# Patient Record
Sex: MALE | Race: WHITE | NOT HISPANIC OR LATINO | Employment: OTHER | URBAN - METROPOLITAN AREA
[De-identification: names, ages, dates, MRNs, and addresses within clinical notes are randomized per-mention and may not be internally consistent; named-entity substitution may affect disease eponyms.]

---

## 2018-03-06 ENCOUNTER — TRANSCRIBE ORDERS (OUTPATIENT)
Dept: LAB | Facility: HOSPITAL | Age: 83
End: 2018-03-06

## 2018-07-14 ENCOUNTER — HOSPITAL ENCOUNTER (EMERGENCY)
Facility: HOSPITAL | Age: 83
Discharge: HOME | End: 2018-07-14
Attending: INTERNAL MEDICINE
Payer: MEDICARE

## 2018-07-14 VITALS
OXYGEN SATURATION: 94 % | HEIGHT: 68 IN | RESPIRATION RATE: 18 BRPM | DIASTOLIC BLOOD PRESSURE: 62 MMHG | HEART RATE: 76 BPM | BODY MASS INDEX: 21.22 KG/M2 | TEMPERATURE: 98.3 F | WEIGHT: 140 LBS | SYSTOLIC BLOOD PRESSURE: 126 MMHG

## 2018-07-14 DIAGNOSIS — T83.9XXA FOLEY CATHETER PROBLEM, INITIAL ENCOUNTER (HCC): Primary | ICD-10-CM

## 2018-07-14 DIAGNOSIS — N32.89 BLADDER SPASM: ICD-10-CM

## 2018-07-14 PROCEDURE — 87186 SC STD MICRODIL/AGAR DIL: CPT | Performed by: INTERNAL MEDICINE

## 2018-07-14 PROCEDURE — 87077 CULTURE AEROBIC IDENTIFY: CPT | Performed by: INTERNAL MEDICINE

## 2018-07-14 PROCEDURE — 99283 EMERGENCY DEPT VISIT LOW MDM: CPT

## 2018-07-14 PROCEDURE — 87086 URINE CULTURE/COLONY COUNT: CPT | Performed by: INTERNAL MEDICINE

## 2018-07-14 RX ORDER — GABAPENTIN 100 MG/1
100 CAPSULE ORAL 3 TIMES DAILY
Status: ON HOLD | COMMUNITY
End: 2020-02-27 | Stop reason: SDUPTHER

## 2018-07-14 RX ORDER — LEVOFLOXACIN 500 MG/1
500 TABLET, FILM COATED ORAL ONCE
Status: COMPLETED | OUTPATIENT
Start: 2018-07-14 | End: 2018-07-14

## 2018-07-14 RX ORDER — FUROSEMIDE 40 MG/1
40 TABLET ORAL 2 TIMES DAILY
Status: ON HOLD | COMMUNITY
End: 2018-10-18

## 2018-07-14 RX ORDER — ASPIRIN 81 MG/1
81 TABLET ORAL DAILY
Status: ON HOLD | COMMUNITY
End: 2020-02-27 | Stop reason: SDUPTHER

## 2018-07-14 RX ORDER — ATROPA BELLADONNA AND OPIUM 16.2; 6 MG/1; MG/1
1 SUPPOSITORY RECTAL EVERY 6 HOURS PRN
Status: DISCONTINUED | OUTPATIENT
Start: 2018-07-14 | End: 2018-07-14 | Stop reason: HOSPADM

## 2018-07-14 RX ORDER — TRAMADOL HYDROCHLORIDE 50 MG/1
50 TABLET ORAL EVERY 6 HOURS PRN
COMMUNITY
End: 2020-02-03 | Stop reason: HOSPADM

## 2018-07-14 RX ORDER — OXYBUTYNIN CHLORIDE 5 MG/1
15 TABLET ORAL DAILY
Status: ON HOLD | COMMUNITY
End: 2020-02-27 | Stop reason: SDUPTHER

## 2018-07-14 RX ORDER — INSULIN GLARGINE 100 [IU]/ML
20 INJECTION, SOLUTION SUBCUTANEOUS DAILY
Status: ON HOLD | COMMUNITY
End: 2020-02-27 | Stop reason: SDUPTHER

## 2018-07-14 RX ADMIN — LEVOFLOXACIN 500 MG: 500 TABLET, FILM COATED ORAL at 19:07

## 2018-07-14 RX ADMIN — ATROPA BELLADONNA AND OPIUM 1 SUPPOSITORY: 16.2; 6 SUPPOSITORY RECTAL at 19:26

## 2018-07-14 NOTE — DISCHARGE INSTRUCTIONS
Lucia Catheter Placement and Care   AMBULATORY CARE:   A Lucia catheter  is a sterile tube that is inserted into your bladder to drain urine  It is also called an indwelling urinary catheter  The tip of the catheter has a small balloon filled with solution that holds the catheter in your bladder  How a Lucia catheter is placed:   · Your healthcare provider will clean your genital area with a sterile solution  He or she will put lubricant jelly on the catheter to help it go in smoothly  The end of the catheter with the deflated balloon will be inserted into your urethra  The catheter will be moved slowly and gently into your bladder  You may be asked to take slow, deep breaths or to push as if you were trying to urinate as the catheter is inserted  · When your healthcare provider sees urine flowing from the catheter, he or she will fill the balloon at the end of the catheter  The balloon holds the catheter in place so it does not come out  Your healthcare provider will attach the open end of the catheter to a sterile drainage bag  Seek care immediately if:   · Your catheter comes out  · You suddenly have material that looks like sand in the tubing or drainage bag  · No urine is draining into the bag and you have checked the system  · You have pain in your hip, back, pelvis, or lower abdomen  · You are confused or cannot think clearly  Contact your healthcare provider if:   · You have a fever  · You have bladder spasms for more than 1 day after the catheter is placed  · You see blood in the tubing or drainage bag  · You have a rash or itching where the catheter tube is secured to your skin  · Urine leaks from or around the catheter, tubing, or drainage bag  · The closed drainage system has accidently come open or apart  · You see a layer of crystals inside the tubing  · You have questions or concerns about your condition or care    Care for your Lucia catheter: · Clean your genital area 2 times every day  Clean your catheter and the area around where it was inserted  Use soap and water  Clean your anal opening and catheter area after every bowel movement  · Secure the catheter tube  so you do not pull or move the catheter  This helps prevent pain and bladder spasms  Healthcare providers will show you how to use medical tape or a strap to secure the catheter tube to your body  · Keep a closed drainage system  Your Lucia catheter should always be attached to the drainage bag to form a closed system  Do not disconnect any part of the closed system unless you need to change the bag  Care for your drainage bag:   · Ask if a leg bag is right for you  A leg bag can be worn under your clothes  Ask your healthcare provider for more information about a leg bag  · Keep the drainage bag below the level of your waist   This helps stop urine from moving back up the tubing and into your bladder  Do not loop or kink the tubing  This can cause urine to back up and collect in your bladder  Do not let the drainage bag touch or lie on the floor  · Empty the drainage bag when needed  The weight of a full drainage bag can be painful  Empty the drainage bag every 3 to 6 hours or when it is ? full  · Clean and change the drainage bag as directed  Ask your healthcare provider how often you should change the drainage bag and what cleaning solution to use  Wear disposable gloves when you change the bag  Do not allow the end of the catheter or tubing to touch anything  Clean the ends with an alcohol pad before you reconnect them  What to do if problems develop:   · No urine is draining into the bag:      ¨ Check for kinks in the tubing and straighten them out  ¨ Check the tape or strap used to secure the catheter tube to your skin  Make sure it is not blocking the tube  ¨ Make sure you are not sitting or lying on the tubing      ¨ Make sure the urine bag is hanging below the level of your waist     · Urine leaks from or around the catheter, tubing, or drainage bag:  Check if the closed drainage system has accidently come open or apart  Clean the catheter and tubing ends with a new alcohol pad and reconnect them  Prevent an infection:   · Wash your hands often  Wash before and after you touch your catheter, tubing, or drainage bag  Use soap and water  Wear clean disposable gloves when you care for your catheter or disconnect the drainage bag  Wash your hands before you prepare or eat food  · Drink liquids as directed  Ask your healthcare provider how much liquid to drink each day and which liquids are best for you  Liquids will help flush your kidneys and bladder to help prevent infection  Follow up with your healthcare provider as directed:  Write down your questions so you remember to ask them during your visits  © 2017 [employer] Information is for End User's use only and may not be sold, redistributed or otherwise used for commercial purposes  All illustrations and images included in CareNotes® are the copyrighted property of [employer]  or [employer]  The above information is an  only  It is not intended as medical advice for individual conditions or treatments  Talk to your doctor, nurse or pharmacist before following any medical regimen to see if it is safe and effective for you

## 2018-07-14 NOTE — ED PROVIDER NOTES
History  Chief Complaint   Patient presents with    Bladder Spasms     pt has suprapubic catherter has been having spasm x few weeks  Dr Viola Estrada aware  Has been flushing frequently, returns clear  A 80year-old male with chronic Lucia has been intermittently flexion and lateral last 24 hr   Is a increasing number bladder spasms today presents to the emergency room for further evaluation  He denies fever chills or flank pain associated with this  He was to get his Lucia catheter changed this upcoming week  Other wife cares for his Lucia regularly  Prior to Admission Medications   Prescriptions Last Dose Informant Patient Reported? Taking?   aspirin (ECOTRIN LOW STRENGTH) 81 mg EC tablet   Yes Yes   Sig: Take 81 mg by mouth daily   fluticasone-salmeterol (ADVAIR) 250-50 mcg/dose inhaler   Yes Yes   Sig: Inhale 1 puff 2 (two) times a day Rinse mouth after use  furosemide (LASIX) 40 mg tablet   Yes Yes   Sig: Take 40 mg by mouth 2 (two) times a day   gabapentin (NEURONTIN) 100 mg capsule   Yes Yes   Sig: Take 100 mg by mouth 3 (three) times a day   insulin glargine (LANTUS) 100 units/mL subcutaneous injection   Yes Yes   Sig: Inject 15 Units under the skin daily   oxybutynin (DITROPAN) 5 mg tablet   Yes Yes   Sig: Take 15 mg by mouth 3 (three) times a day   traMADol (ULTRAM) 50 mg tablet   Yes Yes   Sig: Take 50 mg by mouth every 6 (six) hours as needed for moderate pain      Facility-Administered Medications: None       Past Medical History:   Diagnosis Date    [facility]     Diabetes mellitus ([facility] )     Renal disorder     Stroke [facility])        Past Surgical History:   Procedure Laterality Date    SUPRAPUBIC TUBE PLACEMENT         No family history on file  I have reviewed and agree with the history as documented      Social History   Substance Use Topics    Smoking status: Current Every Day Smoker     Packs/day: 1 00    Smokeless tobacco: Never Used   [place] Alcohol use No        Review of Systems   Constitutional: Negative for chills and fever  HENT: Negative for rhinorrhea and sore throat  Eyes: Negative for visual disturbance  Respiratory: Negative for cough and shortness of breath  Cardiovascular: Negative for chest pain and leg swelling  Gastrointestinal: Negative for abdominal pain, diarrhea, nausea and vomiting  Genitourinary: Positive for dysuria  Having bladder spasms  Much debris in the Lucia catheter  Musculoskeletal: Negative for back pain and myalgias  Skin: Negative for rash  Neurological: Negative for dizziness and headaches  Psychiatric/Behavioral: Negative for confusion  All other systems reviewed and are negative  Physical Exam  Physical Exam   Constitutional: He is oriented to person, place, and time  Appears chronically ill   HENT:   Nose: Nose normal    Mouth/Throat: Oropharynx is clear and moist  No oropharyngeal exudate  Eyes: Conjunctivae and EOM are normal  Pupils are equal, round, and reactive to light  No scleral icterus  Neck: Normal range of motion  Neck supple  No JVD present  No tracheal deviation present  Cardiovascular: Normal rate, regular rhythm and normal heart sounds  No murmur heard  Pulmonary/Chest: Effort normal  No respiratory distress  He has no rales  Diminished breath sounds bilaterally with increased AP diameter and mild wheeze diffusely   Abdominal: Soft  Bowel sounds are normal  There is no tenderness  There is no guarding  Genitourinary:   Genitourinary Comments: Lucia catheter in place   Musculoskeletal: Normal range of motion  He exhibits no edema or tenderness  Neurological: He is alert and oriented to person, place, and time  No cranial nerve deficit or sensory deficit  He exhibits normal muscle tone  5/5 motor, nl sens   Skin: Skin is warm and dry  Psychiatric: He has a normal mood and affect   His behavior is normal    Nursing note and vitals reviewed  Vital Signs  ED Triage Vitals   Temperature Pulse Respirations Blood Pressure SpO2   07/14/18 1650 07/14/18 1650 07/14/18 1650 07/14/18 1650 07/14/18 1650   98 3 °F (36 8 °C) 78 20 120/60 93 %      Temp Source Heart Rate Source Patient Position - Orthostatic VS BP Location FiO2 (%)   07/14/18 1650 07/14/18 1650 -- 07/14/18 1650 --   Temporal Monitor  Left arm       Pain Score       07/14/18 1648       No Pain           Vitals:    07/14/18 1650   BP: 120/60   Pulse: 78       Visual Acuity      ED Medications  Medications - No data to display    Diagnostic Studies  Results Reviewed     None                 No orders to display              Procedures  Procedures       Phone Contacts  ED Phone Contact    ED Course  ED Course as of Jul 14 1932   Sat Jul 14, 2018 1928 Suprapubic catheter replaced by me , tolerated well                                [facility]  CritCare Time    Disposition  Final diagnoses:   None     ED Disposition     None      Follow-up Information    None         Patient's Medications   Discharge Prescriptions    No medications on file     No discharge procedures on file      ED Provider  Electronically Signed by           Lyndsey Gonsalves,   07/14/18 [address] [place], DO  07/14/18 [address] [place], DO  07/14/18 1940

## 2018-07-15 NOTE — ED NOTES
Please disregard the time of D/C vital signs, they were taken at 2015 on 7/14/18 as pt was discharged at 2018        Bridgette Ahn RN  07/14/18 0802

## 2018-07-17 ENCOUNTER — TELEPHONE (OUTPATIENT)
Dept: EMERGENCY DEPT | Facility: HOSPITAL | Age: 83
End: 2018-07-17

## 2018-07-17 LAB
BACTERIA UR CULT: ABNORMAL
BACTERIA UR CULT: ABNORMAL

## 2018-08-27 ENCOUNTER — HOSPITAL ENCOUNTER (EMERGENCY)
Facility: HOSPITAL | Age: 83
Discharge: HOME | End: 2018-08-27
Attending: EMERGENCY MEDICINE | Admitting: EMERGENCY MEDICINE
Payer: MEDICARE

## 2018-08-27 VITALS
WEIGHT: 140 LBS | HEIGHT: 68 IN | SYSTOLIC BLOOD PRESSURE: 114 MMHG | OXYGEN SATURATION: 92 % | HEART RATE: 72 BPM | DIASTOLIC BLOOD PRESSURE: 63 MMHG | RESPIRATION RATE: 18 BRPM | BODY MASS INDEX: 21.22 KG/M2

## 2018-08-27 DIAGNOSIS — Z46.6 CATHETER (URINE) CHANGE REQUIRED: Primary | ICD-10-CM

## 2018-08-27 PROCEDURE — 99282 EMERGENCY DEPT VISIT SF MDM: CPT

## 2018-08-28 NOTE — ED PROCEDURE NOTE
PROCEDURE  Bladder Cath  Date/Time: 8/26/2018 10:00 PM  Performed by: Mary Wynne  Authorized by: Kasie Jiménez D     Consent:     Consent obtained:  Verbal and emergent situation  Pre-procedure details:     Procedure purpose:  Therapeutic    Preparation: Patient was prepped and draped in usual sterile fashion    Anesthesia (see MAR for exact dosages): Anesthesia method:  Topical application  Procedure details:     Bladder irrigation: no      Catheter insertion:  Indwelling    Approach: percutaneous      Catheter type: Lucia    Catheter size:  22 Fr    Number of attempts:  1    Successful placement: yes      Urine characteristics:  Clear  Post-procedure details:     Patient tolerance of procedure:   Tolerated well, no immediate complications         Yanely Bruce MD  08/28/18 5700

## 2018-08-28 NOTE — ED PROVIDER NOTES
History  Chief Complaint   Patient presents with    Urinary Catheter Problem     Wife has been attempting to flush since 4pm   Spasms are becoming unmanagable  80YEAR-OLD MALE WITH A HISTORY OF PROSTATE HYPERTROPHY LEADING TO THE PLACEMENT OF A SUPRAPUBIC CATHETER SOME MONTHS AGO PRESENTS TO THE EMERGENCY DEPARTMENT THIS EVENING WITH REPORTED BLOCKAGE OF THE SUPRAPUBIC CATHETER  PATIENT'S WIFE STATES THAT THE CATHETER HAS HAD VERY MINIMAL DRAINAGE TODAY, AND THEY WERE UNABLE TO FLUSH CATHETER  PATIENT DENIES ANY PAIN AT THIS POINT IN TIME  NO REPORTED FEVER OR CHILLS NO COUGH SORE THROAT OR RUNNY NOSE  Prior to Admission Medications   Prescriptions Last Dose Informant Patient Reported? Taking?   aspirin (ECOTRIN LOW STRENGTH) 81 mg EC tablet   Yes Yes   Sig: Take 81 mg by mouth daily   fluticasone-salmeterol (ADVAIR) 250-50 mcg/dose inhaler   Yes Yes   Sig: Inhale 1 puff 2 (two) times a day Rinse mouth after use  furosemide (LASIX) 40 mg tablet   Yes Yes   Sig: Take 40 mg by mouth 2 (two) times a day   gabapentin (NEURONTIN) 100 mg capsule   Yes Yes   Sig: Take 100 mg by mouth 3 (three) times a day   insulin glargine (LANTUS) 100 units/mL subcutaneous injection   Yes Yes   Sig: Inject 15 Units under the skin daily   oxybutynin (DITROPAN) 5 mg tablet   Yes Yes   Sig: Take 15 mg by mouth 3 (three) times a day   traMADol (ULTRAM) 50 mg tablet   Yes Yes   Sig: Take 50 mg by mouth every 6 (six) hours as needed for moderate pain      Facility-Administered Medications: None       Past Medical History:   Diagnosis Date    [facility]chronic obstructive pulmonary disease) ([facility] )     Diabetes mellitus ([facility] )     Renal disorder     Stroke ([facility] )     may 2015       Past Surgical History:   Procedure Laterality Date    BACK SURGERY  2015    SUPRAPUBIC TUBE PLACEMENT         History reviewed  No pertinent family history  I have reviewed and agree with the history as documented      Social History Substance Use Topics    Smoking status: Current Every Day Smoker     Packs/day: 1 00    Smokeless tobacco: Never Used    Alcohol use No        Review of Systems   Constitutional: Negative for chills and fever  HENT: Negative for ear pain, rhinorrhea and sore throat  Eyes: Negative for pain, redness and visual disturbance  Respiratory: Positive for cough and shortness of breath  Cardiovascular: Negative for chest pain and leg swelling  Gastrointestinal: Negative for abdominal pain, diarrhea, nausea and vomiting  Genitourinary: Positive for difficulty urinating  Negative for dysuria, flank pain, frequency and urgency  Musculoskeletal: Negative for back pain, myalgias and neck pain  Skin: Negative for rash  Neurological: Negative for dizziness, weakness, light-headedness and headaches  Hematological: Negative  Psychiatric/Behavioral: Negative for agitation, confusion and suicidal ideas  The patient is not nervous/anxious  All other systems reviewed and are negative  Physical Exam  Physical Exam   Constitutional: He is oriented to person, place, and time  He appears well-developed and well-nourished  HENT:   Nose: Nose normal    Mouth/Throat: Oropharynx is clear and moist  No oropharyngeal exudate  Eyes: Conjunctivae and EOM are normal  Pupils are equal, round, and reactive to light  No scleral icterus  Neck: Normal range of motion  Neck supple  No JVD present  No tracheal deviation present  Cardiovascular: Normal rate, regular rhythm and normal heart sounds  No murmur heard  Pulmonary/Chest: Effort normal and breath sounds normal  No respiratory distress  He has no wheezes  He has no rales  Abdominal: Soft  Bowel sounds are normal  There is no tenderness  There is no guarding  Genitourinary:   Genitourinary Comments: THERE IS A SUPRAPUBIC CATHETER PLACE WITHOUT SPONTANEOUS DRAINAGE   Musculoskeletal: Normal range of motion  He exhibits no edema or tenderness  Neurological: He is alert and oriented to person, place, and time  No cranial nerve deficit or sensory deficit  He exhibits normal muscle tone  5/5 motor, nl sens   Skin: Skin is warm and dry  Psychiatric: He has a normal mood and affect  His behavior is normal    Nursing note and vitals reviewed  Vital Signs  ED Triage Vitals [08/27/18 2047]   Temp Pulse Respirations Blood Pressure SpO2   -- 83 20 113/83 92 %      Temp src Heart Rate Source Patient Position - Orthostatic VS BP Location FiO2 (%)   -- Monitor Sitting Left arm --      Pain Score       Worst Possible Pain           Vitals:    08/27/18 2047   BP: 113/83   Pulse: 83   Patient Position - Orthostatic VS: Sitting       Visual Acuity      ED Medications  Medications - No data to display    Diagnostic Studies  Results Reviewed     None                 No orders to display              Procedures  Procedures       Phone Contacts  ED Phone Contact    ED Course                               MDM  CritCare Time    Disposition  Final diagnoses:   None     ED Disposition     None      Follow-up Information    None         Patient's Medications   Discharge Prescriptions    No medications on file     No discharge procedures on file      ED Provider  Electronically Signed by           Kierra Harris MD  08/28/18 6319

## 2018-08-28 NOTE — DISCHARGE INSTRUCTIONS
How to Care for Your Suprapubic Catheter   WHAT YOU NEED TO KNOW:   A suprapubic catheter is a sterile (germ-free) tube that drains urine out of your bladder  It is inserted through a stoma (created opening) in your abdomen and into the bladder  The catheter has a small balloon filled with solution that holds the catheter inside your bladder  Suprapubic catheters are used when you have problems urinating because of a medical condition  A suprapubic catheter is also called an indwelling urinary catheter  DISCHARGE INSTRUCTIONS:   Catheter problem signs:  Know the signs of problems related to your catheter:  · Lower abdomen pain:  This can be a sign of infection  You may also have pain if the catheter is in the wrong place  · Urine leaking from your stoma or urethra:  Wet clothes or a wet bed may be signs that your catheter is not draining as it should  You may get some leaking of urine from your stoma or urethra if you have bladder spasms  A lot of urine leaking is not normal  The catheter may be blocked or in the wrong position  The drainage tubing may be blocked or kinked  Leaking urine can also be a sign of infection  · No urine draining from the catheter:  No urine drainage for 6 to 8 hours is a sign that your catheter is not working properly  Pain or fullness in you lower abdomen can also be signs of catheter blockage or infection  You may also feel restless  If you have any of these signs, do the following:     ¨ Check to see if the urine tubing is twisted or bent or if you are lying on the catheter or tubing  ¨ Make sure the urine bag and tubing are located below the level of your waist    ¨ Move to a different position  ¨ Contact your healthcare provider immediately if there is still no urine draining or if you continue to have pain or fullness or feel restless  Suprapubic catheter change problems:  Know what to do if you have any of these problems:  · Unplanned catheter change:   Your catheter may accidently fall out or be pulled out  You or a person who helps care for you will need to learn how to put in a new catheter  This must be done right away  Your stoma can start to close up quickly if it does not have a catheter in it  · Old catheter does not come out easily:  Some types of catheter balloons get ridges on them or may hold their inflated shape after the water is removed  You may need a different type of catheter if this happens  A catheter that does not come out easily can damage your stoma and increase your risk for infection  Tell your healthcare provider if you have problems with removing your old catheter  · Not able to insert the new catheter: If you have trouble, cover the stoma with a sterile bandage and call your healthcare provider right away  · New catheter balloon in the wrong place:  A catheter balloon that is in the wrong place may cause pain when you try to fill it  ¨ Not inserted deep enough: This can cause pain and may damage your stoma if the catheter balloon is in the stoma when the balloon is filled  Damage to your stoma can make inserting a new catheter harder or lead to an infection  Insert more of the catheter and try to fill the balloon again  ¨ Inserted too deep:  A catheter that is inserted too far into your bladder can pass into your urethra  This can cause pain and leaking urine when the balloon is filled  In women the end of the new catheter may poke out of the urethra  Your catheter will not drain urine as it should if this happens and may damage your urethra  Pull the catheter back several inches and try to fill the balloon again  Tell your healthcare provider if this happens  Prevent infections:  Urinary catheter-based infections are common and can lead to serious illness and death  Proper care and cleaning of the catheter, the insertion site, and the urine drainage bag can help prevent infection   The following are ways you can help prevent catheter-based infections:  · Drinking liquids:  Adults should drink about 9 to 13 cups of liquid each day  One cup is 8 ounces  Good choices of liquids for most people include water, juice, and milk  Coffee, soup, and fruit may be counted in your daily liquid amount  Ask your caregiver how much liquid you should drink each day  · Good hand hygiene is the best way to prevent infections   Always wash your hands with soap and water before and after you touch your catheter, tubing, or drainage bag  Do this to remove germs on your hands before you touch these items  Do this after you touch these items to remove germs that may have been on them  Wear clean medical gloves when you care for your catheter or disconnect the drainage bag  This will help stop germs from getting into your catheter  Remind anyone who cares for your catheter or drainage system to wash his or her hands  · Ask your healthcare provider when your catheter will be removed or replaced with a new one  Your risk for infection is greater the longer you have a catheter  · Always do proper care and cleaning of the catheter, its insertion site, and the drainage bag  · Keep the catheter drainage system closed  · Keep the catheter tube secured to your skin or leg so that it drains well  Prevent drainage bag problems:   · Use good hand hygiene:  Keep your hands clean and as free of germs as possible  Always wash your hands before and after you touch the catheter or the insertion site  Wear clean medical gloves when you care for your catheter  · Allow gravity drainage and position the drainage bag properly:  Do not loop or kink the tubing so urine can flow out  Keep the drainage bag below the level of your waist  This helps stop urine from moving back up the tubing and into your bladder  · Keep the bag off the floor:  Do not let the drainage bag touch or lie on the floor  · Empty the drainage bag when needed:   The weight of a full drainage bag can pull on and hurt your stoma  Empty the drainage bag every 3 to 6 hours or when it is ½ to ? full  · Clean and change the drainage bag as directed:  Ask your healthcare provider how often you should change the drainage bag  You may buy a special solution to clean the drainage bag, or you may make a solution with household bleach or vinegar and tap water  Wear medical gloves if you must disconnect the tubing  Do not allow the end of the catheter or tubing to touch anything  Clean the ends with a new alcohol pad or as directed before you reconnect them  Prevent catheter and stoma problems:   · Stoma site care:  Clean the skin around your stoma every day or as directed by your healthcare provider  Keep the area clean and dry to prevent skin problems  Look for redness, skin injuries, red spots, drainage, and swelling  Report any skin changes to your healthcare provider  · Know how far to insert your new catheter:  Know how far to insert the new catheter to prevent it from being in the stoma or urethra  Check the catheter position if you have discomfort or pain when you fill the catheter balloon  · Prevent stoma damage or loss of stoma:  Tell your healthcare provider if you have problems removing a catheter  A catheter that does not come out easily can damage your stoma and increases your risk for infection  You may need a different type of catheter  Your stoma can start to close off quickly if you wait longer than 5 to 10 minutes to insert a new one  Make sure you always have enough supplies to be able to change your catheter  Always keep an extra catheter with you  Healthcare providers may be able to save your stoma if you seek care immediately   · Prevent blockage of catheter or tubing:  Signs that your catheter or tubing is blocked or kinked include urine leaking from your stoma or urethra or urine not draining at all  Your risk for infection increases if the tube is blocked  Keep the tubing in a straight line when you hang your drainage bag to prevent it from getting blocked  · Secure your catheter well:  Catheters that are not secured can pull at the insertion site  This causes the stoma to get bigger and urine may start to leak out of the stoma  Make sure the device holding your catheter does not block the tubing  Change how you secure the catheter if you develop an overgrowth of skin at the insertion site  Secure the catheter in a different direction than usual, or secure the drainage bag to your other leg  Take your medicine as directed  Contact your healthcare provider if you think your medicine is not helping or if you have side effects  Tell him of her if you are allergic to any medicine  Keep a list of the medicines, vitamins, and herbs you take  Include the amounts, and when and why you take them  Bring the list or the pill bottles to follow-up visits  Carry your medicine list with you in case of an emergency  Follow up with your healthcare provider as directed:  Write down your questions so you remember to ask them during your visits  Contact your healthcare provider if:   · You have a fever  · You have changes in how your urine looks or smells, or you have blood in your urine  · You have overgrowth of skin at the insertion site that is getting larger  · Urine keeps draining out of the catheter insertion site or from your urethra  · There is less urine than usual or no urine draining into the drainage bag  · The closed drainage system has accidently come open or apart  · Your catheter keeps getting blocked  · Your catheter came out and you have replaced it with a new one  Return to the emergency department if:   · Your catheter becomes blocked and you cannot reach your healthcare provider  · Your catheter comes out and you cannot replace it yourself      · Your insertion site is red, has green or yellow discharge, smells bad, or is bleeding more than usual     · You have pain in your hip, back, pelvis, or lower abdomen  · You are confused or have other changes in the way that you think  © 2017 [employer] Information is for End User's use only and may not be sold, redistributed or otherwise used for commercial purposes  All illustrations and images included in CareNotes® are the copyrighted property of [employer]  or [employer]  The above information is an  only  It is not intended as medical advice for individual conditions or treatments  Talk to your doctor, nurse or pharmacist before following any medical regimen to see if it is safe and effective for you

## 2018-10-16 ENCOUNTER — APPOINTMENT (EMERGENCY)
Dept: RADIOLOGY | Facility: HOSPITAL | Age: 83
DRG: 699 | End: 2018-10-16
Payer: MEDICARE

## 2018-10-16 ENCOUNTER — HOSPITAL ENCOUNTER (INPATIENT)
Facility: HOSPITAL | Age: 83
LOS: 2 days | Discharge: HOME | DRG: 699 | End: 2018-10-18
Attending: EMERGENCY MEDICINE | Admitting: HOSPITALIST
Payer: MEDICARE

## 2018-10-16 DIAGNOSIS — R74.02 ELEVATED SERUM LACTATE DEHYDROGENASE: ICD-10-CM

## 2018-10-16 DIAGNOSIS — I15.0 RENOVASCULAR HYPERTENSION: Chronic | ICD-10-CM

## 2018-10-16 DIAGNOSIS — N39.0 UTI (URINARY TRACT INFECTION): Primary | ICD-10-CM

## 2018-10-16 DIAGNOSIS — A41.9 SEPSIS, DUE TO UNSPECIFIED ORGANISM: ICD-10-CM

## 2018-10-16 DIAGNOSIS — J43.9 PULMONARY EMPHYSEMA, UNSPECIFIED EMPHYSEMA TYPE (HCC): ICD-10-CM

## 2018-10-16 DIAGNOSIS — T83.9XXA URINARY CATHETER COMPLICATION, INITIAL ENCOUNTER (HCC): Chronic | ICD-10-CM

## 2018-10-16 DIAGNOSIS — J44.9 CHRONIC OBSTRUCTIVE PULMONARY DISEASE, UNSPECIFIED COPD TYPE (HCC): Chronic | ICD-10-CM

## 2018-10-16 PROBLEM — E87.2 LACTIC ACIDOSIS: Status: ACTIVE | Noted: 2018-10-16

## 2018-10-16 PROBLEM — N30.01 ACUTE CYSTITIS WITH HEMATURIA: Chronic | Status: ACTIVE | Noted: 2018-10-16

## 2018-10-16 PROBLEM — N30.01 ACUTE CYSTITIS WITH HEMATURIA: Status: ACTIVE | Noted: 2018-10-16

## 2018-10-16 PROBLEM — E87.2 LACTIC ACIDOSIS: Chronic | Status: ACTIVE | Noted: 2018-10-16

## 2018-10-16 PROBLEM — G62.9 NEUROPATHY: Chronic | Status: ACTIVE | Noted: 2018-10-16

## 2018-10-16 PROBLEM — Z72.0 TOBACCO ABUSE: Chronic | Status: ACTIVE | Noted: 2018-10-16

## 2018-10-16 PROBLEM — N28.9 RENAL DISORDER: Chronic | Status: ACTIVE | Noted: 2018-10-16

## 2018-10-16 PROBLEM — I63.9 STROKE (HCC): Chronic | Status: ACTIVE | Noted: 2018-10-16

## 2018-10-16 PROBLEM — E11.9 DIABETES MELLITUS (HCC): Chronic | Status: ACTIVE | Noted: 2018-10-16

## 2018-10-16 LAB
ANION GAP SERPL CALCULATED.3IONS-SCNC: 5 MMOL/L (ref 4–13)
APTT PPP: 31 SECONDS (ref 24–36)
ATRIAL RATE: 94 BPM
BACTERIA UR QL AUTO: ABNORMAL /HPF
BASOPHILS # BLD AUTO: 0 THOUSANDS/ΜL (ref 0–0.1)
BASOPHILS NFR BLD AUTO: 0 % (ref 0–2)
BILIRUB UR QL STRIP: NEGATIVE
BUN SERPL-MCNC: 22 MG/DL (ref 7–25)
CALCIUM SERPL-MCNC: 9.2 MG/DL (ref 8.6–10.5)
CHLORIDE SERPL-SCNC: 95 MMOL/L (ref 98–107)
CK SERPL-CCNC: 118 U/L (ref 30–308)
CLARITY UR: ABNORMAL
CO2 SERPL-SCNC: 34 MMOL/L (ref 21–31)
COLOR UR: YELLOW
CREAT SERPL-MCNC: 1.01 MG/DL (ref 0.7–1.3)
EOSINOPHIL # BLD AUTO: 0 THOUSAND/ΜL (ref 0–0.61)
EOSINOPHIL NFR BLD AUTO: 0 % (ref 0–5)
ERYTHROCYTE [DISTWIDTH] IN BLOOD BY AUTOMATED COUNT: 16.9 % (ref 11.5–14.5)
GFR SERPL CREATININE-BSD FRML MDRD: 68 ML/MIN/1.73SQ M
GLUCOSE SERPL-MCNC: 148 MG/DL (ref 65–99)
GLUCOSE SERPL-MCNC: 179 MG/DL (ref 65–140)
GLUCOSE SERPL-MCNC: 297 MG/DL (ref 65–140)
GLUCOSE UR STRIP-MCNC: NEGATIVE MG/DL
HCT VFR BLD AUTO: 43.5 % (ref 36.5–49.3)
HGB BLD-MCNC: 13.7 G/DL (ref 14–18)
HGB UR QL STRIP.AUTO: ABNORMAL
INR PPP: 1.11 (ref 0.9–1.5)
KETONES UR STRIP-MCNC: NEGATIVE MG/DL
LACTATE SERPL-SCNC: 2.1 MMOL/L (ref 0.5–2)
LEUKOCYTE ESTERASE UR QL STRIP: ABNORMAL
LYMPHOCYTES # BLD AUTO: 0.6 THOUSANDS/ΜL (ref 0.6–4.47)
LYMPHOCYTES NFR BLD AUTO: 8 % (ref 21–51)
MCH RBC QN AUTO: 28 PG (ref 26–34)
MCHC RBC AUTO-ENTMCNC: 31.4 G/DL (ref 31–37)
MCV RBC AUTO: 89 FL (ref 81–99)
MONOCYTES # BLD AUTO: 0.7 THOUSAND/ΜL (ref 0.17–1.22)
MONOCYTES NFR BLD AUTO: 11 % (ref 2–12)
MUCOUS THREADS UR QL AUTO: ABNORMAL
NEUTROPHILS # BLD AUTO: 5.4 THOUSANDS/ΜL (ref 1.4–6.5)
NEUTS SEG NFR BLD AUTO: 80 % (ref 42–75)
NITRITE UR QL STRIP: NEGATIVE
NON-SQ EPI CELLS URNS QL MICRO: ABNORMAL /HPF
NRBC BLD AUTO-RTO: 0 /100 WBCS
P AXIS: 75 DEGREES
PH UR STRIP.AUTO: >=9 [PH] (ref 5–8)
PLATELET # BLD AUTO: 147 THOUSANDS/UL (ref 149–390)
PMV BLD AUTO: 8.8 FL (ref 8.6–11.7)
POTASSIUM SERPL-SCNC: 4.5 MMOL/L (ref 3.5–5.5)
PR INTERVAL: 204 MS
PROT UR STRIP-MCNC: ABNORMAL MG/DL
PROTHROMBIN TIME: 12.9 SECONDS (ref 10.1–12.9)
QRS AXIS: 67 DEGREES
QRSD INTERVAL: 66 MS
QT INTERVAL: 360 MS
QTC INTERVAL: 450 MS
RBC # BLD AUTO: 4.88 MILLION/UL (ref 4.3–5.9)
RBC #/AREA URNS AUTO: ABNORMAL /HPF
SODIUM SERPL-SCNC: 134 MMOL/L (ref 134–143)
SP GR UR STRIP.AUTO: 1.01 (ref 1–1.03)
T WAVE AXIS: 78 DEGREES
TRI-PHOS CRY URNS QL MICRO: ABNORMAL /HPF
TROPONIN I SERPL-MCNC: <0.03 NG/ML
UROBILINOGEN UR QL STRIP.AUTO: 0.2 E.U./DL
VENTRICULAR RATE: 94 BPM
WBC # BLD AUTO: 6.7 THOUSAND/UL (ref 4.8–10.8)
WBC #/AREA URNS AUTO: ABNORMAL /HPF

## 2018-10-16 PROCEDURE — 0T2BX0Z CHANGE DRAINAGE DEVICE IN BLADDER, EXTERNAL APPROACH: ICD-10-PCS | Performed by: EMERGENCY MEDICINE

## 2018-10-16 PROCEDURE — 93010 ELECTROCARDIOGRAM REPORT: CPT | Performed by: INTERNAL MEDICINE

## 2018-10-16 PROCEDURE — 87040 BLOOD CULTURE FOR BACTERIA: CPT | Performed by: EMERGENCY MEDICINE

## 2018-10-16 PROCEDURE — 96365 THER/PROPH/DIAG IV INF INIT: CPT

## 2018-10-16 PROCEDURE — 82550 ASSAY OF CK (CPK): CPT | Performed by: EMERGENCY MEDICINE

## 2018-10-16 PROCEDURE — 94640 AIRWAY INHALATION TREATMENT: CPT

## 2018-10-16 PROCEDURE — 82948 REAGENT STRIP/BLOOD GLUCOSE: CPT

## 2018-10-16 PROCEDURE — 85025 COMPLETE CBC W/AUTO DIFF WBC: CPT | Performed by: EMERGENCY MEDICINE

## 2018-10-16 PROCEDURE — 83605 ASSAY OF LACTIC ACID: CPT | Performed by: EMERGENCY MEDICINE

## 2018-10-16 PROCEDURE — 99223 1ST HOSP IP/OBS HIGH 75: CPT | Performed by: NURSE PRACTITIONER

## 2018-10-16 PROCEDURE — 87077 CULTURE AEROBIC IDENTIFY: CPT | Performed by: EMERGENCY MEDICINE

## 2018-10-16 PROCEDURE — 81003 URINALYSIS AUTO W/O SCOPE: CPT | Performed by: EMERGENCY MEDICINE

## 2018-10-16 PROCEDURE — 85730 THROMBOPLASTIN TIME PARTIAL: CPT | Performed by: EMERGENCY MEDICINE

## 2018-10-16 PROCEDURE — 94664 DEMO&/EVAL PT USE INHALER: CPT

## 2018-10-16 PROCEDURE — 87086 URINE CULTURE/COLONY COUNT: CPT | Performed by: EMERGENCY MEDICINE

## 2018-10-16 PROCEDURE — 84484 ASSAY OF TROPONIN QUANT: CPT | Performed by: EMERGENCY MEDICINE

## 2018-10-16 PROCEDURE — 87186 SC STD MICRODIL/AGAR DIL: CPT | Performed by: EMERGENCY MEDICINE

## 2018-10-16 PROCEDURE — 96375 TX/PRO/DX INJ NEW DRUG ADDON: CPT

## 2018-10-16 PROCEDURE — 81001 URINALYSIS AUTO W/SCOPE: CPT | Performed by: EMERGENCY MEDICINE

## 2018-10-16 PROCEDURE — 93005 ELECTROCARDIOGRAM TRACING: CPT

## 2018-10-16 PROCEDURE — 85610 PROTHROMBIN TIME: CPT | Performed by: EMERGENCY MEDICINE

## 2018-10-16 PROCEDURE — 36415 COLL VENOUS BLD VENIPUNCTURE: CPT | Performed by: EMERGENCY MEDICINE

## 2018-10-16 PROCEDURE — 80048 BASIC METABOLIC PNL TOTAL CA: CPT | Performed by: EMERGENCY MEDICINE

## 2018-10-16 PROCEDURE — 94760 N-INVAS EAR/PLS OXIMETRY 1: CPT

## 2018-10-16 PROCEDURE — 99284 EMERGENCY DEPT VISIT MOD MDM: CPT

## 2018-10-16 PROCEDURE — 71045 X-RAY EXAM CHEST 1 VIEW: CPT

## 2018-10-16 RX ORDER — HEPARIN SODIUM 5000 [USP'U]/ML
5000 INJECTION, SOLUTION INTRAVENOUS; SUBCUTANEOUS EVERY 8 HOURS SCHEDULED
Status: DISCONTINUED | OUTPATIENT
Start: 2018-10-16 | End: 2018-10-18 | Stop reason: HOSPADM

## 2018-10-16 RX ORDER — ALBUTEROL SULFATE 2.5 MG/3ML
5 SOLUTION RESPIRATORY (INHALATION) ONCE
Status: COMPLETED | OUTPATIENT
Start: 2018-10-16 | End: 2018-10-16

## 2018-10-16 RX ORDER — NICOTINE 21 MG/24HR
1 PATCH, TRANSDERMAL 24 HOURS TRANSDERMAL DAILY
Status: DISCONTINUED | OUTPATIENT
Start: 2018-10-16 | End: 2018-10-18 | Stop reason: HOSPADM

## 2018-10-16 RX ORDER — ONDANSETRON 2 MG/ML
4 INJECTION INTRAMUSCULAR; INTRAVENOUS EVERY 6 HOURS PRN
Status: DISCONTINUED | OUTPATIENT
Start: 2018-10-16 | End: 2018-10-18 | Stop reason: HOSPADM

## 2018-10-16 RX ORDER — METHYLPREDNISOLONE SODIUM SUCCINATE 40 MG/ML
40 INJECTION, POWDER, LYOPHILIZED, FOR SOLUTION INTRAMUSCULAR; INTRAVENOUS EVERY 8 HOURS
Status: DISCONTINUED | OUTPATIENT
Start: 2018-10-16 | End: 2018-10-17

## 2018-10-16 RX ORDER — ASPIRIN 81 MG/1
81 TABLET ORAL DAILY
Status: DISCONTINUED | OUTPATIENT
Start: 2018-10-16 | End: 2018-10-18 | Stop reason: HOSPADM

## 2018-10-16 RX ORDER — SODIUM CHLORIDE 9 MG/ML
125 INJECTION, SOLUTION INTRAVENOUS CONTINUOUS
Status: DISCONTINUED | OUTPATIENT
Start: 2018-10-16 | End: 2018-10-17

## 2018-10-16 RX ORDER — SODIUM CHLORIDE FOR INHALATION 0.9 %
3 VIAL, NEBULIZER (ML) INHALATION EVERY 4 HOURS PRN
Status: DISCONTINUED | OUTPATIENT
Start: 2018-10-16 | End: 2018-10-16 | Stop reason: SDUPTHER

## 2018-10-16 RX ORDER — FUROSEMIDE 40 MG/1
40 TABLET ORAL 2 TIMES DAILY
Status: DISCONTINUED | OUTPATIENT
Start: 2018-10-16 | End: 2018-10-17

## 2018-10-16 RX ORDER — INSULIN GLARGINE 100 [IU]/ML
20 INJECTION, SOLUTION SUBCUTANEOUS DAILY
Status: DISCONTINUED | OUTPATIENT
Start: 2018-10-17 | End: 2018-10-17

## 2018-10-16 RX ORDER — GABAPENTIN 100 MG/1
200 CAPSULE ORAL DAILY
Status: DISCONTINUED | OUTPATIENT
Start: 2018-10-17 | End: 2018-10-18 | Stop reason: HOSPADM

## 2018-10-16 RX ORDER — ALBUTEROL SULFATE 2.5 MG/3ML
2.5 SOLUTION RESPIRATORY (INHALATION) EVERY 4 HOURS PRN
Status: DISCONTINUED | OUTPATIENT
Start: 2018-10-16 | End: 2018-10-18

## 2018-10-16 RX ORDER — GABAPENTIN 100 MG/1
100 CAPSULE ORAL 3 TIMES DAILY
Status: DISCONTINUED | OUTPATIENT
Start: 2018-10-16 | End: 2018-10-16

## 2018-10-16 RX ORDER — GABAPENTIN 300 MG/1
300 CAPSULE ORAL DAILY PRN
Status: DISCONTINUED | OUTPATIENT
Start: 2018-10-16 | End: 2018-10-18 | Stop reason: HOSPADM

## 2018-10-16 RX ORDER — FLUTICASONE FUROATE AND VILANTEROL 200; 25 UG/1; UG/1
1 POWDER RESPIRATORY (INHALATION) DAILY
Status: DISCONTINUED | OUTPATIENT
Start: 2018-10-16 | End: 2018-10-18 | Stop reason: HOSPADM

## 2018-10-16 RX ORDER — OXYBUTYNIN CHLORIDE 5 MG/1
15 TABLET ORAL 3 TIMES DAILY
Status: DISCONTINUED | OUTPATIENT
Start: 2018-10-16 | End: 2018-10-16 | Stop reason: DRUGHIGH

## 2018-10-16 RX ORDER — OXYBUTYNIN CHLORIDE 5 MG/1
10 TABLET ORAL 3 TIMES DAILY
Status: DISCONTINUED | OUTPATIENT
Start: 2018-10-16 | End: 2018-10-18 | Stop reason: HOSPADM

## 2018-10-16 RX ORDER — INSULIN GLARGINE 100 [IU]/ML
15 INJECTION, SOLUTION SUBCUTANEOUS DAILY
Status: DISCONTINUED | OUTPATIENT
Start: 2018-10-16 | End: 2018-10-16

## 2018-10-16 RX ORDER — METHYLPREDNISOLONE SODIUM SUCCINATE 125 MG/2ML
125 INJECTION, POWDER, LYOPHILIZED, FOR SOLUTION INTRAMUSCULAR; INTRAVENOUS ONCE
Status: COMPLETED | OUTPATIENT
Start: 2018-10-16 | End: 2018-10-16

## 2018-10-16 RX ORDER — TRAMADOL HYDROCHLORIDE 50 MG/1
50 TABLET ORAL EVERY 6 HOURS PRN
Status: DISCONTINUED | OUTPATIENT
Start: 2018-10-16 | End: 2018-10-18 | Stop reason: HOSPADM

## 2018-10-16 RX ORDER — ACETAMINOPHEN 325 MG/1
650 TABLET ORAL EVERY 6 HOURS PRN
Status: DISCONTINUED | OUTPATIENT
Start: 2018-10-16 | End: 2018-10-18 | Stop reason: HOSPADM

## 2018-10-16 RX ADMIN — METHYLPREDNISOLONE SODIUM SUCCINATE 40 MG: 40 INJECTION, POWDER, FOR SOLUTION INTRAMUSCULAR; INTRAVENOUS at 21:36

## 2018-10-16 RX ADMIN — METHYLPREDNISOLONE SODIUM SUCCINATE 125 MG: 125 INJECTION, POWDER, FOR SOLUTION INTRAMUSCULAR; INTRAVENOUS at 09:50

## 2018-10-16 RX ADMIN — FUROSEMIDE 40 MG: 40 TABLET ORAL at 19:03

## 2018-10-16 RX ADMIN — ALBUTEROL SULFATE 5 MG: 2.5 SOLUTION RESPIRATORY (INHALATION) at 10:07

## 2018-10-16 RX ADMIN — SODIUM CHLORIDE 125 ML/HR: 9 INJECTION, SOLUTION INTRAVENOUS at 21:32

## 2018-10-16 RX ADMIN — SODIUM CHLORIDE 1000 ML: 0.9 INJECTION, SOLUTION INTRAVENOUS at 09:52

## 2018-10-16 RX ADMIN — METHYLPREDNISOLONE SODIUM SUCCINATE 40 MG: 40 INJECTION, POWDER, FOR SOLUTION INTRAMUSCULAR; INTRAVENOUS at 15:13

## 2018-10-16 RX ADMIN — ASPIRIN 81 MG: 81 TABLET, COATED ORAL at 15:14

## 2018-10-16 RX ADMIN — NICOTINE 1 PATCH: 21 PATCH, EXTENDED RELEASE TRANSDERMAL at 15:14

## 2018-10-16 RX ADMIN — OXYBUTYNIN CHLORIDE 10 MG: 5 TABLET ORAL at 21:39

## 2018-10-16 RX ADMIN — CEFTRIAXONE 1000 MG: 1 INJECTION, SOLUTION INTRAVENOUS at 09:53

## 2018-10-16 RX ADMIN — OXYBUTYNIN CHLORIDE 10 MG: 5 TABLET ORAL at 15:13

## 2018-10-16 RX ADMIN — GABAPENTIN 400 MG: 100 CAPSULE ORAL at 21:38

## 2018-10-16 RX ADMIN — SODIUM CHLORIDE 125 ML/HR: 9 INJECTION, SOLUTION INTRAVENOUS at 11:59

## 2018-10-16 RX ADMIN — INSULIN LISPRO 4 UNITS: 100 INJECTION, SOLUTION INTRAVENOUS; SUBCUTANEOUS at 16:55

## 2018-10-16 RX ADMIN — FLUTICASONE FUROATE AND VILANTEROL TRIFENATATE 1 PUFF: 200; 25 POWDER RESPIRATORY (INHALATION) at 15:12

## 2018-10-16 RX ADMIN — INSULIN LISPRO 1 UNITS: 100 INJECTION, SOLUTION INTRAVENOUS; SUBCUTANEOUS at 21:33

## 2018-10-16 RX ADMIN — SODIUM CHLORIDE 1000 ML: 0.9 INJECTION, SOLUTION INTRAVENOUS at 11:17

## 2018-10-16 RX ADMIN — HEPARIN SODIUM 5000 UNITS: 5000 INJECTION INTRAVENOUS; SUBCUTANEOUS at 15:13

## 2018-10-16 RX ADMIN — HEPARIN SODIUM 5000 UNITS: 5000 INJECTION INTRAVENOUS; SUBCUTANEOUS at 21:40

## 2018-10-16 NOTE — SOCIAL WORK
Chart reviewed by case management, [place], he has a finished basement 12 steps down, pt does not need to use the basement, his bedroom is on the first level, there is a bathroom on the 1st and 2nd level, pt does smoke 1pkg cig/day ,the pt has 5 outside steps to climb, will continue  to follow and assess for any additional needs, pt has had hhc in the past, pt will need to be evaluated by pt dept before d/c  , pt may need rehab befero d/c home,CM reviewed d/c planning process including the following: identifying help at home, patient preference for d/c planning needs, availability of treatment team to discuss questions or concerns patient and/or family may have regarding understanding medications and recognizing signs and symptoms once discharged  CM also encouraged patient to follow up with all recommended appointments after discharge  Patient advised of importance for patient and family to participate in managing patients medical well being

## 2018-10-16 NOTE — ASSESSMENT & PLAN NOTE
· New finding with suprapubic catheter obstruction  · Continue IV fluids  · Continue Rocephin  · Monitor urine for discoloration  · Continue Ditropan

## 2018-10-16 NOTE — ED PROCEDURE NOTE
PROCEDURE  Suprapubic Tube  Date/Time: 10/16/2018 3:32 PM  Performed by: Gerhardt Ralph  Authorized by: Gerhardt Ralph     Patient location:  ED  Consent:     Consent obtained:  Verbal    Consent given by:  Patient    Risks discussed:  Infection and pain    Alternatives discussed:  No treatment  Universal protocol:     Procedure explained and questions answered to patient or proxy's satisfaction: yes      Relevant documents present and verified: yes      Patient identity confirmed:  Verbally with patient and arm band  Anesthesia (see MAR for exact dosages): Anesthesia method:  None  Procedure details:     Complexity:  Simple    Aspiration by:  Catheter    Catheter size:  18 Fr    Successful placement: yes      Urine characteristics:  Cloudy  Post-procedure details:     Patient tolerance of procedure:   Tolerated well, no immediate complications         Kimo Hauser MD  10/16/18 0655

## 2018-10-16 NOTE — H&P
H&P- Yolis Ceballos 1935, 80 y o  male MRN: [id number]    Unit/Bed#: -01 Encounter: [id number]    Primary Care Provider: Marlon Gonzalez MD   Date and time admitted to hospital: 10/16/2018  7:59 AM        * Urinary catheter complication [facility])   Assessment & Plan    · Patient has a chronic suprapubic catheter after a back surgery  · Patient's catheter was clogged and was unable to be flushed at home  · Patient initially came to emergency department for this reason  · Catheter required to be changed which was performed by ED physician  · Continue to monitor     Acute cystitis with hematuria   Assessment & Plan    · New finding with suprapubic catheter obstruction  · Continue IV fluids  · Continue Rocephin  · Monitor urine for discoloration  · Continue Ditropan     Lactic acidosis   Assessment & Plan    · Most likely a subsequent finding secondary to blocked urinary catheter  · First value was 2 1, we will recollect and monitor  COPD (chronic obstructive pulmonary disease) ([facility])   Assessment & Plan    · Continue with patient's inhaler  · Continue Solu-Medrol  · Monitor     Diabetes mellitus ([address] )   Assessment & Plan    Lab Results   Component Value Date    HGBA1C 7 6 (H) 03/03/2015       No results for input(s): POCGLU in the last 72 hours  Blood Sugar Average: Last 72 hrs:  ·  Accu-Cheks Q a c  and HS  · Continue Levemir  · Continue lispro with meals  · Add sliding scale for incidental elevation of blood sugar    · Continue to monitor     Neuropathy   Assessment & Plan    · Secondary to diabetes  · Continue with Neurontin     Renovascular hypertension   Assessment & Plan    · Currently stable  · Continue with Lasix as scheduled     Tobacco abuse   Assessment & Plan    · Continue nicotine patch  · Monitor patient           VTE Prophylaxis: Heparin  Code Status: full  POLST: POLST is not applicable to this patient  Discussion with family:need for IV antibiotics and IV fluid    Anticipated Length of Stay:  Patient will be admitted on an Inpatient basis with an anticipated length of stay of  > 2 midnights  Justification for Hospital Stay:   Continue with IV fluids and IV antibiotics    Chief Complaint:     Suprapubic catheter is blocked    History of Present Illness:    Reji Olivier is a 80 y o  male who presents with suprapubic catheter is blocked  Patient presents to the emergency department stating that since last evening he is unable to have any urine at come out of his suprapubic catheter  He states that he has been able to flush it in the past however he is unable to do that now  The patient does have a temperature of 99 1° which she believes is secondary to his suprapubic catheter being blocked  The emergency department physician and did change his suprapubic catheter which she states was blocked and the patient bladder was filled to the point where there was excess urine when the new Lucia catheter was placed back into the suprapubic stoma  The patient also states that he has been having excess wheezing and nonproductive cough  He denies any type of chest pain or extra shortness of breath  Review of Systems:  Review of Systems    Past Medical and Surgical History:   Past Medical History:   Diagnosis Date    [facility] [facility]     Diabetes mellitus ([facility] )     Renal disorder     Stroke ([facility] )     may 2015       Past Surgical History:   Procedure Laterality Date    BACK SURGERY  2015    SUPRAPUBIC TUBE PLACEMENT         Meds/Allergies:  Prior to Admission medications    Medication Sig Start Date End Date Taking? Authorizing Provider   aspirin (ECOTRIN LOW STRENGTH) 81 mg EC tablet Take 81 mg by mouth daily   Yes Historical Provider, MD   fluticasone-salmeterol (ADVAIR) 250-50 mcg/dose inhaler Inhale 1 puff 2 (two) times a day Rinse mouth after use     Yes Historical Provider, MD   furosemide (LASIX) 40 mg tablet Take 40 mg by mouth 2 (two) times a day   Yes Historical Provider, MD   gabapentin (NEURONTIN) 100 mg capsule Take 100 mg by mouth 3 (three) times a day   Yes Historical Provider, MD   insulin glargine (LANTUS) 100 units/mL subcutaneous injection Inject 15 Units under the skin daily   Yes Historical Provider, MD   oxybutynin (DITROPAN) 5 mg tablet Take 15 mg by mouth 3 (three) times a day   Yes Historical Provider, MD   traMADol (ULTRAM) 50 mg tablet Take 50 mg by mouth every 6 (six) hours as needed for moderate pain   Yes Historical Provider, MD     I have reviewed home medications with patient family member  Allergies: No Known Allergies    Social History:  Marital Status: Single   Occupation: retired   Patient Pre-hospital Living Situation: at home  Patient Pre-hospital Level of Mobility: minimal  Patient Pre-hospital Diet Restrictions: diabetic   Substance Use History:     History   Alcohol Use No     History   Smoking Status    Current Every Day Smoker    Packs/day: 1 00   Smokeless Tobacco    Never Used     History   Drug Use No       Family History:  I have reviewed the patients family history    Physical Exam:   Vitals:   Blood Pressure: 108/57 (10/16/18 1145)  Pulse: 86 (10/16/18 1145)  Temperature: 98 2 °F (36 8 °C) (10/16/18 1145)  Temp Source: Tympanic (10/16/18 1145)  Respirations: 18 (10/16/18 1145)  Height: 5' 10" (177 8 cm) (10/16/18 1145)  Weight - Scale: 63 5 kg (140 lb) (10/16/18 1145)  SpO2: 94 % (10/16/18 1145)    Physical Exam   Constitutional: He is oriented to person, place, and time  Vital signs are normal  He appears well-developed and well-nourished  He is cooperative  HENT:   Head: Normocephalic and atraumatic  Nose: Nose normal    Mouth/Throat: Mucous membranes are normal    Eyes: Pupils are equal, round, and reactive to light  Conjunctivae and EOM are normal    Neck: Normal range of motion and full passive range of motion without pain  Neck supple     Cardiovascular: Normal rate, regular rhythm, normal heart sounds and normal pulses  Pulmonary/Chest: Effort normal and breath sounds normal    Abdominal: Normal appearance and bowel sounds are normal        Genitourinary:   Genitourinary Comments: Suprapubic catheter   Musculoskeletal:   Generalized weakness, lower extremity neuropathy   Neurological: He is alert and oriented to person, place, and time  Skin: Skin is warm and dry  Psychiatric: He has a normal mood and affect  His speech is normal and behavior is normal        Additional Data:   Lab Results: I have personally reviewed pertinent reports  Results from last 7 days  Lab Units 10/16/18  0928   WBC Thousand/uL 6 70   HEMOGLOBIN g/dL 13 7*   HEMATOCRIT % 43 5   PLATELETS Thousands/uL 147*   NEUTROS PCT % 80*   LYMPHS PCT % 8*   MONOS PCT % 11   EOS PCT % 0       Results from last 7 days  Lab Units 10/16/18  0928   SODIUM mmol/L 134   POTASSIUM mmol/L 4 5   CHLORIDE mmol/L 95*   CO2 mmol/L 34*   BUN mg/dL 22   CREATININE mg/dL 1 01   CALCIUM mg/dL 9 2       Results from last 7 days  Lab Units 10/16/18  0928   INR  1 11               Imaging: I have personally reviewed pertinent reports  XR chest 1 view portable   Final Result by Mary Mckinnon MD (08/94 2653)   COPD  Active disease              Signed by Luis Fernando Mendez MD          EKG, Pathology, and Other Studies Reviewed on Admission:   · EKG: N/A    NetAccess/Epic Records Reviewed: Yes     ** Please Note: This note has been constructed using a voice recognition system   **

## 2018-10-16 NOTE — ED PROVIDER NOTES
History  Chief Complaint   Patient presents with    Urinary Catheter Problem     started last night  Will not flush     80 yr male here with blocked urinary catheter since last night and suprapubic pain   +gen weakness, poor po intake, sob/wheeze x 2 days  No CP, +nonprod cough, no f/c   +cloudy urine in bag from suprapubic catheter site  No focal numbness weakness  No n/v/d        History provided by:  Patient  Urinary Catheter Problem   Onset quality:  Gradual  Duration:  2 days  Associated symptoms: abdominal pain, cough, fatigue, nausea, shortness of breath and wheezing    Associated symptoms: no chest pain, no diarrhea, no fever, no headaches, no myalgias, no rash, no rhinorrhea, no sore throat and no vomiting        Prior to Admission Medications   Prescriptions Last Dose Informant Patient Reported? Taking?   aspirin (ECOTRIN LOW STRENGTH) 81 mg EC tablet   Yes Yes   Sig: Take 81 mg by mouth daily   fluticasone-salmeterol (ADVAIR) 250-50 mcg/dose inhaler   Yes Yes   Sig: Inhale 1 puff 2 (two) times a day Rinse mouth after use     furosemide (LASIX) 40 mg tablet   Yes Yes   Sig: Take 40 mg by mouth 2 (two) times a day   gabapentin (NEURONTIN) 100 mg capsule   Yes Yes   Sig: Take 100 mg by mouth 3 (three) times a day   insulin glargine (LANTUS) 100 units/mL subcutaneous injection   Yes Yes   Sig: Inject 15 Units under the skin daily   oxybutynin (DITROPAN) 5 mg tablet   Yes Yes   Sig: Take 15 mg by mouth 3 (three) times a day   traMADol (ULTRAM) 50 mg tablet   Yes Yes   Sig: Take 50 mg by mouth every 6 (six) hours as needed for moderate pain      Facility-Administered Medications: None       Past Medical History:   Diagnosis Date    COPD [facility]chronic obstructive [facility] disease) [facility] [address] [facility]     Diabetes mellitus ([facility] [address] [facility]     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[facility] [address] )     Renal disorder     Stroke [facility])     may 2015       Past Surgical History:   Procedure Laterality Date    ANGIOPLASTY  03/01/2016    Right femoral vein    BACK SURGERY  2015    SUPRAPUBIC TUBE PLACEMENT         History reviewed  No pertinent family history  I have reviewed and agree with the history as documented  Social History   Substance Use Topics    Smoking status: Current Every Day Smoker     Packs/day: 1 00    Smokeless tobacco: Never Used    Alcohol use No        Review of Systems   Constitutional: Positive for fatigue  Negative for chills and fever  Generalized weakness   HENT: Negative for rhinorrhea and sore throat  Eyes: Negative for visual disturbance  Respiratory: Positive for cough, shortness of breath and wheezing  Cardiovascular: Negative for chest pain and leg swelling  Gastrointestinal: Positive for abdominal pain and nausea  Negative for diarrhea and vomiting  Genitourinary: Negative for hematuria  Suprapubic catheter blocked   Musculoskeletal: Negative for back pain and myalgias  Skin: Negative for rash  Neurological: Negative for dizziness, weakness, numbness and headaches  Psychiatric/Behavioral: Negative for confusion  All other systems reviewed and are negative  Physical Exam  Physical Exam   Constitutional: He is oriented to person, place, and time  Thin male in mild distress   HENT:   Nose: Nose normal    Mouth/Throat: No oropharyngeal exudate  mucous membranes are dry   Eyes: Pupils are equal, round, and reactive to light  Conjunctivae and EOM are normal  No scleral icterus  Neck: Normal range of motion  Neck supple  No JVD present  No tracheal deviation present  Cardiovascular: Normal rate, regular rhythm and normal heart sounds  No murmur heard  Pulmonary/Chest: Effort normal  No respiratory distress  He has wheezes (Positive decreased breath sounds with expiratory wheeze bilaterally)  He has no rales  Abdominal: Soft  Bowel sounds are normal  He exhibits distension (Mild bladder distention)   There is tenderness (Mild suprapubic tenderness with suprapubic catheter in place)  There is no guarding  Musculoskeletal: Normal range of motion  He exhibits no edema or tenderness  Neurological: He is alert and oriented to person, place, and time  No cranial nerve deficit or sensory deficit  He exhibits normal muscle tone  5/5 motor, nl sens   Skin: Skin is warm and dry  Psychiatric: He has a normal mood and affect  His behavior is normal    Nursing note and vitals reviewed        Vital Signs  ED Triage Vitals   Temperature Pulse Respirations Blood Pressure SpO2   10/16/18 0804 10/16/18 0804 10/16/18 0804 10/16/18 0804 10/16/18 0804   99 1 °F (37 3 °C) 81 (!) 24 133/79 93 %      Temp Source Heart Rate Source Patient Position - Orthostatic VS BP Location FiO2 (%)   10/16/18 0804 10/16/18 1524 10/16/18 1145 10/16/18 1141 --   Temporal Monitor Lying Left arm       Pain Score       10/16/18 0804       Worst Possible Pain           Vitals:    10/16/18 1030 10/16/18 1141 10/16/18 1145 10/16/18 1524   BP:  111/58 108/57 130/78   Pulse: 77  86 80   Patient Position - Orthostatic VS:   Lying Lying       Visual Acuity      ED Medications  Medications   sodium chloride 0 9 % infusion (125 mL/hr Intravenous Not Given 10/16/18 1402)   aspirin (ECOTRIN LOW STRENGTH) EC tablet 81 mg (81 mg Oral Given 10/16/18 1514)   fluticasone-vilanterol (BREO ELLIPTA) 200-25 MCG/INH inhaler 1 puff (1 puff Inhalation Given 10/16/18 1512)   furosemide (LASIX) tablet 40 mg (40 mg Oral Not Given 10/16/18 1514)   traMADol (ULTRAM) tablet 50 mg (not administered)   sodium chloride 0 9 % infusion (125 mL/hr Intravenous New Bag 10/16/18 1159)   ondansetron (ZOFRAN) injection 4 mg (not administered)   nicotine (NICODERM CQ) 21 mg/24 hr TD 24 hr patch 1 patch (1 patch Transdermal Medication Applied 10/16/18 1514)   albuterol inhalation solution 2 5 mg (not administered)   methylPREDNISolone sodium succinate (Solu-MEDROL) injection 40 mg (40 mg Intravenous Given 10/16/18 1513)   heparin (porcine) subcutaneous injection 5,000 Units (5,000 Units Subcutaneous Given 10/16/18 1513)   insulin lispro (HumaLOG) 100 units/mL subcutaneous injection 1-6 Units (1 Units Subcutaneous Not Given 10/16/18 1455)   insulin lispro (HumaLOG) 100 units/mL subcutaneous injection 1-5 Units (not administered)   acetaminophen (TYLENOL) tablet 650 mg (not administered)   gabapentin (NEURONTIN) capsule 200 mg (not administered)   gabapentin (NEURONTIN) capsule 400 mg (not administered)   gabapentin (NEURONTIN) capsule 300 mg (not administered)   insulin glargine (LANTUS) subcutaneous injection 20 Units 0 2 mL (not administered)   insulin lispro (HumaLOG) 100 units/mL subcutaneous injection 10 Units (10 Units Subcutaneous Not Given 10/16/18 1515)   oxybutynin (DITROPAN) tablet 10 mg (10 mg Oral Given 10/16/18 1513)   cefTRIAXone (ROCEPHIN) IVPB (premix) 1,000 mg (not administered)   sodium chloride 0 9 % bolus 1,000 mL (1,000 mL Intravenous New Bag 10/16/18 0952)   ceftriaxone (ROCEPHIN) 1 g/50 mL in dextrose IVPB (0 mg Intravenous Stopped 10/16/18 1037)   albuterol inhalation solution 5 mg (5 mg Nebulization Given 10/16/18 1007)   methylPREDNISolone sodium succinate (Solu-MEDROL) injection 125 mg (125 mg Intravenous Given 10/16/18 0950)   sodium chloride 0 9 % bolus 1,000 mL (1,000 mL Intravenous New Bag 10/16/18 1117)       Diagnostic Studies  Results Reviewed     Procedure Component Value Units Date/Time    Lactic acid, plasma [[id number]]     Lab Status:  No result Specimen:  Blood     APTT [[id number]]  (Normal) Collected:  10/16/18 0928    Lab Status:  Final result Specimen:  Blood from Arm, Right Updated:  10/16/18 1035     PTT 31 seconds     Protime-INR [[id number]]  (Normal) Collected:  10/16/18 0928    Lab Status:  Final result Specimen:  Blood from Arm, Right Updated:  10/16/18 1035     Protime 12 9 seconds      INR 1 11    Lactic acid, plasma [[id number]]  (Abnormal) Collected:  10/16/18 0928    Lab Status:  Final result Specimen:  Blood from Arm, Right Updated:  10/16/18 1026     LACTIC ACID 2 1 (HH) mmol/L     Narrative:         Result may be elevated if tourniquet was used during collection  Troponin I [[id number]]  (Normal) Collected:  10/16/18 0928    Lab Status:  Final result Specimen:  Blood from Arm, Right Updated:  10/16/18 1024     Troponin I <0 03 ng/mL     CK Total with Reflex CKMB [[id number]]  (Normal) Collected:  10/16/18 0928    Lab Status:  Final result Specimen:  Blood from Arm, Right Updated:  10/16/18 1024     Total  U/L     Basic metabolic panel [[id number]]  (Abnormal) Collected:  10/16/18 0928    Lab Status:  Final result Specimen:  Blood from Arm, Right Updated:  10/16/18 1023     Sodium 134 mmol/L      Potassium 4 5 mmol/L      Chloride 95 (L) mmol/L      CO2 34 (H) mmol/L      ANION GAP 5 mmol/L      BUN 22 mg/dL      Creatinine 1 01 mg/dL      Glucose 148 (H) mg/dL      Calcium 9 2 mg/dL      eGFR 68 ml/min/1 73sq m     Narrative:         National Kidney Disease Education Program recommendations are as follows:  GFR calculation is accurate only with a steady state creatinine  Chronic Kidney disease less than 60 ml/min/1 73 sq  meters  Kidney failure less than 15 ml/min/1 73 sq  meters  Urine Microscopic [[id number]]  (Abnormal) Collected:  10/16/18 0940    Lab Status:  Final result Specimen:  Urine from Urine, Suprapubic catheter Updated:  10/16/18 1000     RBC, UA 30-50 (A) /hpf      WBC, UA Innumerable (A) /hpf      Epithelial Cells Occasional /hpf      Bacteria, UA Innumerable (A) /hpf      Triplep Phos Lyudmila, UA Occasional /hpf      MUCOUS THREADS Occasional (A)    Urine culture [[id number]] Collected:  10/16/18 0940    Lab Status:   In process Specimen:  Urine from Urine, Suprapubic catheter Updated:  10/16/18 1000    UA w Reflex to Microscopic w Reflex to Culture [[id number]]  (Abnormal) Collected:  10/16/18 0940    Lab Status:  Final result Specimen:  Urine from Urine, Suprapubic catheter Updated:  10/16/18 0959     Color, UA Yellow Clarity, UA Turbid (A)     Specific Gravity, UA 1 010     pH, UA >=9 0 (H)     Leukocytes, UA 2+ (A)     Nitrite, UA Negative     Protein, UA 3+ (A) mg/dl      Glucose, UA Negative mg/dl      Ketones, UA Negative mg/dl      Urobilinogen, UA 0 2 E U /dl      Bilirubin, UA Negative     Blood, UA 3+ (A)    CBC and differential [[id number]]  (Abnormal) Collected:  10/16/18 0928    Lab Status:  Final result Specimen:  Blood from Arm, Right Updated:  10/16/18 0949     WBC 6 70 Thousand/uL      RBC 4 88 Million/uL      Hemoglobin 13 7 (L) g/dL      Hematocrit 43 5 %      MCV 89 fL      MCH 28 0 pg      MCHC 31 4 g/dL      RDW 16 9 (H) %      MPV 8 8 fL      Platelets 991 (L) Thousands/uL      nRBC 0 /100 WBCs      Neutrophils Relative 80 (H) %      Lymphocytes Relative 8 (L) %      Monocytes Relative 11 %      Eosinophils Relative 0 %      Basophils Relative 0 %      Neutrophils Absolute 5 40 Thousands/µL      Lymphocytes Absolute 0 60 Thousands/µL      Monocytes Absolute 0 70 Thousand/µL      Eosinophils Absolute 0 00 Thousand/µL      Basophils Absolute 0 00 Thousands/µL     Blood culture #2 [[id number]] Collected:  10/16/18 0928    Lab Status: In process Specimen:  Blood from Arm, Right Updated:  10/16/18 0941    Blood culture #1 [[id number]] Collected:  10/16/18 0928    Lab Status: In process Specimen:  Blood from Arm, Right Updated:  10/16/18 0941                 XR chest 1 view portable   Final Result by Ellen Kanner, MD (75/33 1904)   COPD  Active disease                    Signed by Ady Gibson MD                 Procedures  Procedures       Phone Contacts  ED Phone Contact    ED Course  ED Course as of Oct 16 1528   Tue Oct 16, 2018   0920 Suprapubic catheter removed with large amount of urine draining from site - catheter replaced by me under sterile conditions    0953 EKG - NSR 95bpm, nl QRS, NSSTTW changes, PAC's    1112 Pt's lactate 2 1, clinically dry and then urine indicating UTI - IV rocephin and total of 30ml/kg IVF and repeat lactate ordered  Will admit for IV Abx, along with COPD exacerbation    1120 D/w Vic Marquez - admitting PA - accept to med surg                                [facility]  CritCare Time    Disposition  Final diagnoses:   UTI (urinary tract infection)   Elevated serum lactate dehydrogenase   Pulmonary emphysema, unspecified emphysema type ([facility] [address] )   Sepsis, due to unspecified organism [facility])     Time reflects when diagnosis was documented in both MDM as applicable and the Disposition within this note     Time User Action Codes Description Comment    10/16/2018 11:16 AM Joellyn Grumet A Add [N39 0] UTI (urinary tract infection)     10/16/2018 11:17 AM Joellyn Grumet A Add [R74 0] Elevated serum lactate dehydrogenase     10/16/2018 11:17 AM Joellyn Grumet A Add [J43 9] Pulmonary emphysema, unspecified [facility] type [facility]     10/16/2018 11:18 AM Joellyn Grumet A Add [A41 9] Sepsis, due to unspecified organism [facility])       ED Disposition     ED Disposition Condition Comment    Admit  Case was discussed with Gay Putnam - admitting PA and the patient's admission status was agreed to be Admission Status: inpatient status to the service of Dr Kay Nunez   Follow-up Information    None         Current Discharge Medication List      CONTINUE these medications which have NOT CHANGED    Details   aspirin (ECOTRIN LOW STRENGTH) 81 mg EC tablet Take 81 mg by mouth daily      fluticasone-salmeterol (ADVAIR) 250-50 mcg/dose inhaler Inhale 1 puff 2 (two) times a day Rinse mouth after use        furosemide (LASIX) 40 mg tablet Take 40 mg by mouth 2 (two) times a day      gabapentin (NEURONTIN) 100 mg capsule Take 100 mg by mouth 3 (three) times a day      insulin glargine (LANTUS) 100 units/mL subcutaneous injection Inject 15 Units under the skin daily      oxybutynin (DITROPAN) 5 mg tablet Take 15 mg by mouth 3 (three) times a day      traMADol (ULTRAM) 50 mg tablet Take 50 mg by mouth every 6 (six) hours as needed for moderate pain           No discharge procedures on file      ED Provider  Electronically Signed by           Isma Mondragon MD  10/16/18 2025 [facility] Bo Sams MD  10/16/18 7750

## 2018-10-16 NOTE — ASSESSMENT & PLAN NOTE
· Most likely a subsequent finding secondary to blocked urinary catheter  · First value was 2 1, we will recollect and monitor

## 2018-10-16 NOTE — PLAN OF CARE
Problem: DISCHARGE PLANNING - CARE MANAGEMENT  Goal: Discharge to post-acute care or home with appropriate resources  INTERVENTIONS:  - Conduct assessment to determine patient/family and health care team treatment goals, and need for post-acute services based on payer coverage, community resources, and patient preferences, and barriers to discharge  - Address psychosocial, clinical, and financial barriers to discharge as identified in assessment in conjunction with the patient/family and health care team  - Arrange appropriate level of post-acute services according to patient's   needs and preference and payer coverage in collaboration with the physician and health care team  - Communicate with and update the patient/family, physician, and health care team regarding progress on the discharge plan  - Arrange appropriate transportation to post-acute venues      D/c home with hhc vs snf vs aru  Outcome: Progressing

## 2018-10-16 NOTE — ASSESSMENT & PLAN NOTE
Lab Results   Component Value Date    HGBA1C 7 6 (H) 03/03/2015       No results for input(s): POCGLU in the last 72 hours  Blood Sugar Average: Last 72 hrs:  ·  Accu-Cheks Q a c  and HS  · Continue Levemir  · Continue lispro with meals  · Add sliding scale for incidental elevation of blood sugar    · Continue to monitor

## 2018-10-16 NOTE — ASSESSMENT & PLAN NOTE
· Patient has a chronic suprapubic catheter after a back surgery  · Patient's catheter was clogged and was unable to be flushed at home  · Patient initially came to emergency department for this reason  · Catheter required to be changed which was performed by ED physician  · Continue to monitor

## 2018-10-17 LAB
ALBUMIN SERPL BCP-MCNC: 3.1 G/DL (ref 3.5–5.7)
ALP SERPL-CCNC: 68 U/L (ref 55–165)
ALT SERPL W P-5'-P-CCNC: 8 U/L (ref 7–52)
ANION GAP SERPL CALCULATED.3IONS-SCNC: 7 MMOL/L (ref 4–13)
AST SERPL W P-5'-P-CCNC: 14 U/L (ref 13–39)
BILIRUB SERPL-MCNC: 0.4 MG/DL (ref 0.2–1)
BUN SERPL-MCNC: 18 MG/DL (ref 7–25)
CALCIUM SERPL-MCNC: 8.3 MG/DL (ref 8.6–10.5)
CHLORIDE SERPL-SCNC: 102 MMOL/L (ref 98–107)
CO2 SERPL-SCNC: 29 MMOL/L (ref 21–31)
CREAT SERPL-MCNC: 0.66 MG/DL (ref 0.7–1.3)
ERYTHROCYTE [DISTWIDTH] IN BLOOD BY AUTOMATED COUNT: 17.4 % (ref 11.5–14.5)
EST. AVERAGE GLUCOSE BLD GHB EST-MCNC: 157 MG/DL
GFR SERPL CREATININE-BSD FRML MDRD: 89 ML/MIN/1.73SQ M
GLUCOSE SERPL-MCNC: 146 MG/DL (ref 65–140)
GLUCOSE SERPL-MCNC: 170 MG/DL (ref 65–99)
GLUCOSE SERPL-MCNC: 173 MG/DL (ref 65–140)
GLUCOSE SERPL-MCNC: 212 MG/DL (ref 65–140)
GLUCOSE SERPL-MCNC: 63 MG/DL (ref 65–140)
HBA1C MFR BLD: 7.1 % (ref 4.2–6.3)
HCT VFR BLD AUTO: 37.8 % (ref 36.5–49.3)
HGB BLD-MCNC: 11.8 G/DL (ref 14–18)
LACTATE SERPL-SCNC: 0.7 MMOL/L (ref 0.5–2)
LYMPHOCYTES # BLD AUTO: 0.41 THOUSAND/UL (ref 0.6–4.47)
LYMPHOCYTES # BLD AUTO: 6 % (ref 20–51)
MAGNESIUM SERPL-MCNC: 1.8 MG/DL (ref 1.9–2.7)
MCH RBC QN AUTO: 27.9 PG (ref 26–34)
MCHC RBC AUTO-ENTMCNC: 31.2 G/DL (ref 31–37)
MCV RBC AUTO: 89 FL (ref 81–99)
MONOCYTES # BLD AUTO: 0.14 THOUSAND/UL (ref 0–1.22)
MONOCYTES NFR BLD AUTO: 2 % (ref 4–12)
NEUTS BAND NFR BLD MANUAL: 2 % (ref 0–8)
NEUTS SEG # BLD: 6.26 THOUSAND/UL (ref 1.81–6.82)
NEUTS SEG NFR BLD AUTO: 90 % (ref 42–75)
NRBC BLD AUTO-RTO: 0 /100 WBCS
PLATELET # BLD AUTO: 125 THOUSANDS/UL (ref 149–390)
PLATELET BLD QL SMEAR: ABNORMAL
PMV BLD AUTO: 9.3 FL (ref 8.6–11.7)
POTASSIUM SERPL-SCNC: 3.9 MMOL/L (ref 3.5–5.5)
PROT SERPL-MCNC: 5.5 G/DL (ref 6.4–8.9)
RBC # BLD AUTO: 4.24 MILLION/UL (ref 4.3–5.9)
RBC MORPH BLD: ABNORMAL
SODIUM SERPL-SCNC: 138 MMOL/L (ref 134–143)
TOTAL CELLS COUNTED SPEC: 100
WBC # BLD AUTO: 6.8 THOUSAND/UL (ref 4.8–10.8)

## 2018-10-17 PROCEDURE — 83605 ASSAY OF LACTIC ACID: CPT | Performed by: EMERGENCY MEDICINE

## 2018-10-17 PROCEDURE — 80053 COMPREHEN METABOLIC PANEL: CPT | Performed by: NURSE PRACTITIONER

## 2018-10-17 PROCEDURE — G8979 MOBILITY GOAL STATUS: HCPCS

## 2018-10-17 PROCEDURE — 97163 PT EVAL HIGH COMPLEX 45 MIN: CPT

## 2018-10-17 PROCEDURE — 85027 COMPLETE CBC AUTOMATED: CPT | Performed by: NURSE PRACTITIONER

## 2018-10-17 PROCEDURE — 99232 SBSQ HOSP IP/OBS MODERATE 35: CPT | Performed by: INTERNAL MEDICINE

## 2018-10-17 PROCEDURE — 82948 REAGENT STRIP/BLOOD GLUCOSE: CPT

## 2018-10-17 PROCEDURE — 97530 THERAPEUTIC ACTIVITIES: CPT

## 2018-10-17 PROCEDURE — 83735 ASSAY OF MAGNESIUM: CPT | Performed by: NURSE PRACTITIONER

## 2018-10-17 PROCEDURE — G8978 MOBILITY CURRENT STATUS: HCPCS

## 2018-10-17 PROCEDURE — 85007 BL SMEAR W/DIFF WBC COUNT: CPT | Performed by: NURSE PRACTITIONER

## 2018-10-17 PROCEDURE — 83036 HEMOGLOBIN GLYCOSYLATED A1C: CPT | Performed by: NURSE PRACTITIONER

## 2018-10-17 RX ORDER — INSULIN GLARGINE 100 [IU]/ML
15 INJECTION, SOLUTION SUBCUTANEOUS DAILY
Status: DISCONTINUED | OUTPATIENT
Start: 2018-10-18 | End: 2018-10-18 | Stop reason: HOSPADM

## 2018-10-17 RX ADMIN — INSULIN GLARGINE 20 UNITS: 100 INJECTION, SOLUTION SUBCUTANEOUS at 08:25

## 2018-10-17 RX ADMIN — METHYLPREDNISOLONE SODIUM SUCCINATE 40 MG: 40 INJECTION, POWDER, FOR SOLUTION INTRAMUSCULAR; INTRAVENOUS at 11:49

## 2018-10-17 RX ADMIN — INSULIN LISPRO 10 UNITS: 100 INJECTION, SOLUTION INTRAVENOUS; SUBCUTANEOUS at 08:25

## 2018-10-17 RX ADMIN — INSULIN LISPRO 10 UNITS: 100 INJECTION, SOLUTION INTRAVENOUS; SUBCUTANEOUS at 11:50

## 2018-10-17 RX ADMIN — SODIUM CHLORIDE 125 ML/HR: 9 INJECTION, SOLUTION INTRAVENOUS at 05:26

## 2018-10-17 RX ADMIN — INSULIN LISPRO 1 UNITS: 100 INJECTION, SOLUTION INTRAVENOUS; SUBCUTANEOUS at 22:47

## 2018-10-17 RX ADMIN — OXYBUTYNIN CHLORIDE 10 MG: 5 TABLET ORAL at 22:44

## 2018-10-17 RX ADMIN — OXYBUTYNIN CHLORIDE 10 MG: 5 TABLET ORAL at 08:23

## 2018-10-17 RX ADMIN — SODIUM CHLORIDE 125 ML/HR: 9 INJECTION, SOLUTION INTRAVENOUS at 14:40

## 2018-10-17 RX ADMIN — ASPIRIN 81 MG: 81 TABLET, COATED ORAL at 08:24

## 2018-10-17 RX ADMIN — CEFEPIME HYDROCHLORIDE 2000 MG: 2 INJECTION, SOLUTION INTRAVENOUS at 17:36

## 2018-10-17 RX ADMIN — HEPARIN SODIUM 5000 UNITS: 5000 INJECTION INTRAVENOUS; SUBCUTANEOUS at 14:40

## 2018-10-17 RX ADMIN — INSULIN LISPRO 1 UNITS: 100 INJECTION, SOLUTION INTRAVENOUS; SUBCUTANEOUS at 08:24

## 2018-10-17 RX ADMIN — OXYBUTYNIN CHLORIDE 10 MG: 5 TABLET ORAL at 15:27

## 2018-10-17 RX ADMIN — NICOTINE 1 PATCH: 21 PATCH, EXTENDED RELEASE TRANSDERMAL at 08:26

## 2018-10-17 RX ADMIN — FLUTICASONE FUROATE AND VILANTEROL TRIFENATATE 1 PUFF: 200; 25 POWDER RESPIRATORY (INHALATION) at 08:24

## 2018-10-17 RX ADMIN — HEPARIN SODIUM 5000 UNITS: 5000 INJECTION INTRAVENOUS; SUBCUTANEOUS at 05:11

## 2018-10-17 RX ADMIN — GABAPENTIN 200 MG: 100 CAPSULE ORAL at 08:24

## 2018-10-17 RX ADMIN — HEPARIN SODIUM 5000 UNITS: 5000 INJECTION INTRAVENOUS; SUBCUTANEOUS at 22:46

## 2018-10-17 RX ADMIN — GABAPENTIN 400 MG: 100 CAPSULE ORAL at 22:45

## 2018-10-17 RX ADMIN — METHYLPREDNISOLONE SODIUM SUCCINATE 40 MG: 40 INJECTION, POWDER, FOR SOLUTION INTRAMUSCULAR; INTRAVENOUS at 04:54

## 2018-10-17 RX ADMIN — CEFTRIAXONE 1000 MG: 1 INJECTION, SOLUTION INTRAVENOUS at 10:18

## 2018-10-17 NOTE — SOCIAL WORK
Chart reviewed by case management, I met with the pt and his wife to discuss d/c plan, I made them aware that pt dept felt the pt needs snf, the pt does not want aru and he wants to go home with outpt therapy, I explained that if he does not improve with therapy that this may not be a good d/c plan, thye did agree, they did give me choices  For snf as a "back up " plan, referrals were sent as requested to [address] [place] [address], [place] and [facility], d/c plan was discussed at the d/c planning meeting , will continue to follow and assess for any additional d/c needs

## 2018-10-17 NOTE — PROGRESS NOTES
Progress Note - Yolis Ceballos 1935, 80 y o  male MRN: [id number]    Unit/Bed#: -01 Encounter: [id number]    Primary Care Provider: Marlon Gonzalez MD   Date and time admitted to hospital: 10/16/2018  7:59 AM        * Urinary catheter complication [facility])   Assessment & Plan    · Patient had catheter change in the ER  · Appears to have resolved as patient is pain-free at this time and catheter is draining appropriately  · Urine culture with Pseudomonas however likely a contaminant  · Will continue cefepime for now and monitor  · Patient is afebrile with no leukocytosis     COPD (chronic obstructive pulmonary disease) ([facility] [address] )   Assessment & Plan    · Does not appear to be in any acute exacerbation  · Will taper steroids  · Continue home medications     Renovascular hypertension   Assessment & Plan    · BP has been on the lower side  · Will hold Lasix for now and monitor     Lactic acidosis   Assessment & Plan    · Resolved     Diabetes mellitus ([facility] [address] )   Assessment & Plan    · Accu-Cheks Q a c  and HS  · Continue Levemir  · Continue lispro with meals  · sliding scale for incidental elevation of blood sugar  · Continue to monitor         VTE Pharmacologic Prophylaxis: Pharmacologic: Heparin    Patient Centered Rounds: I have performed bedside rounds with nursing staff today  Discussions with Specialists or Other Care Team Provider: yes  Education and Discussions with Family / Patient: yes    Time Spent for Care: 30 minutes  More than 50% of total time spent on counseling and coordination of care as described above  Current Length of Stay: 1 day(s)    Current Patient Status: Inpatient   Certification Statement: The patient will continue to require additional inpatient hospital stay due to COPD and suspected UTI    Discharge Plan:   Pending hospital course    Code Status: Level 1 - Full Code    Subjective:   Still with mild shortness of breath today  No problems with his urinary catheter    No abdominal pain  No fever or chills  Objective:     Vitals:   Temp (24hrs), Av 3 °F (36 3 °C), Min:96 7 °F (35 9 °C), Max:97 6 °F (36 4 °C)    Temp:  [96 7 °F (35 9 °C)-97 6 °F (36 4 °C)] 97 5 °F (36 4 °C)  HR:  [53-70] 62  Resp:  [18-22] 18  BP: (93-98)/(50-60) 94/50  SpO2:  [93 %-94 %] 94 %  Body mass index is 20 09 kg/m²  Input and Output Summary (last 24 hours): Intake/Output Summary (Last 24 hours) at 10/17/18 1734  Last data filed at 10/17/18 1701   Gross per 24 hour   Intake           3733 5 ml   Output              500 ml   Net           3233 5 ml       Physical Exam:     Physical Exam   Constitutional: He is oriented to person, place, and time  No distress  Frail elderly male   HENT:   Head: Normocephalic and atraumatic  Eyes: Conjunctivae and EOM are normal    Neck: Normal range of motion  Neck supple  Cardiovascular: Normal rate and regular rhythm  Pulmonary/Chest: Effort normal  No respiratory distress  Mild expiratory wheeze bilaterally   Abdominal: Soft  He exhibits no distension  There is no tenderness  Musculoskeletal: Normal range of motion  He exhibits no edema  Neurological: He is alert and oriented to person, place, and time  Skin: Skin is warm and dry  Psychiatric: He has a normal mood and affect         Additional Data:     Labs:      Results from last 7 days  Lab Units 10/17/18  0500 10/16/18  0928   WBC Thousand/uL 6 80 6 70   HEMOGLOBIN g/dL 11 8* 13 7*   HEMATOCRIT % 37 8 43 5   PLATELETS Thousands/uL 125* 147*   NEUTROS PCT %  --  80*   LYMPHS PCT %  --  8*   LYMPHO PCT % 6*  --    MONOS PCT %  --  11   MONO PCT MAN % 2*  --    EOS PCT %  --  0       Results from last 7 days  Lab Units 10/17/18  0500   SODIUM mmol/L 138   POTASSIUM mmol/L 3 9   CHLORIDE mmol/L 102   CO2 mmol/L 29   BUN mg/dL 18   CREATININE mg/dL 0 66*   CALCIUM mg/dL 8 3*   ALK PHOS U/L 68   ALT U/L 8   AST U/L 14       Results from last 7 days  Lab Units 10/16/18  0928   INR  1 11 Results from last 7 days  Lab Units 10/17/18  1528 10/17/18  1149 10/17/18  0620 10/16/18  2021 10/16/18  1614   POC GLUCOSE mg/dl 63* 146* 173* 179* 297*       Results from last 7 days  Lab Units 10/17/18  0510   HEMOGLOBIN A1C % 7 1*       * I Have Reviewed All Lab Data Listed Above  * Additional Pertinent Lab Tests Reviewed: [address] Admission  Reviewed    Imaging:  Imaging Reports Reviewed Today Include:   No new imaging    Recent Cultures (last 7 days):       Results from last 7 days  Lab Units 10/16/18  0940 10/16/18  0928   BLOOD CULTURE   --  No Growth at 24 hrs  No Growth at 24 hrs     URINE CULTURE  >100,000 cfu/ml Pseudomonas aeruginosa*  --        Last 24 Hours Medication List:     Current Facility-Administered Medications:  acetaminophen 650 mg Oral Q6H PRN DEBBY Rhodes   albuterol 2 5 mg Nebulization Q4H PRN DEBBY Rhodes   aspirin 81 mg Oral Daily DEBBY Rhodes   cefepime 2,000 mg Intravenous Q12H Be Belle MD   fluticasone-vilanterol 1 puff Inhalation Daily DEBBY Rhodes   gabapentin 200 mg Oral Daily DEBBY Rhodes   gabapentin 300 mg Oral Daily PRN DEBBY Rhodes   gabapentin 400 mg Oral HS DEBBY Rhodes   heparin (porcine) 5,000 Units Subcutaneous Q8H [address] DEBBY Rhodes   [START ON 10/18/2018] insulin glargine 15 Units Subcutaneous Daily Be Belle MD   insulin lispro 1-5 Units Subcutaneous HS DEBBY Rhodes   insulin lispro 1-6 Units Subcutaneous TID AC DEBBY Rhodes   [START ON 10/18/2018] insulin lispro 5 Units Subcutaneous TID With Meals Be Belle MD   nicotine 1 patch Transdermal Daily DEBBY Rhodes   ondansetron 4 mg Intravenous Q6H PRN DEBBY Rhodes   oxybutynin 10 mg Oral TID DEBBY Rhodes   [START ON 10/18/2018] predniSONE 30 mg Oral Daily Be Belle MD   traMADol 50 mg Oral Q6H PRN DEBBY Jose        Today, Patient Was Seen By: Junior Marshall MD    ** Please Note: Dictation voice to text software may have been used in the creation of this document   **

## 2018-10-17 NOTE — OCCUPATIONAL THERAPY NOTE
Occupational Therapy Evaluation      Liset Ayon    10/17/2018    Patient Active Problem List   Diagnosis    Urinary catheter complication (HCC)    [facility]    Diabetes mellitus ([facility]    [facility]    Renal disorder    Acute cystitis with hematuria    Lactic acidosis    Neuropathy    Renovascular hypertension    Tobacco abuse       Past Medical History:   Diagnosis Date    [facility]chronic obstructive [facility] disease) [facility]     Diabetes mellitus (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[facility] )     Renal disorder     Stroke ([facility] )     may 2015       Past Surgical History:   Procedure Laterality Date    ANGIOPLASTY  03/01/2016    Right femoral vein    BACK SURGERY  2015    SUPRAPUBIC TUBE PLACEMENT        10/16/18 1554   Note Type   Note type Eval/Treat   Restrictions/Precautions   Weight Bearing Precautions Per Order No   Pain Assessment   Pain Assessment No/denies pain   Pain Score No Pain   Home Living   Type of Home House   Home Layout Stairs to enter with rails; Two level  (12 [address] (outside), Bathroom each level, raised ranch)   [address] Walker;Cane   Additional Comments uses cane usually , walker at times/outside of home   Prior Function   Level of [place] Independent with ADLs and functional mobility   Lives With Jordyn Help From Family   ADL Assistance Independent   IADLs Needs assistance   Falls in the last 6 months 1 to 4  (one fall this past week )   Vocational Retired   Comments Spouse provides transportation   Psychosocial   Psychosocial (WDL) [address]  7  Independent   Grooming Assistance 7  Independent   Grooming Deficit Setup   [address] (more difficult)   LB Dressing Assistance Unable to assess   Bed Mobility   Rolling R 4  Minimal assistance   Additional items Bedrails   Rolling L 4  Minimal assistance   Additional items Bedrails   Supine to Sit 4  Minimal assistance   Additional items Assist x 1;HOB elevated;Verbal cues; Increased time required   Sit to Supine 4  Minimal assistance   Additional items Assist x 1; Increased time required;Verbal cues   Balance   Static Sitting Good   Dynamic Sitting Fair   Activity Tolerance   Activity Tolerance Patient limited by fatigue   Nurse Made Aware yes   RUE Assessment   RUE Assessment WFL   LUE Assessment   LUE Assessment WFL   Hand Function   Gross Motor Coordination Functional   Fine Motor Coordination Functional   Cognition   Overall Cognitive Status WFL   Arousal/Participation Alert; Cooperative   Attention Within functional limits   Orientation Level Oriented X4   Memory Within functional limits   Following Commands Follows one step commands without difficulty   Assessment   Limitation Decreased ADL status; Decreased endurance;Decreased self-care trans;Decreased Safe judgement during ADL   Prognosis Good   Assessment Pt is a 80 y o  male seen for OT evaluation s/p admit to [address] on 10/16/2018 w/ Urinary catheter complication ([facility] )  Comorbidities affecting pt's functional performance at time of assessment include: DM, COPD, neuropathy and Stroke, Acute cyctitis with hematuria, Renal D/O  Personal factors affecting pt at time of IE include:steps to enter environment and difficulty performing ADLS  Prior to admission, pt was I with self care ADL's, MI to ambulate [facility]), MI transfers/toileting  Upon evaluation: Pt Darnell Crate increased level of assistance to complete self care ADL's (LB>UB), functional transfers/toileting and mobility r/t the following deficits impacting occupational performance: weakness, decreased tolerance and decreased safety awareness  Pt to benefit from continued skilled OT tx while in the hospital to address deficits as defined above and maximize level of functional independence w ADL's and functional mobility   Occupational Performance areas to address include: bathing/shower, dressing and functional mobility  From OT standpoint, recommendation at time of d/c would be depending upon progress during hospitalization  Goals   Patient Goals feel better, go home   STG Time Frame 3-5   Short Term Goal #1 increase pt knowledge re: adaptations to increase ease of LB self care, to good   Short Term Goal #2 increase bed mobility to MI, to prepare for self care ADL's   LTG Time Frame 7-10   Long Term Goal #1 increase self toileting to MI, with good safety demonstrated   Long Term Goal #2 pt will be able to participate in functional tasks in room X 5-7', for safe d/c home   ADL Goals   Pt Will Perform Grooming With setup   Pt Will Perform Bathing With mod indep   Pt Will Perform UE Dressing With mod indep   Pt Will Perform LE Dressing With mod indep   Pt Will Perform Toileting With mod indep   Plan   Treatment Interventions Energy conservation; ADL retraining;Functional transfer training; Endurance training;Patient/family training; Activityengagement   Goal Expiration Date 10/26/18   Treatment Day 0   OT Frequency 3-5x/wk   Recommendation   OT Discharge Recommendation Short Term Rehab   OT - OK to Discharge No   Barthel Index   Feeding 10   Bathing 0   Grooming Score 0   Dressing Score 5   Bladder Score 0   Bowels Score 10   Toilet Use Score 5   Transfers (Bed/Chair) Score 5   Mobility (Level Surface) Score 0   Stairs Score 0   Barthel Index Score 35   Rehana De Los Santos, OT

## 2018-10-17 NOTE — PROGRESS NOTES
Patient suprapubic catheter draining clear yellow urine  Patient denies pain, offers no complaints at this time  Call bell and belongings within reach, will continue to monitor   e

## 2018-10-17 NOTE — ASSESSMENT & PLAN NOTE
· Patient had catheter change in the ER  · Appears to have resolved as patient is pain-free at this time and catheter is draining appropriately  · Urine culture with Pseudomonas however likely a contaminant  · Will continue cefepime for now and monitor  · Patient is afebrile with no leukocytosis

## 2018-10-17 NOTE — NURSING NOTE
Received Pt from [facility]  Occupational Therapy is in with him at this time  Samson Lindsay His wife is at bedside  No needs identified  Will continue to monitor, call bell in reach

## 2018-10-17 NOTE — UTILIZATION REVIEW
Initial Clinical Review    Admission: Date/Time/Statement: 10/16/18 @ 1120 INPATIENT    Orders Placed This Encounter   Procedures    Inpatient Admission (expected length of stay for this patient is greater than two midnights)     Standing Status:   Standing     Number of Occurrences:   1     Order Specific Question:   Admitting Physician     Answer:   Ambar Lewis [[id number]]     Order Specific Question:   Level of Care     Answer:   Med Surg [[id number]]     Order Specific Question:   Estimated length of stay     Answer:   More than 2 Midnights     Order Specific Question:   Certification     Answer:   I certify that inpatient services are medically necessary for this patient for a duration of greater than two midnights  See H&P and MD Progress Notes for additional information about the patient's course of treatment  ED: Date/Time/Mode of Arrival:   ED Arrival Information     Expected Arrival Acuity Means of Arrival Escorted By Service Admission Type    - 10/16/2018 07:31 Urgent Walk-In Spouse General Medicine Urgent    Arrival Complaint    blocked cath        Chief Complaint:   Chief Complaint   Patient presents with    Urinary Catheter Problem     started last night  Will not flush       History of Illness: Rowena Haley is a 80 y o  male who presents with suprapubic catheter is blocked  Patient presents to the emergency department stating that since last evening he is unable to have any urine come out of his suprapubic catheter  He states that he has been able to flush it in the past however he is unable to do that now    The patient does have a temperature of 99 1° which she believes is secondary to his suprapubic catheter being blocked      The emergency department physician did change his suprapubic catheter which she states was blocked and the patient bladder was filled to the point where there was excess urine when the new Lucia catheter was placed back into the suprapubic stoma      The patient also states that he has been having excess wheezing and nonproductive cough  He denies any type of chest pain or extra shortness of breath    ED Vital Signs:   ED Triage Vitals   Temperature Pulse Respirations Blood Pressure SpO2   10/16/18 0804 10/16/18 0804 10/16/18 0804 10/16/18 0804 10/16/18 0804   99 1 °F (37 3 °C) 81 (!) 24 133/79 93 %      Temp Source Heart Rate Source Patient Position - Orthostatic VS BP Location FiO2 (%)   10/16/18 0804 10/16/18 1524 10/16/18 1145 10/16/18 1141 --   Temporal Monitor Lying Left arm       Pain Score       10/16/18 0804       Worst Possible Pain        Wt Readings from Last 1 Encounters:   10/17/18 63 5 kg (140 lb)     Physical exam: Pulmonary/Chest:  He has wheezes (Positive decreased breath sounds with expiratory wheeze bilaterally)  Abdominal: Soft  He exhibits distension (Mild bladder distention)  There is tenderness (Mild suprapubic tenderness with suprapubic catheter in place)  Vital Signs (abnormal):     Date/Time   Pulse  Resp  BP  SpO2 O2 Device   10/16/18 1524   80   24  130/78  90 % Nasal cannula   10/16/18 1200   --  --  --  -- Nasal cannula   10/16/18 1145   86  18  108/57  94 % Nasal cannula 2L O2   10/16/18 1015   75   26  --  95 % --   10/16/18 1009   --  --  --   87 % Nasal cannula     Abnormal Labs/Diagnostic Test Results:     Chest x-ray; COPD, active disease      Lactic acid = 2 1     10/16/18 0940    Color, UA Yellow, Straw Yellow    Clarity, UA Hazy, Clear Turbid     Specific Gravity, UA >1 005-<1 030 1 010    pH, UA 5 0-<8 0 >=9 0     Leukocytes, UA Negative 2+     Protein, UA Negative, Interference- unable to analyze mg/dl 3+     Blood, UA Negative 3+       ED Treatment:   Medication Administration from 10/16/2018 0731 to 10/16/2018 1147       Date/Time Order Dose Route Action Action by Comments     10/16/2018 0952 sodium chloride 0 9 % bolus 1,000 mL 1,000 mL Intravenous New Maximo Cotton RN      10/16/2018 1037 ceftriaxone (ROCEPHIN) 1 g/50 mL in dextrose IVPB 0 mg Intravenous Stopped Damon Manuel, RN      10/16/2018 0953 ceftriaxone (ROCEPHIN) 1 g/50 mL in dextrose IVPB 1,000 mg Intravenous New Bag Damon Hong RN      10/16/2018 1007 albuterol inhalation solution 5 mg 5 mg Nebulization Given Brit Emily, RT      30/70/0899 9207 methylPREDNISolone sodium succinate (Solu-MEDROL) injection 125 mg 125 mg Intravenous Given [employer], RN      10/16/2018 1117 sodium chloride 0 9 % bolus 1,000 mL 1,000 mL Intravenous New Bag [employer], RN         Past Medical/Surgical History: Active Ambulatory Problems     Diagnosis Date Noted    [facility]roke [facility]) 10/16/2018    Renal disorder 10/16/2018       Past Medical History:   Diagnosis Date    [facility]ronic [facility] disease) [facility]     Diabetes mellitus (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HCC)     Renal disorder     Stroke [facility])        Admitting Diagnosis: UTI (urinary tract infection) [N39 0]  Elevated serum lactate dehydrogenase [R74 0]  Urinary catheter complication ([facility] ) [N72  9XXA]  Sepsis, due to unspecified organism ([facility] ) [A41 9]  Pulmonary emphysema, unspecified emphysema type [facility] [J43 9]    Age/Sex: 80 y o  male    Assessment/Plan:     * Urinary catheter complication ([facility] )   Assessment & Plan     · Patient has a chronic suprapubic catheter after a back surgery  · Patient's catheter was clogged and was unable to be flushed at home  · Patient initially came to emergency department for this reason  · Catheter required to be changed which was performed by ED physician  · Continue to monitor      Acute cystitis with hematuria   Assessment & Plan     · New finding with suprapubic catheter obstruction  · Continue IV fluids  · Continue Rocephin  · Monitor urine for discoloration  · Continue Ditropan      Lactic acidosis   Assessment & Plan     · Most likely a subsequent finding secondary to blocked urinary catheter  · First value was 2 1, we will recollect and monitor       COPD (chronic obstructive pulmonary disease) ([facility])   Assessment & Plan     · Continue with patient's inhaler  · Continue Solu-Medrol  · Monitor      Diabetes mellitus ([address] )   Assessment & Plan             Lab Results   Component Value Date     HGBA1C 7 6 (H) 03/03/2015         No results for input(s): POCGLU in the last 72 hours      Blood Sugar Average: Last 72 hrs:  ·  Accu-Cheks Q a c  and HS  · Continue Levemir  · Continue lispro with meals  · Add sliding scale for incidental elevation of blood sugar  · Continue to monitor      Neuropathy   Assessment & Plan     · Secondary to diabetes  · Continue with Neurontin      Renovascular hypertension   Assessment & Plan     · Currently stable  · Continue with Lasix as scheduled      Tobacco abuse   Assessment & Plan     · Continue nicotine patch  · Monitor patient            VTE Prophylaxis: Heparin  Code Status: full  POLST: POLST is not applicable to this patient  Discussion with family:need for IV antibiotics and IV fluid     Anticipated Length of Stay:  Patient will be admitted on an Inpatient basis with an anticipated length of stay of  > 2 midnights  Justification for Hospital Stay:   Continue with IV fluids and IV antibiotics    Admission Orders: blood and urine cultures, nasal cannula, OOB, continuous cardiac monitoring, sequential compression device, PT/OT eval and treat         Scheduled Meds:   Current Facility-Administered Medications:  acetaminophen 650 mg Oral Q6H PRN   albuterol 2 5 mg Nebulization Q4H PRN   aspirin 81 mg Oral Daily   cefTRIAXone 1,000 mg Intravenous Q24H   fluticasone-vilanterol 1 puff Inhalation Daily   furosemide 40 mg Oral BID   gabapentin 200 mg Oral Daily   gabapentin 300 mg Oral Daily PRN   gabapentin 400 mg Oral HS   heparin (porcine) 5,000 Units Subcutaneous Q8H [address]   insulin glargine 20 Units Subcutaneous Daily   insulin lispro 1-5 Units Subcutaneous HS   insulin lispro 1-6 Units Subcutaneous TID AC insulin lispro 10 Units Subcutaneous TID With Meals   methylPREDNISolone sodium succinate 40 mg Intravenous Q8H   nicotine 1 patch Transdermal Daily   ondansetron 4 mg Intravenous Q6H PRN   oxybutynin 10 mg Oral TID   traMADol 50 mg Oral Q6H PRN     Continuous Infusions:   sodium chloride 125 mL/hr Last Rate: 125 mL/hr (10/17/18 1440)

## 2018-10-17 NOTE — ASSESSMENT & PLAN NOTE
· Accu-Cheks Q a c  and HS  · Continue Levemir  · Continue lispro with meals  · sliding scale for incidental elevation of blood sugar    · Continue to monitor

## 2018-10-17 NOTE — PLAN OF CARE
Problem: PHYSICAL THERAPY ADULT  Goal: Performs mobility at highest level of function for planned discharge setting  See evaluation for individualized goals  Treatment/Interventions: Functional transfer training, LE strengthening/ROM, Therapeutic exercise, Endurance training, Elevations, Patient/family training, Equipment eval/education, Gait training, Spoke to nursing, OT          See flowsheet documentation for full assessment, interventions and recommendations  Prognosis: Good  Problem List: Decreased strength, Decreased endurance, Impaired balance, Decreased mobility, Decreased safety awareness  Assessment: Pt is 80 y o  male seen for PT evaluation on 10/17/2018 s/p admit to [address] [place] [address] on 10/16/2018 w/ Urinary catheter complication ([facility] ); pt presented to ED w/ suprapubic catheter blocked  PT consulted to assess pt's functional mobility and d/c needs  Order placed for PT eval and tx, w/ up and OOB as tolerated order  Performed at least 2 patient identifiers during session: Name and wristband  Comorbidities affecting pt's physical performance at time of assessment include: acute cystitis w/ hematuria, lactic acidosis, COPD, DM, neuropathy, renovascular HTN, tobacco abuse, h/o stroke per chart  PTA, pt was independent w/ all functional mobility w/ SPC vs RW, ambulates community distances and elevations, ambulates household distances, has 12 [address]AN[address], lives w/ spouse in 1 level home (raised ranch) and retired  Personal factors affecting pt at time of IE include: ambulating w/ assistive device, stairs to enter home, inability to navigate level surfaces w/o external assistance, unable to perform dynamic tasks in community, decreased initiation and engagement, limited insight into impairments, inability to perform IADLs and inability to perform ADLs   Please find objective findings from PT assessment regarding body systems outlined above with impairments and limitations including weakness, impaired balance, decreased endurance, decreased activity tolerance, decreased functional mobility tolerance, altered sensation, fall risk and SOB upon exertion, as well as mobility assessment (need for minimal assist w/ all phases of mobility when usually ambulating independently and need for cueing for mobility technique)  The following objective measures performed on IE also reveal limitations: Barthel Index: 40/100 and Modified Elbert: 4 (moderate/severe disability)  Pt's clinical presentation is currently unstable/unpredictable seen in pt's presentation of ongoing medical assessment  Pt to benefit from continued PT tx to address deficits as defined above and maximize level of functional independent mobility and consistency  From PT/mobility standpoint, recommendation at time of d/c would be STR vs  Home PT w/ family support, pending progress in order to facilitate return to PLOF  Barriers to Discharge: Inaccessible home environment     Recommendation: Short-term skilled PT (vs  HHPT, pending progress)     PT - OK to Discharge: No    See flowsheet documentation for full assessment

## 2018-10-17 NOTE — PHYSICAL THERAPY NOTE
Physical Therapy Evaluation     Patient's Name: Sheyla Correa    Admitting Diagnosis  UTI (urinary tract infection) [N39 0]  Elevated serum lactate dehydrogenase [R74 0]  Urinary catheter complication ([place] [address] ) [P18  9XXA]  Sepsis, due to unspecified organism ([place] [address] ) [A41 9]  Pulmonary emphysema, unspecified emphysema type ([place] [address] ) [J43 9]    Problem List  Patient Active Problem List   Diagnosis    Urinary catheter complication ([place] [address] )    COPD (chronic obstructive pulmonary disease) ([place] [address] )    Diabetes mellitus ([place] [address] )    Stroke ([place] [address] )    Renal disorder    Acute cystitis with hematuria    Lactic acidosis    Neuropathy    Renovascular hypertension    Tobacco abuse       Past Medical History  Past Medical History:   Diagnosis Date    COPD (chronic obstructive pulmonary disease) ([place] [address] )     Diabetes mellitus ([place] [address] )     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[place] [address] )     Renal disorder     Stroke ([place] [address] )     may 2015       Past Surgical History  Past Surgical History:   Procedure Laterality Date    ANGIOPLASTY  03/01/2016    Right femoral vein    BACK SURGERY  2015    SUPRAPUBIC TUBE PLACEMENT          10/17/18 0840   Note Type   Note type Eval only   Pain Assessment   Pain Assessment No/denies pain   Pain Score No Pain   Home Living   Type of [address] One level;Performs ADLs on one level; Able to live on main level with bedroom/bathroom;Stairs to enter with rails  ([address]; raised [place])   Bathroom Shower/Tub Tub/shower unit   Bathroom Toilet Standard   Bathroom Equipment Grab bars in shower; Shower chair   [facility]   Additional Comments uses cane usually , walker at times/outside of home   Prior Function   Level of [place] Independent with ADLs and functional mobility   Lives With Spouse  (pt reporting his wife can provide S/A prn)   Receives Help From Family   ADL Assistance Independent   IADLs Needs assistance   Falls in the last 6 months 1 to 4  (one fall this past week )   Vocational Retired   Restrictions/Precautions   [facility] Bearing Precautions Per Order No   Other Precautions O2;Multiple lines; Fall Risk;Bed Alarm  (2 L O2 via NC; suprapubic catheter)   General   Family/Caregiver Present No   Cognition   Overall Cognitive Status WFL   Arousal/Participation Alert   Attention Within functional limits   Orientation Level Oriented X4   Memory Within functional limits   Following Commands Follows one step commands without difficulty   Comments pt agreeable to PT session   RUE Assessment   RUE Assessment WFL   LUE Assessment   LUE Assessment WFL   RLE Assessment   RLE Assessment WFL  (4-/5)   LLE Assessment   LLE Assessment WFL  (4-/5)   Coordination   Movements are Fluid and Coordinated 1   Sensation X  (pt reporting intermittent N/T in feet)   Light Touch   RLE Light Touch Grossly intact   LLE Light Touch Grossly intact   Bed Mobility   Rolling L 4  Minimal assistance   Additional items Assist x 1;HOB elevated; Increased time required   Supine to Sit 4  Minimal assistance   Additional items Assist x 1;HOB elevated; Increased time required;Verbal cues   Sit to Supine 4  Minimal assistance   Additional items Assist x 1; Increased time required;Verbal cues   Transfers   Sit to Stand 4  Minimal assistance   Additional items Assist x 1; Increased time required; Impulsive;Verbal cues   Stand to Sit 4  Minimal assistance   Additional items Assist x 1;Verbal cues  (grossly unsteady w/ all transitional phases)   Ambulation/Elevation   Gait pattern Improper Weight shift; Forward Flexion;Narrow SCAR; Decreased foot clearance; Short stride   Gait Assistance 4  Minimal assist   Additional items Assist x 1;Verbal cues; Tactile cues   Assistive Device Straight cane  (pt's own)   Distance 15' in room   Stair Management Assistance Not tested   Balance   Static Sitting Good   Dynamic Sitting Fair   Static Standing Fair -   Dynamic Standing Poor +   Ambulatory Poor +   Endurance Deficit Endurance Deficit Yes   Activity Tolerance   Activity Tolerance Patient limited by fatigue   Medical Staff Made Aware pt w/ up & OOB as tolerated order   Nurse Made Aware Yes, RN Sandip Tompkins verbalized pt appropriate for PT session   Assessment   Prognosis Good   Problem List Decreased strength;Decreased endurance; Impaired balance;Decreased mobility; Decreased safety awareness   Assessment Pt is 80 y o  male seen for PT evaluation on 10/17/2018 s/p admit to [facility] on 10/16/2018 w/ Urinary catheter complication ([address] ); pt presented to ED w/ suprapubic catheter blocked  PT consulted to assess pt's functional mobility and d/c needs  Order placed for PT eval and tx, w/ up and OOB as tolerated order  Performed at least 2 patient identifiers during session: Name and wristband  Comorbidities affecting pt's physical performance at time of assessment include: acute cystitis w/ hematuria, lactic acidosis, COPD, DM, neuropathy, renovascular HTN, tobacco abuse, h/o stroke per chart  PTA, pt was independent w/ all functional mobility w/ SPC vs RW, ambulates community distances and elevations, ambulates household distances, has 12 [address], lives w/ spouse in 1 level home (raised ranch) and retired  Personal factors affecting pt at time of IE include: ambulating w/ assistive device, stairs to enter home, inability to navigate level surfaces w/o external assistance, unable to perform dynamic tasks in community, decreased initiation and engagement, limited insight into impairments, inability to perform IADLs and inability to perform ADLs   Please find objective findings from PT assessment regarding body systems outlined above with impairments and limitations including weakness, impaired balance, decreased endurance, decreased activity tolerance, decreased functional mobility tolerance, altered sensation, fall risk and SOB upon exertion, as well as mobility assessment (need for minimal assist w/ all phases of mobility when usually ambulating independently and need for cueing for mobility technique)  The following objective measures performed on IE also reveal limitations: Barthel Index: 40/100 and Modified N[place]: 4 (moderate/severe disability)  Pt's clinical presentation is currently unstable/unpredictable seen in pt's presentation of ongoing medical assessment  Pt to benefit from continued PT tx to address deficits as defined above and maximize level of functional independent mobility and consistency  From PT/mobility standpoint, recommendation at time of d/c would be STR vs  Home PT w/ family support, pending progress in order to facilitate return to PLOF  Barriers to Discharge Inaccessible home environment   Goals   Patient Goals "to go home"   STG Expiration Date 10/27/18   Short Term Goal #1 In 7-10 days: Increase bilateral LE strength 1/2 grade to facilitate independent mobility, Perform all bed mobility tasks independently to decrease caregiver burden, Perform all transfers independently to improve independence, Ambulate > 100 ft  with least restrictive assistive device modified independent w/o LOB and w/ normalized gait pattern 100% of the time, Navigate 12 stairs with distant S with unilateral handrail to facilitate return to previous living environment, Increase all balance 1/2 grade to decrease risk for falls, Complete exercise program independently and Tolerate 4 hr OOB to faciliate upright tolerance   Treatment Day 0   Plan   Treatment/Interventions Functional transfer training;LE strengthening/ROM; Therapeutic exercise; Endurance training;Elevations; Patient/family training;Equipment eval/education;Gait training;Spoke to nursing;OT   PT Frequency 5x/wk   Recommendation   Recommendation Short-term skilled PT  (vs  HHPT, pending progress)   PT - OK to Discharge No   Modified [place] Scale   Modified [place] Scale 4   Barthel Index   Feeding 10   Bathing 0   Grooming Score 0   Dressing Score 5   Bladder Score 0   Bowels Score 10   Toilet Use Score 5   Transfers (Bed/Chair) Score 10   Mobility (Level Surface) Score 0   Stairs Score 0   Barthel Index Score 40         Gepp Purchase, PT

## 2018-10-17 NOTE — ASSESSMENT & PLAN NOTE
· Does not appear to be in any acute exacerbation  · Will taper steroids  · Continue home medications

## 2018-10-17 NOTE — PHYSICIAN ADVISOR
Current patient class: Inpatient  The patient is currently on Hospital Day: 2 at [address]      The patient was admitted to the hospital at 1120 on 10/16/18 for the following diagnosis:  UTI (urinary tract infection) [N39 0]  Elevated serum lactate dehydrogenase [R74 0]  Urinary catheter complication ([facility] ) [F08  9XXA]  Sepsis, due to unspecified organism ([facility] ) [A41 9]  Pulmonary emphysema, unspecified emphysema type ([facility] ) [J43 9]       There is documentation in the medical record of an expected length of stay of at least 2 midnights  The patient is therefore expected to satisfy the 2 midnight benchmark and given the 2 midnight presumption is appropriate for INPATIENT ADMISSION  Given this expectation of a satisfying stay, CMS instructs us that the patient is most often appropriate for inpatient admission under part A provided medical necessity is documented in the chart  After review of the relevant documentation, labs, vital signs and test results, the patient is appropriate for INPATIENT ADMISSION  Admission to the hospital as an inpatient is a complex decision making process which requires the practitioner to consider the patients presenting complaint, history and physical examination and all relevant testing  With this in mind, in this case, the patient was deemed appropriate for INPATIENT ADMISSION  After review of the documentation and testing available at the time of the admission I concur with this clinical determination of medical necessity  Rationale is as follows: The patient is a 80 yrs old Male who presented to the ED at 10/16/2018  7:59 AM with a chief complaint of Urinary Catheter Problem (started last night  Will not flush)     Given the need for further hospitalization, and along with the documentation of medical necessity present in the chart, the patient is appropriate for inpatient admission    The patient is expected to satisfy the 2 midnight benchmark, and will require further acute medical care  The patient does have comorbid conditions which increases the risk for significant adverse outcome  Given this the patient is appropriate for inpatient admission        The patients vitals on arrival were ED Triage Vitals   Temperature Pulse Respirations Blood Pressure SpO2   10/16/18 0804 10/16/18 0804 10/16/18 0804 10/16/18 0804 10/16/18 0804   99 1 °F (37 3 °C) 81 (!) 24 133/79 93 %      Temp Source Heart Rate Source Patient Position - Orthostatic VS BP Location FiO2 (%)   10/16/18 0804 10/16/18 1524 10/16/18 1145 10/16/18 1141 --   Temporal Monitor Lying Left arm       Pain Score       10/16/18 0804       Worst Possible Pain           Past Medical History:   Diagnosis Date    COPD (chronic obstructive pulmonary disease) ([facility] )     Diabetes mellitus ([facility] )     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[facility] )     Renal disorder     Stroke ([facility] )     may 2015     Past Surgical History:   Procedure Laterality Date    ANGIOPLASTY  03/01/2016    Right femoral vein    BACK SURGERY  2015    SUPRAPUBIC TUBE PLACEMENT             Consults have been placed to:   IP CONSULT TO CASE MANAGEMENT  IP CONSULT TO INFECTIOUS DISEASES    Vitals:    10/16/18 2310 10/17/18 0600 10/17/18 0708 10/17/18 1457   BP: 93/53  98/60 94/50   BP Location: Left arm  Left arm Right arm   Pulse: 70  (!) 53 62   Resp: 22  20 18   Temp: 97 6 °F (36 4 °C)  (!) 96 7 °F (35 9 °C) 97 5 °F (36 4 °C)   TempSrc: Temporal  Tympanic Tympanic   SpO2: 93%  93% 94%   Weight:  63 5 kg (140 lb)     Height:           Most recent labs:    Recent Labs      10/16/18   0928  10/17/18   0500   WBC  6 70  6 80   HGB  13 7*  11 8*   HCT  43 5  37 8   PLT  147*  125*   K  4 5  3 9   NA  134  138   CALCIUM  9 2  8 3*   BUN  22  18   CREATININE  1 01  0 66*   INR  1 11   --    TROPONINI  <0 03   --    CKTOTAL  118   --    AST   --   14   ALT   --   8   ALKPHOS   --   68       Scheduled Meds:  Current Facility-Administered Medications:  acetaminophen 650 mg Oral Q6H PRN DEBBY Rhodes    albuterol 2 5 mg Nebulization Q4H PRN DEBBY Rhodes    aspirin 81 mg Oral Daily DEBBY Rhodes    cefepime 2,000 mg Intravenous Q12H Taty Villalta MD Last Rate: 2,000 mg (10/17/18 1736)   fluticasone-vilanterol 1 puff Inhalation Daily DEBBY Rhodes    gabapentin 200 mg Oral Daily DEBBY Rhodes    gabapentin 300 mg Oral Daily PRN DEBBY Rhodes    gabapentin 400 mg Oral HS DEBBY Rhodes    heparin (porcine) 5,000 Units Subcutaneous Q8H [facility] DEBBY Rhodes    [START ON 10/18/2018] insulin glargine 15 Units Subcutaneous Daily Taty Villalta MD    insulin lispro 1-5 Units Subcutaneous HS DEBBY Rhodes    insulin lispro 1-6 Units Subcutaneous TID AC DEBBY Rhodes    [START ON 10/18/2018] insulin lispro 5 Units Subcutaneous TID With Meals Taty Villalta MD    nicotine 1 patch Transdermal Daily DEBBY Rhodes    ondansetron 4 mg Intravenous Q6H PRN DEBBY Rhodes    oxybutynin 10 mg Oral TID DEBBY Rhodes    [START ON 10/18/2018] predniSONE 30 mg Oral Daily Taty Villalta MD    traMADol 50 mg Oral Q6H PRN DEBBY Rhodes      Continuous Infusions:   PRN Meds:   acetaminophen    albuterol **AND** [DISCONTINUED] sodium chloride    gabapentin    ondansetron    traMADol    Surgical procedures (if appropriate):

## 2018-10-18 ENCOUNTER — APPOINTMENT (INPATIENT)
Dept: ULTRASOUND IMAGING | Facility: HOSPITAL | Age: 83
DRG: 699 | End: 2018-10-18
Payer: MEDICARE

## 2018-10-18 VITALS
RESPIRATION RATE: 16 BRPM | HEIGHT: 70 IN | HEART RATE: 52 BPM | OXYGEN SATURATION: 93 % | DIASTOLIC BLOOD PRESSURE: 58 MMHG | TEMPERATURE: 96.3 F | SYSTOLIC BLOOD PRESSURE: 126 MMHG | WEIGHT: 140.2 LBS | BODY MASS INDEX: 20.07 KG/M2

## 2018-10-18 LAB
ANION GAP SERPL CALCULATED.3IONS-SCNC: 4 MMOL/L (ref 4–13)
BUN SERPL-MCNC: 23 MG/DL (ref 7–25)
CALCIUM SERPL-MCNC: 8.6 MG/DL (ref 8.6–10.5)
CHLORIDE SERPL-SCNC: 103 MMOL/L (ref 98–107)
CO2 SERPL-SCNC: 31 MMOL/L (ref 21–31)
CREAT SERPL-MCNC: 0.65 MG/DL (ref 0.7–1.3)
ERYTHROCYTE [DISTWIDTH] IN BLOOD BY AUTOMATED COUNT: 17.2 % (ref 11.5–14.5)
GFR SERPL CREATININE-BSD FRML MDRD: 90 ML/MIN/1.73SQ M
GLUCOSE SERPL-MCNC: 109 MG/DL (ref 65–99)
GLUCOSE SERPL-MCNC: 110 MG/DL (ref 65–140)
GLUCOSE SERPL-MCNC: 53 MG/DL (ref 65–140)
GLUCOSE SERPL-MCNC: 84 MG/DL (ref 65–140)
HCT VFR BLD AUTO: 37.9 % (ref 36.5–49.3)
HGB BLD-MCNC: 11.7 G/DL (ref 14–18)
MCH RBC QN AUTO: 27.5 PG (ref 26–34)
MCHC RBC AUTO-ENTMCNC: 31 G/DL (ref 31–37)
MCV RBC AUTO: 89 FL (ref 81–99)
PLATELET # BLD AUTO: 141 THOUSANDS/UL (ref 149–390)
PMV BLD AUTO: 9.4 FL (ref 8.6–11.7)
POTASSIUM SERPL-SCNC: 4.3 MMOL/L (ref 3.5–5.5)
RBC # BLD AUTO: 4.26 MILLION/UL (ref 4.3–5.9)
SODIUM SERPL-SCNC: 138 MMOL/L (ref 134–143)
WBC # BLD AUTO: 17 THOUSAND/UL (ref 4.8–10.8)

## 2018-10-18 PROCEDURE — 97116 GAIT TRAINING THERAPY: CPT

## 2018-10-18 PROCEDURE — 80048 BASIC METABOLIC PNL TOTAL CA: CPT | Performed by: INTERNAL MEDICINE

## 2018-10-18 PROCEDURE — 82948 REAGENT STRIP/BLOOD GLUCOSE: CPT

## 2018-10-18 PROCEDURE — G0427 INPT/ED TELECONSULT70: HCPCS | Performed by: INTERNAL MEDICINE

## 2018-10-18 PROCEDURE — 99239 HOSP IP/OBS DSCHRG MGMT >30: CPT | Performed by: INTERNAL MEDICINE

## 2018-10-18 PROCEDURE — 85027 COMPLETE CBC AUTOMATED: CPT | Performed by: INTERNAL MEDICINE

## 2018-10-18 PROCEDURE — 94760 N-INVAS EAR/PLS OXIMETRY 1: CPT

## 2018-10-18 RX ORDER — ALBUTEROL SULFATE 90 UG/1
2 AEROSOL, METERED RESPIRATORY (INHALATION) EVERY 4 HOURS PRN
Status: DISCONTINUED | OUTPATIENT
Start: 2018-10-18 | End: 2018-10-18 | Stop reason: HOSPADM

## 2018-10-18 RX ORDER — FUROSEMIDE 40 MG/1
40 TABLET ORAL DAILY
Refills: 0
Start: 2018-10-18 | End: 2019-06-06 | Stop reason: HOSPADM

## 2018-10-18 RX ORDER — PREDNISONE 10 MG/1
30 TABLET ORAL DAILY
Qty: 12 TABLET | Refills: 0 | Status: ON HOLD | OUTPATIENT
Start: 2018-10-19 | End: 2019-06-06 | Stop reason: SDUPTHER

## 2018-10-18 RX ADMIN — CEFEPIME HYDROCHLORIDE 2000 MG: 2 INJECTION, SOLUTION INTRAVENOUS at 06:24

## 2018-10-18 RX ADMIN — NICOTINE 1 PATCH: 21 PATCH, EXTENDED RELEASE TRANSDERMAL at 08:39

## 2018-10-18 RX ADMIN — HEPARIN SODIUM 5000 UNITS: 5000 INJECTION INTRAVENOUS; SUBCUTANEOUS at 06:23

## 2018-10-18 RX ADMIN — PREDNISONE 30 MG: 10 TABLET ORAL at 08:39

## 2018-10-18 RX ADMIN — ASPIRIN 81 MG: 81 TABLET, COATED ORAL at 08:39

## 2018-10-18 RX ADMIN — OXYBUTYNIN CHLORIDE 10 MG: 5 TABLET ORAL at 08:39

## 2018-10-18 RX ADMIN — HEPARIN SODIUM 5000 UNITS: 5000 INJECTION INTRAVENOUS; SUBCUTANEOUS at 14:52

## 2018-10-18 RX ADMIN — INSULIN GLARGINE 15 UNITS: 100 INJECTION, SOLUTION SUBCUTANEOUS at 08:40

## 2018-10-18 RX ADMIN — ALBUTEROL SULFATE 2 PUFF: 90 AEROSOL, METERED RESPIRATORY (INHALATION) at 14:52

## 2018-10-18 RX ADMIN — GABAPENTIN 200 MG: 100 CAPSULE ORAL at 08:39

## 2018-10-18 RX ADMIN — FLUTICASONE FUROATE AND VILANTEROL TRIFENATATE 1 PUFF: 200; 25 POWDER RESPIRATORY (INHALATION) at 08:42

## 2018-10-18 NOTE — SOCIAL WORK
Discussed the POC with the interdisciplinary care team   Pt has walked with PT and states he just wants to go for PT as an outpt at [facility] on [address]  Gave pt script for outpt PT  Got an appointment with Dr Tobi Osorio on 10/30/18 10:45  Wife at bedside and made aware of same  Wife will transport home

## 2018-10-18 NOTE — ASSESSMENT & PLAN NOTE
· Does not appear to be in any acute exacerbation  · Will send patient home on short steroid taper  · Continue home medications

## 2018-10-18 NOTE — ASSESSMENT & PLAN NOTE
· Patient had catheter change in the ER  · Appears to have resolved as patient is pain-free at this time and catheter is draining appropriately  · Urine culture with Pseudomonas however likely a contaminant  · No need for antibiotics on discharge per Urology/ID

## 2018-10-18 NOTE — RESPIRATORY THERAPY NOTE
RT Protocol Note  Lenord Claude 80 y o  male MRN: [id number]  Unit/Bed#: -01 Encounter: [id number]    Assessment    Principal Problem:    Urinary catheter complication ([place] [address] )  Active Problems:    COPD (chronic obstructive pulmonary disease) ([facility] )    Diabetes mellitus ([facility] )    Acute cystitis with hematuria    Lactic acidosis    Neuropathy    Renovascular hypertension    Tobacco abuse      Home Pulmonary Medications:  Breo BID  2l/m for HS and Naps    Home Devices/Therapy: Home O2, Other (Comment) (Pt  wears 2l/m for HS and Naps  Breo BID  )    Past Medical History:   Diagnosis Date    COPD (chronic obstructive pulmonary disease) ([place] [address] )     Diabetes mellitus ([place] [address] )     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[place] [address] )     Renal disorder     Stroke ([place] [address] )     may 2015     Social History     Social History    Marital status: Single     Spouse name: N/A    Number of children: N/A    Years of education: N/A     Social History Main Topics    Smoking status: Current Every Day Smoker     Packs/day: 1 00    Smokeless tobacco: Never Used    Alcohol use No    Drug use: No    Sexual activity: Not Asked     Other Topics Concern    None     Social History Narrative    None       Subjective         Objective    Physical Exam:   Assessment Type: Assess only  General Appearance: Alert, Awake  Respiratory Pattern: Normal  Chest Assessment: Chest expansion symmetrical  Bilateral Breath Sounds: Diminished  Cough: None  O2 Device: 2l/m via NC    Vitals:  Blood pressure 121/61, pulse 64, temperature (!) 97 °F (36 1 °C), temperature source Tympanic, resp  rate 16, height 5' 10" (1 778 m), weight 63 6 kg (140 lb 3 2 oz), SpO2 94 %  Imaging and other studies: I have personally reviewed pertinent reports  O2 Device: 2l/m via NC     Plan    Respiratory Plan: No distress/Pulmonary history        Resp Comments: Pt  assessed  Nebulizer treatment offered, as ordered   Pt  did not feel that he needed a breathing treatment @ this time  Pt  stated that he is not SOB and will use his Breo

## 2018-10-18 NOTE — DISCHARGE INSTR - APPOINTMENTS
[facility]  GIVEN SCRIPT FOR OUTPT PT AT [phone]    WIFE WILL SET UP TIME

## 2018-10-18 NOTE — PHYSICAL THERAPY NOTE
Physical Therapy Cancellation Note    Attempted to see pt at 9:37 for PT treatment & he refused stating he's too tired, come back later   Attempt agai later this am     Pily Villa, PTA

## 2018-10-18 NOTE — PLAN OF CARE
Problem: PHYSICAL THERAPY ADULT  Goal: Performs mobility at highest level of function for planned discharge setting  See evaluation for individualized goals  Treatment/Interventions: Functional transfer training, LE strengthening/ROM, Therapeutic exercise, Endurance training, Elevations, Patient/family training, Equipment eval/education, Gait training, Spoke to nursing, OT          See flowsheet documentation for full assessment, interventions and recommendations  Outcome: Progressing  Prognosis: Good  Problem List: Decreased strength, Decreased endurance, Impaired balance, Decreased mobility, Decreased safety awareness  Assessment: Pt in bed & agreed to PT treatment  He transferred to sit with min assist x 1 & stand min assist x 1  He requested no O2 to amb  He amb using his cane for 40 feet with min assist x 1  After sitting rest break, he amb using  feet with min assist x 1  His gait is steadier & balance is improved with RW vs SPC  Recommend use of RW at this time for safe amb & to prevent falls  After seated rest break, he amb to recliner chair from bed 5 feet with RW & sat mod I  Made comfortable, all needs in reach  He did become fatigued & SOB with amb  His request for breathing treatment relayed to his RN Curt  He would benefit from cont'd PT to increase endurance & safe functional mobility  Barriers to Discharge: Inaccessible home environment     Recommendation: Short-term skilled PT (vs home PT)     PT - OK to Discharge: Yes (when med stable)    See flowsheet documentation for full assessment     Maddie Maharaj PTA

## 2018-10-18 NOTE — TELEMEDICINE
Consultation - Infectious Disease   Kris Hand 80 y o  male MRN: [id number]  Unit/Bed#: -01 Encounter: [id number]      Inpatient consult to Infectious Diseases  Consult performed by: Nilsa Coulter ordered by: Marissa Tyson DOCUMENTATION:     1  This service was provided via Telemedicine  2  Provider located at Home  3  TeleMed provider: Ashli Burks MD   4  Identify all parties in room with patient during tele consult:  RN  5  After connecting through [employer], patient was identified by name and date of birth and assistant checked wristband  Patient was then informed that this was a Telemedicine visit and that the exam was being conducted confidentially over secure lines  My office door was closed  No one else was in the room  Patient acknowledged consent and understanding of privacy and security of the Telemedicine visit, and gave us permission to have the assistant stay in the room in order to assist with the history and to conduct the exam   I informed the patient that I have reviewed their record in Epic and presented the opportunity for them to ask any questions regarding the visit today  The patient agreed to participate         Assessment/Recommendations     80-year-old diabetic male with chronic BPH status post suprapubic catheter presents with acute cystitis in the setting of an obstructed SPT catheter    - symptoms of cystitis suspected due to presence of suprapubic pain, leukocytosis in the setting of obstruction  - urine culture is polymicrobial, difficult to say which isolate, if any, is a colonizer  - patient has been afebrile but with leukocytosis, clinically improved  - has had multiple prior episodes of blocked SPT in the recent past for unclear reasons    · Continue cefepime  · Please check renal ultrasound to rule out any obstructing stone  · Recommend urology evaluation given multiple recent ER visits due to blocked suprapubic catheter  · Await urine culture sensitivity  If isolates are sensitive to fluoroquinolones would complete a 5 day course of levofloxacin or ciprofloxacin    Thank you for involving me in the care of your patient  Please call with questions, change in clinical status or if tests recommended above are abnormal      Discussed with the primary service  History     Reason for Consult: Complicated UTI  HPI: Geoffrey Melendez is a 80y o  year old diabetic male with a history of BPH s/p chronic suprapubic catheter placed three years ago  He follows with Dr Cem Joseph and reports having it changed every month  However since July of this year he has had to go to the emergency room twice, before his scheduled catheter change, because of blockage in the catheter requiring an earlier change  He has had no evidence of UTI these past 2 admissions  He states that about 1-2 days before presentation his catheter appeared to be blocked, would not flush and there was decreased urine amount in the bag and the urine appeared very cloudy  He also developed suprapubic pain, generalized weakness, poor oral intake and chills  He presented to the ER two days ago for evaluation  In the ER he was afebrile but mildly tachypneic with wheezing  He had suprapubic tenderness but no discharge around the catheter site  Labs noted lactic acidosis  Initial white count was normal but white count today is over 17,000  The suprapubic catheter was exchanged with drainage of large amount of cloudy urine  Urine microscopy noted significant pyuria  X-ray chest showed no acute findings  He was admitted and started on IV ceftriaxone which was then switched to cefepime  Urine culture is growing Pseudomonas and Providencia  He reports feeling better except for intermittent pain at the tip of his penis  He denies any urinary leakage through his penis  He states his abdominal pain has resolved     Denies nausea, vomiting, diarrhea or rash and is tolerating antibiotics well  Infectious disease is being consulted for diagnostic work up and antibiotic management  Review of Systems  A [id number] point system-based review of systems is otherwise negative  PAST MEDICAL HISTORY:  Past Medical History:   Diagnosis Date    [facility] )     Diabetes mellitus ([facility] )     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[facility] )     Renal disorder     Stroke ([place] [address] )     may 2015     Past Surgical History:   Procedure Laterality Date    ANGIOPLASTY  2016    Right femoral vein    BACK SURGERY      SUPRAPUBIC TUBE PLACEMENT         FAMILY HISTORY:  Non-contributory    SOCIAL HISTORY:  Social History   Single  History   Alcohol Use No     History   Drug Use No     History   Smoking Status    Current Every Day Smoker    Packs/day: 1 00   Smokeless Tobacco    Never Used       ALLERGIES:  No Known Allergies    MEDICATIONS:  All current active medications have been reviewed      Physical Exam     Temp:  [96 1 °F (35 6 °C)-97 5 °F (36 4 °C)] 97 °F (36 1 °C)  HR:  [61-64] 64  Resp:  [16-18] 16  BP: ()/(50-61) 121/61  SpO2:  [90 %-94 %] 94 %  Temp (24hrs), Av 9 °F (36 1 °C), Min:96 1 °F (35 6 °C), Max:97 5 °F (36 4 °C)  Current: Temperature: (!) 97 °F (36 1 °C)    Intake/Output Summary (Last 24 hours) at 10/18/18 0947  Last data filed at 10/18/18 0911   Gross per 24 hour   Intake             1960 ml   Output             1350 ml   Net              610 ml       Physical exam findings reported by bedside and primary medical team staff    General Appearance:  Frail appearing but nontoxic, and in no distress, appears stated age   Head:  Normocephalic, without obvious abnormality, atraumatic   Eyes:  PERRL, conjunctiva pink and sclera anicteric, both eyes   Nose: Nares normal, mucosa normal, no drainage   Throat: Oropharynx moist without lesions; lips, mucosa, and tongue normal; teeth and gums normal   Neck: Supple, symmetrical, trachea midline, no adenopathy, no tenderness/mass/nodules   Back:   Symmetric, no curvature, ROM normal, no CVA tenderness   Lungs:   Scattered expiratory wheeze   Chest Wall:  No tenderness or deformity   Heart:  Regular rate and rhythm, S1, S2 normal, no murmur, rub or gallop   Abdomen:   Soft, non-tender, non-distended, positive bowel sounds, no masses, no organomegaly    SPT catheter site c/d/i   Extremities: Extremities normal, atraumatic, no cyanosis, clubbing or edema   Skin: Skin color, texture, turgor normal, no rashes or lesions  No draining wounds noted  Lymph nodes: Cervical, supraclavicular, and axillary nodes normal   Neurologic: Alert and oriented times 3, extremity strength 5/5 and symmetric       Invasive Devices:   Peripheral IV 10/16/18 Right Hand (Active)   Site Assessment Clean;Dry; Intact 10/18/2018  8:44 AM   Dressing Type Transparent 10/18/2018  8:44 AM   Line Status Flushed 10/18/2018  8:44 AM   Dressing Status Clean;Dry; Intact 10/18/2018  8:44 AM       Urethral Catheter (Active)       Suprapubic Catheter (Active)   Output (mL) 450 mL 10/18/2018  5:33 AM       Labs, Imaging, & Other Studies     Lab Results:    I have personally reviewed pertinent labs  Results from last 7 days  Lab Units 10/18/18  0622 10/17/18  0500 10/16/18  0928   WBC Thousand/uL 17 00* 6 80 6 70   HEMOGLOBIN g/dL 11 7* 11 8* 13 7*   PLATELETS Thousands/uL 141* 125* 147*       Results from last 7 days  Lab Units 10/18/18  0622 10/17/18  0500 10/16/18  0928   SODIUM mmol/L 138 138 134   POTASSIUM mmol/L 4 3 3 9 4 5   CHLORIDE mmol/L 103 102 95*   CO2 mmol/L 31 29 34*   BUN mg/dL 23 18 22   CREATININE mg/dL 0 65* 0 66* 1 01   EGFR ml/min/1 73sq m 90 89 68   CALCIUM mg/dL 8 6 8 3* 9 2   AST U/L  --  14  --    ALT U/L  --  8  --    ALK PHOS U/L  --  68  --        Results from last 7 days  Lab Units 10/16/18  0940 10/16/18  0928   BLOOD CULTURE   --  No Growth at 24 hrs  No Growth at 24 hrs     URINE CULTURE >100,000 cfu/ml Pseudomonas aeruginosa*  >100,000 cfu/ml Providencia rettgeri*  --        Imaging Studies:   I have personally reviewed pertinent imaging study reports and images in PACS  EKG, Pathology, and Other Studies:   I have personally reviewed pertinent reports  Counseling/Coordination of care: Total 70 minutes communication with the patient via telehealth  Labs, medical tests and imaging studies were independently reviewed by me as noted above in HPI and old records were obtained and summarized as noted above in HPI

## 2018-10-18 NOTE — PHYSICAL THERAPY NOTE
10/18/18 1118   Pain Assessment   Pain Assessment 0-10   Pain Score No Pain   Restrictions/Precautions   Weight Bearing Precautions Per Order No   Other Precautions O2;Fall Risk  (suprapubic catheter)   General   Family/Caregiver Present Yes  (wife present)   Cognition   Overall Cognitive Status WFL   Arousal/Participation Alert; Cooperative   Attention Within functional limits   Orientation Level Oriented X4   Memory Within functional limits   Following Commands Follows one step commands without difficulty   Subjective   Subjective "I don't have any pain now but I did earlier  I don't want the O2 to walk  I'll try my cane & see how I do "   Bed Mobility   Rolling R 6  Modified independent   Additional items Bedrails   Supine to Sit 4  Minimal assistance   Additional items Assist x 1;HOB elevated; Increased time required;Verbal cues   Transfers   Sit to Stand 4  Minimal assistance   Additional items Assist x 1; Increased time required;Verbal cues   Stand to Sit 6  Modified independent   Additional items Assist x 1;Bedrails;Verbal cues   Additional Comments impulsive at times with transfers   Ambulation/Elevation   Gait pattern Improper Weight shift;Narrow SCAR; Forward Flexion;Decreased foot clearance; Short stride   Gait Assistance 4  Minimal assist   Additional items Assist x 1;Verbal cues; Tactile cues   Assistive Device Straight cane;Rolling walker   Distance 40 feet x 1 with his cane, 120 with RW   Stair Management Assistance Not tested   Balance   Static Sitting Good   Dynamic Sitting Fair   Static Standing Fair -   Dynamic Standing Poor +   Ambulatory Poor +   Endurance Deficit   Endurance Deficit Yes   Endurance Deficit Description became SOB with amb, he did alfredo use O2 to amb at his request   Activity Tolerance   Activity Tolerance Patient limited by fatigue  (SOB)   Nurse Made Aware yes VEE Knight notified   Assessment   Prognosis Good   Problem List Decreased strength;Decreased endurance; Impaired balance;Decreased mobility; Decreased safety awareness   Assessment Pt in bed & agreed to PT treatment  He transferred to sit with min assist x 1 & stand min assist x 1  He requested no O2 to amb  He amb using his cane for 40 feet with min assist x 1  After sitting rest break, he amb using  feet with min assist x 1  His gait is steadier & balance is improved with RW vs SPC  Recommend use of RW at this time for safe amb & to prevent falls  After seated rest break, he amb to recliner chair from bed 5 feet with RW & sat mod I  Made comfortable, all needs in reach  He did become fatigued & SOB with amb  His request for breathing treatment relayed to his RN Curt  He would benefit from cont'd PT to increase endurance & safe functional mobility  Barriers to Discharge Inaccessible home environment   Goals   Patient Goals to go home soon   Treatment Day 1   Plan   Treatment/Interventions Functional transfer training;LE strengthening/ROM; Therapeutic exercise; Endurance training;Patient/family training;Equipment eval/education; Bed mobility;Gait training;Spoke to nursing   Progress Progressing toward goals   PT Frequency 5x/wk   Recommendation   Recommendation Short-term skilled PT  (vs home PT)   PT - OK to Discharge Yes  (when med stable)   Mazin Khoury, PTA

## 2018-10-18 NOTE — DISCHARGE INSTRUCTIONS
How to Stop Smoking   WHAT YOU NEED TO KNOW:   You will improve your health and the health of others around you if you stop smoking  Your risk for heart and lung disease, cancer, stroke, heart attack, and vision problems will also decrease  You can benefit from quitting no matter how long you have smoked  DISCHARGE INSTRUCTIONS:   Prepare to stop smoking:  Nicotine is a highly addictive drug found in cigarettes  Withdrawal symptoms can happen when you stop smoking and make it hard to quit  These include anxiety, depression, irritability, trouble sleeping, and increased appetite  You increase your chances of success if you prepare to quit  · Set a quit date  Christiano Franco a date that is within the next 2 weeks  Do not pick a day that you think may be stressful or busy  Write down the day or [place] it on your calender  · Tell friends and family that you plan to quit  Explain that you may have withdrawal symptoms when you try to quit  Ask them to support you  They may be able to encourage you and help reduce your stress to make it easier for you to quit  · Make a list of your reasons for quitting  Put the list somewhere you will see it every day, such as your refrigerator  You can look at the list when you have a craving  · Remove all tobacco and nicotine products from your home, car, and workplace  Also, remove anything else that will tempt you to smoke, such as lighters, matches, or ashtrays  Clean your car, home, and places at work that smell like smoke  The smell of smoke can trigger a craving  · Identify triggers that make you want to smoke  This may include activities, feelings, or people  Also write down 1 way you can deal with each of your triggers  For example, if you want to smoke as soon as you wake up, plan another activity during this time, such as exercise  · Make a plan for how you will quit    Learn about the tools that can help you quit, such as medicine, counseling, or nicotine replacement therapy  Choose at least 2 options to help you quit  Tools to help you stop smoking:   · Counseling  from a trained healthcare provider can provide you with support and skills to quit smoking  The provider will also teach you to manage your withdrawal symptoms and cravings  You may receive counseling from one counselor, in group therapy, or through phone therapy called a quit line  · Nicotine replacement therapy (NRT)  such as nicotine patches, gum, or lozenges may help reduce your nicotine cravings  You may get these without a doctor's order  Do not use e-cigarettes or smokeless tobacco in place of cigarettes or to help you quit  They still contain nicotine  · Prescription medicines  such as nasal sprays or nicotine inhalers may help reduce your withdrawal symptoms  Other medicines may also be used to reduce your urge to smoke  Ask your healthcare provider about these medicines  You may need to start certain medicines 2 weeks before your quit date for them to work well  · Hypnosis  is a practice that helps guide you through thoughts and feelings  Hypnosis may help decrease your cravings and make you more willing to quit  · Acupuncture therapy  uses very thin needles to balance energy channels in the body  This is thought to help decrease cravings and symptoms of nicotine withdrawal      · Support groups  let you talk to others who are trying to quit or have already quit  It may be helpful to speak with others about how they quit  Manage your cravings:   · Avoid situations, people, and places that tempt you to smoke  Go to nonsmoking places, such as libraries or restaurants  Understand what tempts you and try to avoid these things  · Keep your hands busy  Hold things such as a stress ball or pen  · Put candy or toothpicks in your mouth  Keep lollipops, sugarless gum, or toothpicks with you at all times  · Do not have alcohol or caffeine    These drinks may tempt you to smoke  Drink healthy liquids such as water or juice instead  · Reward yourself when you resist your cravings  Rewards will motivate you and help you stay positive  · Do an activity that distracts you from your craving  Examples include going for a walk, exercising, or cleaning  Prevent weight gain after you quit:  You may gain a few pounds after you quit smoking  It is healthier for you to gain a few pounds than to continue to smoke  The following can help you prevent weight gain:  · Eat healthy foods  These include fruits, vegetables, whole-grain breads, low-fat dairy products, beans, lean meats, and fish  Eat healthy snacks, such as low-fat yogurt, if you get hungry between meals  · Drink water before, during, and between meals  This will make your stomach feel full and help prevent you from overeating  Ask your healthcare provider how much liquid to drink each day and which liquids are best for you  · Exercise  Take a walk or do some kind of exercise every day  Ask your healthcare provider what exercise is right for you  This may help reduce your cravings and reduce stress  For support and more information:   · [employer]  gov  Phone: 1- 288 - 296-3675  Web Address: www smokefree  gov  © 2017 [address] [employer] Information is for End User's use only and may not be sold, redistributed or otherwise used for commercial purposes  All illustrations and images included in CareNotes® are the copyrighted property of [employer]  or Petey Babb  The above information is an  only  It is not intended as medical advice for individual conditions or treatments  Talk to your doctor, nurse or pharmacist before following any medical regimen to see if it is safe and effective for you

## 2018-10-18 NOTE — DISCHARGE SUMMARY
Discharge- Marry Retana 1935, 80 y o  male MRN: [id number]    Unit/Bed#: -01 Encounter: [id number]    Primary Care Provider: Amber Rodriguez MD   Date and time admitted to hospital: 10/16/2018  7:59 AM        * Urinary catheter complication [facility])   Assessment & Plan    · Patient had catheter change in the ER  · Appears to have resolved as patient is pain-free at this time and catheter is draining appropriately  · Urine culture with Pseudomonas however likely a contaminant  · No need for antibiotics on discharge per Urology/ID     [facility]chronic obstructive [facility] ([facility] )   Assessment & Plan    · Does not appear to be in any acute exacerbation  · Will send patient home on short steroid taper  · Continue home medications     Renovascular hypertension   Assessment & Plan    · BP has been stable while off of Lasix  · Will resume Lasix 40 daily     Lactic acidosis   Assessment & Plan    · Resolved     Diabetes mellitus ([facility] )   Assessment & Plan    · Continue home meds       Leukocytosis likely due to steroids  Patient has no fever  Urine culture appreciated likely contaminant given patient's chronic suprapubic catheter  Discharging Physician / Practitioner: Jay Rodriguez MD  PCP: Amber Rodriguez MD  Admission Date:   Admission Orders     Ordered        10/16/18 1120  Inpatient Admission (expected length of stay for this patient is greater than two midnights)  Once             Discharge Date: 10/18/18    Resolved Problems  Date Reviewed: 10/16/2018    None          Consultations During Hospital Stay:  · Infectious Disease    Procedures Performed:   · Suprapubic catheter replacement by ER    Significant Findings / Test Results:   · None    Incidental Findings:   · None     Test Results Pending at Discharge (will require follow up):    · None     Outpatient Tests Requested:  · None    Complications:  None    Reason for Admission:   Catheter malfunction and suspected COPD exacerbation    Hospital Course:     Yolis Ceballos is a 80 y o  male patient who originally presented to the hospital on 10/16/2018 due to catheter malfunction  Patient has suprapubic catheter changed in the ER by the ER physician  After replacement catheter was draining appropriately and patient was pain free  Patient was admitted for suspected urinary tract infection  After discussing with Urology in infectious Disease decision was made to only give IV antibiotics for 2 days in the hospital which patient did receive  No need for antibiotics on discharge  Patient was afebrile  Urine culture results were appreciated  Patient should follow up with Urology as outpatient  Patient was also suspected to have a COPD exacerbation however patient's symptoms quickly improved and patient was sent home with a short steroid taper  Patient advised to continue his home medications  Patient's blood pressure was low during hospitalization  Lasix was changed to once daily  Case management provided patient with outpatient physical therapy  Patient was stable for discharge home with outpatient follow-up  Please see above list of diagnoses and related plan for additional information  Condition at Discharge: stable     Discharge Day Visit / Exam:     Subjective:  No complaints today    Vitals: Blood Pressure: 121/61 (10/18/18 0720)  Pulse: 64 (10/18/18 0720)  Temperature: (!) 97 °F (36 1 °C) (10/18/18 0720)  Temp Source: Tympanic (10/18/18 0720)  Respirations: 16 (10/18/18 0720)  Height: 5' 10" (177 8 cm) (10/16/18 1145)  Weight - Scale: 63 6 kg (140 lb 3 2 oz) (10/18/18 0600)  SpO2: 94 % (Pt  is on O2 @ 2l/m via NC ) (10/18/18 0720)     Exam:   Physical Exam   Constitutional:   Frail elderly male   HENT:   Head: Normocephalic and atraumatic  Eyes: Conjunctivae and EOM are normal    Neck: Normal range of motion  Neck supple  Cardiovascular: Normal rate and regular rhythm      Pulmonary/Chest: Effort normal  No respiratory distress  Abdominal: Soft  He exhibits no distension  There is no tenderness  Genitourinary:   Genitourinary Comments: Suprapubic catheter noted   Musculoskeletal: Normal range of motion  He exhibits no edema  Neurological: He is alert  No cranial nerve deficit  Skin: Skin is warm and dry  Psychiatric: His behavior is normal        Discussion with Family: yes    Discharge instructions/Information to patient and family:   See after visit summary for information provided to patient and family  Provisions for Follow-Up Care:  See after visit summary for information related to follow-up care and any pertinent home health orders  Disposition:     Home    For Discharges to [facility] SNF:   · Not Applicable to this Patient - Not Applicable to this Patient    Planned Readmission: no     Discharge Statement:  I spent 35 minutes discharging the patient  This time was spent on the day of discharge  I had direct contact with the patient on the day of discharge  Greater than 50% of the total time was spent examining patient, answering all patient questions, arranging and discussing plan of care with patient as well as directly providing post-discharge instructions  Additional time then spent on discharge activities  Discharge Medications:  See after visit summary for reconciled discharge medications provided to patient and family        ** Please Note: This note has been constructed using a voice recognition system **

## 2018-10-18 NOTE — NURSING NOTE
Patient cleared to be DC home today with Homecare services  IV Removed, DC instructions reviewed and given to patient  Patient instructed to continue to take all meds as prescribed and to follow up with all MD appointments, pt verbalized understanding

## 2018-10-19 LAB
BACTERIA UR CULT: ABNORMAL
BACTERIA UR CULT: ABNORMAL

## 2018-10-21 LAB
BACTERIA BLD CULT: NORMAL
BACTERIA BLD CULT: NORMAL

## 2018-10-24 ENCOUNTER — EVALUATION (OUTPATIENT)
Dept: PHYSICAL THERAPY | Facility: CLINIC | Age: 83
End: 2018-10-24
Payer: MEDICARE

## 2018-10-24 DIAGNOSIS — R53.1 GENERALIZED WEAKNESS: ICD-10-CM

## 2018-10-24 DIAGNOSIS — R26.2 AMBULATORY DYSFUNCTION: Primary | ICD-10-CM

## 2018-10-24 PROCEDURE — G8978 MOBILITY CURRENT STATUS: HCPCS | Performed by: PHYSICAL THERAPIST

## 2018-10-24 PROCEDURE — G8979 MOBILITY GOAL STATUS: HCPCS | Performed by: PHYSICAL THERAPIST

## 2018-10-24 PROCEDURE — 97163 PT EVAL HIGH COMPLEX 45 MIN: CPT | Performed by: PHYSICAL THERAPIST

## 2018-10-24 NOTE — PROGRESS NOTES
PT Evaluation     Today's date: 10/24/2018  Patient name: Aga Galindo  : 1935  MRN: [id number]  Referring provider: Inna Perdomo  Dx:   Encounter Diagnosis     ICD-10-CM    1  Ambulatory dysfunction R26 2    2  Generalized weakness R53 1        Start Time: 1100  Stop Time: 1150  Total time in clinic (min): 50 minutes    Assessment  Impairments: abnormal gait, abnormal or restricted ROM, activity intolerance, impaired balance, impaired physical strength, lacks appropriate home exercise program and poor posture   Other impairment: pulmonary dysfunction     Assessment details: Pt presents with chief complaint of generalized weakness and ambulatory dysfunction with recent hospitalization  PT notes the patient with decreased mm strength and flexibility of B/L LE, decreased core/trunk stabilization, impaired gait mechanics, decreased standing balance, impaired stair mobility, decreased activity tolerance, and decreased activity tolerance  Pt with noticeable SOB and decreased SpO2 with minimal activity  Pt educated to bring O2 to tx for improved activity tolerance Pt would benefit from skilled PT to improve strength, flexibility, endurance, gait mechanics, stair mobility, balance, and functional mobility, as well as to reduce the risk of future injury and falls  Prognosis is good with adherence to skilled PT 2-3x/wk and compliance to HEP  Based upon examination, no other referral appears necessary at this time  Understanding of Dx/Px/POC: good   Prognosis: good    Goals  Short Term Goals  1  Pt will improve LE mm strength 1/2-1 grade in 4 wks  2  Pt will decrease TUG score by 3-5 seconds in 4 wks  3  Pt will demonstrate improved activity tolerance in 4 wks    Long Term Goals  1  Pt will demonstrate improved standing static/dynamic balance in 8 wks  2  Pt will decrease TUG score to >10 seconds in 12 wks  3  Pt will decrease 5x sit to stand by 5-10 seconds in 12 wks  4   Pt will report reduced limitations with ADLs in 12 wks  5  Pt will be free of falls while attending PT    Plan  Patient would benefit from: PT eval and skilled physical therapy  Planned therapy interventions: abdominal trunk stabilization, balance, flexibility, gait training, graded exercise, home exercise program, manual therapy, neuromuscular re-education, patient education, postural training, strengthening, stretching and therapeutic exercise  Frequency: 3x week  Duration in weeks: 8  Plan of Care beginning date: 10/24/2018  Plan of Care expiration date: 11/23/2018  Treatment plan discussed with: patient and PTA  Plan details: Discussed findings of evulation with the patient, patient agreeable to skilled PT 2-3x/wk for 4 wks  POC and treatment goals discussed with PT/PTA  Subjective Evaluation    History of Present Illness  Mechanism of injury: Pt presents with chief complaint of ambulatory dysfunction and generalized weakness following recent hospitalization  Reports sx started about 2 years ago when he was hospitalized, did PT afterwards and reports he was doing well  Had a wound removed off of his right pinky toe and had to stop doing PT  Reports he started to decline and balance became worse  Reports he had to start using AD and experienced a few falls, typically when bending forward  Was recently hospitalized following a fall, with 3 day stay, and was given choice between skilled rehab and OPPT  Pt now presents with referral to OPPT for current impairments  Reports hx of smoking and pulmonary/cardiac dysfunction, wears 2L O2 at night and when resting t/o day    Pain  No pain reported    Social Support  Steps to enter house: yes  7  Stairs in house: no   Lives with: spouse    Employment status: not working  Hand dominance: right      Diagnostic Tests  No diagnostic tests performed  Treatments  Previous treatment: physical therapy  Current treatment: physical therapy  Patient Goals  Patient goals for therapy: improved balance, increased motion, increased strength, independence with ADLs/IADLs and return to sport/leisure activities          Objective     Strength/Myotome Testing     Left Hip   Planes of Motion   Flexion: 3+  Extension: 4-  Abduction: 3+  Adduction: 4-  External rotation: 3+  Internal rotation: 3+    Right Hip   Planes of Motion   Flexion: 3+  Extension: 4-  Abduction: 3+  Adduction: 4-  External rotation: 3+  Internal rotation: 3+    Left Knee   Flexion: 4  Extension: 3+    Right Knee   Flexion: 4  Extension: 3+    Left Ankle/Foot   Dorsiflexion: 3+  Plantar flexion: 4  Inversion: 3+  Eversion: 3+    Right Ankle/Foot   Dorsiflexion: 3+  Plantar flexion: 4  Inversion: 3+  Eversion: 3+    Ambulation     Ambulation: Stairs   Pattern: non-reciprocal  Railings: one rail  Pattern: non-reciprocal  Railings: one rail    Observational Gait   Decreased walking speed and stride length  Base of support: decreased    Quality of Movement During Gait   Trunk  Forward lean  Pelvis    Pelvis (Left): Positive Trendelenburg  Pelvis (Right): Positive Trendelenburg  Knee    Knee (Left): Positive increased flexion during stance, increased flexion during swing and stiff knee  Knee (Right): Positive increased flexion during stance, increased flexion during swing and stiff knee       Comments   TU 98 seconds with SPC, close S (A)    5x Sit to Stand: 26 35 seconds, B/L UE use to sit and stand    4 steps Up and Down: one handrail and SPC, step to pattern, decreased foot clearance, CG (A) required     Tandem  Right forward:10-15 seconds  Left Forward 5-10 seconds     Feet Together/Eyes Open/Firm: 30 seconds, occasional CG (A) to prevent LOB  Feet Together/Eyes Closed/Firm: 30 seconds, min sway noted  Feet Together/Eyes Open/Foam: 30 seconds, min to mod sway, occasional CG (A) required  Feet Together/Eeys Closed/Foam: 15-20 seconds, mod sway, CG (A) to prevent LOB      Flowsheet Rows      Most Recent Value   PT/OT G-Codes FOTO information reviewed  N/A   Assessment Type  Evaluation   G code set  Mobility: Walking & Moving Around   Mobility: Walking and Moving Around Current Status ()  CL   Mobility: Walking and Moving Around Goal Status ()  CK          Precautions Generalized Weakness, Ambulatory Dysfunction, Fall Risk  Hx of Pulmonary Dysfunction (Check Sp02 t/o treatment)     Specialty Daily Treatment Diary     Manual                             Exercise Diary         NuStep L2 5 min        Seated March        Seated LAQ        Seated Hip Abd        Seated Hip Add        Seated HS Curl        TR/HR        Tandem Stance        EO/EC Firm        EO/EC Foam        GT in Clinic        [employer]                                    Modalities

## 2018-10-26 ENCOUNTER — APPOINTMENT (OUTPATIENT)
Dept: PHYSICAL THERAPY | Facility: CLINIC | Age: 83
End: 2018-10-26
Payer: MEDICARE

## 2018-10-29 ENCOUNTER — OFFICE VISIT (OUTPATIENT)
Dept: PHYSICAL THERAPY | Facility: CLINIC | Age: 83
End: 2018-10-29
Payer: MEDICARE

## 2018-10-29 DIAGNOSIS — R53.1 GENERALIZED WEAKNESS: ICD-10-CM

## 2018-10-29 DIAGNOSIS — R26.2 AMBULATORY DYSFUNCTION: Primary | ICD-10-CM

## 2018-10-29 PROCEDURE — 97110 THERAPEUTIC EXERCISES: CPT

## 2018-10-29 NOTE — PROGRESS NOTES
Daily Note     Today's date: 10/29/2018  Patient name: Van Eden  : 1935  MRN: [id number]  Referring provider: Scott Licona PA-C  Dx: No diagnosis found  Subjective: I have no pain  Pt  To department with [facility]  Pt  Reports he does use a walker at home as well  Objective: See treatment diary below    Precautions Generalized Weakness, Ambulatory Dysfunction, Fall Risk  Hx of Pulmonary Dysfunction (Check Sp02 t/o treatment)     Specialty Daily Treatment Diary     Manual                             Exercise Diary   10/29      NuStep L2 5 min  L1 6 min      Seated March  2x10      Seated LAQ  2x10      Seated Hip Abd  3"2x10 Green       Seated Hip Add  3"2x10      Seated HS Curl  2x10 Green      TR/HR        Tandem Stance        EO/EC Firm        EO/EC Foam        GT in Clinic        [employer]                                    Modalities                              Assessment: Tolerated treatment poor  Patient demonstrated fatigue post treatment and would benefit from continued PT  Pt  Required frequent rest breaks t/o  Sats 91 pre RX  Sat rate dropped to 83 following 1 min on Nu-step  Pt  Given 3 L O2 with Sats returning above 90  Educated pt  on proper breathing techniques for improved functional activity amy  Sats 95 following therapy session with 3LO2  Plan: Progress treatment as tolerated

## 2018-10-31 ENCOUNTER — OFFICE VISIT (OUTPATIENT)
Dept: PHYSICAL THERAPY | Facility: CLINIC | Age: 83
End: 2018-10-31
Payer: MEDICARE

## 2018-10-31 ENCOUNTER — APPOINTMENT (OUTPATIENT)
Dept: PHYSICAL THERAPY | Facility: CLINIC | Age: 83
End: 2018-10-31
Payer: MEDICARE

## 2018-10-31 DIAGNOSIS — R26.2 AMBULATORY DYSFUNCTION: Primary | ICD-10-CM

## 2018-10-31 DIAGNOSIS — R53.1 GENERALIZED WEAKNESS: ICD-10-CM

## 2018-10-31 PROCEDURE — 97110 THERAPEUTIC EXERCISES: CPT | Performed by: PHYSICAL THERAPIST

## 2018-10-31 NOTE — PROGRESS NOTES
Daily Note     Today's date: 10/31/2018  Patient name: Jerilyn Davis  : 1935  MRN: [id number]  Referring provider: Emerald Iraheta  Dx:   Encounter Diagnosis     ICD-10-CM    1  Ambulatory dysfunction R26 2    2  Generalized weakness R53 1        Start Time: 1100  Stop Time: 1145  Total time in clinic (min): 45 minutes    Subjective: Pt states he feels "okay" today  No new complaints noted at this time  Objective: See treatment diary below    Precautions Generalized Weakness, Ambulatory Dysfunction, Fall Risk  Hx of Pulmonary Dysfunction (Check Sp02 t/o treatment)      Specialty Daily Treatment Diary      Manual      10/31                                         Exercise Diary    10/29 10/31       NuStep L2 5 min  L1 6 min L1 7 min       Seated March   2x10 2x10        Seated LAQ   2x10 2x10       Seated Hip Abd   3"2x10 Green  2x10 3" Green       Seated Hip Add   3"2x10 2x10 3"       Seated HS Curl   2x10 Green 2x10 Green       TR/HR             Tandem Stance             EO/EC Firm             EO/EC Foam             GT in Clinic             [employer]                                                             Modalities     10/31                                         Assessment: Tolerated treatment well  Patient exhibited good technique with therapeutic exercises and would benefit from continued PT  Pt with good tolerance to exercises  Required several rest breaks 2* fatigue  SpO2 92-94% on 2L O2/min with rest breaks, -130 with exercises  Minor fatigue noted post tx  Plan: Continue per plan of care  Progress treatment as tolerated

## 2018-11-02 ENCOUNTER — OFFICE VISIT (OUTPATIENT)
Dept: PHYSICAL THERAPY | Facility: CLINIC | Age: 83
End: 2018-11-02
Payer: MEDICARE

## 2018-11-02 ENCOUNTER — APPOINTMENT (OUTPATIENT)
Dept: PHYSICAL THERAPY | Facility: CLINIC | Age: 83
End: 2018-11-02
Payer: MEDICARE

## 2018-11-02 DIAGNOSIS — R53.1 GENERALIZED WEAKNESS: ICD-10-CM

## 2018-11-02 DIAGNOSIS — R26.2 AMBULATORY DYSFUNCTION: Primary | ICD-10-CM

## 2018-11-02 PROCEDURE — 97110 THERAPEUTIC EXERCISES: CPT

## 2018-11-02 NOTE — PROGRESS NOTES
Daily Note     Today's date: 2018  Patient name: Lenord Claude  : 1935  MRN: [id number]  Referring provider: Christina Nathan  Dx:   Encounter Diagnosis     ICD-10-CM    1  Ambulatory dysfunction R26 2    2  Generalized weakness R53 1                   Subjective: Pt  to department with SPC without O2  Pt 's wife reports pt  Has bruising between his legs possibly due to squeezing the ball  Pt  without c/o  Objective: See treatment diary below    Precautions Generalized Weakness, Ambulatory Dysfunction, Fall Risk  Hx of Pulmonary Dysfunction (Check Sp02 t/o treatment)      Specialty Daily Treatment Diary      Manual      10/31                                         Exercise Diary    10/29 10/31  11/2     NuStep L2 5 min  L1 6 min L1 7 min  L1 10'     Seated March   2x10 2x10   2x10     Seated LAQ   2x10 2x10  2x10     Seated Hip Abd   3"2x10 Green  2x10 3" Green  2x103" Green     Seated Hip Add   3"2x10 2x10 3"  2x10 3" using pillow today     Seated HS Curl   2x10 Green 2x10 Green  2x10 Green     TR/HR        Seated 2x10     Tandem Stance             EO/EC Firm             EO/EC Foam             GT in Clinic             [employer]                                                             Modalities     10/31                                             Assessment: Tolerated treatment poor  Patient demonstrated fatigue post treatment and would benefit from continued PT  Sat pre RX 85 and BPM 91  O2 applied prior to TE with Sat at 91 and BPM 90  Frequent rest periods t/o secondary to SOB with activity  Pt  monitered t/o  Sat post Rx 91 and BPM 90  Plan: Progress treatment as tolerated

## 2018-11-05 ENCOUNTER — OFFICE VISIT (OUTPATIENT)
Dept: PHYSICAL THERAPY | Facility: CLINIC | Age: 83
End: 2018-11-05
Payer: MEDICARE

## 2018-11-05 DIAGNOSIS — R53.1 GENERALIZED WEAKNESS: ICD-10-CM

## 2018-11-05 DIAGNOSIS — R26.2 AMBULATORY DYSFUNCTION: Primary | ICD-10-CM

## 2018-11-05 PROCEDURE — 97110 THERAPEUTIC EXERCISES: CPT

## 2018-11-05 NOTE — PROGRESS NOTES
Daily Note     Today's date: 2018  Patient name: Marry Retana  : 1935  MRN: [id number]  Referring provider: Maykel Olsen  Dx:   Encounter Diagnosis     ICD-10-CM    1  Ambulatory dysfunction R26 2    2  Generalized weakness R53 1                   Subjective: Pt reports "I think I'm coming down with a cold "       Objective: See treatment diary below  Precautions Generalized Weakness, Ambulatory Dysfunction, Fall Risk  Hx of Pulmonary Dysfunction (Check Sp02 t/o treatment)      Specialty Daily Treatment Diary      Manual      10/31                                         Exercise Diary    10/29 10/31  11/2  11/5   NuStep L2 5 min  L1 6 min L1 7 min L1 10' L1 x10'   Seated March   2x10 2x10  2x10 2x10   Seated LAQ   2x10 2x10 2x10 2x10   Seated Hip Abd   3"2x10 Green  2x10 3" Green 2x10 3" Green 5" x20 Green   Seated Hip Add   3"2x10 2x10 3"  2x10 3" using pillow today 5" x20 with ball   Seated HS Curl   2x10 Green 2x10 Green  2x10 Green  2x10 Green   TR/HR        Seated 2x10 Seated 2x10   Tandem Stance             EO/EC Firm             EO/EC Foam             GT in Clinic             [employer]                                                             Modalities     10/31                                       Assessment: Tolerated treatment fair  Pt has SpO2 of 88 before therapy, 91 after NuStep, and a 90 post treatment  Continue to monitor SpO2  Pt fatigues easily and requires theraputic rest breaks  Patient would benefit from continued PT      Plan: Continue per plan of care  Progress treatment as tolerated

## 2018-11-07 ENCOUNTER — APPOINTMENT (OUTPATIENT)
Dept: PHYSICAL THERAPY | Facility: CLINIC | Age: 83
End: 2018-11-07
Payer: MEDICARE

## 2018-11-09 ENCOUNTER — OFFICE VISIT (OUTPATIENT)
Dept: PHYSICAL THERAPY | Facility: CLINIC | Age: 83
End: 2018-11-09
Payer: MEDICARE

## 2018-11-09 DIAGNOSIS — R53.1 GENERALIZED WEAKNESS: ICD-10-CM

## 2018-11-09 DIAGNOSIS — R26.2 AMBULATORY DYSFUNCTION: Primary | ICD-10-CM

## 2018-11-09 PROCEDURE — 97110 THERAPEUTIC EXERCISES: CPT

## 2018-11-09 NOTE — PROGRESS NOTES
Daily Note     Today's date: 2018  Patient name: Gabriel Cross  : 1935  MRN: [id number]  Referring provider: Cody Montero  Dx:   Encounter Diagnosis     ICD-10-CM    1  Ambulatory dysfunction R26 2    2  Generalized weakness R53 1                   Subjective: Pt  reports he has a lot of back pain today  Pt  Reports the Dr  told him to start using a heating pad on his back at night  Objective: See treatment diary below  Precautions Generalized Weakness, Ambulatory Dysfunction, Fall Risk  Hx of Pulmonary Dysfunction (Check Sp02 t/o treatment)      Specialty Daily Treatment Diary      Manual      10/31                                         Exercise Diary   11/9 10/29 10/31  11/2  11/5   NuStep L1 10 min  L1 6 min L1 7 min L1 10' L1 x10'   Seated March  2x15 2x10 2x10  2x10 2x10   Seated LAQ  2x15 2x10 2x10 2x10 2x10   Seated Hip Abd  3"3x15 3"2x10 Green  2x10 3" Green 2x10 3" Green 5" x20 Green   Seated Hip Add  3"2x15 3"2x10 2x10 3"  2x10 3" using pillow today 5" x20 with ball   Seated HS Curl  2x15 Green 2x10 Green 2x10 Green  2x10 Green  2x10 Green   TR/HR  2x15      Seated 2x10 Seated 2x10   Tandem Stance             EO/EC Firm             EO/EC Foam             GT in Clinic             [employer]                                                             Modalities     10/31                                           Assessment: Tolerated treatment fair  Patient demonstrated fatigue post treatment, exhibited good technique with therapeutic exercises and would benefit from continued PT  Pt  able to increase reps with all seated TE  Educated pt  on proper breathing techniques  Vitals monitered t/o  Frequent rest periods provided secondary to SOB with activity  Pt  performs all TE with 2LO2  Plan: Progress treatment as tolerated

## 2018-11-12 ENCOUNTER — APPOINTMENT (OUTPATIENT)
Dept: PHYSICAL THERAPY | Facility: CLINIC | Age: 83
End: 2018-11-12
Payer: MEDICARE

## 2018-11-14 ENCOUNTER — APPOINTMENT (OUTPATIENT)
Dept: PHYSICAL THERAPY | Facility: CLINIC | Age: 83
End: 2018-11-14
Payer: MEDICARE

## 2018-11-19 ENCOUNTER — APPOINTMENT (OUTPATIENT)
Dept: PHYSICAL THERAPY | Facility: CLINIC | Age: 83
End: 2018-11-19
Payer: MEDICARE

## 2018-11-23 ENCOUNTER — APPOINTMENT (OUTPATIENT)
Dept: PHYSICAL THERAPY | Facility: CLINIC | Age: 83
End: 2018-11-23
Payer: MEDICARE

## 2018-11-26 ENCOUNTER — APPOINTMENT (OUTPATIENT)
Dept: PHYSICAL THERAPY | Facility: CLINIC | Age: 83
End: 2018-11-26
Payer: MEDICARE

## 2018-11-29 ENCOUNTER — APPOINTMENT (OUTPATIENT)
Dept: PHYSICAL THERAPY | Facility: CLINIC | Age: 83
End: 2018-11-29
Payer: MEDICARE

## 2018-12-10 NOTE — PROGRESS NOTES
Patient was last seen for outpatient PT on 11/09/18  Pt has not contacted clinic regarding additional PT services or appointments  Pt will be d/c from skilled PT at this time 2* expiration of PT script

## 2019-06-03 ENCOUNTER — APPOINTMENT (INPATIENT)
Dept: NON INVASIVE DIAGNOSTICS | Facility: HOSPITAL | Age: 84
DRG: 190 | End: 2019-06-03
Payer: MEDICARE

## 2019-06-03 ENCOUNTER — HOSPITAL ENCOUNTER (INPATIENT)
Facility: HOSPITAL | Age: 84
LOS: 3 days | Discharge: NURSING, REHAB OR LONG-TERM CARE | DRG: 190 | End: 2019-06-06
Attending: FAMILY MEDICINE | Admitting: FAMILY MEDICINE
Payer: MEDICARE

## 2019-06-03 ENCOUNTER — APPOINTMENT (EMERGENCY)
Dept: RADIOLOGY | Facility: HOSPITAL | Age: 84
DRG: 190 | End: 2019-06-03
Payer: MEDICARE

## 2019-06-03 DIAGNOSIS — I50.9 CONGESTIVE HEART FAILURE, UNSPECIFIED HF CHRONICITY, UNSPECIFIED HEART FAILURE TYPE (HCC): ICD-10-CM

## 2019-06-03 DIAGNOSIS — R77.8 ELEVATED TROPONIN I LEVEL: Primary | ICD-10-CM

## 2019-06-03 DIAGNOSIS — J44.1 CHRONIC OBSTRUCTIVE PULMONARY DISEASE WITH ACUTE EXACERBATION (HCC): ICD-10-CM

## 2019-06-03 DIAGNOSIS — J44.9 CHRONIC OBSTRUCTIVE PULMONARY DISEASE, UNSPECIFIED COPD TYPE (HCC): Chronic | ICD-10-CM

## 2019-06-03 PROBLEM — M19.90 ARTHRITIS: Status: ACTIVE | Noted: 2017-04-04

## 2019-06-03 PROBLEM — R53.1 GENERALIZED WEAKNESS: Status: ACTIVE | Noted: 2019-06-03

## 2019-06-03 PROBLEM — F09 MILD COGNITIVE DISORDER: Status: ACTIVE | Noted: 2018-11-07

## 2019-06-03 PROBLEM — E11.3299 MILD NONPROLIFERATIVE DIABETIC RETINOPATHY (HCC): Status: ACTIVE | Noted: 2018-12-28

## 2019-06-03 PROBLEM — F32.A CHRONIC DEPRESSION: Status: ACTIVE | Noted: 2017-04-28

## 2019-06-03 PROBLEM — IMO0002 UNILATERAL PARALYSIS DUE TO CEREBROVASCULAR ACCIDENT (CVA): Status: ACTIVE | Noted: 2017-04-04

## 2019-06-03 PROBLEM — N40.0 BENIGN PROSTATIC HYPERPLASIA: Status: ACTIVE | Noted: 2017-04-04

## 2019-06-03 PROBLEM — I95.1 CHRONIC ORTHOSTATIC HYPOTENSION: Status: ACTIVE | Noted: 2018-05-01

## 2019-06-03 PROBLEM — R60.0 LOCALIZED EDEMA: Status: ACTIVE | Noted: 2019-06-03

## 2019-06-03 PROBLEM — E11.42 DIABETIC PERIPHERAL NEUROPATHY ASSOCIATED WITH TYPE 2 DIABETES MELLITUS (HCC): Status: ACTIVE | Noted: 2019-04-17

## 2019-06-03 PROBLEM — E11.311 DIABETIC MACULAR EDEMA (HCC): Status: ACTIVE | Noted: 2019-06-02

## 2019-06-03 PROBLEM — M54.50 CHRONIC LOW BACK PAIN: Status: ACTIVE | Noted: 2017-04-04

## 2019-06-03 PROBLEM — R26.89 ANTALGIC GAIT: Status: ACTIVE | Noted: 2017-04-04

## 2019-06-03 PROBLEM — I73.9 PERIPHERAL VASCULAR DISEASE (HCC): Status: ACTIVE | Noted: 2018-06-22

## 2019-06-03 PROBLEM — M81.0 OSTEOPOROSIS: Status: ACTIVE | Noted: 2018-02-01

## 2019-06-03 PROBLEM — J96.11 CHRONIC RESPIRATORY FAILURE WITH HYPOXIA (HCC): Chronic | Status: ACTIVE | Noted: 2019-06-03

## 2019-06-03 PROBLEM — J18.9 COMMUNITY ACQUIRED PNEUMONIA OF LEFT LOWER LOBE OF LUNG: Status: ACTIVE | Noted: 2019-06-03

## 2019-06-03 PROBLEM — G89.29 CHRONIC LOW BACK PAIN: Status: ACTIVE | Noted: 2017-04-04

## 2019-06-03 PROBLEM — F17.200 NICOTINE DEPENDENCE: Status: ACTIVE | Noted: 2017-09-26

## 2019-06-03 LAB
ALBUMIN SERPL BCP-MCNC: 3.6 G/DL (ref 3.5–5.7)
ALP SERPL-CCNC: 77 U/L (ref 55–165)
ALT SERPL W P-5'-P-CCNC: 8 U/L (ref 7–52)
ANION GAP SERPL CALCULATED.3IONS-SCNC: 5 MMOL/L (ref 4–13)
ANION GAP SERPL CALCULATED.3IONS-SCNC: 7 MMOL/L (ref 4–13)
AST SERPL W P-5'-P-CCNC: 15 U/L (ref 13–39)
ATRIAL RATE: 94 BPM
ATRIAL RATE: 96 BPM
BACTERIA UR QL AUTO: ABNORMAL /HPF
BASOPHILS # BLD AUTO: 0 THOUSANDS/ΜL (ref 0–0.1)
BASOPHILS # BLD AUTO: 0 THOUSANDS/ΜL (ref 0–0.1)
BASOPHILS NFR BLD AUTO: 0 % (ref 0–2)
BASOPHILS NFR BLD AUTO: 0 % (ref 0–2)
BILIRUB SERPL-MCNC: 0.6 MG/DL (ref 0.2–1)
BILIRUB UR QL STRIP: NEGATIVE
BNP SERPL-MCNC: 37 PG/ML (ref 1–100)
BUN SERPL-MCNC: 17 MG/DL (ref 7–25)
BUN SERPL-MCNC: 18 MG/DL (ref 7–25)
CALCIUM SERPL-MCNC: 8.4 MG/DL (ref 8.6–10.5)
CALCIUM SERPL-MCNC: 8.8 MG/DL (ref 8.6–10.5)
CHLORIDE SERPL-SCNC: 100 MMOL/L (ref 98–107)
CHLORIDE SERPL-SCNC: 102 MMOL/L (ref 98–107)
CLARITY UR: CLEAR
CO2 SERPL-SCNC: 30 MMOL/L (ref 21–31)
CO2 SERPL-SCNC: 31 MMOL/L (ref 21–31)
COLOR UR: ABNORMAL
CREAT SERPL-MCNC: 0.77 MG/DL (ref 0.7–1.3)
CREAT SERPL-MCNC: 0.8 MG/DL (ref 0.7–1.3)
EOSINOPHIL # BLD AUTO: 0 THOUSAND/ΜL (ref 0–0.61)
EOSINOPHIL # BLD AUTO: 0 THOUSAND/ΜL (ref 0–0.61)
EOSINOPHIL NFR BLD AUTO: 0 % (ref 0–5)
EOSINOPHIL NFR BLD AUTO: 0 % (ref 0–5)
ERYTHROCYTE [DISTWIDTH] IN BLOOD BY AUTOMATED COUNT: 14.1 % (ref 11.5–14.5)
ERYTHROCYTE [DISTWIDTH] IN BLOOD BY AUTOMATED COUNT: 14.2 % (ref 11.5–14.5)
GFR SERPL CREATININE-BSD FRML MDRD: 83 ML/MIN/1.73SQ M
GFR SERPL CREATININE-BSD FRML MDRD: 84 ML/MIN/1.73SQ M
GLUCOSE SERPL-MCNC: 134 MG/DL (ref 65–99)
GLUCOSE SERPL-MCNC: 150 MG/DL (ref 65–99)
GLUCOSE UR STRIP-MCNC: NEGATIVE MG/DL
HCT VFR BLD AUTO: 38.5 % (ref 42–47)
HCT VFR BLD AUTO: 38.5 % (ref 42–47)
HGB BLD-MCNC: 12.3 G/DL (ref 14–18)
HGB BLD-MCNC: 12.4 G/DL (ref 14–18)
HGB UR QL STRIP.AUTO: NEGATIVE
KETONES UR STRIP-MCNC: ABNORMAL MG/DL
L PNEUMO1 AG UR QL IA.RAPID: NEGATIVE
LACTATE SERPL-SCNC: 0.8 MMOL/L (ref 0.5–2)
LEUKOCYTE ESTERASE UR QL STRIP: ABNORMAL
LYMPHOCYTES # BLD AUTO: 0.4 THOUSANDS/ΜL (ref 0.6–4.47)
LYMPHOCYTES # BLD AUTO: 0.6 THOUSANDS/ΜL (ref 0.6–4.47)
LYMPHOCYTES NFR BLD AUTO: 2 % (ref 21–51)
LYMPHOCYTES NFR BLD AUTO: 4 % (ref 21–51)
MCH RBC QN AUTO: 28.7 PG (ref 26–34)
MCH RBC QN AUTO: 29.4 PG (ref 26–34)
MCHC RBC AUTO-ENTMCNC: 32 G/DL (ref 31–37)
MCHC RBC AUTO-ENTMCNC: 32.2 G/DL (ref 31–37)
MCV RBC AUTO: 90 FL (ref 81–99)
MCV RBC AUTO: 91 FL (ref 81–99)
MONOCYTES # BLD AUTO: 1.4 THOUSAND/ΜL (ref 0.17–1.22)
MONOCYTES # BLD AUTO: 1.5 THOUSAND/ΜL (ref 0.17–1.22)
MONOCYTES NFR BLD AUTO: 9 % (ref 2–12)
MONOCYTES NFR BLD AUTO: 9 % (ref 2–12)
MUCOUS THREADS UR QL AUTO: ABNORMAL
NEUTROPHILS # BLD AUTO: 13.5 THOUSANDS/ΜL (ref 1.4–6.5)
NEUTROPHILS # BLD AUTO: 15.8 THOUSANDS/ΜL (ref 1.4–6.5)
NEUTS SEG NFR BLD AUTO: 87 % (ref 42–75)
NEUTS SEG NFR BLD AUTO: 89 % (ref 42–75)
NITRITE UR QL STRIP: POSITIVE
NON-SQ EPI CELLS URNS QL MICRO: ABNORMAL /HPF
P AXIS: 81 DEGREES
P AXIS: 85 DEGREES
PH UR STRIP.AUTO: 5.5 [PH]
PLATELET # BLD AUTO: 168 THOUSANDS/UL (ref 149–390)
PLATELET # BLD AUTO: 175 THOUSANDS/UL (ref 149–390)
PMV BLD AUTO: 8.4 FL (ref 8.6–11.7)
PMV BLD AUTO: 8.4 FL (ref 8.6–11.7)
POTASSIUM SERPL-SCNC: 4.3 MMOL/L (ref 3.5–5.5)
POTASSIUM SERPL-SCNC: 4.4 MMOL/L (ref 3.5–5.5)
PR INTERVAL: 220 MS
PR INTERVAL: 228 MS
PROCALCITONIN SERPL-MCNC: 0.09 NG/ML
PROCALCITONIN SERPL-MCNC: 0.12 NG/ML
PROT SERPL-MCNC: 6.2 G/DL (ref 6.4–8.9)
PROT UR STRIP-MCNC: ABNORMAL MG/DL
QRS AXIS: 63 DEGREES
QRS AXIS: 80 DEGREES
QRSD INTERVAL: 64 MS
QRSD INTERVAL: 72 MS
QT INTERVAL: 346 MS
QT INTERVAL: 350 MS
QTC INTERVAL: 432 MS
QTC INTERVAL: 442 MS
RBC # BLD AUTO: 4.21 MILLION/UL (ref 4.3–5.9)
RBC # BLD AUTO: 4.3 MILLION/UL (ref 4.3–5.9)
RBC #/AREA URNS AUTO: ABNORMAL /HPF
S PNEUM AG UR QL: NEGATIVE
SODIUM SERPL-SCNC: 137 MMOL/L (ref 134–143)
SODIUM SERPL-SCNC: 138 MMOL/L (ref 134–143)
SP GR UR STRIP.AUTO: >=1.03 (ref 1–1.03)
T WAVE AXIS: 79 DEGREES
T WAVE AXIS: 85 DEGREES
TROPONIN I SERPL-MCNC: 0.07 NG/ML
UROBILINOGEN UR QL STRIP.AUTO: 0.2 E.U./DL
VENTRICULAR RATE: 94 BPM
VENTRICULAR RATE: 96 BPM
WBC # BLD AUTO: 15.6 THOUSAND/UL (ref 4.8–10.8)
WBC # BLD AUTO: 17.7 THOUSAND/UL (ref 4.8–10.8)
WBC #/AREA URNS AUTO: ABNORMAL /HPF

## 2019-06-03 PROCEDURE — 93306 TTE W/DOPPLER COMPLETE: CPT | Performed by: INTERNAL MEDICINE

## 2019-06-03 PROCEDURE — G8987 SELF CARE CURRENT STATUS: HCPCS

## 2019-06-03 PROCEDURE — 83880 ASSAY OF NATRIURETIC PEPTIDE: CPT | Performed by: EMERGENCY MEDICINE

## 2019-06-03 PROCEDURE — 99222 1ST HOSP IP/OBS MODERATE 55: CPT | Performed by: INTERNAL MEDICINE

## 2019-06-03 PROCEDURE — 85025 COMPLETE CBC W/AUTO DIFF WBC: CPT | Performed by: EMERGENCY MEDICINE

## 2019-06-03 PROCEDURE — 81001 URINALYSIS AUTO W/SCOPE: CPT | Performed by: EMERGENCY MEDICINE

## 2019-06-03 PROCEDURE — 87449 NOS EACH ORGANISM AG IA: CPT | Performed by: PHYSICIAN ASSISTANT

## 2019-06-03 PROCEDURE — 94640 AIRWAY INHALATION TREATMENT: CPT

## 2019-06-03 PROCEDURE — G8979 MOBILITY GOAL STATUS: HCPCS

## 2019-06-03 PROCEDURE — 99223 1ST HOSP IP/OBS HIGH 75: CPT | Performed by: PHYSICIAN ASSISTANT

## 2019-06-03 PROCEDURE — 85025 COMPLETE CBC W/AUTO DIFF WBC: CPT | Performed by: PHYSICIAN ASSISTANT

## 2019-06-03 PROCEDURE — G8988 SELF CARE GOAL STATUS: HCPCS

## 2019-06-03 PROCEDURE — 97530 THERAPEUTIC ACTIVITIES: CPT

## 2019-06-03 PROCEDURE — G8978 MOBILITY CURRENT STATUS: HCPCS

## 2019-06-03 PROCEDURE — 87086 URINE CULTURE/COLONY COUNT: CPT | Performed by: EMERGENCY MEDICINE

## 2019-06-03 PROCEDURE — 71046 X-RAY EXAM CHEST 2 VIEWS: CPT

## 2019-06-03 PROCEDURE — 36415 COLL VENOUS BLD VENIPUNCTURE: CPT | Performed by: EMERGENCY MEDICINE

## 2019-06-03 PROCEDURE — 84484 ASSAY OF TROPONIN QUANT: CPT | Performed by: EMERGENCY MEDICINE

## 2019-06-03 PROCEDURE — 99285 EMERGENCY DEPT VISIT HI MDM: CPT

## 2019-06-03 PROCEDURE — 94664 DEMO&/EVAL PT USE INHALER: CPT

## 2019-06-03 PROCEDURE — 80048 BASIC METABOLIC PNL TOTAL CA: CPT | Performed by: PHYSICIAN ASSISTANT

## 2019-06-03 PROCEDURE — 1123F ACP DISCUSS/DSCN MKR DOCD: CPT | Performed by: INTERNAL MEDICINE

## 2019-06-03 PROCEDURE — 93010 ELECTROCARDIOGRAM REPORT: CPT | Performed by: INTERNAL MEDICINE

## 2019-06-03 PROCEDURE — 84145 PROCALCITONIN (PCT): CPT | Performed by: PHYSICIAN ASSISTANT

## 2019-06-03 PROCEDURE — 80053 COMPREHEN METABOLIC PANEL: CPT | Performed by: EMERGENCY MEDICINE

## 2019-06-03 PROCEDURE — 97163 PT EVAL HIGH COMPLEX 45 MIN: CPT

## 2019-06-03 PROCEDURE — 94760 N-INVAS EAR/PLS OXIMETRY 1: CPT

## 2019-06-03 PROCEDURE — 93005 ELECTROCARDIOGRAM TRACING: CPT

## 2019-06-03 PROCEDURE — 84145 PROCALCITONIN (PCT): CPT | Performed by: EMERGENCY MEDICINE

## 2019-06-03 PROCEDURE — 83605 ASSAY OF LACTIC ACID: CPT | Performed by: EMERGENCY MEDICINE

## 2019-06-03 PROCEDURE — 97167 OT EVAL HIGH COMPLEX 60 MIN: CPT

## 2019-06-03 PROCEDURE — 87040 BLOOD CULTURE FOR BACTERIA: CPT | Performed by: EMERGENCY MEDICINE

## 2019-06-03 RX ORDER — CEFTRIAXONE 1 G/50ML
1000 INJECTION, SOLUTION INTRAVENOUS EVERY 24 HOURS
Status: DISCONTINUED | OUTPATIENT
Start: 2019-06-04 | End: 2019-06-04

## 2019-06-03 RX ORDER — ALBUTEROL SULFATE 90 UG/1
2 AEROSOL, METERED RESPIRATORY (INHALATION) EVERY 6 HOURS PRN
Status: ON HOLD | COMMUNITY
End: 2020-02-27 | Stop reason: SDUPTHER

## 2019-06-03 RX ORDER — ASPIRIN 81 MG/1
81 TABLET ORAL DAILY
Status: DISCONTINUED | OUTPATIENT
Start: 2019-06-03 | End: 2019-06-06 | Stop reason: HOSPADM

## 2019-06-03 RX ORDER — FINASTERIDE 5 MG/1
5 TABLET, FILM COATED ORAL DAILY
Status: DISCONTINUED | OUTPATIENT
Start: 2019-06-03 | End: 2019-06-06 | Stop reason: HOSPADM

## 2019-06-03 RX ORDER — ACETAMINOPHEN 325 MG/1
650 TABLET ORAL EVERY 6 HOURS PRN
Status: DISCONTINUED | OUTPATIENT
Start: 2019-06-03 | End: 2019-06-06 | Stop reason: HOSPADM

## 2019-06-03 RX ORDER — GABAPENTIN 100 MG/1
200 CAPSULE ORAL DAILY
Status: DISCONTINUED | OUTPATIENT
Start: 2019-06-03 | End: 2019-06-06 | Stop reason: HOSPADM

## 2019-06-03 RX ORDER — CEFTRIAXONE 1 G/50ML
1000 INJECTION, SOLUTION INTRAVENOUS ONCE
Status: COMPLETED | OUTPATIENT
Start: 2019-06-03 | End: 2019-06-03

## 2019-06-03 RX ORDER — ALBUTEROL SULFATE 90 UG/1
2 AEROSOL, METERED RESPIRATORY (INHALATION) EVERY 6 HOURS PRN
Status: DISCONTINUED | OUTPATIENT
Start: 2019-06-03 | End: 2019-06-03

## 2019-06-03 RX ORDER — TRAMADOL HYDROCHLORIDE 50 MG/1
50 TABLET ORAL EVERY 6 HOURS PRN
Status: DISCONTINUED | OUTPATIENT
Start: 2019-06-03 | End: 2019-06-06 | Stop reason: HOSPADM

## 2019-06-03 RX ORDER — ONDANSETRON 2 MG/ML
4 INJECTION INTRAMUSCULAR; INTRAVENOUS EVERY 6 HOURS PRN
Status: DISCONTINUED | OUTPATIENT
Start: 2019-06-03 | End: 2019-06-06 | Stop reason: HOSPADM

## 2019-06-03 RX ORDER — ALBUTEROL SULFATE 2.5 MG/3ML
2.5 SOLUTION RESPIRATORY (INHALATION) EVERY 4 HOURS PRN
Status: DISCONTINUED | OUTPATIENT
Start: 2019-06-03 | End: 2019-06-06 | Stop reason: HOSPADM

## 2019-06-03 RX ORDER — RIZATRIPTAN BENZOATE 10 MG/1
10 TABLET ORAL ONCE AS NEEDED
Status: ON HOLD | COMMUNITY
End: 2020-02-27 | Stop reason: SDUPTHER

## 2019-06-03 RX ORDER — FUROSEMIDE 40 MG/1
40 TABLET ORAL DAILY
Status: DISCONTINUED | OUTPATIENT
Start: 2019-06-03 | End: 2019-06-03

## 2019-06-03 RX ORDER — OXYBUTYNIN CHLORIDE 5 MG/1
5 TABLET ORAL DAILY
Status: DISCONTINUED | OUTPATIENT
Start: 2019-06-03 | End: 2019-06-06 | Stop reason: HOSPADM

## 2019-06-03 RX ORDER — FINASTERIDE 5 MG/1
5 TABLET, FILM COATED ORAL DAILY
Status: ON HOLD | COMMUNITY
End: 2020-02-27 | Stop reason: SDUPTHER

## 2019-06-03 RX ORDER — ALPRAZOLAM 0.5 MG/1
0.5 TABLET ORAL
Status: DISCONTINUED | OUTPATIENT
Start: 2019-06-03 | End: 2019-06-06 | Stop reason: HOSPADM

## 2019-06-03 RX ORDER — ESCITALOPRAM OXALATE 10 MG/1
10 TABLET ORAL DAILY
Status: ON HOLD | COMMUNITY
End: 2020-02-27 | Stop reason: SDUPTHER

## 2019-06-03 RX ORDER — NICOTINE 21 MG/24HR
1 PATCH, TRANSDERMAL 24 HOURS TRANSDERMAL DAILY
Status: DISCONTINUED | OUTPATIENT
Start: 2019-06-03 | End: 2019-06-06 | Stop reason: HOSPADM

## 2019-06-03 RX ORDER — ATORVASTATIN CALCIUM 20 MG/1
20 TABLET, FILM COATED ORAL DAILY
Status: DISCONTINUED | OUTPATIENT
Start: 2019-06-03 | End: 2019-06-06 | Stop reason: HOSPADM

## 2019-06-03 RX ORDER — GLIMEPIRIDE 2 MG/1
2 TABLET ORAL
Status: ON HOLD | COMMUNITY
End: 2020-02-27 | Stop reason: SDUPTHER

## 2019-06-03 RX ORDER — ATORVASTATIN CALCIUM 20 MG/1
20 TABLET, FILM COATED ORAL DAILY
Status: ON HOLD | COMMUNITY
End: 2020-02-27 | Stop reason: SDUPTHER

## 2019-06-03 RX ORDER — ALPRAZOLAM 0.5 MG/1
0.5 TABLET ORAL
Status: ON HOLD | COMMUNITY
End: 2020-02-27 | Stop reason: SDUPTHER

## 2019-06-03 RX ORDER — ALBUTEROL SULFATE 2.5 MG/3ML
2.5 SOLUTION RESPIRATORY (INHALATION)
Status: DISCONTINUED | OUTPATIENT
Start: 2019-06-03 | End: 2019-06-06 | Stop reason: HOSPADM

## 2019-06-03 RX ORDER — SODIUM CHLORIDE 9 MG/ML
125 INJECTION, SOLUTION INTRAVENOUS CONTINUOUS
Status: DISCONTINUED | OUTPATIENT
Start: 2019-06-03 | End: 2019-06-03

## 2019-06-03 RX ORDER — FLUTICASONE FUROATE AND VILANTEROL 200; 25 UG/1; UG/1
1 POWDER RESPIRATORY (INHALATION)
Status: DISCONTINUED | OUTPATIENT
Start: 2019-06-03 | End: 2019-06-06 | Stop reason: HOSPADM

## 2019-06-03 RX ORDER — HEPARIN SODIUM 5000 [USP'U]/ML
5000 INJECTION, SOLUTION INTRAVENOUS; SUBCUTANEOUS EVERY 8 HOURS SCHEDULED
Status: DISCONTINUED | OUTPATIENT
Start: 2019-06-03 | End: 2019-06-06 | Stop reason: HOSPADM

## 2019-06-03 RX ORDER — ESCITALOPRAM OXALATE 10 MG/1
10 TABLET ORAL DAILY
Status: DISCONTINUED | OUTPATIENT
Start: 2019-06-03 | End: 2019-06-06 | Stop reason: HOSPADM

## 2019-06-03 RX ORDER — FUROSEMIDE 10 MG/ML
40 INJECTION INTRAMUSCULAR; INTRAVENOUS
Status: DISCONTINUED | OUTPATIENT
Start: 2019-06-03 | End: 2019-06-04

## 2019-06-03 RX ADMIN — ASPIRIN 81 MG: 81 TABLET, COATED ORAL at 08:06

## 2019-06-03 RX ADMIN — FUROSEMIDE 40 MG: 10 INJECTION, SOLUTION INTRAMUSCULAR; INTRAVENOUS at 15:41

## 2019-06-03 RX ADMIN — OXYBUTYNIN CHLORIDE 5 MG: 5 TABLET ORAL at 08:05

## 2019-06-03 RX ADMIN — GABAPENTIN 400 MG: 100 CAPSULE ORAL at 22:44

## 2019-06-03 RX ADMIN — FINASTERIDE 5 MG: 5 TABLET, FILM COATED ORAL at 08:05

## 2019-06-03 RX ADMIN — SODIUM CHLORIDE 125 ML/HR: 0.9 INJECTION, SOLUTION INTRAVENOUS at 03:30

## 2019-06-03 RX ADMIN — ALBUTEROL SULFATE 2.5 MG: 2.5 SOLUTION RESPIRATORY (INHALATION) at 11:11

## 2019-06-03 RX ADMIN — ATORVASTATIN CALCIUM 20 MG: 20 TABLET, FILM COATED ORAL at 15:39

## 2019-06-03 RX ADMIN — ALBUTEROL SULFATE 2.5 MG: 2.5 SOLUTION RESPIRATORY (INHALATION) at 08:40

## 2019-06-03 RX ADMIN — ESCITALOPRAM OXALATE 10 MG: 10 TABLET ORAL at 08:05

## 2019-06-03 RX ADMIN — HEPARIN SODIUM 5000 UNITS: 5000 INJECTION INTRAVENOUS; SUBCUTANEOUS at 06:04

## 2019-06-03 RX ADMIN — CEFTRIAXONE 1000 MG: 1 INJECTION, SOLUTION INTRAVENOUS at 03:53

## 2019-06-03 RX ADMIN — GABAPENTIN 200 MG: 100 CAPSULE ORAL at 08:06

## 2019-06-03 RX ADMIN — ALBUTEROL SULFATE 2.5 MG: 2.5 SOLUTION RESPIRATORY (INHALATION) at 20:00

## 2019-06-03 RX ADMIN — HEPARIN SODIUM 5000 UNITS: 5000 INJECTION INTRAVENOUS; SUBCUTANEOUS at 22:44

## 2019-06-03 RX ADMIN — FUROSEMIDE 40 MG: 40 TABLET ORAL at 08:05

## 2019-06-03 RX ADMIN — FLUTICASONE FUROATE AND VILANTEROL TRIFENATATE 1 PUFF: 200; 25 POWDER RESPIRATORY (INHALATION) at 08:05

## 2019-06-03 RX ADMIN — HEPARIN SODIUM 5000 UNITS: 5000 INJECTION INTRAVENOUS; SUBCUTANEOUS at 15:41

## 2019-06-03 RX ADMIN — AZITHROMYCIN MONOHYDRATE 500 MG: 500 INJECTION, POWDER, LYOPHILIZED, FOR SOLUTION INTRAVENOUS at 04:43

## 2019-06-03 RX ADMIN — NICOTINE 1 PATCH: 21 PATCH, EXTENDED RELEASE TRANSDERMAL at 08:04

## 2019-06-03 RX ADMIN — ALBUTEROL SULFATE 2.5 MG: 2.5 SOLUTION RESPIRATORY (INHALATION) at 15:37

## 2019-06-04 PROBLEM — J44.1 CHRONIC OBSTRUCTIVE PULMONARY DISEASE WITH ACUTE EXACERBATION (HCC): Status: ACTIVE | Noted: 2018-10-16

## 2019-06-04 PROBLEM — J96.21 ACUTE ON CHRONIC RESPIRATORY FAILURE WITH HYPOXIA (HCC): Status: ACTIVE | Noted: 2019-06-03

## 2019-06-04 PROBLEM — Z79.4 TYPE 2 DIABETES MELLITUS WITH COMPLICATION, WITH LONG-TERM CURRENT USE OF INSULIN (HCC): Status: ACTIVE | Noted: 2018-10-16

## 2019-06-04 PROBLEM — E11.8 TYPE 2 DIABETES MELLITUS WITH COMPLICATION, WITH LONG-TERM CURRENT USE OF INSULIN (HCC): Status: ACTIVE | Noted: 2018-10-16

## 2019-06-04 LAB
ALBUMIN SERPL BCP-MCNC: 3.1 G/DL (ref 3.5–5.7)
ALP SERPL-CCNC: 70 U/L (ref 55–165)
ALT SERPL W P-5'-P-CCNC: 8 U/L (ref 7–52)
ANION GAP SERPL CALCULATED.3IONS-SCNC: 5 MMOL/L (ref 4–13)
AST SERPL W P-5'-P-CCNC: 11 U/L (ref 13–39)
BACTERIA UR CULT: NORMAL
BASOPHILS # BLD AUTO: 0 THOUSANDS/ΜL (ref 0–0.1)
BASOPHILS NFR BLD AUTO: 0 % (ref 0–2)
BILIRUB SERPL-MCNC: 0.3 MG/DL (ref 0.2–1)
BUN SERPL-MCNC: 18 MG/DL (ref 7–25)
CALCIUM SERPL-MCNC: 8.5 MG/DL (ref 8.6–10.5)
CHLORIDE SERPL-SCNC: 98 MMOL/L (ref 98–107)
CO2 SERPL-SCNC: 36 MMOL/L (ref 21–31)
CREAT SERPL-MCNC: 0.62 MG/DL (ref 0.7–1.3)
EOSINOPHIL # BLD AUTO: 0 THOUSAND/ΜL (ref 0–0.61)
EOSINOPHIL NFR BLD AUTO: 0 % (ref 0–5)
ERYTHROCYTE [DISTWIDTH] IN BLOOD BY AUTOMATED COUNT: 14.2 % (ref 11.5–14.5)
GFR SERPL CREATININE-BSD FRML MDRD: 92 ML/MIN/1.73SQ M
GLUCOSE SERPL-MCNC: 234 MG/DL (ref 65–99)
HCT VFR BLD AUTO: 36 % (ref 42–47)
HGB BLD-MCNC: 11.6 G/DL (ref 14–18)
LYMPHOCYTES # BLD AUTO: 0.6 THOUSANDS/ΜL (ref 0.6–4.47)
LYMPHOCYTES NFR BLD AUTO: 5 % (ref 21–51)
MCH RBC QN AUTO: 29.3 PG (ref 26–34)
MCHC RBC AUTO-ENTMCNC: 32.3 G/DL (ref 31–37)
MCV RBC AUTO: 91 FL (ref 81–99)
MONOCYTES # BLD AUTO: 0.9 THOUSAND/ΜL (ref 0.17–1.22)
MONOCYTES NFR BLD AUTO: 9 % (ref 2–12)
NEUTROPHILS # BLD AUTO: 9.2 THOUSANDS/ΜL (ref 1.4–6.5)
NEUTS SEG NFR BLD AUTO: 86 % (ref 42–75)
PLATELET # BLD AUTO: 154 THOUSANDS/UL (ref 149–390)
PMV BLD AUTO: 8.9 FL (ref 8.6–11.7)
POTASSIUM SERPL-SCNC: 3.9 MMOL/L (ref 3.5–5.5)
PROCALCITONIN SERPL-MCNC: 0.2 NG/ML
PROT SERPL-MCNC: 5.6 G/DL (ref 6.4–8.9)
RBC # BLD AUTO: 3.97 MILLION/UL (ref 4.3–5.9)
SODIUM SERPL-SCNC: 139 MMOL/L (ref 134–143)
WBC # BLD AUTO: 10.7 THOUSAND/UL (ref 4.8–10.8)

## 2019-06-04 PROCEDURE — 99232 SBSQ HOSP IP/OBS MODERATE 35: CPT | Performed by: INTERNAL MEDICINE

## 2019-06-04 PROCEDURE — 97116 GAIT TRAINING THERAPY: CPT

## 2019-06-04 PROCEDURE — 97530 THERAPEUTIC ACTIVITIES: CPT

## 2019-06-04 PROCEDURE — 84145 PROCALCITONIN (PCT): CPT | Performed by: INTERNAL MEDICINE

## 2019-06-04 PROCEDURE — 94760 N-INVAS EAR/PLS OXIMETRY 1: CPT

## 2019-06-04 PROCEDURE — 85025 COMPLETE CBC W/AUTO DIFF WBC: CPT | Performed by: INTERNAL MEDICINE

## 2019-06-04 PROCEDURE — 99232 SBSQ HOSP IP/OBS MODERATE 35: CPT | Performed by: NURSE PRACTITIONER

## 2019-06-04 PROCEDURE — 94640 AIRWAY INHALATION TREATMENT: CPT

## 2019-06-04 PROCEDURE — 80053 COMPREHEN METABOLIC PANEL: CPT | Performed by: INTERNAL MEDICINE

## 2019-06-04 RX ORDER — METHYLPREDNISOLONE SODIUM SUCCINATE 40 MG/ML
40 INJECTION, POWDER, LYOPHILIZED, FOR SOLUTION INTRAMUSCULAR; INTRAVENOUS EVERY 12 HOURS SCHEDULED
Status: DISCONTINUED | OUTPATIENT
Start: 2019-06-04 | End: 2019-06-06 | Stop reason: HOSPADM

## 2019-06-04 RX ORDER — METHYLPREDNISOLONE SODIUM SUCCINATE 40 MG/ML
40 INJECTION, POWDER, LYOPHILIZED, FOR SOLUTION INTRAMUSCULAR; INTRAVENOUS EVERY 8 HOURS SCHEDULED
Status: DISCONTINUED | OUTPATIENT
Start: 2019-06-04 | End: 2019-06-04

## 2019-06-04 RX ORDER — GUAIFENESIN 600 MG
600 TABLET, EXTENDED RELEASE 12 HR ORAL EVERY 12 HOURS SCHEDULED
Status: DISCONTINUED | OUTPATIENT
Start: 2019-06-04 | End: 2019-06-06 | Stop reason: HOSPADM

## 2019-06-04 RX ORDER — CEFTRIAXONE 1 G/50ML
1000 INJECTION, SOLUTION INTRAVENOUS EVERY 24 HOURS
Status: DISCONTINUED | OUTPATIENT
Start: 2019-06-05 | End: 2019-06-06 | Stop reason: HOSPADM

## 2019-06-04 RX ORDER — FUROSEMIDE 20 MG/1
20 TABLET ORAL DAILY PRN
Status: DISCONTINUED | OUTPATIENT
Start: 2019-06-04 | End: 2019-06-04

## 2019-06-04 RX ORDER — FUROSEMIDE 20 MG/1
20 TABLET ORAL DAILY
Status: DISCONTINUED | OUTPATIENT
Start: 2019-06-05 | End: 2019-06-06 | Stop reason: HOSPADM

## 2019-06-04 RX ORDER — POLYVINYL ALCOHOL 14 MG/ML
1 SOLUTION/ DROPS OPHTHALMIC
Status: DISCONTINUED | OUTPATIENT
Start: 2019-06-04 | End: 2019-06-06 | Stop reason: HOSPADM

## 2019-06-04 RX ADMIN — METHYLPREDNISOLONE SODIUM SUCCINATE 40 MG: 40 INJECTION, POWDER, FOR SOLUTION INTRAMUSCULAR; INTRAVENOUS at 20:36

## 2019-06-04 RX ADMIN — NICOTINE 1 PATCH: 21 PATCH, EXTENDED RELEASE TRANSDERMAL at 08:14

## 2019-06-04 RX ADMIN — GABAPENTIN 200 MG: 100 CAPSULE ORAL at 08:12

## 2019-06-04 RX ADMIN — HEPARIN SODIUM 5000 UNITS: 5000 INJECTION INTRAVENOUS; SUBCUTANEOUS at 14:34

## 2019-06-04 RX ADMIN — ALBUTEROL SULFATE 2.5 MG: 2.5 SOLUTION RESPIRATORY (INHALATION) at 20:43

## 2019-06-04 RX ADMIN — ASPIRIN 81 MG: 81 TABLET, COATED ORAL at 08:12

## 2019-06-04 RX ADMIN — HEPARIN SODIUM 5000 UNITS: 5000 INJECTION INTRAVENOUS; SUBCUTANEOUS at 05:32

## 2019-06-04 RX ADMIN — CEFTRIAXONE 1000 MG: 1 INJECTION, SOLUTION INTRAVENOUS at 04:06

## 2019-06-04 RX ADMIN — ESCITALOPRAM OXALATE 10 MG: 10 TABLET ORAL at 08:12

## 2019-06-04 RX ADMIN — FLUTICASONE FUROATE AND VILANTEROL TRIFENATATE 1 PUFF: 200; 25 POWDER RESPIRATORY (INHALATION) at 08:12

## 2019-06-04 RX ADMIN — METHYLPREDNISOLONE SODIUM SUCCINATE 40 MG: 40 INJECTION, POWDER, FOR SOLUTION INTRAMUSCULAR; INTRAVENOUS at 08:12

## 2019-06-04 RX ADMIN — OXYBUTYNIN CHLORIDE 5 MG: 5 TABLET ORAL at 08:12

## 2019-06-04 RX ADMIN — GUAIFENESIN 600 MG: 600 TABLET, EXTENDED RELEASE ORAL at 08:15

## 2019-06-04 RX ADMIN — TRAMADOL HYDROCHLORIDE 50 MG: 50 TABLET, COATED ORAL at 20:35

## 2019-06-04 RX ADMIN — ALBUTEROL SULFATE 2.5 MG: 2.5 SOLUTION RESPIRATORY (INHALATION) at 11:20

## 2019-06-04 RX ADMIN — FINASTERIDE 5 MG: 5 TABLET, FILM COATED ORAL at 08:12

## 2019-06-04 RX ADMIN — FUROSEMIDE 40 MG: 10 INJECTION, SOLUTION INTRAMUSCULAR; INTRAVENOUS at 08:12

## 2019-06-04 RX ADMIN — HEPARIN SODIUM 5000 UNITS: 5000 INJECTION INTRAVENOUS; SUBCUTANEOUS at 21:01

## 2019-06-04 RX ADMIN — ALBUTEROL SULFATE 2.5 MG: 2.5 SOLUTION RESPIRATORY (INHALATION) at 07:41

## 2019-06-04 RX ADMIN — GABAPENTIN 400 MG: 100 CAPSULE ORAL at 21:02

## 2019-06-04 RX ADMIN — GUAIFENESIN 600 MG: 600 TABLET, EXTENDED RELEASE ORAL at 20:35

## 2019-06-04 RX ADMIN — ATORVASTATIN CALCIUM 20 MG: 20 TABLET, FILM COATED ORAL at 16:09

## 2019-06-04 RX ADMIN — AZITHROMYCIN FOR INJECTION INJECTION, POWDER, LYOPHILIZED, FOR SOLUTION 500 MG: 500 INJECTION INTRAVENOUS at 05:29

## 2019-06-05 LAB
ANION GAP SERPL CALCULATED.3IONS-SCNC: 1 MMOL/L (ref 4–13)
ANISOCYTOSIS BLD QL SMEAR: PRESENT
BUN SERPL-MCNC: 21 MG/DL (ref 7–25)
CALCIUM SERPL-MCNC: 8.4 MG/DL (ref 8.6–10.5)
CHLORIDE SERPL-SCNC: 97 MMOL/L (ref 98–107)
CO2 SERPL-SCNC: 38 MMOL/L (ref 21–31)
CREAT SERPL-MCNC: 0.62 MG/DL (ref 0.7–1.3)
ERYTHROCYTE [DISTWIDTH] IN BLOOD BY AUTOMATED COUNT: 14.2 % (ref 11.5–14.5)
GFR SERPL CREATININE-BSD FRML MDRD: 92 ML/MIN/1.73SQ M
GLUCOSE SERPL-MCNC: 334 MG/DL (ref 65–99)
HCT VFR BLD AUTO: 34.5 % (ref 42–47)
HGB BLD-MCNC: 11.2 G/DL (ref 14–18)
LYMPHOCYTES # BLD AUTO: 0.36 THOUSAND/UL (ref 0.6–4.47)
LYMPHOCYTES # BLD AUTO: 4 % (ref 20–51)
MCH RBC QN AUTO: 29.3 PG (ref 26–34)
MCHC RBC AUTO-ENTMCNC: 32.4 G/DL (ref 31–37)
MCV RBC AUTO: 90 FL (ref 81–99)
MONOCYTES # BLD AUTO: 0.36 THOUSAND/UL (ref 0–1.22)
MONOCYTES NFR BLD AUTO: 4 % (ref 4–12)
NEUTS BAND NFR BLD MANUAL: 2 % (ref 0–8)
NEUTS SEG # BLD: 8.28 THOUSAND/UL (ref 1.81–6.82)
NEUTS SEG NFR BLD AUTO: 90 % (ref 42–75)
PLATELET # BLD AUTO: 153 THOUSANDS/UL (ref 149–390)
PLATELET BLD QL SMEAR: ADEQUATE
PMV BLD AUTO: 9 FL (ref 8.6–11.7)
POTASSIUM SERPL-SCNC: 4.3 MMOL/L (ref 3.5–5.5)
RBC # BLD AUTO: 3.82 MILLION/UL (ref 4.3–5.9)
RBC MORPH BLD: ABNORMAL
SODIUM SERPL-SCNC: 136 MMOL/L (ref 134–143)
TOTAL CELLS COUNTED SPEC: 100
WBC # BLD AUTO: 9 THOUSAND/UL (ref 4.8–10.8)

## 2019-06-05 PROCEDURE — 94760 N-INVAS EAR/PLS OXIMETRY 1: CPT

## 2019-06-05 PROCEDURE — 94640 AIRWAY INHALATION TREATMENT: CPT

## 2019-06-05 PROCEDURE — 85007 BL SMEAR W/DIFF WBC COUNT: CPT | Performed by: NURSE PRACTITIONER

## 2019-06-05 PROCEDURE — 80048 BASIC METABOLIC PNL TOTAL CA: CPT | Performed by: NURSE PRACTITIONER

## 2019-06-05 PROCEDURE — 85027 COMPLETE CBC AUTOMATED: CPT | Performed by: NURSE PRACTITIONER

## 2019-06-05 PROCEDURE — 99232 SBSQ HOSP IP/OBS MODERATE 35: CPT | Performed by: INTERNAL MEDICINE

## 2019-06-05 RX ADMIN — POLYVINYL ALCOHOL 1 DROP: 14 SOLUTION/ DROPS OPHTHALMIC at 14:40

## 2019-06-05 RX ADMIN — OXYBUTYNIN CHLORIDE 5 MG: 5 TABLET ORAL at 09:05

## 2019-06-05 RX ADMIN — ALBUTEROL SULFATE 2.5 MG: 2.5 SOLUTION RESPIRATORY (INHALATION) at 21:15

## 2019-06-05 RX ADMIN — ASPIRIN 81 MG: 81 TABLET, COATED ORAL at 09:05

## 2019-06-05 RX ADMIN — METHYLPREDNISOLONE SODIUM SUCCINATE 40 MG: 40 INJECTION, POWDER, FOR SOLUTION INTRAMUSCULAR; INTRAVENOUS at 09:05

## 2019-06-05 RX ADMIN — ACETAMINOPHEN 650 MG: 325 TABLET ORAL at 04:05

## 2019-06-05 RX ADMIN — METHYLPREDNISOLONE SODIUM SUCCINATE 40 MG: 40 INJECTION, POWDER, FOR SOLUTION INTRAMUSCULAR; INTRAVENOUS at 20:43

## 2019-06-05 RX ADMIN — HEPARIN SODIUM 5000 UNITS: 5000 INJECTION INTRAVENOUS; SUBCUTANEOUS at 21:43

## 2019-06-05 RX ADMIN — HEPARIN SODIUM 5000 UNITS: 5000 INJECTION INTRAVENOUS; SUBCUTANEOUS at 14:39

## 2019-06-05 RX ADMIN — ALBUTEROL SULFATE 2.5 MG: 2.5 SOLUTION RESPIRATORY (INHALATION) at 15:06

## 2019-06-05 RX ADMIN — ALBUTEROL SULFATE 2.5 MG: 2.5 SOLUTION RESPIRATORY (INHALATION) at 11:22

## 2019-06-05 RX ADMIN — GUAIFENESIN 600 MG: 600 TABLET, EXTENDED RELEASE ORAL at 20:43

## 2019-06-05 RX ADMIN — POLYVINYL ALCOHOL 1 DROP: 14 SOLUTION/ DROPS OPHTHALMIC at 09:09

## 2019-06-05 RX ADMIN — FINASTERIDE 5 MG: 5 TABLET, FILM COATED ORAL at 09:05

## 2019-06-05 RX ADMIN — ALBUTEROL SULFATE 2.5 MG: 2.5 SOLUTION RESPIRATORY (INHALATION) at 07:18

## 2019-06-05 RX ADMIN — ESCITALOPRAM OXALATE 10 MG: 10 TABLET ORAL at 09:05

## 2019-06-05 RX ADMIN — TRAMADOL HYDROCHLORIDE 50 MG: 50 TABLET, COATED ORAL at 05:25

## 2019-06-05 RX ADMIN — FUROSEMIDE 20 MG: 20 TABLET ORAL at 09:05

## 2019-06-05 RX ADMIN — ATORVASTATIN CALCIUM 20 MG: 20 TABLET, FILM COATED ORAL at 17:29

## 2019-06-05 RX ADMIN — GUAIFENESIN 600 MG: 600 TABLET, EXTENDED RELEASE ORAL at 09:05

## 2019-06-05 RX ADMIN — GABAPENTIN 400 MG: 100 CAPSULE ORAL at 21:43

## 2019-06-05 RX ADMIN — HEPARIN SODIUM 5000 UNITS: 5000 INJECTION INTRAVENOUS; SUBCUTANEOUS at 05:25

## 2019-06-05 RX ADMIN — NICOTINE 1 PATCH: 21 PATCH, EXTENDED RELEASE TRANSDERMAL at 09:04

## 2019-06-05 RX ADMIN — FLUTICASONE FUROATE AND VILANTEROL TRIFENATATE 1 PUFF: 200; 25 POWDER RESPIRATORY (INHALATION) at 09:04

## 2019-06-05 RX ADMIN — CEFTRIAXONE 1000 MG: 1 INJECTION, SOLUTION INTRAVENOUS at 04:06

## 2019-06-05 RX ADMIN — GABAPENTIN 200 MG: 100 CAPSULE ORAL at 09:05

## 2019-06-06 ENCOUNTER — EPISODE CHANGES (OUTPATIENT)
Dept: CASE MANAGEMENT | Facility: HOSPITAL | Age: 84
End: 2019-06-06

## 2019-06-06 VITALS
DIASTOLIC BLOOD PRESSURE: 58 MMHG | SYSTOLIC BLOOD PRESSURE: 106 MMHG | HEIGHT: 70 IN | WEIGHT: 143.08 LBS | RESPIRATION RATE: 20 BRPM | BODY MASS INDEX: 20.48 KG/M2 | OXYGEN SATURATION: 87 % | TEMPERATURE: 97 F | HEART RATE: 75 BPM

## 2019-06-06 PROBLEM — E11.42 DIABETIC POLYNEUROPATHY ASSOCIATED WITH TYPE 2 DIABETES MELLITUS (HCC): Status: ACTIVE | Noted: 2018-10-16

## 2019-06-06 LAB — GLUCOSE SERPL-MCNC: 472 MG/DL (ref 65–140)

## 2019-06-06 PROCEDURE — 94760 N-INVAS EAR/PLS OXIMETRY 1: CPT

## 2019-06-06 PROCEDURE — 94640 AIRWAY INHALATION TREATMENT: CPT

## 2019-06-06 PROCEDURE — 82948 REAGENT STRIP/BLOOD GLUCOSE: CPT

## 2019-06-06 PROCEDURE — 94664 DEMO&/EVAL PT USE INHALER: CPT

## 2019-06-06 PROCEDURE — 99239 HOSP IP/OBS DSCHRG MGMT >30: CPT | Performed by: INTERNAL MEDICINE

## 2019-06-06 RX ORDER — GUAIFENESIN 600 MG
600 TABLET, EXTENDED RELEASE 12 HR ORAL EVERY 12 HOURS SCHEDULED
Qty: 5 TABLET | Refills: 0
Start: 2019-06-06 | End: 2019-06-09

## 2019-06-06 RX ORDER — FUROSEMIDE 20 MG/1
20 TABLET ORAL DAILY
Refills: 0 | Status: ON HOLD
Start: 2019-06-07 | End: 2020-02-27 | Stop reason: SDUPTHER

## 2019-06-06 RX ORDER — PREDNISONE 10 MG/1
30 TABLET ORAL DAILY
Qty: 12 TABLET | Refills: 0
Start: 2019-06-06 | End: 2019-06-07

## 2019-06-06 RX ADMIN — METHYLPREDNISOLONE SODIUM SUCCINATE 40 MG: 40 INJECTION, POWDER, FOR SOLUTION INTRAMUSCULAR; INTRAVENOUS at 08:42

## 2019-06-06 RX ADMIN — ASPIRIN 81 MG: 81 TABLET, COATED ORAL at 08:40

## 2019-06-06 RX ADMIN — HEPARIN SODIUM 5000 UNITS: 5000 INJECTION INTRAVENOUS; SUBCUTANEOUS at 06:17

## 2019-06-06 RX ADMIN — TRAMADOL HYDROCHLORIDE 50 MG: 50 TABLET, COATED ORAL at 11:00

## 2019-06-06 RX ADMIN — CEFTRIAXONE 1000 MG: 1 INJECTION, SOLUTION INTRAVENOUS at 03:48

## 2019-06-06 RX ADMIN — GUAIFENESIN 600 MG: 600 TABLET, EXTENDED RELEASE ORAL at 08:39

## 2019-06-06 RX ADMIN — GABAPENTIN 200 MG: 100 CAPSULE ORAL at 08:40

## 2019-06-06 RX ADMIN — NICOTINE 1 PATCH: 21 PATCH, EXTENDED RELEASE TRANSDERMAL at 08:42

## 2019-06-06 RX ADMIN — ESCITALOPRAM OXALATE 10 MG: 10 TABLET ORAL at 08:44

## 2019-06-06 RX ADMIN — POLYVINYL ALCOHOL 1 DROP: 14 SOLUTION/ DROPS OPHTHALMIC at 08:51

## 2019-06-06 RX ADMIN — OXYBUTYNIN CHLORIDE 5 MG: 5 TABLET ORAL at 08:39

## 2019-06-06 RX ADMIN — FLUTICASONE FUROATE AND VILANTEROL TRIFENATATE 1 PUFF: 200; 25 POWDER RESPIRATORY (INHALATION) at 08:41

## 2019-06-06 RX ADMIN — FUROSEMIDE 20 MG: 20 TABLET ORAL at 08:41

## 2019-06-06 RX ADMIN — ALBUTEROL SULFATE 2.5 MG: 2.5 SOLUTION RESPIRATORY (INHALATION) at 12:14

## 2019-06-06 RX ADMIN — FINASTERIDE 5 MG: 5 TABLET, FILM COATED ORAL at 08:39

## 2019-06-06 RX ADMIN — ALBUTEROL SULFATE 2.5 MG: 2.5 SOLUTION RESPIRATORY (INHALATION) at 07:22

## 2019-06-07 ENCOUNTER — EPISODE CHANGES (OUTPATIENT)
Dept: CASE MANAGEMENT | Facility: OTHER | Age: 84
End: 2019-06-07

## 2019-06-08 LAB
BACTERIA BLD CULT: NORMAL
BACTERIA BLD CULT: NORMAL

## 2019-06-19 ENCOUNTER — PATIENT OUTREACH (OUTPATIENT)
Dept: CASE MANAGEMENT | Facility: OTHER | Age: 84
End: 2019-06-19

## 2019-06-19 ENCOUNTER — EPISODE CHANGES (OUTPATIENT)
Dept: CASE MANAGEMENT | Facility: OTHER | Age: 84
End: 2019-06-19

## 2019-06-21 ENCOUNTER — PATIENT OUTREACH (OUTPATIENT)
Dept: CASE MANAGEMENT | Facility: OTHER | Age: 84
End: 2019-06-21

## 2019-06-25 ENCOUNTER — PATIENT OUTREACH (OUTPATIENT)
Dept: CASE MANAGEMENT | Facility: OTHER | Age: 84
End: 2019-06-25

## 2019-07-09 ENCOUNTER — PATIENT OUTREACH (OUTPATIENT)
Dept: CASE MANAGEMENT | Facility: OTHER | Age: 84
End: 2019-07-09

## 2019-07-09 NOTE — PROGRESS NOTES
Wears O2 at 4L - including hours of sleep  Wife reports patient is taking vit D2 2000 IU   after lunch today  PCP will visit in the home on Tuesday 7/16  POLST form mailed to patient

## 2019-07-09 NOTE — PROGRESS NOTES
Chart review completed  Patient due for skilled nurse visit today  Per  VNA visit 7/2, skilled nurse reports SP catheter  22FR 5ml  Changed 07 02 19 and inflated to 15ml per MD order  Patients wife independent with flush  Supplies in home  Skilled nurse continues instruction of Pulmonary dysfunction and SP catheter complications to report  Patient observed without O2 on upon skilled nurses arrival  Skilled nurse instructed patient to follow MD orders on same  Encouraged small frequent meals and adequate protein  Patient previously drinking Boost and Patients wife to obtain additional drinks  Unsuccessful attempt at reaching patient  Left name, call back number and office hours asking for return call

## 2019-07-26 ENCOUNTER — PATIENT OUTREACH (OUTPATIENT)
Dept: CASE MANAGEMENT | Facility: OTHER | Age: 84
End: 2019-07-26

## 2019-07-26 NOTE — PROGRESS NOTES
Nurse visit today between 6 and 1  PCP visit occurred on 7/16 in the home  Wife reports visit went well and the patient is progressing slowly  Wife reports SOB remains the same  Wears his O2 most of the time  Weighs himself once a month  Education provided on importance of weighing self to help gauge CHF  Red flags discussed for CHF  Edema to bottom legs and feet  Wears diabetic socks during day and off at night  Reports cough productive at times  Wife reports cough is more often dry than productive  Yellow mucous  Education provided on change in mucous and when to contact the doctor  85 FBS yesterday  If sugar gets too high, gives self humalog  Wife verbalized he drink  OJ  If blood sugar too low  Encourage wife to contact doctor for BS greater than 250  Decrease in appetite remains the same  Eats a little time  Drinks shakes  Catheter in place, patent and draining yellow urine  Denies any changes to consistency, frequency or color  Next appointment scheduled is 8/7 with plastic surgery consult for cancer on arms  At this time, wife is to cancel the appointment and denied assistance from this CM to cancel appointment on patients behalf

## 2019-07-26 NOTE — PROGRESS NOTES
Chart review completed  Per  VNA visit 7/23, skilled nurse reports continue to reinforce O2 use  Pts O2 sat 83 on RA upon SN arrival 07 23 19  Pt agreeable to wear O2 as ordered  Sats increased to 95 on 4L O2  Encouraged use of Mucinex as ordered to assist with secretions and moist cough  Reviewed s s of pulmonary dysfunction

## 2019-08-09 ENCOUNTER — PATIENT OUTREACH (OUTPATIENT)
Dept: CASE MANAGEMENT | Facility: OTHER | Age: 84
End: 2019-08-09

## 2019-08-09 NOTE — PROGRESS NOTES
Chart review completed  Patient discharged from [address] [place], [place] [address]e VNA SN 8/2/19 and PT 8/3/19  Per  VNA visit 7/30, skilled nurse reports: SP catheter  22FR 5ml  Changed 07 02 19 and inflated to 15ml per MD order  Next change due 4-6 weeks  Pt to schedule appt with Urology for next change  Reviewed s s of pulmonary dysfunction to report  Encouraged adequate hydration  Reviewed use of all inhaled medications  Encouraged discussing possible nebulizer with PCP next appt

## 2019-08-09 NOTE — PROGRESS NOTES
Spoke with patient today who reports he is doing well  Is wearing O2 at 4L  Denies any edema  Weighed 140 today  FBS was 130  Saw Urologist yesterday and reports everything checked out fine  Red flag symptoms for CHF, COPD and Diabetes discussed  Patient able to verbalize S&S of when to call  This care manager will continue to monitor via chart review throughout bundle episode

## 2019-09-03 ENCOUNTER — PATIENT OUTREACH (OUTPATIENT)
Dept: CASE MANAGEMENT | Facility: OTHER | Age: 84
End: 2019-09-03

## 2019-09-03 NOTE — PROGRESS NOTES
Spoke with wife today who reports patient is doing well -- states "he is back to baseline before he was hospitalized "  Wife reports that his blood sugars have been good with no fluctuation  States no changes to suprapubic catheter / urine output  Reports that Kevin Agrawal still wears his O2 at 4L and continues to smoke  Offered smoking cessation information and wife declined stating that Kevin Agrawal will not quit  Red flags discussed for CHF and wife able to verbalize same  Wife admits that patient does not weigh himself daily but does weigh himself several times a week  At this time, no complaints offered  Care manager informed wife that the the patients bundle program ends today and this care manager will not longer be actively reaching out to patient  Monique Espinosa verbalized understanding  Care manager encouraged patient/wife to reach out to this care manager as needed in the future  Wife acknowledged and agreed  BPCI form updated, care coordination note removed and bundle episode resolved  Inbasket message to MOJGAN Trent

## 2019-09-18 ENCOUNTER — TRANSCRIBE ORDERS (OUTPATIENT)
Dept: ADMINISTRATIVE | Facility: HOSPITAL | Age: 84
End: 2019-09-18

## 2019-09-18 DIAGNOSIS — R04.2 COUGHING UP BLOOD: Primary | ICD-10-CM

## 2019-09-18 NOTE — OCCUPATIONAL THERAPY NOTE
Occupational Therapy Treatment Note      Kris Hand    10/17/2018    Patient Active Problem List   Diagnosis    Urinary catheter complication ([facility] )    COPD (chronic obstructive pulmonary disease) ([facility] )    Diabetes mellitus ([facility] )    Stroke ([facility] )    Renal disorder    Acute cystitis with hematuria    Lactic acidosis    Neuropathy    Renovascular hypertension    Tobacco abuse       Past Medical History:   Diagnosis Date    COPD (chronic obstructive pulmonary disease) ([facility] )     Diabetes mellitus ([facility] )     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[address] )     Renal disorder     Stroke ([place] [address] )     may 2015       Past Surgical History:   Procedure Laterality Date    ANGIOPLASTY  03/01/2016    Right femoral vein    BACK SURGERY  2015    SUPRAPUBIC TUBE PLACEMENT          10/17/18 9378   Restrictions/Precautions   Weight Bearing Precautions Per Order No   Other Precautions Fall Risk;O2;Multiple lines; Bed Alarm   Pain Assessment   Pain Assessment No/denies pain   Pain Score No Pain   Bed Mobility   Rolling L 4  Minimal assistance   Additional items Assist x 1;HOB elevated; Increased time required;Verbal cues   Supine to Sit 4  Minimal assistance   Additional items Assist x 1;HOB elevated; Increased time required;Verbal cues   Sit to Supine 4  Minimal assistance   Additional items Assist x 1; Increased time required;Verbal cues   Transfers   Sit to Stand 4  Minimal assistance   Additional items Assist x 1;HOB elevated; Increased time required;Verbal cues; Impulsive   Stand to Sit 4  Minimal assistance   Additional items Assist x 1;HOB elevated; Increased time required;Verbal cues   Additional items Impulsive  (unsteady)   Functional Mobility   Functional Mobility 4  Minimal assistance   Additional Comments (VC;'s, unsteady, impulsive, SPC used)   Additional items SPC  (around bed, VC's, ART)   Cognition   Overall Cognitive Status WFL   Arousal/Participation Alert; Cooperative   Attention Within functional limits Spoke to patient regarding test results. Patient understands results. Orientation Level Oriented X4   Memory Within functional limits   Following Commands Follows one step commands without difficulty   Activity Tolerance   Activity Tolerance Patient limited by fatigue   Assessment   Assessment Pt agreeable to therapy, requiring VC's for safety as pt s/w impulsive with functional mobility/functional transfers  Pt RAT readily during session  Further education re: energy conservation and safety needed  Plan   Treatment Interventions Energy conservation; Functional transfer training;ADL retraining; Endurance training;Patient/family training   Goal Expiration Date 10/26/18   Treatment Day 1   OT Frequency 3-5x/wk   Recommendation   OT Discharge Recommendation Short Term Rehab   OT - OK to Discharge No   Stephany Estrada, OT

## 2019-09-20 ENCOUNTER — HOSPITAL ENCOUNTER (OUTPATIENT)
Dept: CT IMAGING | Facility: HOSPITAL | Age: 84
Discharge: HOME | End: 2019-09-20
Attending: INTERNAL MEDICINE
Payer: MEDICARE

## 2019-09-20 DIAGNOSIS — R04.2 COUGHING UP BLOOD: ICD-10-CM

## 2019-09-20 PROCEDURE — 71250 CT THORAX DX C-: CPT

## 2019-09-30 ENCOUNTER — TRANSCRIBE ORDERS (OUTPATIENT)
Dept: LAB | Facility: HOSPITAL | Age: 84
End: 2019-09-30

## 2019-09-30 ENCOUNTER — APPOINTMENT (OUTPATIENT)
Dept: LAB | Facility: HOSPITAL | Age: 84
End: 2019-09-30
Attending: INTERNAL MEDICINE
Payer: MEDICARE

## 2019-09-30 DIAGNOSIS — Z79.01 LONG TERM (CURRENT) USE OF ANTICOAGULANTS: ICD-10-CM

## 2019-09-30 DIAGNOSIS — E11.9 DIABETES MELLITUS WITH NO COMPLICATION (HCC): Primary | ICD-10-CM

## 2019-09-30 DIAGNOSIS — E11.9 DIABETES MELLITUS WITH NO COMPLICATION (HCC): ICD-10-CM

## 2019-09-30 LAB
ALBUMIN SERPL BCP-MCNC: 3.7 G/DL (ref 3.5–5.7)
ALP SERPL-CCNC: 80 U/L (ref 55–165)
ALT SERPL W P-5'-P-CCNC: 7 U/L (ref 7–52)
ANION GAP SERPL CALCULATED.3IONS-SCNC: 4 MMOL/L (ref 4–13)
AST SERPL W P-5'-P-CCNC: 11 U/L (ref 13–39)
BILIRUB SERPL-MCNC: 0.5 MG/DL (ref 0.2–1)
BUN SERPL-MCNC: 16 MG/DL (ref 7–25)
CALCIUM SERPL-MCNC: 9.2 MG/DL (ref 8.6–10.5)
CHLORIDE SERPL-SCNC: 102 MMOL/L (ref 98–107)
CHOLEST SERPL-MCNC: 140 MG/DL (ref 0–200)
CO2 SERPL-SCNC: 38 MMOL/L (ref 21–31)
CREAT SERPL-MCNC: 0.72 MG/DL (ref 0.7–1.3)
ERYTHROCYTE [DISTWIDTH] IN BLOOD BY AUTOMATED COUNT: 17 % (ref 11.5–14.5)
EST. AVERAGE GLUCOSE BLD GHB EST-MCNC: 186 MG/DL
GFR SERPL CREATININE-BSD FRML MDRD: 86 ML/MIN/1.73SQ M
GLUCOSE P FAST SERPL-MCNC: 147 MG/DL (ref 65–99)
HBA1C MFR BLD: 8.1 % (ref 4.2–6.3)
HCT VFR BLD AUTO: 41 % (ref 42–47)
HDLC SERPL-MCNC: 47 MG/DL (ref 40–60)
HGB BLD-MCNC: 13.2 G/DL (ref 14–18)
LDLC SERPL CALC-MCNC: 76 MG/DL (ref 0–100)
MCH RBC QN AUTO: 28.4 PG (ref 26–34)
MCHC RBC AUTO-ENTMCNC: 32.1 G/DL (ref 31–37)
MCV RBC AUTO: 88 FL (ref 81–99)
NONHDLC SERPL-MCNC: 93 MG/DL
PLATELET # BLD AUTO: 172 THOUSANDS/UL (ref 149–390)
PMV BLD AUTO: 8.7 FL (ref 8.6–11.7)
POTASSIUM SERPL-SCNC: 4.1 MMOL/L (ref 3.5–5.5)
PROT SERPL-MCNC: 6.7 G/DL (ref 6.4–8.9)
RBC # BLD AUTO: 4.64 MILLION/UL (ref 4.3–5.9)
SODIUM SERPL-SCNC: 144 MMOL/L (ref 134–143)
TRIGL SERPL-MCNC: 85 MG/DL (ref 44–166)
WBC # BLD AUTO: 6.8 THOUSAND/UL (ref 4.8–10.8)

## 2019-09-30 PROCEDURE — 80061 LIPID PANEL: CPT

## 2019-09-30 PROCEDURE — 85027 COMPLETE CBC AUTOMATED: CPT

## 2019-09-30 PROCEDURE — 83036 HEMOGLOBIN GLYCOSYLATED A1C: CPT

## 2019-09-30 PROCEDURE — 80053 COMPREHEN METABOLIC PANEL: CPT

## 2019-09-30 PROCEDURE — 36415 COLL VENOUS BLD VENIPUNCTURE: CPT

## 2019-12-04 ENCOUNTER — TRANSCRIBE ORDERS (OUTPATIENT)
Dept: ADMINISTRATIVE | Facility: HOSPITAL | Age: 84
End: 2019-12-04

## 2019-12-04 DIAGNOSIS — E11.9 TYPE 2 DIABETES MELLITUS WITHOUT COMPLICATION, UNSPECIFIED WHETHER LONG TERM INSULIN USE (HCC): ICD-10-CM

## 2019-12-04 DIAGNOSIS — R04.2 HEMOPTYSIS: Primary | ICD-10-CM

## 2019-12-23 ENCOUNTER — HOSPITAL ENCOUNTER (OUTPATIENT)
Dept: CT IMAGING | Facility: HOSPITAL | Age: 84
Discharge: HOME | End: 2019-12-23
Attending: INTERNAL MEDICINE
Payer: MEDICARE

## 2019-12-23 ENCOUNTER — APPOINTMENT (OUTPATIENT)
Dept: LAB | Facility: HOSPITAL | Age: 84
End: 2019-12-23
Attending: INTERNAL MEDICINE
Payer: MEDICARE

## 2019-12-23 DIAGNOSIS — E11.9 TYPE 2 DIABETES MELLITUS WITHOUT COMPLICATION, UNSPECIFIED WHETHER LONG TERM INSULIN USE (HCC): ICD-10-CM

## 2019-12-23 DIAGNOSIS — R04.2 HEMOPTYSIS: ICD-10-CM

## 2019-12-23 LAB
EST. AVERAGE GLUCOSE BLD GHB EST-MCNC: 163 MG/DL
HBA1C MFR BLD: 7.3 % (ref 4.2–6.3)

## 2019-12-23 PROCEDURE — 71250 CT THORAX DX C-: CPT

## 2019-12-23 PROCEDURE — 36415 COLL VENOUS BLD VENIPUNCTURE: CPT

## 2019-12-23 PROCEDURE — 83036 HEMOGLOBIN GLYCOSYLATED A1C: CPT

## 2020-01-21 ENCOUNTER — TRANSCRIBE ORDERS (OUTPATIENT)
Dept: ADMINISTRATIVE | Facility: HOSPITAL | Age: 85
End: 2020-01-21

## 2020-01-21 DIAGNOSIS — M81.0 ENCOUNTER FOR ONGOING OSTEOPOROSIS THERAPY: Primary | ICD-10-CM

## 2020-01-21 DIAGNOSIS — T17.908A ASPIRATION INTO AIRWAY, INITIAL ENCOUNTER: ICD-10-CM

## 2020-01-21 DIAGNOSIS — Z51.89 ENCOUNTER FOR ONGOING OSTEOPOROSIS THERAPY: Primary | ICD-10-CM

## 2020-01-29 ENCOUNTER — APPOINTMENT (EMERGENCY)
Dept: RADIOLOGY | Facility: HOSPITAL | Age: 85
DRG: 480 | End: 2020-01-29
Payer: MEDICARE

## 2020-01-29 ENCOUNTER — HOSPITAL ENCOUNTER (OUTPATIENT)
Dept: RADIOLOGY | Facility: HOSPITAL | Age: 85
Discharge: HOME | DRG: 480 | End: 2020-01-29
Attending: INTERNAL MEDICINE
Payer: MEDICARE

## 2020-01-29 ENCOUNTER — HOSPITAL ENCOUNTER (INPATIENT)
Facility: HOSPITAL | Age: 85
LOS: 4 days | Discharge: NURSING, REHAB OR LONG-TERM CARE | DRG: 480 | End: 2020-02-03
Attending: EMERGENCY MEDICINE | Admitting: INTERNAL MEDICINE
Payer: MEDICARE

## 2020-01-29 ENCOUNTER — APPOINTMENT (EMERGENCY)
Dept: CT IMAGING | Facility: HOSPITAL | Age: 85
DRG: 480 | End: 2020-01-29
Payer: MEDICARE

## 2020-01-29 DIAGNOSIS — K59.00 CONSTIPATION: ICD-10-CM

## 2020-01-29 DIAGNOSIS — T17.908A ASPIRATION INTO AIRWAY, INITIAL ENCOUNTER: ICD-10-CM

## 2020-01-29 DIAGNOSIS — Z72.0 TOBACCO ABUSE: Chronic | ICD-10-CM

## 2020-01-29 DIAGNOSIS — J44.1 CHRONIC OBSTRUCTIVE PULMONARY DISEASE WITH ACUTE EXACERBATION (HCC): ICD-10-CM

## 2020-01-29 DIAGNOSIS — I50.9 CONGESTIVE HEART FAILURE, UNSPECIFIED HF CHRONICITY, UNSPECIFIED HEART FAILURE TYPE (HCC): ICD-10-CM

## 2020-01-29 DIAGNOSIS — S72.001A CLOSED FRACTURE OF NECK OF RIGHT FEMUR, INITIAL ENCOUNTER (HCC): Primary | ICD-10-CM

## 2020-01-29 DIAGNOSIS — S72.009A HIP FRACTURE (HCC): ICD-10-CM

## 2020-01-29 PROCEDURE — 99285 EMERGENCY DEPT VISIT HI MDM: CPT | Performed by: EMERGENCY MEDICINE

## 2020-01-29 PROCEDURE — 74230 X-RAY XM SWLNG FUNCJ C+: CPT

## 2020-01-29 PROCEDURE — 73502 X-RAY EXAM HIP UNI 2-3 VIEWS: CPT

## 2020-01-29 PROCEDURE — 73552 X-RAY EXAM OF FEMUR 2/>: CPT

## 2020-01-29 PROCEDURE — 72125 CT NECK SPINE W/O DYE: CPT

## 2020-01-29 PROCEDURE — 73564 X-RAY EXAM KNEE 4 OR MORE: CPT

## 2020-01-29 PROCEDURE — 73590 X-RAY EXAM OF LOWER LEG: CPT

## 2020-01-29 PROCEDURE — 93005 ELECTROCARDIOGRAM TRACING: CPT

## 2020-01-29 PROCEDURE — 99285 EMERGENCY DEPT VISIT HI MDM: CPT

## 2020-01-29 PROCEDURE — 70450 CT HEAD/BRAIN W/O DYE: CPT

## 2020-01-29 PROCEDURE — 92611 MOTION FLUOROSCOPY/SWALLOW: CPT

## 2020-01-29 RX ORDER — CARISOPRODOL 350 MG/1
350 TABLET ORAL AS NEEDED
Status: ON HOLD | COMMUNITY
End: 2020-02-27 | Stop reason: SDUPTHER

## 2020-01-29 RX ORDER — ERYTHROMYCIN 5 MG/G
0.5 OINTMENT OPHTHALMIC
COMMUNITY
End: 2020-02-27 | Stop reason: HOSPADM

## 2020-01-29 NOTE — PROCEDURES
Video Swallow Study      Patient Name: Lisa Grissom  PNXVA'X Date: 1/29/2020        Past Medical History  Past Medical History:   Diagnosis Date    [facility] [facility]     [facility]     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[facility] )     Renal disorder     Stroke ([facility] )     may 2015        Past Surgical History  Past Surgical History:   Procedure Laterality Date    ANGIOPLASTY  03/01/2016    Right femoral vein    BACK SURGERY  2015    SUPRAPUBIC TUBE PLACEMENT           General Information:    Jasper Sofia is an 81 y/o male referred for a video swallowing study to r/o aspiration and to define any abnormal function in the swallow  Jasper Sofia reports 2 episodes of unknown time duration when he felt "things" wouldn't go down  He does not report coughing/choking symptoms during or after eating/drinking  He was assessed today in the upright and lateral position with apple sauce, chopped pears, soft/chewable cookie and ham sandwich, as well as thin liquids via cup and straw   A 13 mm barium tablet was also assessed taken with thin liquids  Oral Stage:    Mildy prolonged but functional breakdown of soft and solid boluses  Transfer was segmented w/ mild spillage to the vallecula prior to the swallow  No posterior leakage during liquid swallows  Mixed consistencies (liquid portion) filled vallecula prior to swallow  Pharyngeal Stage: The swallow is 1-2 second delayed  There is adequate pharyngeal constriction and airway protection during swallowing of solids and liquids  No evidence of penetration or aspiration observed  Esophageal Stage:    Mildy delayed passage/clearance of liquids into the stomach  Assessment Summary:    Mild oral-pharyngeal stage dysphagia related to reduced dentition, suspected oral dryness and mild swallow delay    Mildly slowed but effective esophageal emptying          Recommendations: Regular-soft diet as tolerated, adequately chewed   Small bites of food   Alternated w/ liquids   slower rate of ingestion   Aspiration and reflux precautions  Results discussed briefly w/ patient following the study  Erich Barahona MA, CCC/SLP  [id number]  [email or web address]  Available via tiger text

## 2020-01-30 ENCOUNTER — ANESTHESIA EVENT (INPATIENT)
Dept: PERIOP | Facility: HOSPITAL | Age: 85
DRG: 480 | End: 2020-01-30
Payer: MEDICARE

## 2020-01-30 PROBLEM — IMO0002 UNCONTROLLED TYPE 2 DIABETES MELLITUS: Status: ACTIVE | Noted: 2019-10-15

## 2020-01-30 PROBLEM — S72.001A CLOSED FRACTURE OF NECK OF RIGHT FEMUR (HCC): Status: ACTIVE | Noted: 2020-01-30

## 2020-01-30 PROBLEM — E11.21 DIABETIC NEPHROPATHY (HCC): Status: ACTIVE | Noted: 2017-04-04

## 2020-01-30 PROBLEM — R13.10 SWALLOWING PROBLEM: Status: ACTIVE | Noted: 2020-01-21

## 2020-01-30 PROBLEM — R91.8 LUNG MASS: Status: ACTIVE | Noted: 2019-12-30

## 2020-01-30 LAB
ALBUMIN SERPL BCP-MCNC: 3.5 G/DL (ref 3.5–5.7)
ALP SERPL-CCNC: 74 U/L (ref 55–165)
ALT SERPL W P-5'-P-CCNC: 13 U/L (ref 7–52)
ANION GAP SERPL CALCULATED.3IONS-SCNC: 6 MMOL/L (ref 4–13)
AST SERPL W P-5'-P-CCNC: 17 U/L (ref 13–39)
ATRIAL RATE: 100 BPM
ATRIAL RATE: 75 BPM
BASOPHILS # BLD AUTO: 0.1 THOUSANDS/ΜL (ref 0–0.1)
BASOPHILS NFR BLD AUTO: 1 % (ref 0–2)
BILIRUB SERPL-MCNC: 0.5 MG/DL (ref 0.2–1)
BUN SERPL-MCNC: 23 MG/DL (ref 7–25)
CALCIUM SERPL-MCNC: 8.7 MG/DL (ref 8.6–10.5)
CHLORIDE SERPL-SCNC: 103 MMOL/L (ref 98–107)
CO2 SERPL-SCNC: 33 MMOL/L (ref 21–31)
CREAT SERPL-MCNC: 0.87 MG/DL (ref 0.7–1.3)
EOSINOPHIL # BLD AUTO: 0 THOUSAND/ΜL (ref 0–0.61)
EOSINOPHIL NFR BLD AUTO: 0 % (ref 0–5)
ERYTHROCYTE [DISTWIDTH] IN BLOOD BY AUTOMATED COUNT: 15.6 % (ref 11.5–14.5)
GFR SERPL CREATININE-BSD FRML MDRD: 79 ML/MIN/1.73SQ M
GLUCOSE SERPL-MCNC: 106 MG/DL (ref 65–140)
GLUCOSE SERPL-MCNC: 132 MG/DL (ref 65–140)
GLUCOSE SERPL-MCNC: 178 MG/DL (ref 65–99)
GLUCOSE SERPL-MCNC: 82 MG/DL (ref 65–140)
GLUCOSE SERPL-MCNC: 85 MG/DL (ref 65–140)
HCT VFR BLD AUTO: 39.4 % (ref 42–47)
HGB BLD-MCNC: 12.7 G/DL (ref 14–18)
LYMPHOCYTES # BLD AUTO: 0.6 THOUSANDS/ΜL (ref 0.6–4.47)
LYMPHOCYTES NFR BLD AUTO: 5 % (ref 21–51)
MCH RBC QN AUTO: 30.5 PG (ref 26–34)
MCHC RBC AUTO-ENTMCNC: 32.2 G/DL (ref 31–37)
MCV RBC AUTO: 95 FL (ref 81–99)
MONOCYTES # BLD AUTO: 0.9 THOUSAND/ΜL (ref 0.17–1.22)
MONOCYTES NFR BLD AUTO: 7 % (ref 2–12)
NEUTROPHILS # BLD AUTO: 11.4 THOUSANDS/ΜL (ref 1.4–6.5)
NEUTS SEG NFR BLD AUTO: 88 % (ref 42–75)
P AXIS: 67 DEGREES
P AXIS: 78 DEGREES
PLATELET # BLD AUTO: 110 THOUSANDS/UL (ref 149–390)
PMV BLD AUTO: 9.5 FL (ref 8.6–11.7)
POTASSIUM SERPL-SCNC: 4.5 MMOL/L (ref 3.5–5.5)
PR INTERVAL: 202 MS
PR INTERVAL: 212 MS
PROT SERPL-MCNC: 6 G/DL (ref 6.4–8.9)
QRS AXIS: 54 DEGREES
QRS AXIS: 65 DEGREES
QRSD INTERVAL: 64 MS
QRSD INTERVAL: 66 MS
QT INTERVAL: 322 MS
QT INTERVAL: 382 MS
QTC INTERVAL: 415 MS
QTC INTERVAL: 426 MS
RBC # BLD AUTO: 4.16 MILLION/UL (ref 4.3–5.9)
SODIUM SERPL-SCNC: 142 MMOL/L (ref 134–143)
T WAVE AXIS: 78 DEGREES
T WAVE AXIS: 80 DEGREES
VENTRICULAR RATE: 100 BPM
VENTRICULAR RATE: 75 BPM
WBC # BLD AUTO: 12.9 THOUSAND/UL (ref 4.8–10.8)

## 2020-01-30 PROCEDURE — 82948 REAGENT STRIP/BLOOD GLUCOSE: CPT

## 2020-01-30 PROCEDURE — 85025 COMPLETE CBC W/AUTO DIFF WBC: CPT | Performed by: EMERGENCY MEDICINE

## 2020-01-30 PROCEDURE — 93010 ELECTROCARDIOGRAM REPORT: CPT | Performed by: INTERNAL MEDICINE

## 2020-01-30 PROCEDURE — 99223 1ST HOSP IP/OBS HIGH 75: CPT | Performed by: INTERNAL MEDICINE

## 2020-01-30 PROCEDURE — 94664 DEMO&/EVAL PT USE INHALER: CPT

## 2020-01-30 PROCEDURE — 80053 COMPREHEN METABOLIC PANEL: CPT | Performed by: EMERGENCY MEDICINE

## 2020-01-30 PROCEDURE — 94760 N-INVAS EAR/PLS OXIMETRY 1: CPT

## 2020-01-30 PROCEDURE — 93005 ELECTROCARDIOGRAM TRACING: CPT

## 2020-01-30 PROCEDURE — 36415 COLL VENOUS BLD VENIPUNCTURE: CPT | Performed by: EMERGENCY MEDICINE

## 2020-01-30 RX ORDER — ATORVASTATIN CALCIUM 20 MG/1
20 TABLET, FILM COATED ORAL DAILY
Status: DISCONTINUED | OUTPATIENT
Start: 2020-01-30 | End: 2020-02-03 | Stop reason: HOSPADM

## 2020-01-30 RX ORDER — HYDROCODONE BITARTRATE AND ACETAMINOPHEN 5; 325 MG/1; MG/1
1 TABLET ORAL EVERY 6 HOURS PRN
Status: DISCONTINUED | OUTPATIENT
Start: 2020-01-30 | End: 2020-01-31

## 2020-01-30 RX ORDER — INSULIN GLARGINE 100 [IU]/ML
20 INJECTION, SOLUTION SUBCUTANEOUS DAILY
Status: DISCONTINUED | OUTPATIENT
Start: 2020-01-30 | End: 2020-01-30

## 2020-01-30 RX ORDER — NICOTINE 21 MG/24HR
1 PATCH, TRANSDERMAL 24 HOURS TRANSDERMAL DAILY
Status: DISCONTINUED | OUTPATIENT
Start: 2020-01-30 | End: 2020-02-03 | Stop reason: HOSPADM

## 2020-01-30 RX ORDER — FENTANYL CITRATE 50 UG/ML
50 INJECTION, SOLUTION INTRAMUSCULAR; INTRAVENOUS ONCE
Status: COMPLETED | OUTPATIENT
Start: 2020-01-30 | End: 2020-01-30

## 2020-01-30 RX ORDER — SODIUM CHLORIDE 9 MG/ML
50 INJECTION, SOLUTION INTRAVENOUS CONTINUOUS
Status: DISCONTINUED | OUTPATIENT
Start: 2020-01-30 | End: 2020-01-30

## 2020-01-30 RX ORDER — IPRATROPIUM BROMIDE AND ALBUTEROL SULFATE 2.5; .5 MG/3ML; MG/3ML
3 SOLUTION RESPIRATORY (INHALATION) EVERY 6 HOURS PRN
Status: DISCONTINUED | OUTPATIENT
Start: 2020-01-30 | End: 2020-02-01

## 2020-01-30 RX ORDER — ONDANSETRON 2 MG/ML
4 INJECTION INTRAMUSCULAR; INTRAVENOUS EVERY 6 HOURS PRN
Status: DISCONTINUED | OUTPATIENT
Start: 2020-01-30 | End: 2020-01-31

## 2020-01-30 RX ORDER — FINASTERIDE 5 MG/1
5 TABLET, FILM COATED ORAL DAILY
Status: DISCONTINUED | OUTPATIENT
Start: 2020-01-30 | End: 2020-02-03 | Stop reason: HOSPADM

## 2020-01-30 RX ORDER — ERYTHROMYCIN 5 MG/G
0.5 OINTMENT OPHTHALMIC
Status: DISCONTINUED | OUTPATIENT
Start: 2020-01-30 | End: 2020-02-03 | Stop reason: HOSPADM

## 2020-01-30 RX ORDER — GABAPENTIN 100 MG/1
100 CAPSULE ORAL 3 TIMES DAILY
Status: DISCONTINUED | OUTPATIENT
Start: 2020-01-30 | End: 2020-02-03 | Stop reason: HOSPADM

## 2020-01-30 RX ORDER — FUROSEMIDE 40 MG/1
40 TABLET ORAL DAILY
Status: DISCONTINUED | OUTPATIENT
Start: 2020-01-30 | End: 2020-02-03 | Stop reason: HOSPADM

## 2020-01-30 RX ORDER — FLUTICASONE FUROATE AND VILANTEROL 200; 25 UG/1; UG/1
1 POWDER RESPIRATORY (INHALATION)
Status: DISCONTINUED | OUTPATIENT
Start: 2020-01-30 | End: 2020-02-03 | Stop reason: HOSPADM

## 2020-01-30 RX ORDER — TRAMADOL HYDROCHLORIDE 50 MG/1
50 TABLET ORAL EVERY 6 HOURS PRN
Status: DISCONTINUED | OUTPATIENT
Start: 2020-01-30 | End: 2020-01-30

## 2020-01-30 RX ORDER — ALPRAZOLAM 0.5 MG/1
0.5 TABLET ORAL
Status: DISCONTINUED | OUTPATIENT
Start: 2020-01-30 | End: 2020-02-03 | Stop reason: HOSPADM

## 2020-01-30 RX ORDER — ALBUTEROL SULFATE 90 UG/1
2 AEROSOL, METERED RESPIRATORY (INHALATION) EVERY 6 HOURS PRN
Status: DISCONTINUED | OUTPATIENT
Start: 2020-01-30 | End: 2020-01-30

## 2020-01-30 RX ORDER — ESCITALOPRAM OXALATE 10 MG/1
10 TABLET ORAL DAILY
Status: DISCONTINUED | OUTPATIENT
Start: 2020-01-30 | End: 2020-02-03 | Stop reason: HOSPADM

## 2020-01-30 RX ORDER — ACETAMINOPHEN 325 MG/1
650 TABLET ORAL EVERY 6 HOURS PRN
Status: DISCONTINUED | OUTPATIENT
Start: 2020-01-30 | End: 2020-02-03 | Stop reason: HOSPADM

## 2020-01-30 RX ORDER — SODIUM CHLORIDE 9 MG/ML
50 INJECTION, SOLUTION INTRAVENOUS CONTINUOUS
Status: DISCONTINUED | OUTPATIENT
Start: 2020-01-30 | End: 2020-01-31

## 2020-01-30 RX ORDER — CARISOPRODOL 350 MG/1
350 TABLET ORAL 3 TIMES DAILY PRN
Status: DISCONTINUED | OUTPATIENT
Start: 2020-01-30 | End: 2020-02-03 | Stop reason: HOSPADM

## 2020-01-30 RX ORDER — OXYBUTYNIN CHLORIDE 5 MG/1
15 TABLET ORAL DAILY
Status: DISCONTINUED | OUTPATIENT
Start: 2020-01-30 | End: 2020-02-03 | Stop reason: HOSPADM

## 2020-01-30 RX ADMIN — SODIUM CHLORIDE 50 ML/HR: 9 INJECTION, SOLUTION INTRAVENOUS at 09:14

## 2020-01-30 RX ADMIN — MORPHINE SULFATE 2 MG: 2 INJECTION, SOLUTION INTRAMUSCULAR; INTRAVENOUS at 09:03

## 2020-01-30 RX ADMIN — ESCITALOPRAM OXALATE 10 MG: 10 TABLET ORAL at 09:05

## 2020-01-30 RX ADMIN — MORPHINE SULFATE 2 MG: 2 INJECTION, SOLUTION INTRAMUSCULAR; INTRAVENOUS at 21:57

## 2020-01-30 RX ADMIN — GABAPENTIN 100 MG: 100 CAPSULE ORAL at 15:18

## 2020-01-30 RX ADMIN — FENTANYL CITRATE 50 MCG: 50 INJECTION INTRAMUSCULAR; INTRAVENOUS at 05:21

## 2020-01-30 RX ADMIN — FLUTICASONE FUROATE AND VILANTEROL TRIFENATATE 1 PUFF: 200; 25 POWDER RESPIRATORY (INHALATION) at 09:04

## 2020-01-30 RX ADMIN — GABAPENTIN 100 MG: 100 CAPSULE ORAL at 09:05

## 2020-01-30 RX ADMIN — MORPHINE SULFATE 2 MG: 2 INJECTION, SOLUTION INTRAMUSCULAR; INTRAVENOUS at 15:18

## 2020-01-30 RX ADMIN — NICOTINE 1 PATCH: 21 PATCH, EXTENDED RELEASE TRANSDERMAL at 05:43

## 2020-01-30 RX ADMIN — ATORVASTATIN CALCIUM 20 MG: 20 TABLET, FILM COATED ORAL at 09:05

## 2020-01-30 RX ADMIN — ERYTHROMYCIN 0.5 INCH: 5 OINTMENT OPHTHALMIC at 22:47

## 2020-01-30 RX ADMIN — FUROSEMIDE 40 MG: 40 TABLET ORAL at 09:04

## 2020-01-30 RX ADMIN — FINASTERIDE 5 MG: 5 TABLET, FILM COATED ORAL at 09:05

## 2020-01-30 RX ADMIN — GABAPENTIN 100 MG: 100 CAPSULE ORAL at 21:57

## 2020-01-30 RX ADMIN — OXYBUTYNIN CHLORIDE 15 MG: 5 TABLET ORAL at 09:04

## 2020-01-30 NOTE — ASSESSMENT & PLAN NOTE
Lab Results   Component Value Date    HGBA1C 7 3 (H) 12/23/2019       No results for input(s): POCGLU in the last 72 hours      Blood Sugar Average: Last 72 hrs:     Patient is NPO, will obtain Accu-Cheks q 6 hours give Humalog correction dose q 6

## 2020-01-30 NOTE — ASSESSMENT & PLAN NOTE
· Admit to Medicine  · Give pain control p r n  · Make patient NPO  · Hold aspirin  · Consult Orthopedic surgery in a m    · Ice to right hip

## 2020-01-30 NOTE — H&P
H&P- Neo Contreras 1935, 80 y o  male MRN: [id number]    Unit/Bed#: SRIRAM Encounter: [id number]    Primary Care Provider: Garcia Saez MD   Date and time admitted to hospital: 1/29/2020 10:23 PM        * Closed fracture of neck of right femur ([address] )  Assessment & Plan  · Admit to Medicine  · Give pain control p r n  · Make patient NPO  · Hold aspirin  · Consult Orthopedic surgery in a m  · Ice to right hip    Chronic low back pain  Assessment & Plan  Continue pre-hospital Soma 350 mg p o  T i d  P r n  Benign prostatic hyperplasia  Assessment & Plan  Continue pre-hospital Ditropan and Proscar    Tobacco abuse  Assessment & Plan  Give nicotine 21 mg transdermal daily consult for smoking cessation education    Type 2 diabetes mellitus with complication, with long-term current use of insulin ([facility])  Assessment & Plan  Lab Results   Component Value Date    HGBA1C 7 3 (H) 12/23/2019       No results for input(s): POCGLU in the last 72 hours  Blood Sugar Average: Last 72 hrs:     Patient is NPO, will obtain Accu-Cheks q 6 hours give Humalog correction dose q 6    Chronic obstructive pulmonary disease with acute exacerbation [facility])  Assessment & Plan  Patient has a history of chronic hypoxic respiratory failure, continue O2 4 L nasal cannula, substitute Breo 200/25 1 inhalation daily for pre-hospital Advair And continue pre-hospital Ventolin p r n  VTE Prophylaxis: SCDs only until cleared by Orthopedic surgery  Code Status:  Level 1  POLST: There is no POLST form on file for this patient (pre-hospital)  Discussion with family:  Wife was present at bedside at time of exam, diagnosis and treatment plan reviewed all questions answered    Anticipated Length of Stay:  Patient will be admitted on an Inpatient basis with an anticipated length of stay of  > 2 midnights     Justification for Hospital Stay:  Right hip fracture requiring orthopedic evaluation and pain control    Total Time for Visit, including Counseling / Coordination of Care: 1 hour  Greater than 50% of this total time spent on direct patient counseling and coordination of care  Chief Complaint:   Fall with left hip pain x1 day    History of Present Illness:    Analia Jenkins is a 80 y o  male who presents with fall with left hip pain x1 day  Patient's wife was present with him in the ER and she provided much the history is patient is hard of hearing  As per patient's wife she was in another room when she heard a commotion which he went into the room she found that he had fallen between the bed and the wall striking his hip against a wall  She was unable to help him up off the bed as patient was unable to bear weight secondary to pain  Patient denied any chest pain, dizziness, syncope or other trauma and states that he simply lost his balance falling against a wall  There is some reports of altered mental status though in discussing this with the wife she states that has been waxing waning over period of time and today appears to be at his baseline  Review of Systems:  Review of Systems   Constitutional: Negative for chills and fever  Respiratory: Positive for shortness of breath  Negative for cough, chest tightness and wheezing  Cardiovascular: Negative for chest pain, palpitations and leg swelling  Gastrointestinal: Negative for diarrhea, nausea and vomiting  Genitourinary: Negative for dysuria, frequency, hematuria and urgency  Musculoskeletal: Positive for arthralgias and gait problem  Neurological: Negative for dizziness, syncope, light-headedness and headaches  All other systems reviewed and are negative        Past Medical and Surgical History:   Past Medical History:   Diagnosis Date    [facility]     [facility]     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 )     Renal disorder     Stroke [facility] )     may 2015       Past Surgical History:   Procedure Laterality Date    ANGIOPLASTY  03/01/2016    Right femoral vein    BACK SURGERY  2015    SUPRAPUBIC TUBE PLACEMENT         Meds/Allergies:  Prior to Admission medications    Medication Sig Start Date End Date Taking? Authorizing Provider   atorvastatin (LIPITOR) 20 mg tablet Take 20 mg by mouth daily   Yes Historical Provider, MD   carisoprodol (SOMA) 350 mg tablet Take 350 mg by mouth as needed for muscle spasms   Yes Historical Provider, MD   erythromycin (ILOTYCIN) ophthalmic ointment 0 5 inches daily at bedtime   Yes Historical Provider, MD   insulin lispro (HumaLOG) 100 units/mL injection Inject under the skin   Yes Historical Provider, MD   albuterol (PROVENTIL HFA,VENTOLIN HFA) 90 mcg/act inhaler Inhale 2 puffs every 6 (six) hours as needed for wheezing    Historical Provider, MD   ALPRAZolam (XANAX) 0 5 mg tablet Take 0 5 mg by mouth daily at bedtime as needed for anxiety    Historical Provider, MD   aspirin (ECOTRIN LOW STRENGTH) 81 mg EC tablet Take 81 mg by mouth daily    Historical Provider, MD   escitalopram (LEXAPRO) 10 mg tablet Take 10 mg by mouth daily    Historical Provider, MD   finasteride (PROSCAR) 5 mg tablet Take 5 mg by mouth daily    Historical Provider, MD   fluticasone-salmeterol (ADVAIR) 250-50 mcg/dose inhaler Inhale 1 puff 2 (two) times a day Rinse mouth after use      Historical Provider, MD   furosemide (LASIX) 20 mg tablet Take 1 tablet (20 mg total) by mouth daily  Patient taking differently: Take 40 mg by mouth daily  6/7/19   Kyaw Alba MD   gabapentin (NEURONTIN) 100 mg capsule Take 100 mg by mouth 3 (three) times a day     Historical Provider, MD   glimepiride (AMARYL) 2 mg tablet Take 2 mg by mouth every morning before breakfast    Historical Provider, MD   insulin glargine (LANTUS) 100 units/mL subcutaneous injection Inject 20 Units under the skin daily     Historical Provider, MD   oxybutynin (DITROPAN) 5 mg tablet Take 15 mg by mouth daily     Historical Provider, MD rizatriptan (MAXALT) 10 MG tablet Take 10 mg by mouth once as needed for migraine May repeat in 2 hours if needed    Historical Provider, MD   traMADol (ULTRAM) 50 mg tablet Take 50 mg by mouth every 6 (six) hours as needed for moderate pain    Historical Provider, MD     I have reviewed home medications with patient personally  Allergies: No Known Allergies    Social History:  Marital Status: Single   Occupation:  Retired   Patient Pre-hospital Living Situation:  Resides at home with wife  Patient Pre-hospital Level of Mobility:  Full with assist of a walker  Patient Pre-hospital Diet Restrictions:  None  Substance Use History:     Social History     Substance and Sexual Activity   Alcohol Use Never    Frequency: Never     Social History     Tobacco Use   Smoking Status Current Every Day Smoker    Packs/day: 1 00    Years: 70 00    Pack years: 70 00   Smokeless Tobacco Never Used   Tobacco Comment    Wife reports it's a losing matos     Social History     Substance and Sexual Activity   Drug Use No       Family History:  I have reviewed the patients family history    Physical Exam:   Vitals:   Blood Pressure: 108/59 (01/30/20 0552)  Pulse: 71 (01/30/20 0552)  Temperature: 99 3 °F (37 4 °C) (01/29/20 2226)  Respirations: 16 (01/30/20 0552)  Weight - Scale: 63 5 kg (140 lb) (01/29/20 2226)  SpO2: 93 % (01/30/20 0552)    Physical Exam   Constitutional: He is oriented to person, place, and time  He appears well-developed and well-nourished  HENT:   Head: Normocephalic and atraumatic  Mouth/Throat: No oropharyngeal exudate  Eyes: Pupils are equal, round, and reactive to light  EOM are normal  No scleral icterus  Neck: Normal range of motion  Neck supple  No JVD present  Cardiovascular: Normal rate, regular rhythm and normal heart sounds  No murmur heard  Pulmonary/Chest: Effort normal  No respiratory distress  He has decreased breath sounds  He has no wheezes  He has no rhonchi  He has no rales  Abdominal: Soft  Bowel sounds are normal  There is no tenderness  There is no rebound and no guarding  Musculoskeletal: He exhibits tenderness  He exhibits no edema  Right hip: He exhibits decreased range of motion and bony tenderness  Lymphadenopathy:     He has no cervical adenopathy  Neurological: He is alert and oriented to person, place, and time  Skin: Skin is warm and dry  No rash noted  No erythema  Psychiatric: He has a normal mood and affect  His behavior is normal    Nursing note and vitals reviewed  Additional Data:   Lab Results: I have personally reviewed pertinent reports  Results from last 7 days   Lab Units 01/30/20  0405   WBC Thousand/uL 12 90*   HEMOGLOBIN g/dL 12 7*   HEMATOCRIT % 39 4*   PLATELETS Thousands/uL 110*   NEUTROS PCT % 88*   LYMPHS PCT % 5*   MONOS PCT % 7   EOS PCT % 0     Results from last 7 days   Lab Units 01/30/20  0405   POTASSIUM mmol/L 4 5   CHLORIDE mmol/L 103   CO2 mmol/L 33*   BUN mg/dL 23   CREATININE mg/dL 0 87   CALCIUM mg/dL 8 7   ALK PHOS U/L 74   ALT U/L 13   AST U/L 17                   Imaging: I have personally reviewed pertinent reports  CT head without contrast   Final Result by Calista Herrera DO (01/30 0221)      No calvarial fracture or acute intracranial abnormality is seen  Other findings as above  CT CERVICAL SPINE - WITHOUT CONTRAST      INDICATION:   fall  COMPARISON:  None      TECHNIQUE:  CT examination of the cervical spine was performed without intravenous contrast   Contiguous axial images were obtained  Sagittal and coronal reconstructions were performed  Radiation dose length product (DLP) for this visit:  892 6 mGy-cm  (accession [id number]), 179 1 mGy-cm  (accession [id number])    This examination, like all CT scans performed in the [facility], was performed utilizing techniques to minimize    radiation dose exposure, including the use of iterative reconstruction and automated exposure control  IMAGE QUALITY:  Diagnostic  FINDINGS:      ALIGNMENT:  Approximately 2 mm of anterolisthesis of C3 on C4, probably related to facet joint arthropathy at this level  Spinal alignment otherwise grossly maintained  VERTEBRAL BODIES:  No fracture  DEGENERATIVE CHANGES:  Intervertebral disc space narrowing at C3/C4, C4/C5 C5/C6 with anterior, marginal, and uncovertebral osteophytosis at these levels  Multilevel facet joint arthropathy which appears to be most prominent at C3/C4  PREVERTEBRAL AND PARASPINAL SOFT TISSUES:  Grossly unremarkable      THORACIC INLET:  Mild parenchymal and paraseptal emphysematous changes         IMPRESSION:      Degenerative changes as described but no evidence of acute cervical spine injury  Other findings as above  Workstation performed: [id number]         CT spine cervical without contrast   Final Result by Edith Doss DO (01/30 0221)      No calvarial fracture or acute intracranial abnormality is seen  Other findings as above  CT CERVICAL SPINE - WITHOUT CONTRAST      INDICATION:   fall  COMPARISON:  None      TECHNIQUE:  CT examination of the cervical spine was performed without intravenous contrast   Contiguous axial images were obtained  Sagittal and coronal reconstructions were performed  Radiation dose length product (DLP) for this visit:  892 6 mGy-cm  (accession [id number]), 179 1 mGy-cm  (accession [id number])  This examination, like all CT scans performed in the [facility], was performed utilizing techniques to minimize    radiation dose exposure, including the use of iterative reconstruction and automated exposure control  IMAGE QUALITY:  Diagnostic  FINDINGS:      ALIGNMENT:  Approximately 2 mm of anterolisthesis of C3 on C4, probably related to facet joint arthropathy at this level    Spinal alignment otherwise grossly maintained  VERTEBRAL BODIES:  No fracture  DEGENERATIVE CHANGES:  Intervertebral disc space narrowing at C3/C4, C4/C5 C5/C6 with anterior, marginal, and uncovertebral osteophytosis at these levels  Multilevel facet joint arthropathy which appears to be most prominent at C3/C4  PREVERTEBRAL AND PARASPINAL SOFT TISSUES:  Grossly unremarkable      THORACIC INLET:  Mild parenchymal and paraseptal emphysematous changes         IMPRESSION:      Degenerative changes as described but no evidence of acute cervical spine injury  Other findings as above  Workstation performed: [id number]         XR hip/pelv 2-3 vws right if performed   Final Result by Fermin Grady MD (01/30 0351)      Cortical discontinuity and angulation of the right femoral head suggests a femoral neck fracture  Abdominal aortic aneurysm is noted  Vascular atherosclerosis is noted  Workstation performed: [id number]         XR femur 2 views RIGHT    (Results Pending)   XR knee 4+ vw right injury    (Results Pending)   XR tibia fibula 2 views RIGHT    (Results Pending)       EKG, Pathology, and Other Studies Reviewed on Admission:   · EKG: N/A    NetAccess/Epic Records Reviewed: Yes     ** Please Note: This note has been constructed using a voice recognition system   **

## 2020-01-30 NOTE — CONSULTS
Consultation - Sydnee Garrett Nikita 80 y o  male MRN: [id number]  Unit/Bed#: -02 Encounter: [id number]      Assessment/Plan     Assessment:  Subcapital fracture right hip    Plan: Will need operative intervention with cannulated screw fixation  Will need surgical intervention including insertion of a trochanteric femoral nail  All risks, complications, and benefits were discussed with the patient in great detail including bleeding, infection, blood clots, pain, stiffness, nerve damage, nonunion, malunion, loss of fixation, the possibility of loss of life or limb fro surgery, heart attack, stroke, etc    NPO after midnight  Ice and analgesia and mechanical DVT prophylaxis only      AdamHistory of Present Illness   Physician Requesting Consult: Jean George MD  Reason for Consult / Principal Problem:  Right hip fracture  HPI: Haylee Dalton is a 80y o  year old male who presents with right hip pain after falling at home yesterday  He denies hitting his head or any loss consciousness  He came into the emergency room in the middle night were x-rays were taken which showed a nondisplaced subcapital fracture of his right hip  He was subsequently admitted with the above diagnosis      Consults    Review of Systems    Historical Information   Past Medical History:   Diagnosis Date    [facility]     Diabetes mellitus ([facility] )     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 )     Renal disorder     Stroke ([facility] )     may 2015     Past Surgical History:   Procedure Laterality Date    ANGIOPLASTY  03/01/2016    Right femoral vein    BACK SURGERY  2015    SUPRAPUBIC TUBE PLACEMENT       Social History   Social History     Substance and Sexual Activity   Alcohol Use Never    Frequency: Never     Social History     Substance and Sexual Activity   Drug Use No     Social History     Tobacco Use   Smoking Status Current Every Day Smoker    Packs/day: 1 00    Years: 70 00    Pack years: 70 00   Smokeless Tobacco Never Used   Tobacco Comment    Wife reports it's a losing matos     Family History: non-contributory    Meds/Allergies   all current active meds have been reviewed  No Known Allergies    Objective   Vitals: Blood pressure 116/58, pulse 73, temperature (!) 97 4 °F (36 3 °C), temperature source Temporal, resp  rate 16, height 5' 10" (1 778 m), weight 69 kg (152 lb 1 9 oz), SpO2 90 %  ,Body mass index is 21 83 kg/m²  No intake or output data in the 24 hours ending 01/30/20 0750  No intake/output data recorded  Invasive Devices     Peripheral Intravenous Line            Peripheral IV 01/29/20 Left Antecubital less than 1 day          Drain            Suprapubic Catheter Latex 22 Fr  -- days                Physical Exam  Ortho Exam    Lab Results:   CBC:   Lab Results   Component Value Date    WBC 12 90 (H) 01/30/2020    HGB 12 7 (L) 01/30/2020    HCT 39 4 (L) 01/30/2020    MCV 95 01/30/2020     (L) 01/30/2020    MCH 30 5 01/30/2020    MCHC 32 2 01/30/2020    RDW 15 6 (H) 01/30/2020    MPV 9 5 01/30/2020     CMP:   Lab Results   Component Value Date    SODIUM 142 01/30/2020     01/30/2020    CO2 33 (H) 01/30/2020    BUN 23 01/30/2020    CREATININE 0 87 01/30/2020    CALCIUM 8 7 01/30/2020    AST 17 01/30/2020    ALT 13 01/30/2020    ALKPHOS 74 01/30/2020    EGFR 79 01/30/2020     PT/INR: No results found for: PT, INR  Imaging Studies: I have personally reviewed pertinent films in PACS     X-rays performed showed a nondisplaced subcapital fracture of the right hip  The articular surface is intact  EKG, Pathology, and Other Studies: I have personally reviewed pertinent reports  VTE Prophylaxis: Sequential compression device (Venodyne)     Code Status: Level 1 - Full Code  Advance Directive and Living Will:      Power of :    POLST:      Counseling / Coordination of Care  Total floor / unit time spent today 30 minutes   Greater than 50% of total time was spent with the patient and / or family counseling and / or coordination of care   A description of the counseling / coordination of care:

## 2020-01-30 NOTE — ASSESSMENT & PLAN NOTE
Patient has a history of chronic hypoxic respiratory failure, continue O2 4 L nasal cannula, substitute Breo 200/25 1 inhalation daily for pre-hospital Advair And continue pre-hospital Ventolin p r n

## 2020-01-30 NOTE — ED NOTES
PT SLEEPING  WIFE UPDATED ON DELAY OF DISPO, AWAITING RADIOLOGIST TO RTN CALL WITH FAITH Browne RN  51/59/51 [id number]

## 2020-01-30 NOTE — ANESTHESIA PREPROCEDURE EVALUATION
Review of Systems/Medical History  Patient summary reviewed  Chart reviewed      Cardiovascular  Hypertension , CHF ,    Pulmonary  COPD ,        GI/Hepatic            Endo/Other  Diabetes well controlled type 2 ,      GYN       Hematology   Musculoskeletal    Arthritis     Neurology    CVA ,    Psychology   Depression ,            Results for Saadia Prieto (MRN [id number]) as of 1/30/2020 12:19   Ref   Range 1/30/2020 04:05 1/30/2020 04:06   Glucose, Random Latest Ref Range: 65 - 99 mg/dL 178 (H)    Calcium Latest Ref Range: 8 6 - 10 5 mg/dL 8 7    AST Latest Ref Range: 13 - 39 U/L 17    ALT Latest Ref Range: 7 - 52 U/L 13    Alkaline Phosphatase Latest Ref Range: 55 - 165 U/L 74    Total Protein Latest Ref Range: 6 4 - 8 9 g/dL 6 0 (L)    Albumin Latest Ref Range: 3 5 - 5 7 g/dL 3 5    TOTAL BILIRUBIN Latest Ref Range: 0 20 - 1 00 mg/dL 0 50    eGFR Latest Units: ml/min/1 73sq m 79    WBC Latest Ref Range: 4 80 - 10 80 Thousand/uL 12 90 (H)    Red Blood Cell Count Latest Ref Range: 4 30 - 5 90 Million/uL 4 16 (L)    Hemoglobin Latest Ref Range: 14 0 - 18 0 g/dL 12 7 (L)    HCT Latest Ref Range: 42 0 - 47 0 % 39 4 (L)    MCV Latest Ref Range: 81 - 99 fL 95    MCH Latest Ref Range: 26 0 - 34 0 pg 30 5    MCHC Latest Ref Range: 31 0 - 37 0 g/dL 32 2    RDW Latest Ref Range: 11 5 - 14 5 % 15 6 (H)    Platelet Count Latest Ref Range: 149 - 390 Thousands/uL 110 (L)    MPV Latest Ref Range: 8 6 - 11 7 fL 9 5    Neutrophils % Latest Ref Range: 42 - 75 % 88 (H)    Lymphocytes Relative Latest Ref Range: 21 - 51 % 5 (L)    Monocytes Relative Latest Ref Range: 2 - 12 % 7    Eosinophils Latest Ref Range: 0 - 5 % 0    Basophils Relative Latest Ref Range: 0 - 2 % 1    Absolute Neutrophils Latest Ref Range: 1 40 - 6 50 Thousands/µL 11 40 (H)    Lymphocytes Absolute Latest Ref Range: 0 60 - 4 47 Thousands/µL 0 60    Absolute Monocytes Latest Ref Range: 0 17 - 1 22 Thousand/µL 0 90    Absolute Eosinophils Latest Ref Range: 0 00 - 0 61 Thousand/µL 0 00    Basophils Absolute Latest Ref Range: 0 00 - 0 10 Thousands/µL 0 10    ECG 12-LEAD Unknown  Rpt          Anesthesia Plan  ASA Score- 3     Anesthesia Type-     Plan Factors-    Induction- intravenous  Postoperative Plan-     Informed Consent- Anesthetic plan and risks discussed with patient

## 2020-01-30 NOTE — RESPIRATORY THERAPY NOTE
RT Protocol Note  Tomas Velázquez 80 y o  male MRN: [id number]  Unit/Bed#: -02 Encounter: [id number]    Assessment    Principal Problem:    Closed fracture of neck of right femur ([facility] )  Active Problems:    Chronic obstructive pulmonary disease with acute exacerbation (HCC)    Type 2 diabetes mellitus with complication, with long-term current use of insulin (HCC)    Tobacco abuse    Benign prostatic hyperplasia    Chronic low back pain      Home Pulmonary Medications:  Albuterol  MDI PRN  Advair   Home Devices/Therapy: Home O2  4 Liters O2    Past Medical History:   Diagnosis Date    COPD (chronic obstructive pulmonary disease) ([place] [address] )     Diabetes mellitus ([place] [address] )     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[place] [address] )     Renal disorder     Stroke ([place] [address] )     may 2015     Social History     Socioeconomic History    Marital status: Single     Spouse name: Rosie Anna Number of children: 3    Years of education: 15    Highest education level: 12th grade   Occupational History    None   Social Needs    Financial resource strain: Somewhat hard    Food insecurity:     Worry: Never true     Inability: Never true    Transportation needs:     Medical: No     Non-medical: No   Tobacco Use    Smoking status: Current Every Day Smoker     Packs/day: 1 00     Years: 70 00     Pack years: 70 00    Smokeless tobacco: Never Used    Tobacco comment: Wife reports it's a losing matos   Substance and Sexual Activity    Alcohol use: Never     Frequency: Never    Drug use: No    Sexual activity: Yes     Partners: Female   Lifestyle    Physical activity:     Days per week: 0 days     Minutes per session: 0 min    Stress: Only a little   Relationships    Social connections:     Talks on phone:  Three times a week     Gets together: Once a week     Attends Synagogue service: Never     Active member of club or organization: No     Attends meetings of clubs or organizations: Never     Relationship status:     Intimate partner violence:     Fear of current or ex partner: No     Emotionally abused: No     Physically abused: No     Forced sexual activity: No   Other Topics Concern    None   Social History Narrative    None       Subjective         Objective    Physical Exam:   Assessment Type: Assess only  General Appearance: Alert, Awake  Respiratory Pattern: Normal  Chest Assessment: Chest expansion symmetrical  Bilateral Breath Sounds: Other (Comment)(decreased with few upper airway rhonchi)  Cough: Moist, Non-productive    Vitals:  Blood pressure 116/58, pulse 73, temperature (!) 97 4 °F (36 3 °C), temperature source Temporal, resp  rate 16, height 5' 10" (1 778 m), weight 69 kg (152 lb 1 9 oz), SpO2 91 %  Imaging and other studies: I have personally reviewed pertinent reports  Plan    Respiratory Plan: Home Bronchodilator Patient pathway        Resp Comments: Patient stated his breathing is fine and declined  a PRN treatment at this time   Will continue to monitor the patient

## 2020-01-30 NOTE — ED PROVIDER NOTES
History  Chief Complaint   Patient presents with    Altered Mental Status    Leg Pain     R mid thigh to below knee     HPI Patient is a 80-year-old male that reports to the emergency department with a ground level fall while getting out of bed today  The chief complaint notes altered mental status, but according to family the patient feels well, no confusion, no acute distress, the fall was mechanical   He reports hitting his head  No associated vomiting  No associated headache  No focal neurological symptoms  No tenderness to the C, T, L-spine  No abdominal tenderness  No chest wall tenderness or shortness of breath  He has pain with range of motion of the right hip as well as the right knee  He has some right knee swelling with no deformity, no crepitus  He is able to range his right knee and right hip, however it does cause pain  He has some associated achy, nonradiating pain in the right thigh area that is worse with movement  Medical decision making:  Fall, will image  Of note, + hip fracture, discussed with ortho dr Francesca Trinidad, will admit to medicine  Prior to Admission Medications   Prescriptions Last Dose Informant Patient Reported? Taking?    ALPRAZolam (XANAX) 0 5 mg tablet   Yes No   Sig: Take 0 5 mg by mouth daily at bedtime as needed for anxiety   albuterol (PROVENTIL HFA,VENTOLIN HFA) 90 mcg/act inhaler   Yes No   Sig: Inhale 2 puffs every 6 (six) hours as needed for wheezing   aspirin (ECOTRIN LOW STRENGTH) 81 mg EC tablet   Yes No   Sig: Take 81 mg by mouth daily   atorvastatin (LIPITOR) 20 mg tablet   Yes Yes   Sig: Take 20 mg by mouth daily   carisoprodol (SOMA) 350 mg tablet   Yes Yes   Sig: Take 350 mg by mouth as needed for muscle spasms   erythromycin (ILOTYCIN) ophthalmic ointment   Yes Yes   Si 5 inches daily at bedtime   escitalopram (LEXAPRO) 10 mg tablet   Yes No   Sig: Take 10 mg by mouth daily   finasteride (PROSCAR) 5 mg tablet  Self Yes No   Sig: Take 5 mg by mouth daily   fluticasone-salmeterol (ADVAIR) 250-50 mcg/dose inhaler   Yes No   Sig: Inhale 1 puff 2 (two) times a day Rinse mouth after use  furosemide (LASIX) 20 mg tablet   No No   Sig: Take 1 tablet (20 mg total) by mouth daily   Patient taking differently: Take 40 mg by mouth daily    gabapentin (NEURONTIN) 100 mg capsule   Yes No   Sig: Take 100 mg by mouth 3 (three) times a day    glimepiride (AMARYL) 2 mg tablet   Yes No   Sig: Take 2 mg by mouth every morning before breakfast   insulin glargine (LANTUS) 100 units/mL subcutaneous injection   Yes No   Sig: Inject 20 Units under the skin daily    insulin lispro (HumaLOG) 100 units/mL injection   Yes Yes   Sig: Inject under the skin   oxybutynin (DITROPAN) 5 mg tablet   Yes No   Sig: Take 15 mg by mouth daily    rizatriptan (MAXALT) 10 MG tablet   Yes No   Sig: Take 10 mg by mouth once as needed for migraine May repeat in 2 hours if needed   traMADol (ULTRAM) 50 mg tablet   Yes No   Sig: Take 50 mg by mouth every 6 (six) hours as needed for moderate pain      Facility-Administered Medications: None       Past Medical History:   Diagnosis Date    COPD (chronic obstructive pulmonary disease) ([facility] )     Diabetes mellitus ([facility] )     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[facility] )     Renal disorder     Stroke ([facility] )     may 2015       Past Surgical History:   Procedure Laterality Date    ANGIOPLASTY  03/01/2016    Right femoral vein    BACK SURGERY  2015    SUPRAPUBIC TUBE PLACEMENT         History reviewed  No pertinent family history  I have reviewed and agree with the history as documented      Social History     Tobacco Use    Smoking status: Current Every Day Smoker     Packs/day: 1 00     Years: 70 00     Pack years: 70 00    Smokeless tobacco: Never Used    Tobacco comment: Wife reports it's a losing matos   Substance Use Topics    Alcohol use: Never     Frequency: Never    Drug use: No        Review of Systems   Musculoskeletal: Positive for arthralgias and joint swelling  All other systems reviewed and are negative  Physical Exam  Physical Exam   Constitutional: He is oriented to person, place, and time  He appears well-developed and well-nourished  HENT:   Head: Normocephalic and atraumatic  Eyes: Pupils are equal, round, and reactive to light  EOM are normal    Neck: Normal range of motion  Neck supple  Cardiovascular: Normal rate, regular rhythm and normal heart sounds  No murmur heard  Pulmonary/Chest: Effort normal and breath sounds normal  No respiratory distress  He has no wheezes  Abdominal: Soft  Bowel sounds are normal  He exhibits no distension  There is no tenderness  Musculoskeletal: Normal range of motion  He exhibits tenderness  He exhibits no edema  Neurological: He is alert and oriented to person, place, and time  No cranial nerve deficit  Coordination normal    Skin: Skin is warm and dry  He is not diaphoretic  No erythema  Psychiatric: He has a normal mood and affect  His behavior is normal    Nursing note and vitals reviewed        Vital Signs  ED Triage Vitals [01/29/20 2226]   Temperature Pulse Respirations Blood Pressure SpO2   99 3 °F (37 4 °C) 101 18 116/59 (!) 84 %      Temp src Heart Rate Source Patient Position - Orthostatic VS BP Location FiO2 (%)   -- -- -- -- --      Pain Score       8           Vitals:    01/30/20 0230 01/30/20 0300 01/30/20 0330 01/30/20 0430   BP: 104/59 112/55 109/55 111/56   Pulse: 79 77 73 70         Visual Acuity      ED Medications  Medications - No data to display    Diagnostic Studies  Results Reviewed     Procedure Component Value Units Date/Time    Comprehensive metabolic panel [[id number]]  (Abnormal) Collected:  01/30/20 0405    Lab Status:  Final result Specimen:  Blood from Arm, Left Updated:  01/30/20 0434     Sodium 142 mmol/L      Potassium 4 5 mmol/L      Chloride 103 mmol/L      CO2 33 mmol/L      ANION GAP 6 mmol/L      BUN 23 mg/dL      Creatinine 0 87 mg/dL Glucose 178 mg/dL      Calcium 8 7 mg/dL      AST 17 U/L      ALT 13 U/L      Alkaline Phosphatase 74 U/L      Total Protein 6 0 g/dL      Albumin 3 5 g/dL      Total Bilirubin 0 50 mg/dL      eGFR 79 ml/min/1 73sq m     Narrative:       [place] guidelines for Chronic Kidney Disease (CKD):     Stage 1 with normal or high GFR (GFR > 90 mL/min/1 73 square meters)    Stage 2 Mild CKD (GFR = 60-89 mL/min/1 73 square meters)    Stage 3A Moderate CKD (GFR = 45-59 mL/min/1 73 square meters)    Stage 3B Moderate CKD (GFR = 30-44 mL/min/1 73 square meters)    Stage 4 Severe CKD (GFR = 15-29 mL/min/1 73 square meters)    Stage 5 End Stage CKD (GFR <15 mL/min/1 73 square meters)  Note: GFR calculation is accurate only with a steady state creatinine    CBC and differential [[id number]]  (Abnormal) Collected:  01/30/20 0405    Lab Status:  Final result Specimen:  Blood from Arm, Left Updated:  01/30/20 0424     WBC 12 90 Thousand/uL      RBC 4 16 Million/uL      Hemoglobin 12 7 g/dL      Hematocrit 39 4 %      MCV 95 fL      MCH 30 5 pg      MCHC 32 2 g/dL      RDW 15 6 %      MPV 9 5 fL      Platelets 116 Thousands/uL      Neutrophils Relative 88 %      Lymphocytes Relative 5 %      Monocytes Relative 7 %      Eosinophils Relative 0 %      Basophils Relative 1 %      Neutrophils Absolute 11 40 Thousands/µL      Lymphocytes Absolute 0 60 Thousands/µL      Monocytes Absolute 0 90 Thousand/µL      Eosinophils Absolute 0 00 Thousand/µL      Basophils Absolute 0 10 Thousands/µL                  CT head without contrast   Final Result by Ngoc Bee DO (01/30 0221)      No calvarial fracture or acute intracranial abnormality is seen  Other findings as above  CT CERVICAL SPINE - WITHOUT CONTRAST      INDICATION:   fall        COMPARISON:  None      TECHNIQUE:  CT examination of the cervical spine was performed without intravenous contrast   Contiguous axial images were obtained  Sagittal and coronal reconstructions were performed  Radiation dose length product (DLP) for this visit:  892 6 mGy-cm  (accession [id number]), 179 1 mGy-cm  (accession [id number])  This examination, like all CT scans performed in the [facility], was performed utilizing techniques to minimize    radiation dose exposure, including the use of iterative reconstruction and automated exposure control  IMAGE QUALITY:  Diagnostic  FINDINGS:      ALIGNMENT:  Approximately 2 mm of anterolisthesis of C3 on C4, probably related to facet joint arthropathy at this level  Spinal alignment otherwise grossly maintained  VERTEBRAL BODIES:  No fracture  DEGENERATIVE CHANGES:  Intervertebral disc space narrowing at C3/C4, C4/C5 C5/C6 with anterior, marginal, and uncovertebral osteophytosis at these levels  Multilevel facet joint arthropathy which appears to be most prominent at C3/C4  PREVERTEBRAL AND PARASPINAL SOFT TISSUES:  Grossly unremarkable      THORACIC INLET:  Mild parenchymal and paraseptal emphysematous changes         IMPRESSION:      Degenerative changes as described but no evidence of acute cervical spine injury  Other findings as above  Workstation performed: [id number]         CT spine cervical without contrast   Final Result by Jaret Galeas DO (01/30 0221)      No calvarial fracture or acute intracranial abnormality is seen  Other findings as above  CT CERVICAL SPINE - WITHOUT CONTRAST      INDICATION:   fall  COMPARISON:  None      TECHNIQUE:  CT examination of the cervical spine was performed without intravenous contrast   Contiguous axial images were obtained  Sagittal and coronal reconstructions were performed  Radiation dose length product (DLP) for this visit:  892 6 mGy-cm  (accession [id number]), 179 1 mGy-cm  (accession [id number])    This examination, like all CT scans performed in the [facility]  [address], was performed utilizing techniques to minimize    radiation dose exposure, including the use of iterative reconstruction and automated exposure control  IMAGE QUALITY:  Diagnostic  FINDINGS:      ALIGNMENT:  Approximately 2 mm of anterolisthesis of C3 on C4, probably related to facet joint arthropathy at this level  Spinal alignment otherwise grossly maintained  VERTEBRAL BODIES:  No fracture  DEGENERATIVE CHANGES:  Intervertebral disc space narrowing at C3/C4, C4/C5 C5/C6 with anterior, marginal, and uncovertebral osteophytosis at these levels  Multilevel facet joint arthropathy which appears to be most prominent at C3/C4  PREVERTEBRAL AND PARASPINAL SOFT TISSUES:  Grossly unremarkable      THORACIC INLET:  Mild parenchymal and paraseptal emphysematous changes         IMPRESSION:      Degenerative changes as described but no evidence of acute cervical spine injury  Other findings as above  Workstation performed: [id number]         XR hip/pelv 2-3 vws right if performed   Final Result by Kalee Veloz MD (01/30 0351)      Cortical discontinuity and angulation of the right femoral head suggests a femoral neck fracture  Abdominal aortic aneurysm is noted  Vascular atherosclerosis is noted  Workstation performed: [id number]         XR femur 2 views RIGHT    (Results Pending)   XR knee 4+ vw right injury    (Results Pending)   XR tibia fibula 2 views RIGHT    (Results Pending)              Procedures  Procedures         ED Course  ED Course as of Jan 30 0450   Thu Jan 30, 2020   0105 Ekg rate 100, sinus, no stemi, narrow qrs      0227    IMPRESSION:     Degenerative changes as described but no evidence of acute cervical spine injury      Other findings as above          0358 IMPRESSION:     Cortical discontinuity and angulation of the right femoral head suggests a femoral neck fracture      Abdominal aortic aneurysm is noted  Vascular atherosclerosis is noted  MDM      Disposition  Final diagnoses:   Hip fracture ([facility] )     Time reflects when diagnosis was documented in both MDM as applicable and the Disposition within this note     Time User Action Codes Description Comment    1/30/2020  4:22 AM Catrachita Zelaya Add [S72 001A] Closed fracture of neck of right femur, initial encounter ([facility] )     1/30/2020  4:48 AM Kourtney Friday ALEXIS Add [S72 009A] Hip fracture [facility])       ED Disposition     ED Disposition Condition Date/Time Comment    Admit Stable Thu Jan 30, 2020  4:48 AM Case was discussed with slim ap and the patient's admission status was agreed to be Admission Status: inpatient status to the service of Dr Matty Brooks   Follow-up Information    None         Patient's Medications   Discharge Prescriptions    No medications on file     No discharge procedures on file      ED Provider  Electronically Signed by           Katelin Nicole MD  01/30/20 9055

## 2020-01-31 ENCOUNTER — ANESTHESIA (INPATIENT)
Dept: PERIOP | Facility: HOSPITAL | Age: 85
DRG: 480 | End: 2020-01-31
Payer: MEDICARE

## 2020-01-31 ENCOUNTER — APPOINTMENT (INPATIENT)
Dept: RADIOLOGY | Facility: HOSPITAL | Age: 85
DRG: 480 | End: 2020-01-31
Payer: MEDICARE

## 2020-01-31 PROBLEM — J96.11 CHRONIC RESPIRATORY FAILURE WITH HYPOXIA (HCC): Status: ACTIVE | Noted: 2020-01-31

## 2020-01-31 LAB
GLUCOSE SERPL-MCNC: 162 MG/DL (ref 65–140)
GLUCOSE SERPL-MCNC: 204 MG/DL (ref 65–140)
GLUCOSE SERPL-MCNC: 86 MG/DL (ref 65–140)
GLUCOSE SERPL-MCNC: 89 MG/DL (ref 65–140)
GLUCOSE SERPL-MCNC: 99 MG/DL (ref 65–140)
INR PPP: 1.19 (ref 0.9–1.5)
PROTHROMBIN TIME: 13.8 SECONDS (ref 10.2–13)

## 2020-01-31 PROCEDURE — C1713 ANCHOR/SCREW BN/BN,TIS/BN: HCPCS | Performed by: ORTHOPAEDIC SURGERY

## 2020-01-31 PROCEDURE — 87070 CULTURE OTHR SPECIMN AEROBIC: CPT | Performed by: PHYSICIAN ASSISTANT

## 2020-01-31 PROCEDURE — 87106 FUNGI IDENTIFICATION YEAST: CPT | Performed by: PHYSICIAN ASSISTANT

## 2020-01-31 PROCEDURE — 94760 N-INVAS EAR/PLS OXIMETRY 1: CPT

## 2020-01-31 PROCEDURE — 71045 X-RAY EXAM CHEST 1 VIEW: CPT

## 2020-01-31 PROCEDURE — 94640 AIRWAY INHALATION TREATMENT: CPT

## 2020-01-31 PROCEDURE — 87449 NOS EACH ORGANISM AG IA: CPT | Performed by: PHYSICIAN ASSISTANT

## 2020-01-31 PROCEDURE — 85610 PROTHROMBIN TIME: CPT | Performed by: NURSE PRACTITIONER

## 2020-01-31 PROCEDURE — 73502 X-RAY EXAM HIP UNI 2-3 VIEWS: CPT

## 2020-01-31 PROCEDURE — C1769 GUIDE WIRE: HCPCS | Performed by: ORTHOPAEDIC SURGERY

## 2020-01-31 PROCEDURE — 99232 SBSQ HOSP IP/OBS MODERATE 35: CPT | Performed by: INTERNAL MEDICINE

## 2020-01-31 PROCEDURE — 87040 BLOOD CULTURE FOR BACTERIA: CPT | Performed by: PHYSICIAN ASSISTANT

## 2020-01-31 PROCEDURE — 84145 PROCALCITONIN (PCT): CPT | Performed by: PHYSICIAN ASSISTANT

## 2020-01-31 PROCEDURE — 0QS604Z REPOSITION RIGHT UPPER FEMUR WITH INTERNAL FIXATION DEVICE, OPEN APPROACH: ICD-10-PCS | Performed by: ORTHOPAEDIC SURGERY

## 2020-01-31 PROCEDURE — 87205 SMEAR GRAM STAIN: CPT | Performed by: PHYSICIAN ASSISTANT

## 2020-01-31 PROCEDURE — 82948 REAGENT STRIP/BLOOD GLUCOSE: CPT

## 2020-01-31 DEVICE — 7.3MM CANNULATED SCREW 16MM THREAD/85MM: Type: IMPLANTABLE DEVICE | Site: HIP | Status: FUNCTIONAL

## 2020-01-31 DEVICE — 7.3MM CANNULATED SCREW 16MM THREAD/70MM: Type: IMPLANTABLE DEVICE | Site: HIP | Status: FUNCTIONAL

## 2020-01-31 RX ORDER — FONDAPARINUX SODIUM 2.5 MG/.5ML
2.5 INJECTION SUBCUTANEOUS DAILY
Status: DISCONTINUED | OUTPATIENT
Start: 2020-02-01 | End: 2020-02-03 | Stop reason: HOSPADM

## 2020-01-31 RX ORDER — CEFTRIAXONE 1 G/50ML
1000 INJECTION, SOLUTION INTRAVENOUS EVERY 24 HOURS
Status: DISCONTINUED | OUTPATIENT
Start: 2020-01-31 | End: 2020-02-01

## 2020-01-31 RX ORDER — ONDANSETRON 2 MG/ML
4 INJECTION INTRAMUSCULAR; INTRAVENOUS EVERY 6 HOURS PRN
Status: DISCONTINUED | OUTPATIENT
Start: 2020-01-31 | End: 2020-02-03 | Stop reason: HOSPADM

## 2020-01-31 RX ORDER — SODIUM CHLORIDE 9 MG/ML
50 INJECTION, SOLUTION INTRAVENOUS CONTINUOUS
Status: DISCONTINUED | OUTPATIENT
Start: 2020-01-31 | End: 2020-01-31

## 2020-01-31 RX ORDER — CEFAZOLIN SODIUM 1 G/3ML
INJECTION, POWDER, FOR SOLUTION INTRAMUSCULAR; INTRAVENOUS AS NEEDED
Status: DISCONTINUED | OUTPATIENT
Start: 2020-01-31 | End: 2020-01-31 | Stop reason: SURG

## 2020-01-31 RX ORDER — MAGNESIUM HYDROXIDE/ALUMINUM HYDROXICE/SIMETHICONE 120; 1200; 1200 MG/30ML; MG/30ML; MG/30ML
30 SUSPENSION ORAL EVERY 6 HOURS PRN
Status: DISCONTINUED | OUTPATIENT
Start: 2020-01-31 | End: 2020-02-03 | Stop reason: HOSPADM

## 2020-01-31 RX ORDER — FENTANYL CITRATE 50 UG/ML
INJECTION, SOLUTION INTRAMUSCULAR; INTRAVENOUS AS NEEDED
Status: DISCONTINUED | OUTPATIENT
Start: 2020-01-31 | End: 2020-01-31 | Stop reason: SURG

## 2020-01-31 RX ORDER — ONDANSETRON 2 MG/ML
4 INJECTION INTRAMUSCULAR; INTRAVENOUS ONCE AS NEEDED
Status: DISCONTINUED | OUTPATIENT
Start: 2020-01-31 | End: 2020-01-31 | Stop reason: HOSPADM

## 2020-01-31 RX ORDER — LIDOCAINE HYDROCHLORIDE 10 MG/ML
INJECTION, SOLUTION EPIDURAL; INFILTRATION; INTRACAUDAL; PERINEURAL AS NEEDED
Status: DISCONTINUED | OUTPATIENT
Start: 2020-01-31 | End: 2020-01-31 | Stop reason: SURG

## 2020-01-31 RX ORDER — FENTANYL CITRATE/PF 50 MCG/ML
25 SYRINGE (ML) INJECTION
Status: DISCONTINUED | OUTPATIENT
Start: 2020-01-31 | End: 2020-01-31 | Stop reason: HOSPADM

## 2020-01-31 RX ORDER — MORPHINE SULFATE 4 MG/ML
3 INJECTION, SOLUTION INTRAMUSCULAR; INTRAVENOUS EVERY 4 HOURS PRN
Status: DISCONTINUED | OUTPATIENT
Start: 2020-01-31 | End: 2020-02-03

## 2020-01-31 RX ORDER — DOCUSATE SODIUM 100 MG/1
100 CAPSULE, LIQUID FILLED ORAL 2 TIMES DAILY
Status: DISCONTINUED | OUTPATIENT
Start: 2020-01-31 | End: 2020-02-03 | Stop reason: HOSPADM

## 2020-01-31 RX ORDER — IPRATROPIUM BROMIDE AND ALBUTEROL SULFATE 2.5; .5 MG/3ML; MG/3ML
3 SOLUTION RESPIRATORY (INHALATION)
Status: DISCONTINUED | OUTPATIENT
Start: 2020-01-31 | End: 2020-01-31 | Stop reason: HOSPADM

## 2020-01-31 RX ORDER — POVIDONE-IODINE 10 MG/G
OINTMENT TOPICAL AS NEEDED
Status: DISCONTINUED | OUTPATIENT
Start: 2020-01-31 | End: 2020-01-31 | Stop reason: HOSPADM

## 2020-01-31 RX ORDER — EPHEDRINE SULFATE 50 MG/ML
INJECTION INTRAVENOUS AS NEEDED
Status: DISCONTINUED | OUTPATIENT
Start: 2020-01-31 | End: 2020-01-31 | Stop reason: SURG

## 2020-01-31 RX ORDER — PROPOFOL 10 MG/ML
INJECTION, EMULSION INTRAVENOUS AS NEEDED
Status: DISCONTINUED | OUTPATIENT
Start: 2020-01-31 | End: 2020-01-31 | Stop reason: SURG

## 2020-01-31 RX ORDER — FERROUS SULFATE 325(65) MG
325 TABLET ORAL 2 TIMES DAILY WITH MEALS
Status: DISCONTINUED | OUTPATIENT
Start: 2020-01-31 | End: 2020-02-03 | Stop reason: HOSPADM

## 2020-01-31 RX ORDER — OXYCODONE HYDROCHLORIDE AND ACETAMINOPHEN 5; 325 MG/1; MG/1
1 TABLET ORAL EVERY 4 HOURS PRN
Status: DISCONTINUED | OUTPATIENT
Start: 2020-01-31 | End: 2020-02-03 | Stop reason: HOSPADM

## 2020-01-31 RX ORDER — ONDANSETRON 2 MG/ML
INJECTION INTRAMUSCULAR; INTRAVENOUS AS NEEDED
Status: DISCONTINUED | OUTPATIENT
Start: 2020-01-31 | End: 2020-01-31 | Stop reason: SURG

## 2020-01-31 RX ORDER — CEFAZOLIN SODIUM 1 G/50ML
1000 SOLUTION INTRAVENOUS EVERY 8 HOURS
Status: DISCONTINUED | OUTPATIENT
Start: 2020-01-31 | End: 2020-01-31

## 2020-01-31 RX ORDER — BUPIVACAINE HYDROCHLORIDE AND EPINEPHRINE 5; 5 MG/ML; UG/ML
INJECTION, SOLUTION EPIDURAL; INTRACAUDAL; PERINEURAL AS NEEDED
Status: DISCONTINUED | OUTPATIENT
Start: 2020-01-31 | End: 2020-01-31 | Stop reason: HOSPADM

## 2020-01-31 RX ADMIN — OXYBUTYNIN CHLORIDE 15 MG: 5 TABLET ORAL at 10:44

## 2020-01-31 RX ADMIN — FENTANYL CITRATE 50 MCG: 50 INJECTION INTRAMUSCULAR; INTRAVENOUS at 07:48

## 2020-01-31 RX ADMIN — INSULIN LISPRO 1 UNITS: 100 INJECTION, SOLUTION INTRAVENOUS; SUBCUTANEOUS at 22:15

## 2020-01-31 RX ADMIN — PHENYLEPHRINE HYDROCHLORIDE 200 MCG: 10 INJECTION INTRAVENOUS at 07:30

## 2020-01-31 RX ADMIN — ERYTHROMYCIN 0.5 INCH: 5 OINTMENT OPHTHALMIC at 22:16

## 2020-01-31 RX ADMIN — DOCUSATE SODIUM 100 MG: 100 CAPSULE, LIQUID FILLED ORAL at 10:41

## 2020-01-31 RX ADMIN — FUROSEMIDE 40 MG: 40 TABLET ORAL at 10:42

## 2020-01-31 RX ADMIN — PHENYLEPHRINE HYDROCHLORIDE 100 MCG: 10 INJECTION INTRAVENOUS at 07:52

## 2020-01-31 RX ADMIN — FINASTERIDE 5 MG: 5 TABLET, FILM COATED ORAL at 10:42

## 2020-01-31 RX ADMIN — ATORVASTATIN CALCIUM 20 MG: 20 TABLET, FILM COATED ORAL at 10:40

## 2020-01-31 RX ADMIN — LIDOCAINE HYDROCHLORIDE 100 MG: 10 INJECTION, SOLUTION EPIDURAL; INFILTRATION; INTRACAUDAL; PERINEURAL at 07:17

## 2020-01-31 RX ADMIN — MORPHINE SULFATE 3 MG: 4 INJECTION INTRAVENOUS at 20:03

## 2020-01-31 RX ADMIN — CEFAZOLIN 1000 MG: 1 INJECTION, POWDER, FOR SOLUTION INTRAMUSCULAR; INTRAVENOUS at 08:02

## 2020-01-31 RX ADMIN — PHENYLEPHRINE HYDROCHLORIDE 100 MCG: 10 INJECTION INTRAVENOUS at 07:27

## 2020-01-31 RX ADMIN — GABAPENTIN 100 MG: 100 CAPSULE ORAL at 16:54

## 2020-01-31 RX ADMIN — GABAPENTIN 100 MG: 100 CAPSULE ORAL at 20:50

## 2020-01-31 RX ADMIN — FLUTICASONE FUROATE AND VILANTEROL TRIFENATATE 1 PUFF: 200; 25 POWDER RESPIRATORY (INHALATION) at 18:22

## 2020-01-31 RX ADMIN — OXYCODONE HYDROCHLORIDE AND ACETAMINOPHEN 1 TABLET: 5; 325 TABLET ORAL at 13:23

## 2020-01-31 RX ADMIN — ESCITALOPRAM OXALATE 10 MG: 10 TABLET ORAL at 10:41

## 2020-01-31 RX ADMIN — PROPOFOL 50 MG: 10 INJECTION, EMULSION INTRAVENOUS at 07:30

## 2020-01-31 RX ADMIN — Medication 1 TABLET: at 10:43

## 2020-01-31 RX ADMIN — OXYCODONE HYDROCHLORIDE AND ACETAMINOPHEN 1 TABLET: 5; 325 TABLET ORAL at 22:04

## 2020-01-31 RX ADMIN — EPHEDRINE SULFATE 10 MG: 50 INJECTION, SOLUTION INTRAVENOUS at 07:51

## 2020-01-31 RX ADMIN — CEFTRIAXONE 1000 MG: 1 INJECTION, SOLUTION INTRAVENOUS at 21:57

## 2020-01-31 RX ADMIN — AZITHROMYCIN MONOHYDRATE 500 MG: 500 INJECTION, POWDER, LYOPHILIZED, FOR SOLUTION INTRAVENOUS at 22:55

## 2020-01-31 RX ADMIN — ONDANSETRON 4 MG: 2 INJECTION INTRAMUSCULAR; INTRAVENOUS at 08:25

## 2020-01-31 RX ADMIN — FERROUS SULFATE TAB 325 MG (65 MG ELEMENTAL FE) 325 MG: 325 (65 FE) TAB at 10:41

## 2020-01-31 RX ADMIN — INSULIN LISPRO 1 UNITS: 100 INJECTION, SOLUTION INTRAVENOUS; SUBCUTANEOUS at 18:20

## 2020-01-31 RX ADMIN — SODIUM CHLORIDE 50 ML/HR: 9 INJECTION, SOLUTION INTRAVENOUS at 10:39

## 2020-01-31 RX ADMIN — GABAPENTIN 100 MG: 100 CAPSULE ORAL at 10:42

## 2020-01-31 RX ADMIN — IPRATROPIUM BROMIDE AND ALBUTEROL SULFATE 3 ML: 2.5; .5 SOLUTION RESPIRATORY (INHALATION) at 09:22

## 2020-01-31 RX ADMIN — HYDROCODONE BITARTRATE AND ACETAMINOPHEN 1 TABLET: 5; 325 TABLET ORAL at 02:32

## 2020-01-31 RX ADMIN — FERROUS SULFATE TAB 325 MG (65 MG ELEMENTAL FE) 325 MG: 325 (65 FE) TAB at 16:54

## 2020-01-31 RX ADMIN — MORPHINE SULFATE 2 MG: 2 INJECTION, SOLUTION INTRAMUSCULAR; INTRAVENOUS at 06:38

## 2020-01-31 RX ADMIN — CEFAZOLIN SODIUM 1000 MG: 1 SOLUTION INTRAVENOUS at 16:54

## 2020-01-31 RX ADMIN — PROPOFOL 140 MG: 10 INJECTION, EMULSION INTRAVENOUS at 07:17

## 2020-01-31 RX ADMIN — IPRATROPIUM BROMIDE AND ALBUTEROL SULFATE 3 ML: .5; 3 SOLUTION RESPIRATORY (INHALATION) at 09:51

## 2020-01-31 RX ADMIN — NICOTINE 1 PATCH: 21 PATCH, EXTENDED RELEASE TRANSDERMAL at 10:43

## 2020-01-31 RX ADMIN — EPHEDRINE SULFATE 10 MG: 50 INJECTION, SOLUTION INTRAVENOUS at 07:55

## 2020-01-31 RX ADMIN — CEFAZOLIN 1000 MG: 1 INJECTION, POWDER, FOR SOLUTION INTRAMUSCULAR; INTRAVENOUS at 07:23

## 2020-01-31 NOTE — ANESTHESIA POSTPROCEDURE EVALUATION
Post-Op Assessment Note    CV Status:  Stable       Mental Status:  Sleepy   Hydration Status:  Stable   PONV Controlled:  None   Airway Patency:  Patent   Post Op Vitals Reviewed: Yes      Staff: CRNA           BP   124/58   Temp  97 5   Pulse  94   Resp   16   SpO2   95%

## 2020-01-31 NOTE — ASSESSMENT & PLAN NOTE
· Without acute exacerbation at this time  · Continue home inhalers  · P r n  nebulizers, chronically on 4 L of oxygen at home

## 2020-01-31 NOTE — PROGRESS NOTES
Progress Note - Georgina Dopp 1935, 80 y o  male MRN: [id number]    Unit/Bed#: -02 Encounter: [id number]    Primary Care Provider: Brittany Bravo MD   Date and time admitted to hospital: 2020 10:23 PM        Chronic respiratory failure with hypoxia ([address] )  Assessment & Plan  · Chronically on 4 L    Tobacco abuse  Assessment & Plan  · Nicotine patch    Type 2 diabetes mellitus with complication, with long-term current use of insulin [facility])  Assessment & Plan  Lab Results   Component Value Date    HGBA1C 7 3 (H) 2019       Recent Labs     20  1612 20  2041 20  0039 20  0624   POCGLU 85 82 86 89       Blood Sugar Average: Last 72 hrs:  (P) 86 [id number]   Patient is NPO, will obtain Accu-Cheks q 6 hours give Humalog correction dose q 6    Chronic obstructive pulmonary disease with acute exacerbation (HCC)  Assessment & Plan  · Without acute exacerbation at this time  · Continue home inhalers  · P r n  nebulizers, chronically on 4 L of oxygen at home    * Closed fracture of neck of right femur (HCC)  Assessment & Plan  · Status post surgery with Orthopedics  · Tolerated procedure well  · PT/OT  · DVT prophylaxis        VTE Pharmacologic Prophylaxis: Pharmacologic: Enoxaparin (Lovenox)    Patient Centered Rounds: I have performed bedside rounds with nursing staff today  Discussions with Specialists or Other Care Team Provider: n/a  Education and Discussions with Family / Patient: patient    Current Length of Stay: 1 day(s)    Current Patient Status: Inpatient   Certification Statement: The patient will continue to require additional inpatient hospital stay due to hip fracture    Discharge Plan: pending pt/ot eval, will likely need placement    Code Status: Level 1 - Full Code    Subjective:   Patient seen examined  Tolerated procedure well    Comfortable postoperatively    Objective:     Vitals:   Temp (24hrs), Av 2 °F (36 2 °C), Min:96 2 °F (35 7 °C), Max:98 3 °F (36 8 °C)    Temp:  [96 2 °F (35 7 °C)-98 3 °F (36 8 °C)] 96 2 °F (35 7 °C)  HR:  [76-97] 89  Resp:  [18-20] 18  BP: ()/(50-59) 109/56  SpO2:  [69 %-95 %] 94 %  Body mass index is 22 3 kg/m²  Input and Output Summary (last 24 hours): Intake/Output Summary (Last 24 hours) at 1/31/2020 1048  Last data filed at 1/31/2020 0901  Gross per 24 hour   Intake 1473 33 ml   Output 1100 ml   Net 373 33 ml       Physical Exam:     Physical Exam   Constitutional: He is oriented to person, place, and time  He appears well-developed and well-nourished  HENT:   Head: Normocephalic and atraumatic  Eyes: Pupils are equal, round, and reactive to light  EOM are normal    Neck: Normal range of motion  Neck supple  Cardiovascular: Normal rate and regular rhythm  Pulmonary/Chest: Effort normal  He has rales  Abdominal: Soft  Bowel sounds are normal    Musculoskeletal: Normal range of motion  He exhibits tenderness  Surgical site dressed appearing clean dry and intact   Neurological: He is alert and oriented to person, place, and time  Skin: Skin is warm  Capillary refill takes less than 2 seconds  Psychiatric: He has a normal mood and affect  His behavior is normal  Thought content normal    Nursing note and vitals reviewed        Additional Data:     Labs:    Results from last 7 days   Lab Units 01/30/20  0405   WBC Thousand/uL 12 90*   HEMOGLOBIN g/dL 12 7*   HEMATOCRIT % 39 4*   PLATELETS Thousands/uL 110*   NEUTROS PCT % 88*   LYMPHS PCT % 5*   MONOS PCT % 7   EOS PCT % 0     Results from last 7 days   Lab Units 01/30/20  0405   POTASSIUM mmol/L 4 5   CHLORIDE mmol/L 103   CO2 mmol/L 33*   BUN mg/dL 23   CREATININE mg/dL 0 87   CALCIUM mg/dL 8 7   ALK PHOS U/L 74   ALT U/L 13   AST U/L 17     Results from last 7 days   Lab Units 01/31/20  0500   INR  1 19     Results from last 7 days   Lab Units 01/31/20  0624 01/31/20  0039 01/30/20  2041 01/30/20  1612 01/30/20  1125 01/30/20  0650   POC GLUCOSE mg/dl 89 86 82 85 106 132           * I Have Reviewed All Lab Data Listed Above  * Additional Pertinent Lab Tests Reviewed:  [address] Admission  Reviewed    Imaging:  Imaging Reports Reviewed Today Include: n/a    Recent Cultures (last 7 days):           Last 24 Hours Medication List:     Current Facility-Administered Medications:  acetaminophen 650 mg Oral Q6H PRN Tacey Callas, PA-C    ALPRAZolam 0 5 mg Oral HS PRN Tacey Callas, PA-C    aluminum-magnesium hydroxide-simethicone 30 mL Oral Q6H PRN Tacey Callas, PA-C    atorvastatin 20 mg Oral Daily Tacey Callas, PA-C    carisoprodol 350 mg Oral TID PRN Tacey Callas, PA-C    cefazolin 1,000 mg Intravenous Q8H Tacey Callas, PA-C    docusate sodium 100 mg Oral BID Tacey Callas, PA-C    erythromycin 0 5 inch Both Eyes HS Tacey Callas, PA-C    escitalopram 10 mg Oral Daily Tacey Callas, PA-C    ferrous sulfate 325 mg Oral BID With Meals Tacey Callas, PA-C    finasteride 5 mg Oral Daily Tacey Callas, PA-C    fluticasone-vilanterol 1 puff Inhalation Daily [place]    [START ON 2/1/2020] fondaparinux 2 5 mg Subcutaneous Daily Tacey Callas, PA-C    furosemide 40 mg Oral Daily Tacey Callas, PA-C    gabapentin 100 mg Oral TID Tacey Callas, PA-C    insulin lispro 1-5 Units Subcutaneous Q6H [facility] Tacey Callas, PA-C    ipratropium-albuterol 3 mL Nebulization Q6H PRN Tacey Callas, PA-C    morphine injection 3 mg Intravenous Q4H PRN Tacey Callas, PA-C    multivitamin-minerals 1 tablet Oral Daily Tacey Callas, PA-C    nicotine 1 patch Transdermal Daily Tacey Callas, PA-C    ondansetron 4 mg Intravenous Q6H PRN Tacey Callas, PA-C    oxybutynin 15 mg Oral Daily Tacey Callas, PA-C    oxyCODONE-acetaminophen 1 tablet Oral Q4H PRN Tacey Callas, PA-C    sodium chloride 50 mL/hr Intravenous Continuous Caridad Hoose, CRNA Last Rate: 50 mL/hr (01/31/20 1039)     Facility-Administered Medications Ordered in Other Encounters:  barium sulfate 1 tablet Oral Once in imaging Brittany Bravo MD        Today, Patient Was Seen By: Jag Lopez MD    ** Please Note: Dictation voice to text software may have been used in the creation of this document   **

## 2020-01-31 NOTE — NURSING NOTE
Report from the prior shift, al valdes made aware of sepsis screen done and current vitals, bp retaken and charted, ivf's d/c'd and pt for a pcxr, right hip dsg cdi at this time, denies any pain at this time, callbell in reach of the pt and the bed alarm is on

## 2020-01-31 NOTE — PLAN OF CARE
Problem: DISCHARGE PLANNING - CARE MANAGEMENT  Goal: Discharge to post-acute care or home with appropriate resources  Description  INTERVENTIONS:  - Conduct assessment to determine patient/family and health care team treatment goals, and need for post-acute services based on payer coverage, community resources, and patient preferences, and barriers to discharge  - Address psychosocial, clinical, and financial barriers to discharge as identified in assessment in conjunction with the patient/family and health care team  - Arrange appropriate level of post-acute services according to patient's   needs and preference and payer coverage in collaboration with the physician and health care team  - Communicate with and update the patient/family, physician, and health care team regarding progress on the discharge plan  - Arrange appropriate transportation to post-acute venues  Pt will need STR s/p hip repair     Outcome: Progressing

## 2020-01-31 NOTE — ASSESSMENT & PLAN NOTE
Lab Results   Component Value Date    HGBA1C 7 3 (H) 12/23/2019       Recent Labs     01/30/20  1612 01/30/20  2041 01/31/20  0039 01/31/20  0624   POCGLU 85 82 86 89       Blood Sugar Average: Last 72 hrs:  (P) 96 [id number]   Patient is NPO, will obtain Accu-Cheks q 6 hours give Humalog correction dose q 6

## 2020-01-31 NOTE — OP NOTE
OPERATIVE REPORT  PATIENT NAME: Ramy Gorman    :  1935  MRN: [id number]  Pt Location: [facility] OR ROOM 03    SURGERY DATE: 2020    Surgeon(s) and Role:     * Donya Cohen, DO - Primary     * Francheska Doss PA-C - Assisting    Preop Diagnosis:  Subcapital fracture right hip    Postop diagnosis:  Subcapital fracture right hip    Procedure(s) (LRB):  OPEN REDUCTION W/ INTERNAL FIXATION (ORIF) HIP WITH CANNUALATED SCREWS (Right)    Specimen(s):  None    Estimated Blood Loss:   Minimal    Drains:  Suprapubic Catheter Latex 22 Fr  (Active)   Output (mL) 300 mL 2020  3:00 AM   Number of days:        Anesthesia Type:   LMA    Operative Indications:    Subcapital fracture right hip    Operative Findings:  TT: 0    Complications:   None    Procedure and Technique:    The patient was properly identified and brought to the operative suite  After successful induction of the general anesthetic, he was transferred to the fracture table  All areas of possible skin pressurization were well padded to avoid the above  Fluoroscopy was brought into the operative field which showed the fracture to remain nondisplaced  The right lower extremity, hip, and abdominal region were then prepped and draped in the usual sterile fashion    The patient was given a dose of intravenous antibiotics  The surgical landmarks were outlined with skin marker  Using a guide pin, this was placed in a retrograde fashion along the lateral cortex staying opposite and superior to the lesser trochanter  The 1st guide pin was placed along the femoral head and neck in a central/central position  This was advanced just short of subchondral bone  Using the [place] gun, 2 other pins were placed inferior and confirmed to be in anatomic position using multiple fluoroscopic views    Once all 3 pins were in the ideal position, they were measured, the lateral cortex was opened up with a Reamer, and the appropriate size 7 3 mm short thread screws were placed  In this situation, the screws were 70 mm, 70 mm, and 85 mm  Once all the screws were placed, fluoroscopy was brought back to the operative field which showed anatomic reduction of the fracture without any incarceration of the hardware into the joint  The wounds were irrigated, closed with 2 O Vicryl and staples  The region was infiltrated with 0 5% Marcaine with epinephrine and dressed with ointment, Xeroform, 4x4s, and Tegaderms  There was no complication during procedure  He was successfully woken, transferred to his hospital bed, and went to recovery room in stable condition           I was present for the entire procedure    Patient Disposition:  PACU     SIGNATURE: Joanna Caraballo DO  DATE: January 31, 2020  TIME: 7:11 AM

## 2020-02-01 PROBLEM — S72.001D CLOSED FRACTURE OF NECK OF RIGHT FEMUR WITH ROUTINE HEALING: Status: ACTIVE | Noted: 2020-01-30

## 2020-02-01 PROBLEM — J96.21 ACUTE ON CHRONIC RESPIRATORY FAILURE WITH HYPOXIA (HCC): Status: ACTIVE | Noted: 2020-01-31

## 2020-02-01 LAB
ANION GAP SERPL CALCULATED.3IONS-SCNC: 5 MMOL/L (ref 4–13)
ANISOCYTOSIS BLD QL SMEAR: PRESENT
BASOPHILS # BLD AUTO: 0 THOUSANDS/ΜL (ref 0–0.1)
BASOPHILS NFR BLD AUTO: 0 % (ref 0–2)
BUN SERPL-MCNC: 24 MG/DL (ref 7–25)
CALCIUM SERPL-MCNC: 7.8 MG/DL (ref 8.6–10.5)
CHLORIDE SERPL-SCNC: 102 MMOL/L (ref 98–107)
CO2 SERPL-SCNC: 32 MMOL/L (ref 21–31)
CREAT SERPL-MCNC: 0.78 MG/DL (ref 0.7–1.3)
EOSINOPHIL # BLD AUTO: 0.1 THOUSAND/ΜL (ref 0–0.61)
EOSINOPHIL NFR BLD AUTO: 1 % (ref 0–5)
ERYTHROCYTE [DISTWIDTH] IN BLOOD BY AUTOMATED COUNT: 15.4 % (ref 11.5–14.5)
GFR SERPL CREATININE-BSD FRML MDRD: 83 ML/MIN/1.73SQ M
GIANT PLATELETS BLD QL SMEAR: PRESENT
GLUCOSE SERPL-MCNC: 159 MG/DL (ref 65–99)
GLUCOSE SERPL-MCNC: 207 MG/DL (ref 65–140)
GLUCOSE SERPL-MCNC: 236 MG/DL (ref 65–140)
GLUCOSE SERPL-MCNC: 268 MG/DL (ref 65–140)
GLUCOSE SERPL-MCNC: 286 MG/DL (ref 65–140)
HCT VFR BLD AUTO: 36.7 % (ref 42–47)
HGB BLD-MCNC: 11.8 G/DL (ref 14–18)
L PNEUMO1 AG UR QL IA.RAPID: NEGATIVE
LYMPHOCYTES # BLD AUTO: 0.4 THOUSANDS/ΜL (ref 0.6–4.47)
LYMPHOCYTES NFR BLD AUTO: 3 % (ref 21–51)
MCH RBC QN AUTO: 31.1 PG (ref 26–34)
MCHC RBC AUTO-ENTMCNC: 32 G/DL (ref 31–37)
MCV RBC AUTO: 97 FL (ref 81–99)
MONOCYTES # BLD AUTO: 1.3 THOUSAND/ΜL (ref 0.17–1.22)
MONOCYTES NFR BLD AUTO: 10 % (ref 2–12)
NEUTROPHILS # BLD AUTO: 11.6 THOUSANDS/ΜL (ref 1.4–6.5)
NEUTS SEG NFR BLD AUTO: 86 % (ref 42–75)
PLATELET # BLD AUTO: 88 THOUSANDS/UL (ref 149–390)
PLATELET BLD QL SMEAR: ABNORMAL
PMV BLD AUTO: 9.6 FL (ref 8.6–11.7)
POTASSIUM SERPL-SCNC: 3.8 MMOL/L (ref 3.5–5.5)
PROCALCITONIN SERPL-MCNC: 0.11 NG/ML
PROCALCITONIN SERPL-MCNC: 0.17 NG/ML
RBC # BLD AUTO: 3.78 MILLION/UL (ref 4.3–5.9)
RBC MORPH BLD: ABNORMAL
S PNEUM AG UR QL: NEGATIVE
SODIUM SERPL-SCNC: 139 MMOL/L (ref 134–143)
WBC # BLD AUTO: 13.4 THOUSAND/UL (ref 4.8–10.8)

## 2020-02-01 PROCEDURE — 97163 PT EVAL HIGH COMPLEX 45 MIN: CPT

## 2020-02-01 PROCEDURE — 82948 REAGENT STRIP/BLOOD GLUCOSE: CPT

## 2020-02-01 PROCEDURE — 82805 BLOOD GASES W/O2 SATURATION: CPT | Performed by: PHYSICIAN ASSISTANT

## 2020-02-01 PROCEDURE — 80048 BASIC METABOLIC PNL TOTAL CA: CPT | Performed by: PHYSICIAN ASSISTANT

## 2020-02-01 PROCEDURE — 97167 OT EVAL HIGH COMPLEX 60 MIN: CPT

## 2020-02-01 PROCEDURE — 99232 SBSQ HOSP IP/OBS MODERATE 35: CPT | Performed by: NURSE PRACTITIONER

## 2020-02-01 PROCEDURE — 94760 N-INVAS EAR/PLS OXIMETRY 1: CPT

## 2020-02-01 PROCEDURE — 94640 AIRWAY INHALATION TREATMENT: CPT

## 2020-02-01 PROCEDURE — 84145 PROCALCITONIN (PCT): CPT | Performed by: PHYSICIAN ASSISTANT

## 2020-02-01 PROCEDURE — 94664 DEMO&/EVAL PT USE INHALER: CPT

## 2020-02-01 PROCEDURE — 36600 WITHDRAWAL OF ARTERIAL BLOOD: CPT

## 2020-02-01 PROCEDURE — 85025 COMPLETE CBC W/AUTO DIFF WBC: CPT | Performed by: PHYSICIAN ASSISTANT

## 2020-02-01 PROCEDURE — 97530 THERAPEUTIC ACTIVITIES: CPT

## 2020-02-01 RX ORDER — CEFTRIAXONE 1 G/50ML
1000 INJECTION, SOLUTION INTRAVENOUS EVERY 24 HOURS
Status: DISCONTINUED | OUTPATIENT
Start: 2020-02-01 | End: 2020-02-03

## 2020-02-01 RX ORDER — PREDNISONE 20 MG/1
40 TABLET ORAL DAILY
Status: DISCONTINUED | OUTPATIENT
Start: 2020-02-01 | End: 2020-02-03

## 2020-02-01 RX ORDER — SODIUM CHLORIDE FOR INHALATION 0.9 %
3 VIAL, NEBULIZER (ML) INHALATION
Status: DISCONTINUED | OUTPATIENT
Start: 2020-02-01 | End: 2020-02-03 | Stop reason: HOSPADM

## 2020-02-01 RX ORDER — IPRATROPIUM BROMIDE AND ALBUTEROL SULFATE 2.5; .5 MG/3ML; MG/3ML
3 SOLUTION RESPIRATORY (INHALATION)
Status: DISCONTINUED | OUTPATIENT
Start: 2020-02-01 | End: 2020-02-01

## 2020-02-01 RX ORDER — ALBUTEROL SULFATE 2.5 MG/3ML
2.5 SOLUTION RESPIRATORY (INHALATION) EVERY 4 HOURS PRN
Status: DISCONTINUED | OUTPATIENT
Start: 2020-02-01 | End: 2020-02-03 | Stop reason: HOSPADM

## 2020-02-01 RX ORDER — LEVALBUTEROL 1.25 MG/.5ML
1.25 SOLUTION, CONCENTRATE RESPIRATORY (INHALATION)
Status: DISCONTINUED | OUTPATIENT
Start: 2020-02-01 | End: 2020-02-03 | Stop reason: HOSPADM

## 2020-02-01 RX ORDER — BUDESONIDE 0.5 MG/2ML
0.5 INHALANT ORAL
Status: DISCONTINUED | OUTPATIENT
Start: 2020-02-01 | End: 2020-02-03 | Stop reason: HOSPADM

## 2020-02-01 RX ADMIN — CARISOPRODOL 350 MG: 350 TABLET ORAL at 14:40

## 2020-02-01 RX ADMIN — FERROUS SULFATE TAB 325 MG (65 MG ELEMENTAL FE) 325 MG: 325 (65 FE) TAB at 08:17

## 2020-02-01 RX ADMIN — BUDESONIDE 0.5 MG: 0.5 INHALANT RESPIRATORY (INHALATION) at 20:32

## 2020-02-01 RX ADMIN — OXYBUTYNIN CHLORIDE 15 MG: 5 TABLET ORAL at 08:20

## 2020-02-01 RX ADMIN — CEFTRIAXONE 1000 MG: 1 INJECTION, SOLUTION INTRAVENOUS at 20:01

## 2020-02-01 RX ADMIN — ISODIUM CHLORIDE 3 ML: 0.03 SOLUTION RESPIRATORY (INHALATION) at 20:32

## 2020-02-01 RX ADMIN — LEVALBUTEROL HYDROCHLORIDE 1.25 MG: 1.25 SOLUTION, CONCENTRATE RESPIRATORY (INHALATION) at 20:32

## 2020-02-01 RX ADMIN — ISODIUM CHLORIDE 3 ML: 0.03 SOLUTION RESPIRATORY (INHALATION) at 15:46

## 2020-02-01 RX ADMIN — INSULIN LISPRO 3 UNITS: 100 INJECTION, SOLUTION INTRAVENOUS; SUBCUTANEOUS at 17:20

## 2020-02-01 RX ADMIN — IPRATROPIUM BROMIDE AND ALBUTEROL SULFATE 3 ML: 2.5; .5 SOLUTION RESPIRATORY (INHALATION) at 01:33

## 2020-02-01 RX ADMIN — FLUTICASONE FUROATE AND VILANTEROL TRIFENATATE 1 PUFF: 200; 25 POWDER RESPIRATORY (INHALATION) at 08:18

## 2020-02-01 RX ADMIN — Medication 1 TABLET: at 08:19

## 2020-02-01 RX ADMIN — FERROUS SULFATE TAB 325 MG (65 MG ELEMENTAL FE) 325 MG: 325 (65 FE) TAB at 17:21

## 2020-02-01 RX ADMIN — ATORVASTATIN CALCIUM 20 MG: 20 TABLET, FILM COATED ORAL at 08:17

## 2020-02-01 RX ADMIN — INSULIN LISPRO 2 UNITS: 100 INJECTION, SOLUTION INTRAVENOUS; SUBCUTANEOUS at 11:53

## 2020-02-01 RX ADMIN — FINASTERIDE 5 MG: 5 TABLET, FILM COATED ORAL at 08:17

## 2020-02-01 RX ADMIN — OXYCODONE HYDROCHLORIDE AND ACETAMINOPHEN 1 TABLET: 5; 325 TABLET ORAL at 16:15

## 2020-02-01 RX ADMIN — INSULIN LISPRO 1 UNITS: 100 INJECTION, SOLUTION INTRAVENOUS; SUBCUTANEOUS at 08:19

## 2020-02-01 RX ADMIN — DOCUSATE SODIUM 100 MG: 100 CAPSULE, LIQUID FILLED ORAL at 08:17

## 2020-02-01 RX ADMIN — GABAPENTIN 100 MG: 100 CAPSULE ORAL at 20:01

## 2020-02-01 RX ADMIN — ESCITALOPRAM OXALATE 10 MG: 10 TABLET ORAL at 08:17

## 2020-02-01 RX ADMIN — PREDNISONE 40 MG: 20 TABLET ORAL at 14:40

## 2020-02-01 RX ADMIN — DOCUSATE SODIUM 100 MG: 100 CAPSULE, LIQUID FILLED ORAL at 17:21

## 2020-02-01 RX ADMIN — NICOTINE 1 PATCH: 21 PATCH, EXTENDED RELEASE TRANSDERMAL at 08:20

## 2020-02-01 RX ADMIN — OXYCODONE HYDROCHLORIDE AND ACETAMINOPHEN 1 TABLET: 5; 325 TABLET ORAL at 11:53

## 2020-02-01 RX ADMIN — LEVALBUTEROL HYDROCHLORIDE 1.25 MG: 1.25 SOLUTION, CONCENTRATE RESPIRATORY (INHALATION) at 15:46

## 2020-02-01 RX ADMIN — OXYCODONE HYDROCHLORIDE AND ACETAMINOPHEN 1 TABLET: 5; 325 TABLET ORAL at 08:11

## 2020-02-01 RX ADMIN — INSULIN LISPRO 2 UNITS: 100 INJECTION, SOLUTION INTRAVENOUS; SUBCUTANEOUS at 22:39

## 2020-02-01 RX ADMIN — FONDAPARINUX SODIUM 2.5 MG: 2.5 INJECTION, SOLUTION SUBCUTANEOUS at 08:18

## 2020-02-01 RX ADMIN — ERYTHROMYCIN 0.5 INCH: 5 OINTMENT OPHTHALMIC at 22:38

## 2020-02-01 RX ADMIN — GABAPENTIN 100 MG: 100 CAPSULE ORAL at 08:18

## 2020-02-01 RX ADMIN — GABAPENTIN 100 MG: 100 CAPSULE ORAL at 17:20

## 2020-02-01 RX ADMIN — FUROSEMIDE 40 MG: 40 TABLET ORAL at 08:18

## 2020-02-01 NOTE — PLAN OF CARE
Problem: PHYSICAL THERAPY ADULT  Goal: Performs mobility at highest level of function for planned discharge setting  See evaluation for individualized goals  Description  Treatment/Interventions: Functional transfer training, LE strengthening/ROM, Elevations, Therapeutic exercise, Endurance training, Patient/family training, Equipment eval/education, Bed mobility, Gait training, Spoke to nursing, Spoke to case management, OT, Family  Equipment Recommended: Walker(pt uses RW at baseline)       See flowsheet documentation for full assessment, interventions and recommendations  Note:   Prognosis: Good  Problem List: Decreased strength, Decreased endurance, Impaired balance, Decreased mobility, Decreased safety awareness, Pain, Orthopedic restrictions, Decreased skin integrity  Assessment: Pt is 80 y o  male seen for PT evaluation on 2/1/2020 s/p admit to [address] [facility] on 1/29/2020 w/ Closed fracture of neck of right femur ([facility] [address] ); pt presents s/p fall c L hip pain x 1 day  PT consulted to assess pt's functional mobility and d/c needs  Order placed for PT eval and tx, w/ activity as tolerated and TTWB R LE order  Performed at least 2 patient identifiers during session: Name and wristband  Comorbidities affecting pt's physical performance at time of assessment include: chronic low back pain, BPH, tobacco abuse, DM type 2, COPD  PTA, pt was independent w/ all functional mobility w/ RW, ambulates household distances, has 10-12 KHANH, lives w/ spouse in 1 level home and retired  Personal factors affecting pt at time of IE include: ambulating w/ assistive device, stairs to enter home, inability to navigate level surfaces w/o external assistance, unable to perform dynamic tasks in community, positive fall history, decreased initiation and engagement, limited insight into impairments, inability to perform IADLs and inability to perform ADLs   Please find objective findings from PT assessment regarding body systems outlined above with impairments and limitations including weakness, impaired balance, decreased endurance, pain, decreased activity tolerance, decreased functional mobility tolerance, decreased safety awareness, fall risk, orthopedic restrictions and decreased skin integrity, as well as mobility assessment (need for cueing for mobility technique)  The following objective measures performed on IE also reveal limitations: Barthel Index: 35/100  Pt's clinical presentation is currently unstable/unpredictable seen in pt's presentation of need for input for task focus and mobility technique and ongoing medical assessment  Pt to benefit from continued PT tx to address deficits as defined above and maximize level of functional independent mobility and consistency  From PT/mobility standpoint, recommendation at time of d/c would be STR pending progress in order to facilitate return to PLOF  Barriers to Discharge: Inaccessible home environment     Recommendation: Short-term skilled PT     PT - OK to Discharge: Yes(when medically cleared, if to STR)    See flowsheet documentation for full assessment

## 2020-02-01 NOTE — PLAN OF CARE
Problem: OCCUPATIONAL THERAPY ADULT  Goal: Performs self-care activities at highest level of function for planned discharge setting  See evaluation for individualized goals  Description  Treatment Interventions: ADL retraining, Functional transfer training, Endurance training, Patient/family training, Neuromuscular reeducation, Energy conservation    See flowsheet documentation for full assessment, interventions and recommendations  Note:   Limitation: Decreased ADL status, Decreased Safe judgement during ADL, Decreased cognition, Decreased endurance, Decreased self-care trans  Prognosis: Good  Assessment: Pt is a 80 y o  male seen for OT evaluation s/p admit to [address] [place] [address] on 1/29/2020 w/ Closed fracture of neck of right femur ([facility] [address] )  Comorbidities affecting pt's functional performance at time of assessment include: COPD, DM, CVA, Renal disorder  Personal factors affecting pt at time of IE include:steps to enter environment, difficulty performing ADLS and limited insight into deficits  Prior to admission, pt required A from wife prior  Upon evaluation: the following deficits impact occupational performance: decreased balance, decreased tolerance, impaired memory, impaired sequencing, impaired problem solving, decreased safety awareness and orthopedic restrictions  Pt to benefit from continued skilled OT tx while in the hospital to address deficits as defined above and maximize level of functional independence w ADL's and functional mobility  Occupational Performance areas to address include: bathing/shower, toilet hygiene, dressing, functional mobility and clothing management  From OT standpoint, recommendation at time of d/c would be STR       OT Discharge Recommendation: Short Term Rehab  OT - OK to Discharge: Yes(when medically stable)    Arely Linares MS, OTR/L

## 2020-02-01 NOTE — ASSESSMENT & PLAN NOTE
· Status post surgery with Orthopedics 1/31  · Tolerated procedure well  · PT/OT  · DVT prophylaxis  · Monitor H&H closely

## 2020-02-01 NOTE — PROGRESS NOTES
Progress Note - Ramy Gorman 1935, 80 y o  male MRN: [id number]    Unit/Bed#: -02 Encounter: [id number]    Primary Care Provider: Sang Lal MD   Date and time admitted to hospital: 1/29/2020 10:23 PM        * Closed fracture of neck of right femur [facility])  Assessment & Plan  · Status post surgery with Orthopedics 1/31  · Tolerated procedure well  · PT/OT  · DVT prophylaxis  · Monitor H&H closely    Chronic obstructive pulmonary disease with acute exacerbation ([facility] [address] )  Assessment & Plan  · cxr suspects pneumonia  · Unclear etiology of acute exacerbation ?related to status post anesthesia with surgery with receiving IV fluid and possible infectious cause/pneumonia   · Will start on low dose steroid   · Nebs   · Respiratory protocol  · chronically on 4 L of oxygen at home    Acute on chronic respiratory failure with hypoxia ([facility] [address] )  Assessment & Plan  · Chronically on 4 L, the patient was require 6 L nasal cannula as his SP O2 dropped to mid 70s  · Currently is improving   · Able to titrate down to 4L; continue oxygen supplement to keep SpO2 greater than 88%    · Incentive spirometry, early ambulation cough and deep breathing    Chronic low back pain  Assessment & Plan  Continue with soma   Supportive care    Benign prostatic hyperplasia  Assessment & Plan  · Continue Ditropan and Proscar    Tobacco abuse  Assessment & Plan  · Nicotine patch      Type 2 diabetes mellitus with complication, with long-term current use of insulin [facility])  Assessment & Plan  Lab Results   Component Value Date    HGBA1C 7 3 (H) 12/23/2019       Recent Labs     01/31/20  1630 01/31/20  2027 02/01/20  0620 02/01/20  1132   POCGLU 162* 204* 207* 236*       Blood Sugar Average: Last 72 hrs:  (P) 135 [id number]   · Continue with insulin sliding scale  · If blood glucose continues to be elevated will add long-acting insulin        VTE Pharmacologic Prophylaxis: Pharmacologic: Fondaparinux (Arixtra)    Patient Centered Rounds: I have performed bedside rounds with nursing staff today  Discussions with Specialists or Other Care Team Provider: ortho, nursing, OT/PT, CM, Rx   Education and Discussions with Family / Patient: patient     Current Length of Stay: 2 day(s)    Current Patient Status: Inpatient   Certification Statement: The patient will continue to require additional inpatient hospital stay due to right hip fracture     Discharge Plan: pending hospital course     Code Status: Level 1 - Full Code    Subjective:   Feeling okay  Denies any pain dyspnea  Per staff, patient was found hypoxia last night with low SpO2 in 70s  His O2 was found to be at 6L instead of his normal at 4L at home  ABG result noted  O2 lowered to 4L  Patient appears comfortable currently  Objective:     Vitals:   Temp (24hrs), Av 5 °F (36 4 °C), Min:96 1 °F (35 6 °C), Max:99 4 °F (37 4 °C)    Temp:  [96 1 °F (35 6 °C)-99 4 °F (37 4 °C)] 96 1 °F (35 6 °C)  HR:  [78-91] 78  Resp:  [16-22] 16  BP: (100-112)/(52-60) 100/56  SpO2:  [86 %-93 %] 88 %  Body mass index is 23 kg/m²  Input and Output Summary (last 24 hours): Intake/Output Summary (Last 24 hours) at 2020 1404  Last data filed at 2020 0150  Gross per 24 hour   Intake    Output 350 ml   Net -350 ml       Physical Exam:     Physical Exam   Constitutional: He is oriented to person, place, and time  He appears well-developed  No distress  Frail and chronically ill appearing      HENT:   Head: Normocephalic and atraumatic  Mouth/Throat: Oropharynx is clear and moist    Eyes: Pupils are equal, round, and reactive to light  Conjunctivae and EOM are normal    Neck: Normal range of motion  Neck supple  No thyromegaly present  Cardiovascular: Normal rate, regular rhythm, normal heart sounds and intact distal pulses  Pulmonary/Chest: Effort normal  No respiratory distress  He has wheezes (mild scattered expiratory wheezing )  Abdominal: Soft   Bowel sounds are normal  He exhibits no distension  There is no tenderness  Musculoskeletal: Normal range of motion  He exhibits no edema or deformity  Neurological: He is alert and oriented to person, place, and time  He has normal reflexes  No cranial nerve deficit  Skin: Skin is warm and dry  No erythema  Right hip dressing on   Psychiatric: He has a normal mood and affect  His behavior is normal  Thought content normal    Vitals reviewed  Additional Data:     Labs:    Results from last 7 days   Lab Units 02/01/20  0124   WBC Thousand/uL 13 40*   HEMOGLOBIN g/dL 11 8*   HEMATOCRIT % 36 7*   PLATELETS Thousands/uL 88*   NEUTROS PCT % 86*   LYMPHS PCT % 3*   MONOS PCT % 10   EOS PCT % 1     Results from last 7 days   Lab Units 02/01/20  0124 01/30/20  0405   POTASSIUM mmol/L 3 8 4 5   CHLORIDE mmol/L 102 103   CO2 mmol/L 32* 33*   BUN mg/dL 24 23   CREATININE mg/dL 0 78 0 87   CALCIUM mg/dL 7 8* 8 7   ALK PHOS U/L  --  74   ALT U/L  --  13   AST U/L  --  17     Results from last 7 days   Lab Units 01/31/20  0500   INR  1 19     Results from last 7 days   Lab Units 02/01/20  1132 02/01/20  0620 01/31/20  2027 01/31/20  1630 01/31/20  1122 01/31/20  0624 01/31/20  0039 01/30/20  2041 01/30/20  1612 01/30/20  1125 01/30/20  0650   POC GLUCOSE mg/dl 236* 207* 204* 162* 99 89 86 82 85 106 132           * I Have Reviewed All Lab Data Listed Above  * Additional Pertinent Lab Tests Reviewed: [address] Admission  Reviewed    Imaging:  Imaging Reports Reviewed Today Include: CXR     Recent Cultures (last 7 days):     Results from last 7 days   Lab Units 01/31/20 2039 01/31/20 2026 01/31/20 2025 01/31/20 2024   BLOOD CULTURE   --   --  Received in Microbiology Lab  Culture in Progress  Received in Microbiology Lab  Culture in Progress     GRAM STAIN RESULT   --  No Epithelial cells seen  No Polys or Bacteria seen  --   --    LEGIONELLA URINARY ANTIGEN  Negative  --   --   --        Last 24 Hours Medication List: Current Facility-Administered Medications:  acetaminophen 650 mg Oral Q6H PRN Rafita Esteban PA-C   albuterol 2 5 mg Nebulization Q4H PRN DEBBY Beltre   ALPRAZolam 0 5 mg Oral HS PRN Rafita Esteban PA-C   aluminum-magnesium hydroxide-simethicone 30 mL Oral Q6H PRN Rafita Esteban PA-C   atorvastatin 20 mg Oral Daily Rafita Esteban PA-C   bacitracin 81697 units in sodium chloride 0 9% 1000 mL  Irrigation Once Pierre Opitz, DO   budesonide 0 5 mg Nebulization Q12H DEBBY Beltre   carisoprodol 350 mg Oral TID PRN Rafita Esteban PA-C   cefTRIAXone 1,000 mg Intravenous Q24H DEBBY Beltre   docusate sodium 100 mg Oral BID Rafita Esteban PA-C   erythromycin 0 5 inch Both Eyes HS Rafita Esteban PA-C   escitalopram 10 mg Oral Daily Rafita Esteban PA-C   ferrous sulfate 325 mg Oral BID With Meals Rafita Esteban PA-C   finasteride 5 mg Oral Daily Rafita Esteban PA-C   fluticasone-vilanterol 1 puff Inhalation Daily Rafita Esteban PA-C   fondaparinux 2 5 mg Subcutaneous Daily Rafita Esteban PA-C   furosemide 40 mg Oral Daily Rafita Esteban PA-C   gabapentin 100 mg Oral TID Rafita Esteban PA-C   insulin lispro 1-5 Units Subcutaneous TID AC Kelly Calvo PA-C   insulin lispro 1-5 Units Subcutaneous HS Kelly Calvo PA-C   ipratropium-albuterol 3 mL Nebulization TID DEBBY Beltre   morphine injection 3 mg Intravenous Q4H PRN Rafita Esteban PA-C   multivitamin-minerals 1 tablet Oral Daily Rafita Esteban PA-C   nicotine 1 patch Transdermal Daily Rafita Esteban PA-C   ondansetron 4 mg Intravenous Q6H PRN Rafita Esteban PA-C   oxybutynin 15 mg Oral Daily Rafita Esteban PA-C   oxyCODONE-acetaminophen 1 tablet Oral Q4H PRN Rafita Esteban PA-C   predniSONE 40 mg Oral Daily DEBBY Beltre     Facility-Administered Medications Ordered in Other Encounters:  barium sulfate 1 tablet Oral Once in imaging Tr Momin MD        Today, Patient Was Seen By: DEBBY Beltre    ** Please Note: Dictation voice to text software may have been used in the creation of this document   **

## 2020-02-01 NOTE — ASSESSMENT & PLAN NOTE
· cxr suspects pneumonia  · Unclear etiology of acute exacerbation ?related to status post anesthesia with surgery with receiving IV fluid and possible infectious cause/pneumonia   · Will start on low dose steroid   · Nebs   · Respiratory protocol  · chronically on 4 L of oxygen at home

## 2020-02-01 NOTE — OCCUPATIONAL THERAPY NOTE
Occupational Therapy Evaluation      Haylee Dalton    2/1/2020    Principal Problem:    Closed fracture of neck of right femur ([facility] )  Active Problems:    Chronic obstructive pulmonary disease with acute exacerbation (HCC)    Type 2 diabetes mellitus with complication, with long-term current use of insulin (HCC)    Tobacco abuse    Benign prostatic hyperplasia    Chronic low back pain    Chronic respiratory failure with hypoxia ([facility])      Past Medical History:   Diagnosis Date    COPD (chronic obstructive pulmonary disease) ([facility] )     Diabetes mellitus ([place] [address] )     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[place] [address] )     Renal disorder     Stroke ([place] [address] )     may 2015       Past Surgical History:   Procedure Laterality Date    ANGIOPLASTY  03/01/2016    Right femoral vein    BACK SURGERY  2015    ORIF HIP FRACTURE Right 1/31/2020    Procedure: OPEN REDUCTION W/ INTERNAL FIXATION (ORIF) HIP WITH CANNUALATED SCREWS;  Surgeon: Sara Maxwell DO;  Location: [facility] MAIN OR;  Service: Orthopedics    SUPRAPUBIC TUBE PLACEMENT          02/01/20 0755   Note Type   Note type Eval only   Restrictions/Precautions   Weight Bearing Precautions Per Order Yes   RLE Weight Bearing Per Order TTWB   Other Precautions O2;Telemetry; Fall Risk;Pain;WBS   Pain Assessment   Pain Assessment No/denies pain   Pain Score No Pain   Home Living   Type of [address] [place] [address] One level;Performs ADLs on one level; Able to live on main level with bedroom/bathroom;Stairs to enter with rails  (10-12 KHANH)   Bathroom Shower/Tub Tub/shower unit   Bathroom Equipment Shower chair   [facility]  (pt uses RW at baseline)   Prior Function   Level of [place] Needs assistance with ADLs and functional mobility; Needs assistance with IADLs   Lives With Spouse   Receives Help From Family   ADL Assistance Needs assistance   IADLs Needs assistance   Falls in the last 6 months 1 to 4  (3 falls)   Comments pt's wife assists with donning/doffing shoes/socks, pt reports ability to cary shirt and pants independently  Pt reports his wife A with showering - pt can wash UEs and chest   ADL   Grooming Assistance 5  Supervision/Setup   UB Bathing Assistance 5  Supervision/Setup   LB Bathing Assistance 3  Moderate Assistance   UB Dressing Assistance 4  Minimal [address] 1  Total Assistance   Bed Mobility   Supine to Sit 3  Moderate assistance   Additional items Assist x 2;HOB elevated; Increased time required;Verbal cues;LE management   Sit to Supine 3  Moderate assistance   Additional items Assist x 2; Increased time required;Verbal cues;LE management   Transfers   Sit to Stand 3  Moderate assistance   Additional items Assist x 2; Increased time required;Verbal cues   Stand to Sit 3  Moderate assistance   Additional items Assist x 2; Increased time required;Verbal cues   Balance   Static Sitting Good   Dynamic Sitting Fair   Static Standing Poor +   Dynamic Standing Poor   Ambulatory Poor   Activity Tolerance   Activity Tolerance Patient limited by fatigue;Patient limited by pain;Treatment limited secondary to medical complications (Comment)  (SOB, hypoxia- SpO2 on 4 L = 80-82% during exertion)   Nurse Made Aware Yes, VEE Gayle verbalized pt appropriate for PT session   RUE Assessment   RUE Assessment WFL   LUE Assessment   LUE Assessment WFL   Hand Function   Gross Motor Coordination Functional   Fine Motor Coordination Functional   Cognition   Orientation Level Oriented to person;Oriented to place;Oriented to time   Memory Decreased recall of biographical information;Decreased recall of recent events   Following Commands Follows one step commands without difficulty   Comments DNT Mini Cog due to cognition dx   Assessment   Limitation Decreased ADL status; Decreased Safe judgement during ADL;Decreased cognition;Decreased endurance;Decreased self-care trans   Prognosis Good   Assessment Pt is a 80 y o  male seen for OT evaluation s/p admit [facility] [address] on 1/29/2020 w/ Closed fracture of neck of right femur ([facility] [address] )  Comorbidities affecting pt's functional performance at time of assessment include: COPD, DM, CVA, Renal disorder  Personal factors affecting pt at time of IE include:steps to enter environment, difficulty performing ADLS and limited insight into deficits  Prior to admission, pt required A from wife prior  Upon evaluation: the following deficits impact occupational performance: decreased balance, decreased tolerance, impaired memory, impaired sequencing, impaired problem solving, decreased safety awareness and orthopedic restrictions  Pt to benefit from continued skilled OT tx while in the hospital to address deficits as defined above and maximize level of functional independence w ADL's and functional mobility  Occupational Performance areas to address include: bathing/shower, toilet hygiene, dressing, functional mobility and clothing management  From OT standpoint, recommendation at time of d/c would be STR  Goals   Patient Goals "to return home"   Plan   Treatment Interventions ADL retraining;Functional transfer training; Endurance training;Patient/family training;Neuromuscular reeducation; Energy conservation   Goal Expiration Date 02/11/20   OT Treatment Day 0   OT Frequency 5x/wk   Recommendation   OT Discharge Recommendation Short Term Rehab   OT - OK to Discharge Yes  (when medically stable)   Barthel Index   Feeding 10   Bathing 0   Grooming Score 0   Dressing Score 5   Bladder Score 5   Bowels Score 5   Toilet Use Score 5   Transfers (Bed/Chair) Score 5   Mobility (Level Surface) Score 0   Stairs Score 0   Barthel Index Score 35     GOALS:    Pt will achieve the following within specified time frame: STG  Pt will achieve the following goals within 5 days    *ADL transfers with Mod (A) for inc'd independence with ADLs/purposeful tasks    *LB ADL with Max (A) using AE prn for inc'd independence with self cares    *Toileting with Max (A) for clothing management and hygiene for return to PLOF with personal care    *Increase static stand balance to F- and dyn stand balance to P+ for inc'd safety with standing purposeful tasks    *Increase stand tolerance x2 m for inc'd tolerance with standing purposeful tasks    *Participate in 10m UE therex to increase overall stamina/activity tolerance for purposeful tasks    *Bed mobility- Mod (A) for inc'd independence to manage own comfort and initiate EOB & OOB purposeful tasks    Pt will achieve the following within specified time frame: LTG  Pt will achieve the following goals within 10 days    *ADL transfers with Min (A) for inc'd independence with ADLs/purposeful tasks    *UB ADL with (S) for inc'd independence with self cares    *LB ADL with Mod (A) using AE prn for inc'd independence with self cares    *Toileting with Mod (A) for clothing management and hygiene for return to PLOF with personal care    *Increase static stand balance to F and dyn stand balance to F- for inc'd safety with standing purposeful tasks    *Increase stand tolerance x5 m for inc'd tolerance with standing purposeful tasks    *Bed mobility- Min (A) for inc'd independence to manage own comfort and initiate EOB & OOB purposeful tasks        Arely Gaines, MS, OTR/L

## 2020-02-01 NOTE — PHYSICAL THERAPY NOTE
Physical Therapy Evaluation     Patient's Name: Kaela Damon    Admitting Diagnosis  Hip fracture ([place] [address] ) [S72 009A]  Altered mental status [R41 82]  Closed fracture of neck of right femur, initial encounter ([place] [address] ) [S72 001A]    Problem List  Patient Active Problem List   Diagnosis    Chronic obstructive pulmonary disease with acute exacerbation ([place] [address] )    Type 2 diabetes mellitus with complication, with long-term current use of insulin ([place] [address] )    Stroke ([place] [address] )    Renal disorder    Diabetic polyneuropathy associated with type 2 diabetes mellitus ([place] [address] )    Renovascular hypertension    Tobacco abuse    Abdominal aortic aneurysm without rupture ([place] [address] )    Aneurysm of iliac artery ([place] [address] )    Antalgic gait    Arthritis    Benign prostatic hyperplasia    Presence of suprapubic catheter ([facility])    Chronic depression    Chronic diarrhea    Chronic low back pain    Chronic orthostatic hypotension    Congestive heart failure (HCC)    DDD (degenerative disc disease), lumbosacral    Diabetic macular edema (HCC)    Diabetic peripheral neuropathy associated with type 2 diabetes mellitus ([facility])    Edema of both legs    Hyperlipidemia associated with type 2 diabetes mellitus ([facility])    Insomnia    Mild cognitive disorder    Nicotine dependence    Mild nonproliferative diabetic retinopathy ([place] [address] )    Osteoporosis    Peripheral arterial occlusive disease (HCC)    Peripheral vascular disease ([place] [address] )    Recurrent UTI    Thrombocytopenia ([facility])    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)    Vitamin D deficiency    Localized edema    Lung mass    Swallowing problem    Uncontrolled type 2 diabetes mellitus ([place] [address] )    Diabetic nephropathy ([place] [address] )    Closed fracture of neck of right femur (HCC)    Chronic respiratory failure with hypoxia ([facility])       Past Medical History  Past Medical History:   Diagnosis Date    COPD (chronic obstructive pulmonary disease) ([place] [address] )     Diabetes mellitus ([place] [address] )     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[facility] [address] )     Renal disorder     Stroke [facility])     may 2015       Past Surgical History  Past Surgical History:   Procedure Laterality Date    ANGIOPLASTY  03/01/2016    Right femoral vein    BACK SURGERY  2015    ORIF HIP FRACTURE Right 1/31/2020    Procedure: OPEN REDUCTION W/ INTERNAL FIXATION (ORIF) HIP WITH CANNUALATED SCREWS;  Surgeon: Davonte Mak DO;  Location: [address] [place] [address] MAIN OR;  Service: Orthopedics    SUPRAPUBIC TUBE PLACEMENT            02/01/20 0754   Note Type   Note type Eval/Treat   Pain Assessment   Pain Assessment No/denies pain   Pain Score No Pain   Home Living   Type of [address] One level;Performs ADLs on one level; Able to live on main level with bedroom/bathroom;Stairs to enter with rails  (10-12 KHANH)   Bathroom Shower/Tub Tub/shower unit   Bathroom Equipment Shower chair   [facility]  (pt uses RW at baseline)   Prior Function   Level of [place] Needs assistance with ADLs and functional mobility; Needs assistance with IADLs   Lives With Spouse   Receives Help From Family   ADL Assistance Needs assistance   IADLs Needs assistance   Falls in the last 6 months 1 to 4  (3 falls)   Comments pt's wife assists with donning/doffing shoes/socks, pt reports ability to cary shirt and pants independently  Pt reports his wife A with showering - pt can wash UEs and chest   Restrictions/Precautions   Weight Bearing Precautions Per Order Yes   RLE Weight Bearing Per Order TTWB   Other Precautions O2;Telemetry; Fall Risk;Pain;WBS; Bed Alarm   General   Family/Caregiver Present No   Cognition   Arousal/Participation Alert   Orientation Level Oriented to person;Oriented to place;Oriented to time   Memory Decreased recall of biographical information;Decreased recall of recent events   Following Commands Follows one step commands without difficulty   Comments pt agreeable to PT session   RLE Assessment   RLE Assessment X Strength RLE   R Hip Flexion 3-/5   R Knee Extension 3-/5   R Ankle Dorsiflexion 3-/5   LLE Assessment   LLE Assessment X   Strength LLE   LLE Overall Strength 4-/5   Coordination   Movements are Fluid and Coordinated 0   Bed Mobility   Supine to Sit 3  Moderate assistance   Additional items Assist x 2;HOB elevated; Increased time required;Verbal cues;LE management   Sit to Supine 3  Moderate assistance   Additional items Assist x 2; Increased time required;Verbal cues;LE management   Transfers   Sit to Stand 3  Moderate assistance   Additional items Assist x 2; Increased time required;Verbal cues   Stand to Sit 3  Moderate assistance   Additional items Assist x 2; Increased time required;Verbal cues   Ambulation/Elevation   Gait pattern Improper Weight shift;Decreased R stance;Decreased foot clearance; Step to;Excessively slow   Gait Assistance 3  Moderate assist   Additional items Assist x 2;Verbal cues; Tactile cues   Assistive Device Standard walker   Distance 2 sidesteps toward HOB; 1 ft   Stair Management Assistance Not tested   Balance   Static Sitting Good   Dynamic Sitting Fair   Static Standing Poor +   Dynamic Standing Poor   Ambulatory Poor   Endurance Deficit   Endurance Deficit Yes   Activity Tolerance   Activity Tolerance Patient limited by fatigue;Patient limited by pain;Treatment limited secondary to medical complications (Comment)  (SOB, hypoxia- SpO2 on 4 L = 80-82% during exertion)   Nurse Made Aware Yes, RN Nalini verbalized pt appropriate for PT session   Assessment   Prognosis Good   Problem List Decreased strength;Decreased endurance; Impaired balance;Decreased mobility; Decreased safety awareness;Pain;Orthopedic restrictions;Decreased skin integrity   Assessment Pt is 80 y o  male seen for PT evaluation on 2/1/2020 s/p admit to [facility] on 1/29/2020 w/ Closed fracture of neck of right femur ([address] ); pt presents s/p fall c L hip pain x 1 day   PT consulted to assess pt's functional mobility and d/c needs  Order placed for PT eval and tx, w/ activity as tolerated and TTWB R LE order  Performed at least 2 patient identifiers during session: Name and wristband  Comorbidities affecting pt's physical performance at time of assessment include: chronic low back pain, BPH, tobacco abuse, DM type 2, COPD  PTA, pt was independent w/ all functional mobility w/ RW, ambulates household distances, has 10-12 KHANH, lives w/ spouse in 1 level home and retired  Personal factors affecting pt at time of IE include: ambulating w/ assistive device, stairs to enter home, inability to navigate level surfaces w/o external assistance, unable to perform dynamic tasks in community, positive fall history, decreased initiation and engagement, limited insight into impairments, inability to perform IADLs and inability to perform ADLs  Please find objective findings from PT assessment regarding body systems outlined above with impairments and limitations including weakness, impaired balance, decreased endurance, pain, decreased activity tolerance, decreased functional mobility tolerance, decreased safety awareness, fall risk, orthopedic restrictions and decreased skin integrity, as well as mobility assessment (need for cueing for mobility technique)  The following objective measures performed on IE also reveal limitations: Barthel Index: 35/100  Pt's clinical presentation is currently unstable/unpredictable seen in pt's presentation of need for input for task focus and mobility technique and ongoing medical assessment  Pt to benefit from continued PT tx to address deficits as defined above and maximize level of functional independent mobility and consistency  From PT/mobility standpoint, recommendation at time of d/c would be STR pending progress in order to facilitate return to PLOF     Barriers to Discharge Inaccessible home environment   Goals   Patient Goals "to return home"   [facility] Expiration Date 02/11/20   Short Term Goal #1 In 7-10 days: Increase bilateral LE strength 1/2 grade to facilitate independent mobility, Perform all bed mobility tasks modified independent to decrease caregiver burden, Perform all transfers with min A of 1 to improve independence, Ambulate > 25 ft  with RW with min A of 1 and maintaining TTWB R LE w/o LOB and w/ normalized gait pattern 100% of the time, Increase all balance 1/2 grade to decrease risk for falls, Complete exercise program independently, Tolerate 4 hr OOB to faciliate upright tolerance, Tolerate seated at EOB 25 minutes to facilitate functional task performance and Tolerate standing 2 minutes to facilitate functional task performance   PT Treatment Day 1   Plan   Treatment/Interventions Functional transfer training;LE strengthening/ROM; Elevations; Therapeutic exercise; Endurance training;Patient/family training;Equipment eval/education; Bed mobility;Gait training;Spoke to nursing;Spoke to case management;OT;Family   PT Frequency 5x/wk; Weekend  (+1x/wkend)   Recommendation   Recommendation Short-term skilled PT   Equipment Recommended Walker  (pt uses RW at baseline)   PT - OK to Discharge Yes  (when medically cleared, if to STR)   Barthel Index   Feeding 10   Bathing 0   Grooming Score 0   Dressing Score 5   Bladder Score 5   Bowels Score 5   Toilet Use Score 5   Transfers (Bed/Chair) Score 5   Mobility (Level Surface) Score 0   Stairs Score 0   Barthel Index Score 35     Physical Therapy Treatment Note  Time In: 0804  Time Out: 0815  Total Time: 11 min     S:  Pt agreeable to PT treatment session s/p PT eval, in no apparent distress and responsive      O:  Pt seen for PT treatment session this date with interventions consisting of therapeutic activity consisting of training: sit<>stand transfers, static sitting tolerance at EOB for 5 minutes w/ B UE support, vc and tactile cues for static sitting posture faciliation and vc and tactile cues for static standing posture faciliation   VC required for technique  Min Ax2 for STS Transfer on 3rd attempt c B UE support on RW      A:  Post session: pt returned BTB, bed alarm engaged, all needs in reach and RN notified of session findings/recommendations     P:  Continue to recommend STR at time of d/c in order to maximize pt's functional independence and safety w/ mobility  Pt continues to be functioning below baseline level, and remains limited 2* factors listed above  PT will continue to see pt while here in order to address the deficits listed above and provide interventions consistent w/ POC in effort to achieve STGs      Jayme Farnsworth PT

## 2020-02-01 NOTE — QUICK NOTE
Nursing informed me that patient had a portable chest done earlier today for positive sepsis screen which showed worsening bibasilar airspace opacities likely pneumonia  On admission patient had had some mild leukocytosis which was initially attributed to his hip fracture but light of chest x-ray results will trend procalcitonin begin patient on Zithromax and Rocephin  Patient has a history of chronic hypoxic respiratory failure secondary to COPD, additionally will obtain sputum culture Gram stain, blood cultures x2 and urine for strep pneumo and Legionella

## 2020-02-01 NOTE — PROGRESS NOTES
PHYSICAL MEDICINE AND REHABILITATION   PREADMISSION ASSESSMENT     Projected [facility] Diagnoses:  Impairment of mobility, safety and Activities of Daily Living (ADLs) due to Orthopedic Disorders:  08 11  Unilateral Hip Fracture  Etiologic Diagnosis: Non-Displaced R Subcapital Hip Fx  Date of Onset: 1/30/2020 Date of surgery: 1/31/2020 R hip ORIF/Dr Payal Escalona    PATIENT INFORMATION  Name: Jeremias Craft Phone #: [phone] (home)   [place] [address]  [place] [address]  YOB: 1935 Age: 80 y o  SS# [id number]  Marital Status: Single  Ethnicity: Not /Turkmen/  Employment Status: retired  Extended Emergency Contact Information  Primary Emergency Contact: 1995 [address] Phone: [phone]  Mobile Phone: [phone]  Relation: Spouse  Advance Directive: Level 1 Full Code (no ACP docs)    INSURANCE/COVERAGE:     Primary Payor: MEDICARE / Plan: MEDICARE A AND B / Product Type: Medicare A & B Fee for Service /   Secondary Payer:AARP   Payer Contact:  Payer Contact:   Contact Phone:  Contact Phone:     Authorization #:   Coverage Dates:  LCD:   Medicare  [id number]  Medicare Days: 60/30/60  Medical Record #: [id number]    REFERRAL SOURCE:   Referring provider: Elfego Ramachandran MD  Referring facility[facility]  Room: /-02  PCP: Chetna York MD PCP phone number: [phone]    MEDICAL INFORMATION  HPI: Christin George is an 81 yo who presented to the ER with his wife following a fall at home  The spouse provided much of the information in the ER since the pt is [facility]  As per his wife, she was in another room and heard a commotion in the other room  She found pt had fallen and landed between the bed and the wall striking his hip against the wall  She was unable to get him up off the bed and he was unable to bear weight on his RLE secondary to pain    Pt wife reported altered mental status which has been waxing/waning over a period of time but on day admission, appeared at baseline  R hip imaging revealed a non-displaced R subcapital hip fx which required surgical repair  Dr Albert Vega completed R hip ORIF on 1/31/2020 and pt now has RLE TTWB post-op orders  Therapy is recommending STR upon discharge from acute care setting  Pt will require close medical management of active co-morbidities by ARU MD as some of the pt's co-morbidities may impede the healing process if not well controlled  Pt is now medically stable for transfer to ARU  Past Medical History:   Past Surgical History:    Allergies:     Past Medical History:   Diagnosis Date    [facility]     Diabetes mellitus [facility]     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HCC)     Renal disorder     Stroke ([facility] )     may 2015    Past Surgical History:   Procedure Laterality Date    ANGIOPLASTY  03/01/2016    Right femoral vein    BACK SURGERY  2015    ORIF HIP FRACTURE Right 1/31/2020    Procedure: OPEN REDUCTION W/ INTERNAL FIXATION (ORIF) HIP WITH CANNUALATED SCREWS;  Surgeon: Albert Vega DO;  Location: [address] [place] [address];  Service: Orthopedics    SUPRAPUBIC TUBE PLACEMENT       No Known Allergies      Comorbidities:R subcapital femur fx s/p ORIF, RLE TTWB, Chronic Respiratory Failure with hypoxia, tobacco abuse, IDDM, COPD with acute exacerbation    Surgeries/Procedures in the last 100 days: 1/31/20 R hip ORIF, Chest xray, R hip xray, CT cervical spine, CT head, R tib/fib xray, R knee xray    CURRENT VITAL SIGNS:   Temp:  [96 1 °F (35 6 °C)-99 4 °F (37 4 °C)] 96 1 °F (35 6 °C)  HR:  [80-91] 80  Resp:  [18-22] 20  BP: (100-112)/(52-60) 100/56   Intake/Output Summary (Last 24 hours) at 2/1/2020 1036  Last data filed at 2/1/2020 0150  Gross per 24 hour   Intake    Output 650 ml   Net -650 ml        LABORATORY RESULTS:      Lab Results   Component Value Date    HGB 11 8 (L) 02/01/2020    HCT 36 7 (L) 02/01/2020    WBC 13 40 (H) 02/01/2020     Lab Results   Component Value Date    BUN 24 02/01/2020    K 3 8 02/01/2020     02/01/2020    CREATININE 0 78 02/01/2020     Lab Results   Component Value Date    PROTIME 13 8 (H) 01/31/2020    INR 1 19 01/31/2020        DIAGNOSTIC STUDIES:  Xr Chest Portable    Result Date: 1/31/2020  Impression: Worsening bibasilar airspace opacities, likely pneumonia  Workstation performed: [id number]     Xr Hip/pelv 2-3 Vws Right If Performed    Result Date: 1/31/2020  Impression: Fluoroscopic guidance provided for orthopedic procedure  Please refer to the separate procedure notes for additional details  Workstation performed: [id number]     Xr Hip/pelv 2-3 Vws Right If Performed    Result Date: 1/30/2020  Impression: Cortical discontinuity and angulation of the right femoral head suggests a femoral neck fracture  Abdominal aortic aneurysm is noted  Vascular atherosclerosis is noted  Workstation performed: [id number]     Xr Femur 2 Views Right    Result Date: 1/30/2020  Impression: Cortical fragmentation laterally could represent fracture although not well delineated  CT would be useful to assess for the extent of any fracture line  The study was marked in EPIC for significant notification  Workstation performed: [id number]     Xr Knee 4+ Vw Right Injury    Result Date: 1/30/2020  Impression: Cortical fragmentation laterally could represent fracture although not well delineated  CT would be useful to assess for the extent of any fracture line  The study was marked in EPIC for significant notification  Workstation performed: [id number]     Xr Tibia Fibula 2 Views Right    Result Date: 1/30/2020  Impression: Cortical fragmentation laterally could represent fracture although not well delineated  CT would be useful to assess for the extent of any fracture line  The study was marked in EPIC for significant notification   Workstation performed: [id number]     Ct Head Without Contrast    Result Date: 1/30/2020  Impression: No calvarial fracture or acute intracranial abnormality is seen  Other findings as above  CT CERVICAL SPINE - WITHOUT CONTRAST INDICATION:   fall  COMPARISON:  None TECHNIQUE:  CT examination of the cervical spine was performed without intravenous contrast   Contiguous axial images were obtained  Sagittal and coronal reconstructions were performed  Radiation dose length product (DLP) for this visit:  892 6 mGy-cm  (accession [id number]), 179 1 mGy-cm  (accession [id number])  This examination, like all CT scans performed in the [facility], was performed utilizing techniques to minimize  radiation dose exposure, including the use of iterative reconstruction and automated exposure control  IMAGE QUALITY:  Diagnostic  FINDINGS: ALIGNMENT:  Approximately 2 mm of anterolisthesis of C3 on C4, probably related to facet joint arthropathy at this level  Spinal alignment otherwise grossly maintained  VERTEBRAL BODIES:  No fracture  DEGENERATIVE CHANGES:  Intervertebral disc space narrowing at C3/C4, C4/C5 C5/C6 with anterior, marginal, and uncovertebral osteophytosis at these levels  Multilevel facet joint arthropathy which appears to be most prominent at C3/C4  PREVERTEBRAL AND PARASPINAL SOFT TISSUES:  Grossly unremarkable THORACIC INLET:  Mild parenchymal and paraseptal emphysematous changes IMPRESSION: Degenerative changes as described but no evidence of acute cervical spine injury  Other findings as above  Workstation performed: [id number]     Ct Spine Cervical Without Contrast    Result Date: 1/30/2020  Impression: No calvarial fracture or acute intracranial abnormality is seen  Other findings as above  CT CERVICAL SPINE - WITHOUT CONTRAST INDICATION:   fall  COMPARISON:  None TECHNIQUE:  CT examination of the cervical spine was performed without intravenous contrast   Contiguous axial images were obtained    Sagittal and coronal reconstructions were performed  Radiation dose length product (DLP) for this visit:  892 6 mGy-cm  (accession [id number]), 179 1 mGy-cm  (accession [id number])  This examination, like all CT scans performed in the [facility], was performed utilizing techniques to minimize  radiation dose exposure, including the use of iterative reconstruction and automated exposure control  IMAGE QUALITY:  Diagnostic  FINDINGS: ALIGNMENT:  Approximately 2 mm of anterolisthesis of C3 on C4, probably related to facet joint arthropathy at this level  Spinal alignment otherwise grossly maintained  VERTEBRAL BODIES:  No fracture  DEGENERATIVE CHANGES:  Intervertebral disc space narrowing at C3/C4, C4/C5 C5/C6 with anterior, marginal, and uncovertebral osteophytosis at these levels  Multilevel facet joint arthropathy which appears to be most prominent at C3/C4  PREVERTEBRAL AND PARASPINAL SOFT TISSUES:  Grossly unremarkable THORACIC INLET:  Mild parenchymal and paraseptal emphysematous changes IMPRESSION: Degenerative changes as described but no evidence of acute cervical spine injury  Other findings as above  Workstation performed: [id number]       PRECAUTIONS/SPECIAL NEEDS:    Regular diet, RLE TTWB restriction, fall & safety precautions, incontinent of bowel x 1, A&O x 2, poor judgement, short term memory loss, long term memory loss, supra-pubic catheter (pt reported emptying catheter contents himself PTA), [place]         MEDICATIONS:     Current Facility-Administered Medications:     acetaminophen (TYLENOL) tablet 650 mg, 650 mg, Oral, Q6H PRN, Helen Finer, PA-C    ALPRAZolam Elizabeth Pounds) tablet 0 5 mg, 0 5 mg, Oral, HS PRN, Greenbrier Finer, PA-C    aluminum-magnesium hydroxide-simethicone (MYLANTA) 200-200-20 mg/5 mL oral suspension 30 mL, 30 mL, Oral, Q6H PRN, Greenbrier Finer, PA-C    atorvastatin (LIPITOR) tablet 20 mg, 20 mg, Oral, Daily, Helen Finer, PA-C, 20 mg at 02/01/20 0817    azithromycin (ZITHROMAX) 500 mg in sodium chloride 0 9 % 250 mL IVPB, 500 mg, Intravenous, Q24H, Yimi Carpenter PA-C, Last Rate: 250 mL/hr at 01/31/20 2255, 500 mg at 01/31/20 2255    bacitracin 50,000 Units in sodium chloride 0 9 % 1,000 mL irrigation bag, , Irrigation, Once, Celeste Luciano,     carisoprodol (SOMA) tablet 350 mg, 350 mg, Oral, TID PRN, Damian Dodd PA-C    cefTRIAXone (ROCEPHIN) IVPB (premix) 1,000 mg, 1,000 mg, Intravenous, Q24H, Yimi Carpenter PA-C, Last Rate: 100 mL/hr at 01/31/20 2157, 1,000 mg at 01/31/20 2157    docusate sodium (COLACE) capsule 100 mg, 100 mg, Oral, BID, Damian Dodd PA-C, 100 mg at 02/01/20 9111    erythromycin (ILOTYCIN) 0 5 % ophthalmic ointment 0 5 inch, 0 5 inch, Both Eyes, HS, Damian Dodd PA-C, 0 5 inch at 01/31/20 2216    escitalopram (LEXAPRO) tablet 10 mg, 10 mg, Oral, Daily, Damian Dodd PA-C, 10 mg at 02/01/20 4467    ferrous sulfate tablet 325 mg, 325 mg, Oral, BID With Meals, Damian Dodd PA-C, 325 mg at 02/01/20 2769    finasteride (PROSCAR) tablet 5 mg, 5 mg, Oral, Daily, Damian Dodd PA-C, 5 mg at 02/01/20 0817    fluticasone-vilanterol (BREO ELLIPTA) 200-25 MCG/INH inhaler 1 puff, 1 puff, Inhalation, Daily, Damian Dodd PA-C, 1 puff at 02/01/20 0818    fondaparinux (ARIXTRA) subcutaneous injection 2 5 mg, 2 5 mg, Subcutaneous, Daily, Damian Dodd PA-C, 2 5 mg at 02/01/20 0818    furosemide (LASIX) tablet 40 mg, 40 mg, Oral, Daily, Damian Dodd PA-C, 40 mg at 02/01/20 0818    gabapentin (NEURONTIN) capsule 100 mg, 100 mg, Oral, TID, Damian Dodd, MIGUEL ANGEL, 100 mg at 02/01/20 0818    insulin lispro (HumaLOG) 100 units/mL subcutaneous injection 1-5 Units, 1-5 Units, Subcutaneous, TID AC, 1 Units at 02/01/20 0819 **AND** Fingerstick Glucose (POCT), , , TID AC, Yimi Carpenter PA-C    insulin lispro (HumaLOG) 100 units/mL subcutaneous injection 1-5 Units, 1-5 Units, Subcutaneous, HS, Yimi Carpenter PA-C, 1 Units at 01/31/20 0709    ipratropium-albuterol (DUO-NEB) 0 5-2 5 mg/3 mL inhalation solution 3 mL, 3 mL, Nebulization, Q6H PRN, Francheska Doss PA-C, 3 mL at 02/01/20 0133    morphine (PF) 4 mg/mL injection 3 mg, 3 mg, Intravenous, Q4H PRN, Francheska Doss PA-C, 3 mg at 01/31/20 2003    multivitamin-minerals (CENTRUM) tablet 1 tablet, 1 tablet, Oral, Daily, Francheska Doss PA-C, 1 tablet at 02/01/20 0819    nicotine (NICODERM CQ) 21 mg/24 hr TD 24 hr patch 1 patch, 1 patch, Transdermal, Daily, MISHA Parrish-C, 1 patch at 02/01/20 0820    ondansetron (ZOFRAN) injection 4 mg, 4 mg, Intravenous, Q6H PRN, Francheska Doss PA-C    oxybutynin [facility]) tablet 15 mg, 15 mg, Oral, Daily, MISHA Parrish-C, 15 mg at 02/01/20 0820    oxyCODONE-acetaminophen (PERCOCET) 5-325 mg per tablet 1 tablet, 1 tablet, Oral, Q4H PRN, MISHA Parrish-C, 1 tablet at 02/01/20 0754    Facility-Administered Medications Ordered in Other Encounters:     barium sulfate tablet 1 tablet, 1 tablet, Oral, Once in imaging, Sang Lal MD    SKIN INTEGRITY:     Excoriated groin folds, R lateral thigh,  Posterior scrotum, R hip surgical site       PRIOR LEVEL OF FUNCTION:  He lives with his spouse in a 1 story home  Pt was Independent with ambulation utilizing a RW PTA  Pt reported that spouse assists him with donning/doffing sock & provides some assistance with LB dressing  Pt able to complete UB dressing  Wife reported that she assists pt in/out of shower, he utilizes a shower chair & he is able to bathe himself  Wife also assists pt to dry himself once out of the shower  Pt has a supra-pubic catheter for several years and pt empties this himself  Otherwise, any other catheter care is completed by wife  Pt wore 4L of supplemental oxygen at night and PRN during the day PTA  Wife provides transportation,  prepares meals & completes laundry  FALLS IN THE LAST 6 MONTHS: 3    HOME ENVIRONMENT:  The living area: first floor setup    There are 10-12 steps to enter the home with B handrails that pt is able to reach on both sides  The patient will have 24/7 supervision available upon discharge  PREVIOUS DME:  Equipment in home (previous DME): shower chair & RW     FUNCTIONAL STATUS:  Physical Therapy Occupational Therapy Speech Therapy          CURRENT GAP IN FUNCTION  Prior to Admission:      Pt was residing with his spouse PTA and utilized a RW for transfers and gait  Pt required some assistance with ADL's as described above  There are 10-12 steps to enter his home from the outside  Estimated length of stay: 10 to 14 days    Anticipated Post-Discharge Disposition/Treatment  Disposition: Return to previous home/apartment  Outpatient Services: Once closer to discharge date from ARU, interdisciplinary team will make recommendation of required services  BARRIERS TO DISCHARGE    Home accessibility, decreased STM/LTM, RLE weight bearing restriction, decreased safety, poor judgement, weakness, pain, fatigue, decreased blood oxygenation levels at times, caregiver accessibility, equipment needs & resource availability    INTERVENTIONS FOR DISCHARGE    Adaptive equipment, Patient/Family/Caregiver Education, [employer], Support Group, [employer] DME needs, Therapy exercises, Center of balance support  and Energy conservation education Medication changes per MD recommendations    REQUIRED THERAPY:    Patient will require PT and OT 90 minutes each per day, five days per week to achieve rehab goals       REQUIRED FUNCTIONAL AND MEDICAL MANAGEMENT FOR INPATIENT REHABILITATION:    Skin integrity monitoring including new R hip surgical site, overall pain management control, DVT prophylaxis, DM management with monitoring as ordered, blood oxygenation level monitoring/management, pt/family education and training, bowel/bladder program to decrease likelihood of incontinence, education on adherence to RLE TTWB restriction    RECOMMENDED LEVEL OF CARE:   Chaim Bunch is an 79 yo who presented to the ER with his wife following a fall at home  R hip imaging revealed a non-displaced R subcapital hip fx which required surgical repair  Dr Griffin Chamberlain completed R hip ORIF on 1/31/2020 and pt now has RLE TTWB post-op orders  The pt will benefit from 24/7 nursing care while in ARU to monitor his skin integrity as he has some areas of breakdown  They will also provide dressing changes as ordered to his R hip surgical site & monitor the area for any signs of infection or wound dehiscence  The nursing team will also record the pt's caloric intake levels and monitor the pt for any signs/symptoms of aspiration with the current diet as ordered  The pt's supra-pubic catheter will also be managed by the nursing team to ensure that there is not any indications of infection or blockage with this device  Medications will be administered as ordered and monitoring for any allergic reactions will be done on a continual basis  Control of the pt's pain will be done with medications as ordered and nursing will notify the MD if this is effective  Vital signs will be taken routinely with particular attention to the pt's blood oxygenation level so that these levels are within normal limits for the activity level required while in ARU  The pt's blood glucose levels will also be routinely monitored as ordered and insulin coverage provided as deemed necessary  Should the pt have a change in medical status, the nursing team will promptly notify the ARU MD and treat per his orders  If the pt's medical condition requires further workup, the ARU MD will be able to expedite the ordering of any additional lab tests, imaging studies or consults  The ARU MD will also discuss with the therapists the pt's tolerance and ability to participate in the intense 3 hours of therapy offered while in ARU  The therapists will assist the pt in increasing him ROM, strength, endurance and ADL's status    They will provide any needed family and safety education as deemed necessary  During all functional tasks, the therapists will educate the pt on the importance and techniques for maintaining his RLE TTWB status  The nursing team will also follow through with the therapists' recommendations so that the pt has continued reinforcement of techniques during non-therapy hours  The ARU interdisciplinary team will meet weekly to discuss the pt's overall medical status and progress toward stated goals  They will also identify any new goals which may need to be addressed per the interdisciplinary team and add this to his POC  In conclusion, the pt will benefit from the overall ARU interdisciplinary team approach so that he can have close medical oversight of his co-morbidities while making functional gains so that he is able to make a safe & timely return to his previous living environment with the assistance of his family

## 2020-02-01 NOTE — RESPIRATORY THERAPY NOTE
Pt  Is much more alert and awake  He remains with 4lpm via nasal cannula intact  Sa02=93%  Lauren Joy PA-C aware and we are to monitor until ABG results are avail

## 2020-02-01 NOTE — RESPIRATORY THERAPY NOTE
RT Protocol Note  Jose L Bailey 80 y o  male MRN: [id number]  Unit/Bed#: -02 Encounter: [id number]    Assessment    Principal Problem:    Closed fracture of neck of right femur ([facility] )  Active Problems:    Chronic obstructive pulmonary disease with acute exacerbation (HCC)    Type 2 diabetes mellitus with complication, with long-term current use of insulin (HCC)    Tobacco abuse    Benign prostatic hyperplasia    Chronic low back pain    Chronic respiratory failure with hypoxia (HCC)      Home Pulmonary Medications:    Home Devices/Therapy: Home O2, Other (Comment)(Albuterol MDI, Advair 250/50, 02 4lpm at home, denies CPAP/B)    Past Medical History:   Diagnosis Date    COPD (chronic obstructive pulmonary disease) ([place] [address] )     Diabetes mellitus ([place] [address] )     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[place] [address] )     Renal disorder     Stroke ([place] [address] )     may 2015     Social History     Socioeconomic History    Marital status: Single     Spouse name: Joie Tello Number of children: 3    Years of education: 15    Highest education level: 12th grade   Occupational History    None   Social Needs    Financial resource strain: Somewhat hard    Food insecurity:     Worry: Never true     Inability: Never true    Transportation needs:     Medical: No     Non-medical: No   Tobacco Use    Smoking status: Current Every Day Smoker     Packs/day: 1 00     Years: 70 00     Pack years: 70 00    Smokeless tobacco: Never Used    Tobacco comment: Wife reports it's a losing matos   Substance and Sexual Activity    Alcohol use: Never     Frequency: Never    Drug use: No    Sexual activity: Yes     Partners: Female   Lifestyle    Physical activity:     Days per week: 0 days     Minutes per session: 0 min    Stress: Only a little   Relationships    Social connections:     Talks on phone:  Three times a week     Gets together: Once a week     Attends Orthodox service: Never     Active member of club or organization: No     Attends meetings of clubs or organizations: Never     Relationship status:     Intimate partner violence:     Fear of current or ex partner: No     Emotionally abused: No     Physically abused: No     Forced sexual activity: No   Other Topics Concern    None   Social History Narrative    None       Subjective   Pt  Has a Duoneb already ordered  Pt  Uses an Albuterol MDI at home and Advair 250/50 BID and 4lpm via nasal cannula at home  Will perform I S  With the pt  And give PRN Duoneb at this time  Objective    Physical Exam:   Assessment Type: (P) Pre-treatment  General Appearance: (P) Sleeping  Respiratory Pattern: (P) Dyspnea with exertion  Chest Assessment: (P) Chest expansion symmetrical  Bilateral Breath Sounds: (P) Diminished, Expiratory wheezes(Faint expiratory)  Cough: (P) Non-productive, Moist    Vitals:  Blood pressure 112/52, pulse 86, temperature 99 4 °F (37 4 °C), temperature source Temporal, resp  rate 21, height 5' 10" (1 778 m), weight 70 5 kg (155 lb 6 8 oz), SpO2 92 %  Imaging and other studies: I have personally reviewed pertinent reports  Plan    Respiratory Plan: No distress/Pulmonary history  Airway Clearance Plan: Incentive Spirometer     Resp Comments: Patient stated his breathing is fine and declined  a PRN treatment at this time   Will continue to monitor the patient

## 2020-02-01 NOTE — ASSESSMENT & PLAN NOTE
Lab Results   Component Value Date    HGBA1C 7 3 (H) 12/23/2019       Recent Labs     01/31/20  1630 01/31/20 2027 02/01/20  0620 02/01/20  1132   POCGLU 162* 204* 207* 236*       Blood Sugar Average: Last 72 hrs:  (P) 135 [id number]   · Continue with insulin sliding scale  · If blood glucose continues to be elevated will add long-acting insulin

## 2020-02-02 LAB
ANION GAP SERPL CALCULATED.3IONS-SCNC: 7 MMOL/L (ref 4–13)
BUN SERPL-MCNC: 19 MG/DL (ref 7–25)
CALCIUM SERPL-MCNC: 8.7 MG/DL (ref 8.6–10.5)
CHLORIDE SERPL-SCNC: 98 MMOL/L (ref 98–107)
CO2 SERPL-SCNC: 34 MMOL/L (ref 21–31)
CREAT SERPL-MCNC: 0.72 MG/DL (ref 0.7–1.3)
ERYTHROCYTE [DISTWIDTH] IN BLOOD BY AUTOMATED COUNT: 14.8 % (ref 11.5–14.5)
GFR SERPL CREATININE-BSD FRML MDRD: 86 ML/MIN/1.73SQ M
GLUCOSE SERPL-MCNC: 226 MG/DL (ref 65–140)
GLUCOSE SERPL-MCNC: 254 MG/DL (ref 65–99)
GLUCOSE SERPL-MCNC: 300 MG/DL (ref 65–140)
GLUCOSE SERPL-MCNC: 308 MG/DL (ref 65–140)
GLUCOSE SERPL-MCNC: 323 MG/DL (ref 65–140)
HCT VFR BLD AUTO: 38.2 % (ref 42–47)
HGB BLD-MCNC: 12.1 G/DL (ref 14–18)
MCH RBC QN AUTO: 30.6 PG (ref 26–34)
MCHC RBC AUTO-ENTMCNC: 31.8 G/DL (ref 31–37)
MCV RBC AUTO: 96 FL (ref 81–99)
PLATELET # BLD AUTO: 105 THOUSANDS/UL (ref 149–390)
PMV BLD AUTO: 10.2 FL (ref 8.6–11.7)
POTASSIUM SERPL-SCNC: 3.9 MMOL/L (ref 3.5–5.5)
PROCALCITONIN SERPL-MCNC: <0.05 NG/ML
RBC # BLD AUTO: 3.96 MILLION/UL (ref 4.3–5.9)
SODIUM SERPL-SCNC: 139 MMOL/L (ref 134–143)
WBC # BLD AUTO: 11.7 THOUSAND/UL (ref 4.8–10.8)

## 2020-02-02 PROCEDURE — 97530 THERAPEUTIC ACTIVITIES: CPT

## 2020-02-02 PROCEDURE — 94640 AIRWAY INHALATION TREATMENT: CPT

## 2020-02-02 PROCEDURE — 94760 N-INVAS EAR/PLS OXIMETRY 1: CPT

## 2020-02-02 PROCEDURE — 84145 PROCALCITONIN (PCT): CPT | Performed by: NURSE PRACTITIONER

## 2020-02-02 PROCEDURE — 99232 SBSQ HOSP IP/OBS MODERATE 35: CPT | Performed by: NURSE PRACTITIONER

## 2020-02-02 PROCEDURE — 80048 BASIC METABOLIC PNL TOTAL CA: CPT | Performed by: NURSE PRACTITIONER

## 2020-02-02 PROCEDURE — 85027 COMPLETE CBC AUTOMATED: CPT | Performed by: NURSE PRACTITIONER

## 2020-02-02 PROCEDURE — 82948 REAGENT STRIP/BLOOD GLUCOSE: CPT

## 2020-02-02 RX ORDER — FUROSEMIDE 10 MG/ML
20 INJECTION INTRAMUSCULAR; INTRAVENOUS ONCE
Status: COMPLETED | OUTPATIENT
Start: 2020-02-02 | End: 2020-02-02

## 2020-02-02 RX ORDER — INSULIN GLARGINE 100 [IU]/ML
8 INJECTION, SOLUTION SUBCUTANEOUS
Status: DISCONTINUED | OUTPATIENT
Start: 2020-02-02 | End: 2020-02-03

## 2020-02-02 RX ADMIN — INSULIN LISPRO 3 UNITS: 100 INJECTION, SOLUTION INTRAVENOUS; SUBCUTANEOUS at 22:04

## 2020-02-02 RX ADMIN — DOCUSATE SODIUM 100 MG: 100 CAPSULE, LIQUID FILLED ORAL at 08:02

## 2020-02-02 RX ADMIN — FUROSEMIDE 40 MG: 40 TABLET ORAL at 08:03

## 2020-02-02 RX ADMIN — INSULIN GLARGINE 8 UNITS: 100 INJECTION, SOLUTION SUBCUTANEOUS at 22:05

## 2020-02-02 RX ADMIN — OXYCODONE HYDROCHLORIDE AND ACETAMINOPHEN 1 TABLET: 5; 325 TABLET ORAL at 11:36

## 2020-02-02 RX ADMIN — CEFTRIAXONE 1000 MG: 1 INJECTION, SOLUTION INTRAVENOUS at 22:00

## 2020-02-02 RX ADMIN — FLUTICASONE FUROATE AND VILANTEROL TRIFENATATE 1 PUFF: 200; 25 POWDER RESPIRATORY (INHALATION) at 07:57

## 2020-02-02 RX ADMIN — ESCITALOPRAM OXALATE 10 MG: 10 TABLET ORAL at 08:02

## 2020-02-02 RX ADMIN — FINASTERIDE 5 MG: 5 TABLET, FILM COATED ORAL at 08:02

## 2020-02-02 RX ADMIN — INSULIN LISPRO 2 UNITS: 100 INJECTION, SOLUTION INTRAVENOUS; SUBCUTANEOUS at 07:57

## 2020-02-02 RX ADMIN — GABAPENTIN 100 MG: 100 CAPSULE ORAL at 08:03

## 2020-02-02 RX ADMIN — FONDAPARINUX SODIUM 2.5 MG: 2.5 INJECTION, SOLUTION SUBCUTANEOUS at 08:03

## 2020-02-02 RX ADMIN — OXYBUTYNIN CHLORIDE 15 MG: 5 TABLET ORAL at 08:03

## 2020-02-02 RX ADMIN — FUROSEMIDE 20 MG: 10 INJECTION, SOLUTION INTRAVENOUS at 13:35

## 2020-02-02 RX ADMIN — PREDNISONE 40 MG: 20 TABLET ORAL at 08:03

## 2020-02-02 RX ADMIN — ERYTHROMYCIN 0.5 INCH: 5 OINTMENT OPHTHALMIC at 22:04

## 2020-02-02 RX ADMIN — LEVALBUTEROL HYDROCHLORIDE 1.25 MG: 1.25 SOLUTION, CONCENTRATE RESPIRATORY (INHALATION) at 07:31

## 2020-02-02 RX ADMIN — ALPRAZOLAM 0.5 MG: 0.5 TABLET ORAL at 01:43

## 2020-02-02 RX ADMIN — OXYCODONE HYDROCHLORIDE AND ACETAMINOPHEN 1 TABLET: 5; 325 TABLET ORAL at 15:40

## 2020-02-02 RX ADMIN — BUDESONIDE 0.5 MG: 0.5 INHALANT RESPIRATORY (INHALATION) at 07:31

## 2020-02-02 RX ADMIN — GABAPENTIN 100 MG: 100 CAPSULE ORAL at 15:40

## 2020-02-02 RX ADMIN — INSULIN LISPRO 3 UNITS: 100 INJECTION, SOLUTION INTRAVENOUS; SUBCUTANEOUS at 16:55

## 2020-02-02 RX ADMIN — OXYCODONE HYDROCHLORIDE AND ACETAMINOPHEN 1 TABLET: 5; 325 TABLET ORAL at 07:58

## 2020-02-02 RX ADMIN — FERROUS SULFATE TAB 325 MG (65 MG ELEMENTAL FE) 325 MG: 325 (65 FE) TAB at 07:57

## 2020-02-02 RX ADMIN — FERROUS SULFATE TAB 325 MG (65 MG ELEMENTAL FE) 325 MG: 325 (65 FE) TAB at 15:40

## 2020-02-02 RX ADMIN — ISODIUM CHLORIDE 3 ML: 0.03 SOLUTION RESPIRATORY (INHALATION) at 14:59

## 2020-02-02 RX ADMIN — ATORVASTATIN CALCIUM 20 MG: 20 TABLET, FILM COATED ORAL at 08:02

## 2020-02-02 RX ADMIN — NICOTINE 1 PATCH: 21 PATCH, EXTENDED RELEASE TRANSDERMAL at 08:00

## 2020-02-02 RX ADMIN — ISODIUM CHLORIDE 3 ML: 0.03 SOLUTION RESPIRATORY (INHALATION) at 07:31

## 2020-02-02 RX ADMIN — Medication 1 TABLET: at 08:03

## 2020-02-02 RX ADMIN — GABAPENTIN 100 MG: 100 CAPSULE ORAL at 22:00

## 2020-02-02 RX ADMIN — DOCUSATE SODIUM 100 MG: 100 CAPSULE, LIQUID FILLED ORAL at 18:31

## 2020-02-02 RX ADMIN — LEVALBUTEROL HYDROCHLORIDE 1.25 MG: 1.25 SOLUTION, CONCENTRATE RESPIRATORY (INHALATION) at 14:59

## 2020-02-02 RX ADMIN — INSULIN LISPRO 3 UNITS: 100 INJECTION, SOLUTION INTRAVENOUS; SUBCUTANEOUS at 11:36

## 2020-02-02 NOTE — PROGRESS NOTES
Progress Note - Jeremy Wilfred 1935, 80 y o  male MRN: [id number]    Unit/Bed#: -02 Encounter: [id number]    Primary Care Provider: Wesley Marsh MD   Date and time admitted to hospital: 1/29/2020 10:23 PM        * Closed fracture of neck of right femur with routine healing  Assessment & Plan  · Status post surgery with Orthopedics 1/31  · Tolerated procedure well  · PT/OT  · DVT prophylaxis  · Monitor H&H closely      Chronic obstructive pulmonary disease with acute exacerbation ([address] )  Assessment & Plan  · cxr suspects pneumonia  · Unclear etiology of acute exacerbation ?related to status post anesthesia with surgery with receiving IV fluid and possible infectious cause/pneumonia   · Continue with prednisone taper    · Nebs   · Respiratory protocol  · chronically on 4 L of oxygen at home  · Clinically is improving     Acute on chronic respiratory failure with hypoxia ([address] )  Assessment & Plan  · Chronically on 4 L, the patient was require 6 L nasal cannula as his SP O2 dropped to mid 70s (2/1)  · Currently is improving   · 2/2- SpO2 dropped to low 80s% with PT/OT this AM; recover back to 88-89%   · Able to titrate down to 4L; continue oxygen supplement to keep SpO2 greater than 88%    · Incentive spirometry, early ambulation cough and deep breathing    Chronic bilateral low back pain without sciatica  Assessment & Plan  · Continue with soma   · Supportive care    Benign prostatic hyperplasia  Assessment & Plan  · Continue Ditropan and Proscar      Tobacco abuse  Assessment & Plan  · Nicotine patch          Type 2 diabetes mellitus with complication, with long-term current use of insulin [facility])  Assessment & Plan  Lab Results   Component Value Date    HGBA1C 7 3 (H) 12/23/2019       Recent Labs     02/01/20  1600 02/01/20 2023 02/02/20  0701 02/02/20  1109   POCGLU 286* 268* 226* 300*       Blood Sugar Average: Last 72 hrs:  (P) 171 2   · Continue with insulin sliding scale  · Will add lantus VTE Pharmacologic Prophylaxis: Pharmacologic: Fondaparinux (Arixtra)    Patient Centered Rounds: I have performed bedside rounds with nursing staff today  Discussions with Specialists or Other Care Team Provider: nursing, PT/OT, Rx  Education and Discussions with Family / Patient: patient and wife     Current Length of Stay: 3 day(s)    Current Patient Status: Inpatient   Certification Statement: The patient will continue to require additional inpatient hospital stay due to right hip fracture and acute on chronic respiratory failure hypoxia     Discharge Plan: pending decision from ARU for admission  If patient does not qualify for admission then will likely need rehab  Code Status: Level 1 - Full Code    Subjective:   Feeling tired  Denies any pain or dyspnea  Patient repeatedly asked me "what am I doing here"    Objective:     Vitals:   Temp (24hrs), Av °F (36 7 °C), Min:97 3 °F (36 3 °C), Max:98 7 °F (37 1 °C)    Temp:  [97 3 °F (36 3 °C)-98 7 °F (37 1 °C)] 97 3 °F (36 3 °C)  HR:  [88-94] 88  Resp:  [20-22] 20  BP: (112-116)/(59-69) 116/69  SpO2:  [88 %-94 %] 92 %  Body mass index is 23 31 kg/m²  Input and Output Summary (last 24 hours): Intake/Output Summary (Last 24 hours) at 2020 1233  Last data filed at 2020 1700  Gross per 24 hour   Intake 200 ml   Output 500 ml   Net -300 ml       Physical Exam:     Physical Exam   Constitutional: He is oriented to person, place, and time  He appears well-developed  No distress  Frail and chronically ill appearing      HENT:   Head: Normocephalic and atraumatic  Mouth/Throat: Oropharynx is clear and moist    Eyes: Pupils are equal, round, and reactive to light  Conjunctivae and EOM are normal    Neck: Normal range of motion  Neck supple  No thyromegaly present  Cardiovascular: Normal rate, regular rhythm, normal heart sounds and intact distal pulses  Pulmonary/Chest: Effort normal  No respiratory distress  He has no wheezes  Coarse; mild scattered wheezing      Abdominal: Soft  Bowel sounds are normal  He exhibits no distension  There is no tenderness  Musculoskeletal: Normal range of motion  He exhibits no edema or deformity  Neurological: He is alert and oriented to person, place, and time  He has normal reflexes  No cranial nerve deficit  With period of forgetfulness     Skin: Skin is warm and dry  No erythema  Psychiatric: He has a normal mood and affect  His behavior is normal  Thought content normal    Vitals reviewed  Additional Data:     Labs:    Results from last 7 days   Lab Units 02/02/20  0904 02/01/20  0124   WBC Thousand/uL 11 70* 13 40*   HEMOGLOBIN g/dL 12 1* 11 8*   HEMATOCRIT % 38 2* 36 7*   PLATELETS Thousands/uL 105* 88*   NEUTROS PCT %  --  86*   LYMPHS PCT %  --  3*   MONOS PCT %  --  10   EOS PCT %  --  1     Results from last 7 days   Lab Units 02/02/20  0904  01/30/20  0405   POTASSIUM mmol/L 3 9   < > 4 5   CHLORIDE mmol/L 98   < > 103   CO2 mmol/L 34*   < > 33*   BUN mg/dL 19   < > 23   CREATININE mg/dL 0 72   < > 0 87   CALCIUM mg/dL 8 7   < > 8 7   ALK PHOS U/L  --   --  74   ALT U/L  --   --  13   AST U/L  --   --  17    < > = values in this interval not displayed  Results from last 7 days   Lab Units 01/31/20  0500   INR  1 19     Results from last 7 days   Lab Units 02/02/20  1109 02/02/20  0701 02/01/20  2023 02/01/20  1600 02/01/20  1132 02/01/20  0620 01/31/20 2027 01/31/20  1630 01/31/20  1122 01/31/20  0624 01/31/20  0039 01/30/20  2041   POC GLUCOSE mg/dl 300* 226* 268* 286* 236* 207* 204* 162* 99 89 86 82           * I Have Reviewed All Lab Data Listed Above  * Additional Pertinent Lab Tests Reviewed:  [address] Admission  Reviewed    Imaging:  Imaging Reports Reviewed Today Include: n/a     Recent Cultures (last 7 days):     Results from last 7 days   Lab Units 01/31/20  2039 01/31/20 2026 01/31/20 2025 01/31/20 2024   BLOOD CULTURE   --   --  No Growth at 24 hrs  No Growth at 24 hrs     SPUTUM CULTURE   --  Culture too young- will reincubate  --   --    GRAM STAIN RESULT   --  No Epithelial cells seen  No Polys or Bacteria seen  --   --    LEGIONELLA URINARY ANTIGEN  Negative  --   --   --        Last 24 Hours Medication List:     Current Facility-Administered Medications:  acetaminophen 650 mg Oral Q6H PRN Darrel Small PA-C    albuterol 2 5 mg Nebulization Q4H PRN DEBBY Mae    ALPRAZolam 0 5 mg Oral HS PRN Darrel Small PA-C    aluminum-magnesium hydroxide-simethicone 30 mL Oral Q6H PRN Darrel Small PA-C    atorvastatin 20 mg Oral Daily Darrel Small PA-C    bacitracin 85473 units in sodium chloride 0 9% 1000 mL  Irrigation Once Joanna Rashmi,     budesonide 0 5 mg Nebulization Q12H DEBBY Mae    carisoprodol 350 mg Oral TID PRN Darrel Small PA-C    cefTRIAXone 1,000 mg Intravenous Q24H DEBBY Mae Last Rate: 1,000 mg (02/01/20 2001)   docusate sodium 100 mg Oral BID Darrel Small PA-C    erythromycin 0 5 inch Both Eyes HS Darrel Small PA-C    escitalopram 10 mg Oral Daily Darrel Small PA-C    ferrous sulfate 325 mg Oral BID With Meals Darrel Small PA-C    finasteride 5 mg Oral Daily Darrel Small PA-C    fluticasone-vilanterol 1 puff Inhalation Daily Darrel Small PA-C    fondaparinux 2 5 mg Subcutaneous Daily Darrel Small PA-C    furosemide 40 mg Oral Daily Darrel Small PA-C    gabapentin 100 mg Oral TID Darrel Small PA-C    insulin glargine 8 Units Subcutaneous HS DEBBY Mae    insulin lispro 1-5 Units Subcutaneous TID AC Alexa Gray PA-C    insulin lispro 1-5 Units Subcutaneous HS Alexa Gray PA-C    levalbuterol 1 25 mg Nebulization TID Jorge Peck MD    And        sodium chloride 3 mL Nebulization TID Jorge Peck MD    morphine injection 3 mg Intravenous Q4H PRN Darrel Small PA-C    multivitamin-minerals 1 tablet Oral Daily Darrel Small PA-C    nicotine 1 patch Transdermal Daily Nova Cluster, PA-C    ondansetron 4 mg Intravenous Q6H PRN Nova Cluster, PA-C    oxybutynin 15 mg Oral Daily Nova Cluster, PA-C    oxyCODONE-acetaminophen 1 tablet Oral Q4H PRN Nova Cluster, PA-ROSA    predniSONE 40 mg Oral Daily DEBBY Chacko         Today, Patient Was Seen By: DEBBY Chacko    ** Please Note: Dictation voice to text software may have been used in the creation of this document   **

## 2020-02-02 NOTE — ASSESSMENT & PLAN NOTE
· cxr suspects pneumonia  · Unclear etiology of acute exacerbation ?related to status post anesthesia with surgery with receiving IV fluid and possible infectious cause/pneumonia   · Continue with prednisone taper    · Nebs   · Respiratory protocol  · chronically on 4 L of oxygen at home  · Clinically is improving

## 2020-02-02 NOTE — PHYSICAL THERAPY NOTE
Physical Therapy Treatment Note       02/02/20 0931   Pain Assessment   Pain Assessment 0-10   Pain Rating: FLACC (Rest) - Face 0   Pain Rating: FLACC (Rest) - Legs 0   Pain Rating: FLACC (Rest) - Activity 0   Pain Rating: FLACC (Rest) - Cry 0   Pain Rating: FLACC (Rest) - Consolability 0   Score: FLACC (Rest) 0   Pain Rating: FLACC (Activity) - Face 1   Pain Rating: FLACC (Activity) - Legs 1   Pain Rating: FLACC (Activity) - Activity 0   Pain Rating: FLACC (Activity) - Cry 1   Pain Rating: FLACC (Activity) - Consolability 1   Score: FLACC (Activity) 4   Restrictions/Precautions   Weight Bearing Precautions Per Order Yes   RLE Weight Bearing Per Order TTWB  (pt c difficulty maintaining TTWB during SPT this date)   Other Precautions O2;Telemetry;Multiple lines; Fall Risk;Pain; Chair Alarm; Bed Alarm;Cognitive;WBS  (4 L O2 via NC)   General   Chart Reviewed Yes   Response to Previous Treatment Patient unable to report, no changes reported from family or staff   Family/Caregiver Present No   Cognition   Overall Cognitive Status Impaired   Arousal/Participation Cooperative;Responsive  (pt c eyes closed during 75% of session, very tired)   Attention Attends with cues to redirect   Orientation Level Oriented to person;Oriented to place   Memory Decreased recall of precautions   Following Commands Follows one step commands with increased time or repetition   Comments pt agreeable to PT session   Subjective   Subjective "I feel very tired today"   Bed Mobility   Rolling R 3  Moderate assistance   Additional items Assist x 2;HOB elevated; Bedrails; Increased time required;Verbal cues;LE management   Supine to Sit 3  Moderate assistance   Additional items Assist x 2;HOB elevated; Bedrails; Increased time required;Verbal cues;LE management   Additional Comments min/mod Ax1 required throughout duration of EOB dangling x 10 mins in order to prevent posterior LOB   Transfers   Sit to Stand 3  Moderate assistance   Additional items Assist x 2; Increased time required;Verbal cues   Stand to Sit 3  Moderate assistance   Additional items Assist x 2; Increased time required;Verbal cues;Armrests   Stand pivot 3  Moderate assistance   Additional items Assist x 2; Increased time required;Verbal cues  (pt c poor safety awareness, max VCs to maintaing TTWB)   Ambulation/Elevation   Gait pattern Improper Weight shift; Shuffling; Step to;Excessively slow   Gait Assistance 3  Moderate assist   Additional items Assist x 2;Verbal cues; Tactile cues   Assistive Device   (HHA)   Distance 1 ft from bed>recliner; pt with poor awareness of maintaining TTWB on R LE   Stair Management Assistance Not tested   Balance   Static Sitting Poor +   Dynamic Sitting Poor   Static Standing Poor   Dynamic Standing Poor   Ambulatory Poor   Endurance Deficit   Endurance Deficit Yes   Endurance Deficit Description SpO2 on 4 L O2 at rest prior to mobility = 88%, during EOB dangling attempt SpO2 = 90%, dipped to 88% temporarily  SpO2 following stand-pivot transfer to recliner = 82%, recovered to > 86%-88% < 1 min of seated rest and encouragament for PLB technique  Pt requesting to rest in chair at this time 2/2 fatigue   Activity Tolerance   Activity Tolerance Patient limited by fatigue;Patient limited by pain   Nurse Made Aware Yes, RN Nalini verbalized pt appropriate for PT session   Assessment   Prognosis Good   Problem List Decreased strength;Decreased endurance; Impaired balance;Decreased mobility; Decreased safety awareness;Pain;Orthopedic restrictions;Decreased skin integrity   Assessment Pt seen for PT treatment session this date with interventions consisting of gait training w/ emphasis on improving pt's ability to ambulate level surfaces x 1 ft with HHA/mod Ax2 provided by therapist with no AD and therapeutic activity consisting of training: bed mobility, supine<>sit transfers, sit<>stand transfers, static sitting tolerance at EOB for 10 minutes w/ min/mod Ax1 + B UE support, vc and tactile cues for static sitting posture faciliation, vc and tactile cues for static standing posture faciliation and stand pivot transfers towards L direction  Pt agreeable to PT treatment session upon arrival, pt found supine in bed w/ HOB elevated, in no apparent distress and increased fatigue/tiredness this date  In comparison to previous session, pt with improvements in tolerating OOB transfer including SPT towards L direction  Pt requires max VCs 2/2 poor safety awareness maintaining TTWB on R LE  Please reference above in flowsheet for SpO2 readings during and following mobility  BP post session = 111/52  Post session: pt returned back to recliner, chair alarm engaged, all needs in reach and RN notified of session findings/recommendations  Continue to recommend STR at time of d/c in order to maximize pt's functional independence and safety w/ mobility  Pt continues to be functioning below baseline level, and remains limited 2* factors listed above  PT will continue to see pt while here in order to address the deficits listed above and provide interventions consistent w/ POC in effort to achieve STGs  Barriers to Discharge Inaccessible home environment   Goals   Patient Goals "to sleep"   STG Expiration Date 02/11/20   Short Term Goal #1 goals remain appropriate   PT Treatment Day 2   Plan   Treatment/Interventions Functional transfer training;LE strengthening/ROM; Elevations; Therapeutic exercise; Endurance training;Patient/family training;Equipment eval/education; Bed mobility;Gait training;Spoke to nursing;Spoke to case management;OT;Family   Progress Slow progress, decreased activity tolerance   PT Frequency 5x/wk; Weekend  (+1x/wkend)   Recommendation   Recommendation Short-term skilled PT   Equipment Recommended Walker  (pt uses RW at baseline)   PT - OK to Discharge Yes  (when medically cleared, if to STR)       Carlin Goel PT    Time of PT treatment session: [id number]

## 2020-02-02 NOTE — QUICK NOTE
Discussed at length with patient and his wife regarding code status  Initially patient would like CPR WITHOUT intubation  After our discussion, patient is agreeable for DNR/DNI

## 2020-02-02 NOTE — ASSESSMENT & PLAN NOTE
· Chronically on 4 L, the patient was require 6 L nasal cannula as his SP O2 dropped to mid 70s (2/1)  · Currently is improving   · 2/2- SpO2 dropped to low 80s% with PT/OT this AM; recover back to 88-89%   · Able to titrate down to 4L; continue oxygen supplement to keep SpO2 greater than 88%    · Incentive spirometry, early ambulation cough and deep breathing

## 2020-02-02 NOTE — PROGRESS NOTES
Progress Note - Orthopedics   Tita Chairez 80 y o  male MRN: [id number]  Unit/Bed#: -02 Encounter: [id number]    Assessment:  Postop day #1, status post cannulated screw fixation right hip    Plan:  Out of bed  Toe-touch weight-bearing right lower extremity  PT/OT  Arixtra for DVT prophylaxis  Discharge planning to skilled nursing facility versus acute rehab    Weight bearing: Toe-touch weight-bearing    VTE Pharmacologic Prophylaxis: Fondaparinux (Arixtra)  VTE Mechanical Prophylaxis: sequential compression device    Subjective:  Less complaints of pain    Vitals: Blood pressure 116/69, pulse 88, temperature (!) 97 3 °F (36 3 °C), temperature source Tympanic, resp  rate 20, height 5' 10" (1 778 m), weight 73 7 kg (162 lb 7 7 oz), SpO2 92 %  ,Body mass index is 23 31 kg/m²  Intake/Output Summary (Last 24 hours) at 2/2/2020 1135  Last data filed at 2/1/2020 1700  Gross per 24 hour   Intake 200 ml   Output 500 ml   Net -300 ml       Invasive Devices     Peripheral Intravenous Line            Peripheral IV 01/31/20 Left Arm 2 days          Drain            Suprapubic Catheter Latex 22 Fr  -- days                Physical Exam:   Ortho Exam:     Right lower extremity is neurovascular intact  Toes are pink and mobile  Compartments are soft  Dressing is clean, dry, and intact  Negative Homans  Arc of motion is improved  Good dorsiflexion of the foot      Lab, Imaging and other studies:   CBC:   Lab Results   Component Value Date    WBC 11 70 (H) 02/02/2020    HGB 12 1 (L) 02/02/2020    HCT 38 2 (L) 02/02/2020    MCV 96 02/02/2020     (L) 02/02/2020    MCH 30 6 02/02/2020    MCHC 31 8 02/02/2020    RDW 14 8 (H) 02/02/2020    MPV 10 2 02/02/2020     CMP:   Lab Results   Component Value Date    SODIUM 139 02/02/2020    CL 98 02/02/2020    CO2 34 (H) 02/02/2020    BUN 19 02/02/2020    CREATININE 0 72 02/02/2020    CALCIUM 8 7 02/02/2020    EGFR 86 02/02/2020     PT/INR: No results found for: PT, INR

## 2020-02-02 NOTE — PROGRESS NOTES
Status changed to DNR/DNI  Wife here and included in decision with NP  Very Lime  Continues O2 @ 4L nasal cannula  OOB to chair for breakfast and lunch  Blood sugars monitored with sliding scale  Suprapubic cath patent  Dr Bridgette Ceron in   2X2 dressing to right hip removed by MD Rodney Valdez for right hip discomfort  Productive cough with yellow sputum  Duonebs by resp tech  Allevyns to sacrum and heels for protection  T+P q2hrs  IS q1hr with RN assist= 250  Continues Colace but no BM today  Verbalizes needs

## 2020-02-02 NOTE — PLAN OF CARE
Problem: PHYSICAL THERAPY ADULT  Goal: Performs mobility at highest level of function for planned discharge setting  See evaluation for individualized goals  Description  Treatment/Interventions: Functional transfer training, LE strengthening/ROM, Elevations, Therapeutic exercise, Endurance training, Patient/family training, Equipment eval/education, Bed mobility, Gait training, Spoke to nursing, Spoke to case management, OT, Family  Equipment Recommended: Walker(pt uses RW at baseline)       See flowsheet documentation for full assessment, interventions and recommendations  Outcome: Progressing  Note:   Prognosis: Good  Problem List: Decreased strength, Decreased endurance, Impaired balance, Decreased mobility, Decreased safety awareness, Pain, Orthopedic restrictions, Decreased skin integrity  Assessment: Pt seen for PT treatment session this date with interventions consisting of gait training w/ emphasis on improving pt's ability to ambulate level surfaces x 1 ft with HHA/mod Ax2 provided by therapist with no AD and therapeutic activity consisting of training: bed mobility, supine<>sit transfers, sit<>stand transfers, static sitting tolerance at EOB for 10 minutes w/ min/mod Ax1 + B UE support, vc and tactile cues for static sitting posture faciliation, vc and tactile cues for static standing posture faciliation and stand pivot transfers towards L direction  Pt agreeable to PT treatment session upon arrival, pt found supine in bed w/ HOB elevated, in no apparent distress and increased fatigue/tiredness this date  In comparison to previous session, pt with improvements in tolerating OOB transfer including SPT towards L direction  Pt requires max VCs 2/2 poor safety awareness maintaining TTWB on R LE  Please reference above in flowsheet for SpO2 readings during and following mobility  BP post session = 111/52   Post session: pt returned back to recliner, chair alarm engaged, all needs in reach and RN notified of session findings/recommendations  Continue to recommend STR at time of d/c in order to maximize pt's functional independence and safety w/ mobility  Pt continues to be functioning below baseline level, and remains limited 2* factors listed above  PT will continue to see pt while here in order to address the deficits listed above and provide interventions consistent w/ POC in effort to achieve STGs  Barriers to Discharge: Inaccessible home environment     Recommendation: Short-term skilled PT     PT - OK to Discharge: Yes(when medically cleared, if to STR)    See flowsheet documentation for full assessment

## 2020-02-02 NOTE — PLAN OF CARE
Problem: OCCUPATIONAL THERAPY ADULT  Goal: Performs self-care activities at highest level of function for planned discharge setting  See evaluation for individualized goals  Description  Treatment Interventions: ADL retraining, Functional transfer training, Endurance training, Patient/family training, Neuromuscular reeducation, Energy conservation    See flowsheet documentation for full assessment, interventions and recommendations  Outcome: Not Progressing  Note:   Limitation: Decreased ADL status, Decreased Safe judgement during ADL, Decreased cognition, Decreased endurance  Prognosis: Fair  Assessment: Patient participated in Skilled OT session this date with interventions consisting of Energy Conservation techniques, safety awareness and fall prevention techniques, maintaining weight bearing restrictions and increase OOB/ sitting tolerance   Patient agreeable to OT treatment session, upon arrival patient was found supine in bed and sleepy  In comparison to previous session, patient with improvements in pain levels   Patient requiring verbal cues for correct technique, one step directives, frequent rest periods and ocassional safety reminders  Patient continues to be functioning below baseline level, occupational performance remains limited secondary to factors listed above and increased risk for falls and injury  From OT standpoint, recommendation at time of d/c would be Short Term Rehab  Patient to benefit from continued Occupational Therapy treatment while in the hospital to address deficits as defined above and maximize level of functional independence with ADLs and functional mobility        OT Discharge Recommendation: Short Term Rehab  OT - OK to Discharge: Yes     Angel Murguia OT

## 2020-02-02 NOTE — ASSESSMENT & PLAN NOTE
Lab Results   Component Value Date    HGBA1C 7 3 (H) 12/23/2019       Recent Labs     02/01/20  1600 02/01/20 2023 02/02/20  0701 02/02/20  1109   POCGLU 286* 268* 226* 300*       Blood Sugar Average: Last 72 hrs:  (P) 171 2   · Continue with insulin sliding scale  · Will add lantus

## 2020-02-02 NOTE — OCCUPATIONAL THERAPY NOTE
Occupational Therapy Treatment Note      Danyelndantoni Lita    2/2/2020    Principal Problem:    Closed fracture of neck of right femur with routine healing  Active Problems:    Chronic obstructive pulmonary disease with acute exacerbation (HCC)    Type 2 diabetes mellitus with complication, with long-term current use of insulin (HCC)    Tobacco abuse    Benign prostatic hyperplasia    Chronic bilateral low back pain without sciatica    Acute on chronic respiratory failure with hypoxia [facility])      Past Medical History:   Diagnosis Date    COPD (chronic obstructive pulmonary disease) ([facility] )     Diabetes mellitus ([facility] )     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[place] [address] )     Renal disorder     Stroke ([place] [address] )     may 2015       Past Surgical History:   Procedure Laterality Date    ANGIOPLASTY  03/01/2016    Right femoral vein    BACK SURGERY  2015    ORIF HIP FRACTURE Right 1/31/2020    Procedure: OPEN REDUCTION W/ INTERNAL FIXATION (ORIF) HIP WITH CANNUALATED SCREWS;  Surgeon: Lotus Ahn DO;  Location: [facility] MAIN OR;  Service: Orthopedics    SUPRAPUBIC TUBE PLACEMENT          02/02/20 0901   Note Type   Note type Eval/Treat   Restrictions/Precautions   Weight Bearing Precautions Per Order Yes   RLE Weight Bearing Per Order TTWB  (pt c difficulty maintaining TTWB during SPT this date)   Other Precautions Cognitive; Chair Alarm; Bed Alarm;Telemetry;O2;Fall Risk   Pain Assessment   Pain Assessment 0-10   Pain Score 2   Pain Location Arm   Pain Orientation Right   Pain Onset   ("from the needle")   ADL   UB Bathing Assistance 3  Moderate Assistance   UB Bathing Deficit Setup;Steadying;Supervision/safety; Increased time to complete   LB Bathing Assistance 2  Maximal Assistance   LB Bathing Deficit Setup;Steadying;Verbal cueing;Supervision/safety; Increased time to complete;Perineal area; Buttocks;Right lower leg including foot; Left lower leg including foot   UB Dressing Assistance 4  Minimal Assistance   LB Dressing Assistance 1 Total Assistance   Toileting Assistance  1  Total Assistance   Bed Mobility   Rolling R 3  Moderate assistance   Additional items Assist x 2;HOB elevated; Bedrails; Increased time required;Verbal cues;LE management   Supine to Sit 3  Moderate assistance   Additional items Assist x 2;HOB elevated; Bedrails; Increased time required;LE management   Additional Comments Required Min/Mod A to maintain sitting balance   Transfers   Sit to Stand 3  Moderate assistance   Additional items Assist x 2; Increased time required;Verbal cues   Stand pivot 3  Moderate assistance   Additional items Assist x 2; Increased time required;Verbal cues   Balance   Static Sitting Poor +   Dynamic Sitting Poor   Static Standing Poor   Dynamic Standing Poor   Activity Tolerance   Activity Tolerance Patient limited by fatigue;Patient limited by pain   Nurse Made Aware yes   Cognition   Overall Cognitive Status Impaired   Arousal/Participation Cooperative;Responsive  (pt c eyes closed during 75% of session, very tired)   Attention Attends with cues to redirect   Orientation Level Oriented to person;Oriented to place   Memory Decreased recall of precautions   Following Commands Follows one step commands with increased time or repetition   Assessment   Limitation Decreased ADL status; Decreased Safe judgement during ADL;Decreased cognition;Decreased endurance   Prognosis Fair   Assessment Patient participated in Skilled OT session this date with interventions consisting of Energy Conservation techniques, safety awareness and fall prevention techniques, maintaining weight bearing restrictions and increase OOB/ sitting tolerance   Patient agreeable to OT treatment session, upon arrival patient was found supine in bed and sleepy  In comparison to previous session, patient with improvements in pain levels   Patient requiring verbal cues for correct technique, one step directives, frequent rest periods and ocassional safety reminders   Patient continues to be functioning below baseline level, occupational performance remains limited secondary to factors listed above and increased risk for falls and injury  From OT standpoint, recommendation at time of d/c would be Short Term Rehab  Patient to benefit from continued Occupational Therapy treatment while in the hospital to address deficits as defined above and maximize level of functional independence with ADLs and functional mobility  Plan   Treatment Interventions Functional transfer training;UE strengthening/ROM; Endurance training;Cognitive reorientation;Patient/family training;Equipment evaluation/education; Energy conservation; Activityengagement   Goal Expiration Date 02/11/20   OT Treatment Day 1   OT Frequency 5x/wk   Recommendation   OT Discharge Recommendation Short Term Rehab   OT - OK to Discharge Yes   Pia Montilla, OT

## 2020-02-03 ENCOUNTER — HOSPITAL ENCOUNTER (INPATIENT)
Facility: HOSPITAL | Age: 85
LOS: 7 days | Discharge: STILL ADMITTED OR ADMINISTRATIVE | End: 2020-02-10
Attending: FAMILY MEDICINE | Admitting: FAMILY MEDICINE

## 2020-02-03 VITALS
TEMPERATURE: 96.3 F | HEIGHT: 70 IN | HEART RATE: 89 BPM | BODY MASS INDEX: 24.05 KG/M2 | WEIGHT: 167.99 LBS | DIASTOLIC BLOOD PRESSURE: 68 MMHG | RESPIRATION RATE: 18 BRPM | SYSTOLIC BLOOD PRESSURE: 142 MMHG | OXYGEN SATURATION: 93 %

## 2020-02-03 DIAGNOSIS — J44.1 CHRONIC OBSTRUCTIVE PULMONARY DISEASE WITH ACUTE EXACERBATION (HCC): Primary | ICD-10-CM

## 2020-02-03 LAB
ANION GAP SERPL CALCULATED.3IONS-SCNC: 6 MMOL/L (ref 4–13)
ARTERIAL PATENCY WRIST A: YES
BACTERIA SPT RESP CULT: ABNORMAL
BASE EXCESS BLDA CALC-SCNC: 3.6 MMOL/L
BUN SERPL-MCNC: 19 MG/DL (ref 7–25)
CALCIUM SERPL-MCNC: 8.5 MG/DL (ref 8.6–10.5)
CHLORIDE SERPL-SCNC: 98 MMOL/L (ref 98–107)
CO2 SERPL-SCNC: 34 MMOL/L (ref 21–31)
CREAT SERPL-MCNC: 0.58 MG/DL (ref 0.7–1.3)
ERYTHROCYTE [DISTWIDTH] IN BLOOD BY AUTOMATED COUNT: 15 % (ref 11.5–14.5)
GFR SERPL CREATININE-BSD FRML MDRD: 94 ML/MIN/1.73SQ M
GLUCOSE SERPL-MCNC: 243 MG/DL (ref 65–99)
GLUCOSE SERPL-MCNC: 248 MG/DL (ref 65–140)
GLUCOSE SERPL-MCNC: 271 MG/DL (ref 65–140)
GRAM STN SPEC: ABNORMAL
GRAM STN SPEC: ABNORMAL
HCO3 BLDA-SCNC: 30.7 MMOL/L (ref 22–28)
HCT VFR BLD AUTO: 35.7 % (ref 42–47)
HFNC FLOW LPM: ABNORMAL
HGB BLD-MCNC: 11.6 G/DL (ref 14–18)
MCH RBC QN AUTO: 31.1 PG (ref 26–34)
MCHC RBC AUTO-ENTMCNC: 32.6 G/DL (ref 31–37)
MCV RBC AUTO: 96 FL (ref 81–99)
NON VENT TYPE HFNC: ABNORMAL
O2 CT BLDA-SCNC: 14.2 ML/DL
OXYHGB MFR BLDA: 77.6 %
PCO2 BLDA: 58.1 MM HG
PH BLDA: 7.34 [PH]
PLATELET # BLD AUTO: 118 THOUSANDS/UL (ref 149–390)
PMV BLD AUTO: 10.1 FL (ref 8.6–11.7)
PO2 BLDA: 45.5 MM HG (ref 75–129)
POTASSIUM SERPL-SCNC: 3.8 MMOL/L (ref 3.5–5.5)
RBC # BLD AUTO: 3.74 MILLION/UL (ref 4.3–5.9)
SODIUM SERPL-SCNC: 138 MMOL/L (ref 134–143)
SPECIMEN SOURCE: ABNORMAL
WBC # BLD AUTO: 11.4 THOUSAND/UL (ref 4.8–10.8)

## 2020-02-03 PROCEDURE — 99239 HOSP IP/OBS DSCHRG MGMT >30: CPT | Performed by: INTERNAL MEDICINE

## 2020-02-03 PROCEDURE — 80048 BASIC METABOLIC PNL TOTAL CA: CPT | Performed by: NURSE PRACTITIONER

## 2020-02-03 PROCEDURE — 97535 SELF CARE MNGMENT TRAINING: CPT

## 2020-02-03 PROCEDURE — 97530 THERAPEUTIC ACTIVITIES: CPT

## 2020-02-03 PROCEDURE — 94760 N-INVAS EAR/PLS OXIMETRY 1: CPT

## 2020-02-03 PROCEDURE — 97116 GAIT TRAINING THERAPY: CPT

## 2020-02-03 PROCEDURE — 94640 AIRWAY INHALATION TREATMENT: CPT

## 2020-02-03 PROCEDURE — 97110 THERAPEUTIC EXERCISES: CPT

## 2020-02-03 PROCEDURE — 82948 REAGENT STRIP/BLOOD GLUCOSE: CPT

## 2020-02-03 PROCEDURE — 85027 COMPLETE CBC AUTOMATED: CPT | Performed by: NURSE PRACTITIONER

## 2020-02-03 RX ORDER — BUDESONIDE 0.5 MG/2ML
0.5 INHALANT ORAL
Refills: 0
Start: 2020-02-03 | End: 2020-02-27 | Stop reason: HOSPADM

## 2020-02-03 RX ORDER — LEVALBUTEROL 1.25 MG/.5ML
1.25 SOLUTION, CONCENTRATE RESPIRATORY (INHALATION)
Refills: 0
Start: 2020-02-03 | End: 2020-02-27 | Stop reason: HOSPADM

## 2020-02-03 RX ORDER — POLYETHYLENE GLYCOL 3350 17 G/17G
17 POWDER, FOR SOLUTION ORAL DAILY
Status: DISCONTINUED | OUTPATIENT
Start: 2020-02-03 | End: 2020-02-03 | Stop reason: HOSPADM

## 2020-02-03 RX ORDER — POLYETHYLENE GLYCOL 3350 17 G/17G
17 POWDER, FOR SOLUTION ORAL DAILY
Status: DISCONTINUED | OUTPATIENT
Start: 2020-02-03 | End: 2020-02-03

## 2020-02-03 RX ORDER — SENNOSIDES 8.6 MG
2 TABLET ORAL DAILY PRN
Status: DISCONTINUED | OUTPATIENT
Start: 2020-02-03 | End: 2020-02-03 | Stop reason: HOSPADM

## 2020-02-03 RX ORDER — SODIUM CHLORIDE FOR INHALATION 0.9 %
3 VIAL, NEBULIZER (ML) INHALATION
Refills: 0
Start: 2020-02-03 | End: 2020-02-27 | Stop reason: HOSPADM

## 2020-02-03 RX ORDER — INSULIN GLARGINE 100 [IU]/ML
20 INJECTION, SOLUTION SUBCUTANEOUS
Status: DISCONTINUED | OUTPATIENT
Start: 2020-02-03 | End: 2020-02-03 | Stop reason: HOSPADM

## 2020-02-03 RX ORDER — POLYETHYLENE GLYCOL 3350 17 G/17G
17 POWDER, FOR SOLUTION ORAL DAILY
Refills: 0
Start: 2020-02-03 | End: 2020-02-27 | Stop reason: HOSPADM

## 2020-02-03 RX ORDER — FONDAPARINUX SODIUM 2.5 MG/.5ML
2.5 INJECTION SUBCUTANEOUS DAILY
Refills: 0
Start: 2020-02-04 | End: 2020-02-27 | Stop reason: HOSPADM

## 2020-02-03 RX ORDER — SENNOSIDES 8.6 MG
2 TABLET ORAL DAILY PRN
Qty: 120 EACH | Refills: 0 | Status: ON HOLD
Start: 2020-02-03 | End: 2020-02-27 | Stop reason: SDUPTHER

## 2020-02-03 RX ORDER — OXYCODONE HYDROCHLORIDE AND ACETAMINOPHEN 5; 325 MG/1; MG/1
1 TABLET ORAL EVERY 4 HOURS PRN
Qty: 20 TABLET | Refills: 0 | Status: SHIPPED | OUTPATIENT
Start: 2020-02-03 | End: 2020-02-08

## 2020-02-03 RX ORDER — POLYETHYLENE GLYCOL 3350 17 G/17G
17 POWDER, FOR SOLUTION ORAL DAILY
Qty: 14 EACH | Refills: 0
Start: 2020-02-03 | End: 2020-02-14 | Stop reason: HOSPADM

## 2020-02-03 RX ORDER — DOCUSATE SODIUM 100 MG/1
100 CAPSULE, LIQUID FILLED ORAL 2 TIMES DAILY
Qty: 10 CAPSULE | Refills: 0
Start: 2020-02-03 | End: 2020-02-27 | Stop reason: HOSPADM

## 2020-02-03 RX ORDER — NICOTINE 21 MG/24HR
1 PATCH, TRANSDERMAL 24 HOURS TRANSDERMAL DAILY
Qty: 28 PATCH | Refills: 0 | Status: ON HOLD
Start: 2020-02-04 | End: 2020-02-27 | Stop reason: SDUPTHER

## 2020-02-03 RX ADMIN — OXYCODONE HYDROCHLORIDE AND ACETAMINOPHEN 1 TABLET: 5; 325 TABLET ORAL at 14:07

## 2020-02-03 RX ADMIN — DOCUSATE SODIUM 100 MG: 100 CAPSULE, LIQUID FILLED ORAL at 08:02

## 2020-02-03 RX ADMIN — ATORVASTATIN CALCIUM 20 MG: 20 TABLET, FILM COATED ORAL at 08:02

## 2020-02-03 RX ADMIN — POLYETHYLENE GLYCOL 3350 17 G: 17 POWDER, FOR SOLUTION ORAL at 14:07

## 2020-02-03 RX ADMIN — Medication 1 TABLET: at 08:02

## 2020-02-03 RX ADMIN — ISODIUM CHLORIDE 3 ML: 0.03 SOLUTION RESPIRATORY (INHALATION) at 13:49

## 2020-02-03 RX ADMIN — FONDAPARINUX SODIUM 2.5 MG: 2.5 INJECTION, SOLUTION SUBCUTANEOUS at 08:03

## 2020-02-03 RX ADMIN — FINASTERIDE 5 MG: 5 TABLET, FILM COATED ORAL at 08:02

## 2020-02-03 RX ADMIN — OXYBUTYNIN CHLORIDE 15 MG: 5 TABLET ORAL at 08:02

## 2020-02-03 RX ADMIN — LEVALBUTEROL HYDROCHLORIDE 1.25 MG: 1.25 SOLUTION, CONCENTRATE RESPIRATORY (INHALATION) at 07:19

## 2020-02-03 RX ADMIN — INSULIN LISPRO 2 UNITS: 100 INJECTION, SOLUTION INTRAVENOUS; SUBCUTANEOUS at 07:41

## 2020-02-03 RX ADMIN — NICOTINE 1 PATCH: 21 PATCH, EXTENDED RELEASE TRANSDERMAL at 08:02

## 2020-02-03 RX ADMIN — FLUTICASONE FUROATE AND VILANTEROL TRIFENATATE 1 PUFF: 200; 25 POWDER RESPIRATORY (INHALATION) at 07:40

## 2020-02-03 RX ADMIN — INSULIN LISPRO 3 UNITS: 100 INJECTION, SOLUTION INTRAVENOUS; SUBCUTANEOUS at 12:40

## 2020-02-03 RX ADMIN — ESCITALOPRAM OXALATE 10 MG: 10 TABLET ORAL at 08:02

## 2020-02-03 RX ADMIN — FUROSEMIDE 40 MG: 40 TABLET ORAL at 08:02

## 2020-02-03 RX ADMIN — ISODIUM CHLORIDE 3 ML: 0.03 SOLUTION RESPIRATORY (INHALATION) at 07:19

## 2020-02-03 RX ADMIN — BUDESONIDE 0.5 MG: 0.5 INHALANT RESPIRATORY (INHALATION) at 07:19

## 2020-02-03 RX ADMIN — GABAPENTIN 100 MG: 100 CAPSULE ORAL at 08:02

## 2020-02-03 RX ADMIN — PREDNISONE 40 MG: 20 TABLET ORAL at 08:02

## 2020-02-03 RX ADMIN — LEVALBUTEROL HYDROCHLORIDE 1.25 MG: 1.25 SOLUTION, CONCENTRATE RESPIRATORY (INHALATION) at 13:48

## 2020-02-03 RX ADMIN — FERROUS SULFATE TAB 325 MG (65 MG ELEMENTAL FE) 325 MG: 325 (65 FE) TAB at 07:40

## 2020-02-03 NOTE — NURSING NOTE
hospitalist was in to see and assess the pt and he is ok for d/c to [facility], iv site removed with the tip intact, report called to chidi 2nd floor [facility],, all questions answered and she verbalized understanding, pt assisted into the w/c and was d/c'd from the hospital

## 2020-02-03 NOTE — H&P
STACEY Shelton#  DUS:[id number] Byron Elmore  [facility]:[id number]    [facility]:[id number]  Adm Date: 2/3/2020 1527  3:27 PM   ATT PHY: Panchito Caldwell, [address] [facility]         Chief Complaint:  Short-term rehabilitation following right hip fracture status post ORIF    History of Presenting Illness: Joana Jones is a(n) 80y o  year old male who was admitted to [place] [address] for short-term rehabilitation following right hip fractures status post ORIF  Patient had a fall at home resulting in hip fracture  Patient feels weak and reports moderate to severe degree of pain in his right leg and hip  Patient examined at bedside  Patient denied any active suicidal homicidal ideations      No Known Allergies    Current Facility-Administered Medications on File Prior to Encounter   Medication Dose Route Frequency Provider Last Rate Last Dose    [DISCONTINUED] acetaminophen (TYLENOL) tablet 650 mg  650 mg Oral Q6H PRN Rachelle Brsolitario, PA-C        [DISCONTINUED] albuterol inhalation solution 2 5 mg  2 5 mg Nebulization Q4H PRN Cherie Pack, CRNP        [DISCONTINUED] ALPRAZolam Dorisann Corner) tablet 0 5 mg  0 5 mg Oral HS PRN Rachelle Brsolitario, PA-C   0 5 mg at 02/02/20 0143    [DISCONTINUED] aluminum-magnesium hydroxide-simethicone (MYLANTA) 200-200-20 mg/5 mL oral suspension 30 mL  30 mL Oral Q6H PRN Rachelle Ross, PA-C        [DISCONTINUED] atorvastatin (LIPITOR) tablet 20 mg  20 mg Oral Daily Rachelle Ross, PA-C   20 mg at 02/03/20 0802    [DISCONTINUED] bacitracin 50,000 Units in sodium chloride 0 9 % 1,000 mL irrigation bag   Irrigation Once Fer Luevano DO        [DISCONTINUED] budesonide (PULMICORT) inhalation solution 0 5 mg  0 5 mg Nebulization Q12H Cherie Pack, CRNP   0 5 mg at 02/03/20 0719    [DISCONTINUED] carisoprodol (SOMA) tablet 350 mg  350 mg Oral TID PRN Rachelle Brsolitario, PA-C   350 mg at 02/01/20 1440    [DISCONTINUED] cefTRIAXone (ROCEPHIN) IVPB (premix) 1,000 mg  1,000 mg Intravenous Q24H Barbana Coma, CRNP   Stopped at 02/03/20 1353    [DISCONTINUED] docusate sodium (COLACE) capsule 100 mg  100 mg Oral BID Zaheer Joya PA-C   100 mg at 02/03/20 1352    [DISCONTINUED] erythromycin (ILOTYCIN) 0 5 % ophthalmic ointment 0 5 inch  0 5 inch Both Eyes HS Zaheer Joya PA-C   0 5 inch at 02/02/20 2204    [DISCONTINUED] escitalopram (LEXAPRO) tablet 10 mg  10 mg Oral Daily Zaheer Joya PA-C   10 mg at 02/03/20 7851    [DISCONTINUED] ferrous sulfate tablet 325 mg  325 mg Oral BID With Meals Zaheer Joya PA-C   325 mg at 02/03/20 0740    [DISCONTINUED] finasteride (PROSCAR) tablet 5 mg  5 mg Oral Daily Zaheer Joya PA-C   5 mg at 02/03/20 0802    [DISCONTINUED] fluticasone-vilanterol (BREO ELLIPTA) 200-25 MCG/INH inhaler 1 puff  1 puff Inhalation Daily Zaheer Joya PA-C   1 puff at 02/03/20 0740    [DISCONTINUED] fondaparinux (ARIXTRA) subcutaneous injection 2 5 mg  2 5 mg Subcutaneous Daily Zaheer Joya PA-C   2 5 mg at 02/03/20 1013    [DISCONTINUED] furosemide (LASIX) tablet 40 mg  40 mg Oral Daily Zaheer Joya PA-C   40 mg at 02/03/20 1237    [DISCONTINUED] gabapentin (NEURONTIN) capsule 100 mg  100 mg Oral TID Zaheer Joya PA-C   100 mg at 02/03/20 0802    [DISCONTINUED] insulin glargine (LANTUS) subcutaneous injection 20 Units 0 2 mL  20 Units Subcutaneous HS Tess Thakur MD        [DISCONTINUED] insulin glargine (LANTUS) subcutaneous injection 8 Units 0 08 mL  8 Units Subcutaneous HS Barbana Coma, CRNP   8 Units at 02/02/20 2205    [DISCONTINUED] insulin lispro (HumaLOG) 100 units/mL subcutaneous injection 1-5 Units  1-5 Units Subcutaneous TID [facility] Ray Rodriguez PA-C   3 Units at 02/03/20 1240    [DISCONTINUED] insulin lispro (HumaLOG) 100 units/mL subcutaneous injection 1-5 Units  1-5 Units Subcutaneous HS Ray Rodriguez PA-C   3 Units at 02/02/20 2204    [DISCONTINUED] levalbuterol (XOPENEX) inhalation solution 1 25 mg  1 25 mg Nebulization TID Jean George MD   1 25 mg at 02/03/20 1348    [DISCONTINUED] morphine (PF) 4 mg/mL injection 3 mg  3 mg Intravenous Q4H PRN Rachale Leopard, PA-C   3 mg at 01/31/20 2003    [DISCONTINUED] multivitamin-minerals (CENTRUM) tablet 1 tablet  1 tablet Oral Daily Rachael Leopard, PA-C   1 tablet at 02/03/20 0802    [DISCONTINUED] nicotine (NICODERM CQ) 21 mg/24 hr TD 24 hr patch 1 patch  1 patch Transdermal Daily Rachael Leopard, PA-C   1 patch at 02/03/20 0802    [DISCONTINUED] ondansetron (ZOFRAN) injection 4 mg  4 mg Intravenous Q6H PRN Rachael Leopard, PA-C        [DISCONTINUED] oxybutynin (DITROPAN) tablet 15 mg  15 mg Oral Daily Rachael Leopard, PA-C   15 mg at 02/03/20 0802    [DISCONTINUED] oxyCODONE-acetaminophen (PERCOCET) 5-325 mg per tablet 1 tablet  1 tablet Oral Q4H PRN Rachael Leopard, PA-C   1 tablet at 02/03/20 1407    [DISCONTINUED] polyethylene glycol (GLYCOLAX) bowel prep 17 g  17 g Oral Daily Frantz Chaudhry MD        [DISCONTINUED] polyethylene glycol (MIRALAX) packet 17 g  17 g Oral Daily Frantz Chaudhry MD   17 g at 02/03/20 1407    [DISCONTINUED] predniSONE tablet 40 mg  40 mg Oral Daily DEBBY Chapa   40 mg at 02/03/20 0802    [DISCONTINUED] senna (SENOKOT) tablet 17 2 mg  2 tablet Oral Daily PRN Frantz Chaudhry MD       [facility] [DISCONTINUED] sodium chloride 0 9 % inhalation solution 3 mL  3 mL Nebulization TID Jean George MD   3 mL at 02/03/20 1349     Current Outpatient Medications on File Prior to Encounter   Medication Sig Dispense Refill    albuterol (PROVENTIL HFA,VENTOLIN HFA) 90 mcg/act inhaler Inhale 2 puffs every 6 (six) hours as needed for wheezing      ALPRAZolam (XANAX) 0 5 mg tablet Take 0 5 mg by mouth daily at bedtime as needed for anxiety      aspirin (ECOTRIN LOW STRENGTH) 81 mg EC tablet Take 81 mg by mouth daily      atorvastatin (LIPITOR) 20 mg tablet Take 20 mg by mouth daily      budesonide (PULMICORT) 0 5 mg/2 mL nebulizer solution Take 1 vial (0 5 mg total) by nebulization every 12 (twelve) hours Rinse mouth after use   0    carisoprodol (SOMA) 350 mg tablet Take 350 mg by mouth as needed for muscle spasms      docusate sodium (COLACE) 100 mg capsule Take 1 capsule (100 mg total) by mouth 2 (two) times a day 10 capsule 0    erythromycin (ILOTYCIN) ophthalmic ointment 0 5 inches daily at bedtime      escitalopram (LEXAPRO) 10 mg tablet Take 10 mg by mouth daily      finasteride (PROSCAR) 5 mg tablet Take 5 mg by mouth daily      fluticasone-salmeterol (ADVAIR) 250-50 mcg/dose inhaler Inhale 1 puff 2 (two) times a day Rinse mouth after use        [START ON 2/4/2020] fondaparinux (ARIXTRA) 2 5 mg/0 5 mL Inject 2 5 mg under the skin daily  0    furosemide (LASIX) 20 mg tablet Take 1 tablet (20 mg total) by mouth daily (Patient taking differently: Take 40 mg by mouth daily )  0    gabapentin (NEURONTIN) 100 mg capsule Take 100 mg by mouth 3 (three) times a day       glimepiride (AMARYL) 2 mg tablet Take 2 mg by mouth every morning before breakfast      insulin glargine (LANTUS) 100 units/mL subcutaneous injection Inject 20 Units under the skin daily       insulin lispro (HumaLOG) 100 units/mL injection Inject under the skin      levalbuterol (XOPENEX) 1 25 mg/0 5 mL nebulizer solution Take 0 5 mL (1 25 mg total) by nebulization 3 (three) times a day  0    [START ON 2/4/2020] nicotine (NICODERM CQ) 21 mg/24 hr TD 24 hr patch Place 1 patch on the skin daily 28 patch 0    oxybutynin (DITROPAN) 5 mg tablet Take 15 mg by mouth daily       oxyCODONE-acetaminophen (PERCOCET) 5-325 mg per tablet Take 1 tablet by mouth every 4 (four) hours as needed for moderate pain for up to 5 daysMax Daily Amount: 6 tablets 20 tablet 0    polyethylene glycol (GLYCOLAX) powder Take 17 g by mouth daily  0    polyethylene glycol (MIRALAX) 17 g packet Take 17 g by mouth daily 14 each 0    rizatriptan (MAXALT) 10 MG tablet Take 10 mg by mouth once as needed for migraine May repeat in 2 hours if needed      senna (SENOKOT) 8 6 mg Take 2 tablets (17 2 mg total) by mouth daily as needed for constipation 120 each 0    sodium chloride 0 9 % nebulizer solution Take 3 mL by nebulization 3 (three) times a day  0    [DISCONTINUED] traMADol (ULTRAM) 50 mg tablet Take 50 mg by mouth every 6 (six) hours as needed for moderate pain         Active Ambulatory Problems     Diagnosis Date Noted    Chronic obstructive pulmonary disease with acute exacerbation ([facility] ) 10/16/2018    Type 2 diabetes mellitus with complication, with long-term current use of insulin ([facility] ) 10/16/2018    Stroke ([facility] ) 10/16/2018    Renal disorder 10/16/2018    Diabetic polyneuropathy associated with type 2 diabetes mellitus ([facility] ) 10/16/2018    Renovascular hypertension 10/16/2018    Tobacco abuse 10/16/2018    Abdominal aortic aneurysm without rupture ([facility] ) 11/11/2015    Aneurysm of iliac artery ([facility] ) 11/11/2015    Antalgic gait 04/04/2017    Arthritis 04/04/2017    Benign prostatic hyperplasia 04/04/2017    Presence of suprapubic catheter ([facility] ) 08/20/2015    Chronic depression 04/28/2017    Chronic diarrhea 11/25/2013    Chronic bilateral low back pain without sciatica 04/04/2017    Chronic orthostatic hypotension 05/01/2018    Congestive heart failure ([facility] ) 04/18/2017    DDD (degenerative disc disease), lumbosacral 11/19/2013    Diabetic macular edema ([facility] ) 06/02/2019    Diabetic peripheral neuropathy associated with type 2 diabetes mellitus ([facility] ) 04/17/2019    Edema of both legs 02/25/2015    Hyperlipidemia associated with type 2 diabetes mellitus ([facility] ) 11/19/2013    Insomnia 07/23/2014    Mild cognitive disorder 11/07/2018    Nicotine dependence 09/26/2017    Mild nonproliferative diabetic retinopathy ([facility] ) 12/28/2018    Osteoporosis 02/01/2018    Peripheral arterial occlusive disease (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) 04/04/2017    Vitamin D deficiency 07/23/2014    Localized edema 06/03/2019    Lung mass 12/30/2019    Swallowing problem 01/21/2020    Uncontrolled type 2 [facility] mellitus [email or web address] 10/15/2019    Diabetic nephropathy ([facility] ) 04/04/2017    Closed fracture of neck of right femur with routine healing 01/30/2020    Acute on chronic respiratory failure with hypoxia ([facility] ) 01/31/2020     Resolved Ambulatory Problems     Diagnosis Date Noted    No Resolved Ambulatory Problems     Past Medical History:   Diagnosis Date    COPD (chronic obstructive pulmonary disease) ([place] [address] )     Diabetes mellitus (HCC)     DJD (degenerative joint disease)     Falls     Gait abnormality     Left middle cerebral artery stroke [facility])        Past Surgical History:   Procedure Laterality Date    ANGIOPLASTY  03/01/2016    Right femoral vein    BACK SURGERY  2015    ORIF HIP FRACTURE Right 1/31/2020    Procedure: OPEN REDUCTION W/ INTERNAL FIXATION (ORIF) HIP WITH CANNUALATED SCREWS;  Surgeon: Ammon Ceron DO;  Location: [address] [place] [address] OR;  Service: Orthopedics    SUPRAPUBIC TUBE PLACEMENT         Social History:   Social History     Socioeconomic History    Marital status: Single     Spouse name: Toribio Shannon Number of children: 3    Years of education: 15    Highest education level: 12th grade   Occupational History    None   Social Needs    Financial resource strain: Somewhat hard    Food insecurity:     Worry: Never true     Inability: Never true    Transportation needs:     Medical: No     Non-medical: No   Tobacco Use    Smoking status: Current Every Day Smoker     Packs/day: 1 00     Years: 70 00     Pack years: 70 00    Smokeless tobacco: Never Used    Tobacco comment: Wife reports it's a losing matos   Substance and Sexual Activity    Alcohol use: Never     Frequency: Never    Drug use: No    Sexual activity: Yes     Partners: Female   Lifestyle    Physical activity:     Days per week: 0 days     Minutes per session: 0 min    Stress: Only a little   Relationships    Social connections:     Talks on phone: Three times a week     Gets together: Once a week     Attends Yars[employer] service: Never     Active member of club or organization: No     Attends meetings of clubs or organizations: Never     Relationship status:     Intimate partner violence:     Fear of current or ex partner: No     Emotionally abused: No     Physically abused: No     Forced sexual activity: No   Other Topics Concern    None   Social History Narrative    None       Family History: History reviewed  No pertinent family history  Review of Systems   Musculoskeletal: Positive for arthralgias, back pain, gait problem and joint swelling  Neurological: Positive for weakness  All other systems reviewed and are negative  Physical Exam   Vitals  Temperature 97 3°, heart rate 75, respirations 20, blood pressure 110/64, O2 sats 93% on 4 L of oxygen via nasal cannula  Constitutional: Awake and Alert  Well-developed and well-nourished  No distress  HENT: PERR,EOMI, conjunctiva normal  Head: Normocephalic and atraumatic  Mouth/Throat: Oropharynx is clear and moist     Eyes: Conjunctivae and EOM are normal  Pupils are equal, round, and reactive to light  Right and left eye exhibits no discharge  Neck: Neck supple  No tracheal deviation present  No thyromegaly present  Cardiovascular: Normal rate, regular rhythm and normal heart sounds  Exam reveals no friction rub  No murmur heard  Pulmonary/Chest: Effort normal and breath sounds normal  No respiratory distress  She has no wheezes  Abdominal: Soft  Bowel sounds are normal  She exhibits no distension  There is no tenderness  There is no rebound and no guarding  Neurological: Cranial Nerves grossly intact  No sensory deficit  Coordination normal    Musculoskeletal:   Nontender spine    Dressing over the right hip appears dry and clean  Skin: Skin is warm and dry  No rash noted  No diaphoresis  No erythema  No edema  No cyanosis  Assessment     Rod Brenner is a(n) 80y o  year old male for short-term rehabilitation at [facility] following right hip fracture status post ORIF    1  Right hip fracture status post ORIF  Patient will be receiving extensive physical therapy occupational therapy as a part of short-term rehabilitation  Patient is also getting pain medications including Percocet and Soma along with Tylenol on as needed basis  Patient is also on Arixtra  2  Cardiac with history of hypertension, coronary artery disease, dyslipidemia  Patient is on Lasix, atorvastatin and aspirin  3  Type 2 diabetes mellitus requiring insulin along with diabetic neuropathy  Patient will be continued acute on Lantus 20 units at bedtime along with Amaryl and gabapentin  3  COPD requiring 247 oxygen via nasal cannula  Patient may continue to receive fluticasone/salmeterol, Pulmicort, albuterol along with oxygen via nasal cannula  Patient's home baseline oxygen is 3-4 L via nasal cannula  4  BPH  Patient is on Ditropan XL, Proscar  5  Anxiety and depression  Patient is on Lexapro and Xanax  6  Migraine headaches  Patient may continue to receive Maxalt along with Tylenol on as needed basis  7  Tobacco abuse  Patient is on nicotine transdermal patch  8  Constipation  Patient is on Glycolax and Colace  9  Dry eyes  Patient is getting artificial tears  Prognosis: fair  Discharge Plan: in progress  Advanced Directives: I have discussed in detail the patient the advanced directives  Patient's wife Toño Gamboa is patient's POA and her phone number is [phone]  Patient also reports that he has a living will with advanced directives  I called patient's wife and discussed patient's resuscitation status  She informed me that patient is DNR/DNI        I have spent more than 50 minutes gathering data, doing physical examination, and discussing the advanced directives, which was witnessed by caring staff

## 2020-02-03 NOTE — PLAN OF CARE
Problem: OCCUPATIONAL THERAPY ADULT  Goal: Performs self-care activities at highest level of function for planned discharge setting  See evaluation for individualized goals  Description  Treatment Interventions: ADL retraining, Functional transfer training, Endurance training, Patient/family training, Neuromuscular reeducation, Energy conservation    See flowsheet documentation for full assessment, interventions and recommendations  Outcome: Progressing, slowly  Note:   Limitation: Decreased ADL status, Decreased Safe judgement during ADL, Decreased cognition, Decreased endurance  Prognosis: Fair  Assessment: Patient participated in Skilled OT session this date with interventions consisting of ADL re training with the use of correct body mechnaics and Energy Conservation techniques and the initiation of instruction LB/LH AE   Patient agreeable to OT treatment session, upon arrival patient was found seated OOB to Chair  In comparison to previous session, patient with improvements (minimal) in self care ability  Patient requiring verbal cues for correct technique and one step directives and occasional rest periods  Patient continues to be functioning below baseline level, occupational performance remains limited secondary to factors listed above and increased risk for falls and injury  From OT standpoint, recommendation at time of d/c would be Short Term Rehab --- encouraged patient to look for improvements each day  Patient to benefit from continued Occupational Therapy treatment while in the hospital to address deficits as defined above and maximize level of functional independence with ADLs and functional mobility        OT Discharge Recommendation: Short Term Rehab  OT - OK to Discharge: Yes     KRISTEN Victor/L

## 2020-02-03 NOTE — DISCHARGE SUMMARY
Discharge- Tita Muta 1935, 80 y o  male MRN: [id number]    Unit/Bed#: -02 Encounter: [id number]    Primary Care Provider: Tr Momin MD   Date and time admitted to hospital: 1/29/2020 10:23 PM        Acute on chronic respiratory failure with hypoxia ([address] )  Assessment & Plan  · Chronically on 4 L which is his home regimen  · At baseline now  · Incentive spirometry, early ambulation cough and deep breathing    Tobacco abuse  Assessment & Plan  · Nicotine patch          Type 2 diabetes mellitus with complication, with long-term current use of insulin [facility])  Assessment & Plan  Lab Results   Component Value Date    HGBA1C 7 3 (H) 12/23/2019       Recent Labs     02/02/20  1109 02/02/20  1624 02/02/20 2019 02/03/20  0634   POCGLU 300* 323* 308* 248*       Blood Sugar Average: Last 72 hrs:  (P) 217 [id number]   · Continue with insulin sliding scale  · Increase lantus    Chronic obstructive pulmonary disease with acute exacerbation (HCC)  Assessment & Plan  · cxr suspects pneumonia although procalcitonin negative  · Discontinue antibiotics  · Discontinue prednisone, no exacerbation currently  · Nebs   · Respiratory protocol  · chronically on 4 L of oxygen at home  · Clinically is improving     * Closed fracture of neck of right femur with routine healing  Assessment & Plan  · Status post surgery with Orthopedics 1/31  · Tolerated procedure well  · PT/OT  · DVT prophylaxis  · Monitor H&H closely            Discharging Physician / Practitioner: Anabella Sears MD  PCP: Tr Momin MD  Admission Date:   Admission Orders (From admission, onward)     Ordered        01/30/20 0449  Inpatient Admission  Once                   Discharge Date: 02/03/20    Resolved Problems  Date Reviewed: 2/2/2020    None          Consultations During Hospital Stay:  · orhtopedics    Procedures Performed:   · ORIF right hip    Significant Findings / Test Results:   · n/a    Incidental Findings:   · n/a     Test Results Pending at Discharge (will require follow up):   · n/a     Outpatient Tests Requested:  · n/a    Complications:     none    Reason for Admission:  Mechanical fall    Hospital Course:     Teresa Simpson is a 80 y o  male with a past medical history of severe COPD, chronic hypoxic respiratory failure on 4 L of oxygen chronically patient who originally presented to the hospital on 1/29/2020 due to mechanical fall and right hip fracture  Patient was seen by Orthopedic surgery and underwent ORIF a tolerated procedure well  His postoperative course was complicated by respiratory failure requiring increased oxygen requirements  Chest x-ray had demonstrated atelectasis versus pneumonia, however, laboratory testing was negative for pneumonia including negative procalcitonin  His antibiotics were discontinued  Patient had a mild exacerbation of his COPD and was treated with short course of steroids  As patient had returned to his baseline, he was deemed stable for discharge to rehab  He was unfortunate denied by acute rehab unit, and will instead be discharged to a skilled nursing facility for further therapy thereafter  Please see above list of diagnoses and related plan for additional information  Condition at Discharge: fair     Discharge Day Visit / Exam:     Subjective:  Patient seen examined  No acute events overnight  Aside from pain in his leg, patient notes he has returned to his baseline  Vitals: Blood Pressure: 142/68 (02/03/20 0751)  Pulse: 89 (02/03/20 0751)  Temperature: (!) 96 3 °F (35 7 °C) (02/03/20 0751)  Temp Source: Temporal (02/03/20 0751)  Respirations: 18 (02/03/20 0751)  Height: 5' 10" (177 8 cm) (01/30/20 0630)  Weight - Scale: 76 2 kg (167 lb 15 9 oz) (02/03/20 0600)  SpO2: (!) 87 % (02/03/20 0751)  Exam:   Physical Exam   Constitutional: He is oriented to person, place, and time  He appears well-developed and well-nourished  HENT:   Head: Normocephalic and atraumatic  Eyes: Pupils are equal, round, and reactive to light  EOM are normal    Neck: Normal range of motion  Neck supple  Cardiovascular: Normal rate and regular rhythm  Pulmonary/Chest: Effort normal    Poor air movement   Abdominal: Soft  Bowel sounds are normal    Musculoskeletal: Normal range of motion  He exhibits tenderness  Neurological: He is alert and oriented to person, place, and time  Skin: Skin is warm  Capillary refill takes less than 2 seconds  Psychiatric: He has a normal mood and affect  His behavior is normal  Thought content normal    Nursing note and vitals reviewed  Discussion with Family: n/a    Discharge instructions/Information to patient and family:   See after visit summary for information provided to patient and family  Provisions for Follow-Up Care:  See after visit summary for information related to follow-up care and any pertinent home health orders  Disposition:     Home    For Discharges to   [address] SNF:   · Not Applicable to this Patient - Not Applicable to this Patient    Planned Readmission:    n/a     Discharge Statement:  I spent 34 minutes discharging the patient  This time was spent on the day of discharge  I had direct contact with the patient on the day of discharge  Greater than 50% of the total time was spent examining patient, answering all patient questions, arranging and discussing plan of care with patient as well as directly providing post-discharge instructions  Additional time then spent on discharge activities  Discharge Medications:  See after visit summary for reconciled discharge medications provided to patient and family        ** Please Note: This note has been constructed using a voice recognition system **

## 2020-02-03 NOTE — ASSESSMENT & PLAN NOTE
· cxr suspects pneumonia although procalcitonin negative  · Discontinue antibiotics  · Discontinue prednisone, no exacerbation currently  · Nebs   · Respiratory protocol  · chronically on 4 L of oxygen at home  · Clinically is improving

## 2020-02-03 NOTE — PLAN OF CARE
Problem: Potential for Falls  Goal: Patient will remain free of falls  Description  INTERVENTIONS:  - Assess patient frequently for physical needs  -  Identify cognitive and physical deficits and behaviors that affect risk of falls    -  [place] fall precautions as indicated by assessment   - Educate patient/family on patient safety including physical limitations  - Instruct patient to call for assistance with activity based on assessment  - Modify environment to reduce risk of injury  - Consider OT/PT consult to assist with strengthening/mobility  Outcome: Progressing     Problem: MUSCULOSKELETAL - ADULT  Goal: Maintain or return mobility to safest level of function  Description  INTERVENTIONS:  - Assess patient's ability to carry out ADLs; assess patient's baseline for ADL function and identify physical deficits which impact ability to perform ADLs (bathing, care of mouth/teeth, toileting, grooming, dressing, etc )  - Assess/evaluate cause of self-care deficits   - Assess range of motion  - Assess patient's mobility  - Assess patient's need for assistive devices and provide as appropriate  - Encourage maximum independence but intervene and supervise when necessary  - Involve family in performance of ADLs  - Assess for home care needs following discharge   - Consider OT consult to assist with ADL evaluation and planning for discharge  - Provide patient education as appropriate  Outcome: Progressing  Goal: Maintain proper alignment of affected body part  Description  INTERVENTIONS:  - Support, maintain and protect limb and body alignment  - Provide patient/ family with appropriate education  Outcome: Progressing     Problem: PAIN - ADULT  Goal: Verbalizes/displays adequate comfort level or baseline comfort level  Description  Interventions:  - Encourage patient to monitor pain and request assistance  - Assess pain using appropriate pain scale  - Administer analgesics based on type and severity of pain and evaluate response  - Implement non-pharmacological measures as appropriate and evaluate response  - Consider cultural and social influences on pain and pain management  - Notify physician/advanced practitioner if interventions unsuccessful or patient reports new pain  Outcome: Progressing     Problem: INFECTION - ADULT  Goal: Absence or prevention of progression during hospitalization  Description  INTERVENTIONS:  - Assess and monitor for signs and symptoms of infection  - Monitor lab/diagnostic results  - Monitor all insertion sites, i e  indwelling lines, tubes, and drains  - Monitor endotracheal if appropriate and nasal secretions for changes in amount and color  - [place] appropriate cooling/warming therapies per order  - Administer medications as ordered  - Instruct and encourage patient and family to use good hand hygiene technique  - Identify and instruct in appropriate isolation precautions for identified infection/condition  Outcome: Progressing  Goal: Absence of fever/infection during neutropenic period  Description  INTERVENTIONS:  - Monitor WBC    Outcome: Progressing     Problem: SAFETY ADULT  Goal: Maintain or return to baseline ADL function  Description  INTERVENTIONS:  -  Assess patient's ability to carry out ADLs; assess patient's baseline for ADL function and identify physical deficits which impact ability to perform ADLs (bathing, care of mouth/teeth, toileting, grooming, dressing, etc )  - Assess/evaluate cause of self-care deficits   - Assess range of motion  - Assess patient's mobility; develop plan if impaired  - Assess patient's need for assistive devices and provide as appropriate  - Encourage maximum independence but intervene and supervise when necessary  - Involve family in performance of ADLs  - Assess for home care needs following discharge   - Consider OT consult to assist with ADL evaluation and planning for discharge  - Provide patient education as appropriate  Outcome: Progressing  Goal: Maintain or return mobility status to optimal level  Description  INTERVENTIONS:  - Assess patient's baseline mobility status (ambulation, transfers, stairs, etc )    - Identify cognitive and physical deficits and behaviors that affect mobility  - Identify mobility aids required to assist with transfers and/or ambulation (gait belt, sit-to-stand, lift, walker, cane, etc )  - [place] fall precautions as indicated by assessment  - Record patient progress and toleration of activity level on Mobility SBAR; progress patient to next Phase/Stage  - Instruct patient to call for assistance with activity based on assessment  - Consider rehabilitation consult to assist with strengthening/weightbearing, etc   Outcome: Progressing     Problem: DISCHARGE PLANNING  Goal: Discharge to home or other facility with appropriate resources  Description  INTERVENTIONS:  - Identify barriers to discharge w/patient and caregiver  - Arrange for needed discharge resources and transportation as appropriate  - Identify discharge learning needs (meds, wound care, etc )  - Arrange for interpretive services to assist at discharge as needed  - Refer to Case Management Department for coordinating discharge planning if the patient needs post-hospital services based on physician/advanced practitioner order or complex needs related to functional status, cognitive ability, or social support system  Outcome: Progressing     Problem: Knowledge Deficit  Goal: Patient/family/caregiver demonstrates understanding of disease process, treatment plan, medications, and discharge instructions  Description  Complete learning assessment and assess knowledge base    Interventions:  - Provide teaching at level of understanding  - Provide teaching via preferred learning methods  Outcome: Progressing     Problem: Prexisting or High Potential for Compromised Skin Integrity  Goal: Skin integrity is maintained or improved  Description  INTERVENTIONS:  - Identify patients at risk for skin breakdown  - Assess and monitor skin integrity  - Assess and monitor nutrition and hydration status  - Monitor labs   - Assess for incontinence   - Turn and reposition patient  - Assist with mobility/ambulation  - Relieve pressure over bony prominences  - Avoid friction and shearing  - Provide appropriate hygiene as needed including keeping skin clean and dry  - Evaluate need for skin moisturizer/barrier cream  - Collaborate with interdisciplinary team   - Patient/family teaching  - Consider wound care consult   Outcome: Progressing     Problem: DISCHARGE PLANNING - CARE MANAGEMENT  Goal: Discharge to post-acute care or home with appropriate resources  Description  INTERVENTIONS:  - Conduct assessment to determine patient/family and health care team treatment goals, and need for post-acute services based on payer coverage, community resources, and patient preferences, and barriers to discharge  - Address psychosocial, clinical, and financial barriers to discharge as identified in assessment in conjunction with the patient/family and health care team  - Arrange appropriate level of post-acute services according to patient's   needs and preference and payer coverage in collaboration with the physician and health care team  - Communicate with and update the patient/family, physician, and health care team regarding progress on the discharge plan  - Arrange appropriate transportation to post-acute venues  Pt will need STR s/p hip repair     Outcome: Progressing

## 2020-02-03 NOTE — PLAN OF CARE
Problem: PHYSICAL THERAPY ADULT  Goal: Performs mobility at highest level of function for planned discharge setting  See evaluation for individualized goals  Description  Treatment/Interventions: Functional transfer training, LE strengthening/ROM, Elevations, Therapeutic exercise, Endurance training, Patient/family training, Equipment eval/education, Bed mobility, Gait training, Spoke to nursing, Spoke to case management, OT, Family  Equipment Recommended: Walker(pt uses RW at baseline)       See flowsheet documentation for full assessment, interventions and recommendations  Outcome: Progressing  Note:   Prognosis: Good  Problem List: Decreased strength, Decreased endurance, Impaired balance, Decreased mobility, Decreased safety awareness, Pain, Orthopedic restrictions, Decreased skin integrity  Assessment: Pt seen for PT treatment session this date with interventions consisting of gait training w/ emphasis on improving pt's ability to ambulate level surfaces x 8 ft forward/backward with min A of 2 provided by therapist with RW, Therapeutic exercise consisting of: AROM 20 reps B LE in sitting position and therapeutic activity consisting of training: supine<>sit transfers, sit<>stand transfers and vc and tactile cues for static standing posture faciliation  Pt agreeable to PT treatment session upon arrival, pt found supine in bed w/ HOB elevated, in no apparent distress and responsive  In comparison to previous session, pt with improvements in tolerating increased amb distances c less physical A  Pt c improved ability to maintain TTWB c amb, however still requires mod/max VCs for maintaining such  Post session: pt returned back to recliner, chair alarm engaged, all needs in reach and RN notified of session findings/recommendations, breakfast tray placed in front of patient  Continue to recommend STR at time of d/c in order to maximize pt's functional independence and safety w/ mobility   Pt continues to be functioning below baseline level, and remains limited 2* factors listed above  PT will continue to see pt while here in order to address the deficits listed above and provide interventions consistent w/ POC in effort to achieve STGs  Barriers to Discharge: Inaccessible home environment     Recommendation: Short-term skilled PT     PT - OK to Discharge: Yes(when medically cleared, if to STR)    See flowsheet documentation for full assessment

## 2020-02-03 NOTE — PROGRESS NOTES
Saw patient on Vic rounds for vic scale of 16  Patient has a healing area to the right ischium, scab intact, periwound blanchable  Patient states he spends most of time in wheelchair  Buttocks and heels intact  1  EHOB waffle cushion to chair when OOB  2  Skin nourishing cream daily  3  Apply Allevyn life silicone bordered foam to the right ischium, luis eduardo with P, date, peel back daily for skin assessment, reapply, change every 3 days or with soilage or dislodgement  4   Apply Allevyn life silicone bordered foam to sacrum, luis eduardo with P, date, peel back daily for skin assessment, reapply, change every 3 days or with soilage or dislodgement

## 2020-02-03 NOTE — PLAN OF CARE
Problem: Potential for Falls  Goal: Patient will remain free of falls  Description  INTERVENTIONS:  - Assess patient frequently for physical needs  -  Identify cognitive and physical deficits and behaviors that affect risk of falls    -  [place] fall precautions as indicated by assessment   - Educate patient/family on patient safety including physical limitations  - Instruct patient to call for assistance with activity based on assessment  - Modify environment to reduce risk of injury  - Consider OT/PT consult to assist with strengthening/mobility  Outcome: Progressing     Problem: MUSCULOSKELETAL - ADULT  Goal: Maintain or return mobility to safest level of function  Description  INTERVENTIONS:  - Assess patient's ability to carry out ADLs; assess patient's baseline for ADL function and identify physical deficits which impact ability to perform ADLs (bathing, care of mouth/teeth, toileting, grooming, dressing, etc )  - Assess/evaluate cause of self-care deficits   - Assess range of motion  - Assess patient's mobility  - Assess patient's need for assistive devices and provide as appropriate  - Encourage maximum independence but intervene and supervise when necessary  - Involve family in performance of ADLs  - Assess for home care needs following discharge   - Consider OT consult to assist with ADL evaluation and planning for discharge  - Provide patient education as appropriate  Outcome: Progressing  Goal: Maintain proper alignment of affected body part  Description  INTERVENTIONS:  - Support, maintain and protect limb and body alignment  - Provide patient/ family with appropriate education  Outcome: Progressing     Problem: PAIN - ADULT  Goal: Verbalizes/displays adequate comfort level or baseline comfort level  Description  Interventions:  - Encourage patient to monitor pain and request assistance  - Assess pain using appropriate pain scale  - Administer analgesics based on type and severity of pain and evaluate response  - Implement non-pharmacological measures as appropriate and evaluate response  - Consider cultural and social influences on pain and pain management  - Notify physician/advanced practitioner if interventions unsuccessful or patient reports new pain  Outcome: Progressing     Problem: INFECTION - ADULT  Goal: Absence or prevention of progression during hospitalization  Description  INTERVENTIONS:  - Assess and monitor for signs and symptoms of infection  - Monitor lab/diagnostic results  - Monitor all insertion sites, i e  indwelling lines, tubes, and drains  - Monitor endotracheal if appropriate and nasal secretions for changes in amount and color  - [place] appropriate cooling/warming therapies per order  - Administer medications as ordered  - Instruct and encourage patient and family to use good hand hygiene technique  - Identify and instruct in appropriate isolation precautions for identified infection/condition  Outcome: Progressing  Goal: Absence of fever/infection during neutropenic period  Description  INTERVENTIONS:  - Monitor WBC    Outcome: Progressing     Problem: SAFETY ADULT  Goal: Maintain or return to baseline ADL function  Description  INTERVENTIONS:  -  Assess patient's ability to carry out ADLs; assess patient's baseline for ADL function and identify physical deficits which impact ability to perform ADLs (bathing, care of mouth/teeth, toileting, grooming, dressing, etc )  - Assess/evaluate cause of self-care deficits   - Assess range of motion  - Assess patient's mobility; develop plan if impaired  - Assess patient's need for assistive devices and provide as appropriate  - Encourage maximum independence but intervene and supervise when necessary  - Involve family in performance of ADLs  - Assess for home care needs following discharge   - Consider OT consult to assist with ADL evaluation and planning for discharge  - Provide patient education as appropriate  Outcome: Progressing  Goal: Maintain or return mobility status to optimal level  Description  INTERVENTIONS:  - Assess patient's baseline mobility status (ambulation, transfers, stairs, etc )    - Identify cognitive and physical deficits and behaviors that affect mobility  - Identify mobility aids required to assist with transfers and/or ambulation (gait belt, sit-to-stand, lift, walker, cane, etc )  - [place] fall precautions as indicated by assessment  - Record patient progress and toleration of activity level on Mobility SBAR; progress patient to next Phase/Stage  - Instruct patient to call for assistance with activity based on assessment  - Consider rehabilitation consult to assist with strengthening/weightbearing, etc   Outcome: Progressing     Problem: DISCHARGE PLANNING  Goal: Discharge to home or other facility with appropriate resources  Description  INTERVENTIONS:  - Identify barriers to discharge w/patient and caregiver  - Arrange for needed discharge resources and transportation as appropriate  - Identify discharge learning needs (meds, wound care, etc )  - Arrange for interpretive services to assist at discharge as needed  - Refer to Case Management Department for coordinating discharge planning if the patient needs post-hospital services based on physician/advanced practitioner order or complex needs related to functional status, cognitive ability, or social support system  Outcome: Progressing     Problem: Knowledge Deficit  Goal: Patient/family/caregiver demonstrates understanding of disease process, treatment plan, medications, and discharge instructions  Description  Complete learning assessment and assess knowledge base    Interventions:  - Provide teaching at level of understanding  - Provide teaching via preferred learning methods  Outcome: Progressing     Problem: Prexisting or High Potential for Compromised Skin Integrity  Goal: Skin integrity is maintained or improved  Description  INTERVENTIONS:  - Identify patients at risk for skin breakdown  - Assess and monitor skin integrity  - Assess and monitor nutrition and hydration status  - Monitor labs   - Assess for incontinence   - Turn and reposition patient  - Assist with mobility/ambulation  - Relieve pressure over bony prominences  - Avoid friction and shearing  - Provide appropriate hygiene as needed including keeping skin clean and dry  - Evaluate need for skin moisturizer/barrier cream  - Collaborate with interdisciplinary team   - Patient/family teaching  - Consider wound care consult   Outcome: Progressing     Problem: DISCHARGE PLANNING - CARE MANAGEMENT  Goal: Discharge to post-acute care or home with appropriate resources  Description  INTERVENTIONS:  - Conduct assessment to determine patient/family and health care team treatment goals, and need for post-acute services based on payer coverage, community resources, and patient preferences, and barriers to discharge  - Address psychosocial, clinical, and financial barriers to discharge as identified in assessment in conjunction with the patient/family and health care team  - Arrange appropriate level of post-acute services according to patient's   needs and preference and payer coverage in collaboration with the physician and health care team  - Communicate with and update the patient/family, physician, and health care team regarding progress on the discharge plan  - Arrange appropriate transportation to post-acute venues  Pt will need STR s/p hip repair     Outcome: Progressing

## 2020-02-03 NOTE — OCCUPATIONAL THERAPY NOTE
02/03/20 0945   Restrictions/Precautions   Weight Bearing Precautions Per Order Yes   RLE Weight Bearing Per Order TTWB   Other Precautions Cognitive; Chair Alarm;O2;Fall Risk;Hard of hearing   ADL   Where Assessed Chair   Grooming Comments patient declined mouthcare - he Did comb his hair   UB Bathing Assistance 4  Minimal Assistance   UB Bathing Deficit Setup;Verbal cueing;Supervision/safety; Increased time to complete   LB Bathing Assistance 2  Maximal Assistance   LB Bathing Deficit Setup;Verbal cueing;Supervision/safety; Increased time to complete; Buttocks;Right lower leg including foot; Left lower leg including foot   UB Dressing Comments Min  assist  required with hospital gown    LB Dressing Comments Requires Max  assist  for All LB care   Toileting Comments using bedpan secondary to impaired transfer status   Bed Mobility   Additional Comments please see PT note   Transfers   Additional Comments please see PT note   Coordination   Gross Motor [facility]   Dexterity WFL   Cognition   Overall Cognitive Status Impaired   Arousal/Participation Alert; Responsive; Cooperative   Comments patient reports "this isn't like the last time -- (when) I did 'good' "    Activity Tolerance   Activity Tolerance Patient limited by fatigue;Patient limited by pain   Assessment   Assessment Patient participated in Skilled OT session this date with interventions consisting of ADL re training with the use of correct body mechnaics and Energy Conservation techniques and the initiation of instruction LB/LH AE   Patient agreeable to OT treatment session, upon arrival patient was found seated OOB to Chair  In comparison to previous session, patient with improvements (minimal) in self care ability  Patient requiring verbal cues for correct technique and one step directives and occasional rest periods   Patient continues to be functioning below baseline level, occupational performance remains limited secondary to factors listed above and increased risk for falls and injury  From OT standpoint, recommendation at time of d/c would be Short Term Rehab --- encouraged patient to look for improvements each day  Patient to benefit from continued Occupational Therapy treatment while in the hospital to address deficits as defined above and maximize level of functional independence with ADLs and functional mobility  Plan   Treatment Interventions ADL retraining;Patient/family training;Equipment evaluation/education; Activityengagement   Goal Expiration Date 02/11/20   OT Treatment Day 2   KRISTEN Benson/JOHNNIE

## 2020-02-03 NOTE — PLAN OF CARE
Problem: Potential for Falls  Goal: Patient will remain free of falls  Description  INTERVENTIONS:  - Assess patient frequently for physical needs  -  Identify cognitive and physical deficits and behaviors that affect risk of falls    -  [place] fall precautions as indicated by assessment   - Educate patient/family on patient safety including physical limitations  - Instruct patient to call for assistance with activity based on assessment  - Modify environment to reduce risk of injury  - Consider OT/PT consult to assist with strengthening/mobility  2/3/2020 1348 by Heide Harp RN  Outcome: Adequate for Discharge  2/3/2020 0828 by Heide Harp RN  Outcome: Progressing     Problem: MUSCULOSKELETAL - ADULT  Goal: Maintain or return mobility to safest level of function  Description  INTERVENTIONS:  - Assess patient's ability to carry out ADLs; assess patient's baseline for ADL function and identify physical deficits which impact ability to perform ADLs (bathing, care of mouth/teeth, toileting, grooming, dressing, etc )  - Assess/evaluate cause of self-care deficits   - Assess range of motion  - Assess patient's mobility  - Assess patient's need for assistive devices and provide as appropriate  - Encourage maximum independence but intervene and supervise when necessary  - Involve family in performance of ADLs  - Assess for home care needs following discharge   - Consider OT consult to assist with ADL evaluation and planning for discharge  - Provide patient education as appropriate  2/3/2020 1348 by Heide Harp RN  Outcome: Adequate for Discharge  2/3/2020 7428 by Heide Harp RN  Outcome: Progressing  Goal: Maintain proper alignment of affected body part  Description  INTERVENTIONS:  - Support, maintain and protect limb and body alignment  - Provide patient/ family with appropriate education  2/3/[phone] by Heide Harp RN  Outcome: Adequate for Discharge  2/3/2020 8375 by Radames Danielle VEE Delgado  Outcome: Progressing     Problem: PAIN - ADULT  Goal: Verbalizes/displays adequate comfort level or baseline comfort level  Description  Interventions:  - Encourage patient to monitor pain and request assistance  - Assess pain using appropriate pain scale  - Administer analgesics based on type and severity of pain and evaluate response  - Implement non-pharmacological measures as appropriate and evaluate response  - Consider cultural and social influences on pain and pain management  - Notify physician/advanced practitioner if interventions unsuccessful or patient reports new pain  2/3/2020 1348 by Dmitry Gan RN  Outcome: Adequate for Discharge  2/3/2020 0828 by Dmitry Gan RN  Outcome: Progressing     Problem: INFECTION - ADULT  Goal: Absence or prevention of progression during hospitalization  Description  INTERVENTIONS:  - Assess and monitor for signs and symptoms of infection  - Monitor lab/diagnostic results  - Monitor all insertion sites, i e  indwelling lines, tubes, and drains  - Monitor endotracheal if appropriate and nasal secretions for changes in amount and color  - [place] appropriate cooling/warming therapies per order  - Administer medications as ordered  - Instruct and encourage patient and family to use good hand hygiene technique  - Identify and instruct in appropriate isolation precautions for identified infection/condition  2/3/2020 1348 by Dmitry Gan RN  Outcome: Adequate for Discharge  2/3/2020 6274 by Dmitry Gan RN  Outcome: Progressing  Goal: Absence of fever/infection during neutropenic period  Description  INTERVENTIONS:  - Monitor WBC    2/3/2020 1348 by Dmitry Gan RN  Outcome: Adequate for Discharge  2/3/2020 0828 by Dmitry Gan RN  Outcome: Progressing     Problem: SAFETY ADULT  Goal: Maintain or return to baseline ADL function  Description  INTERVENTIONS:  -  Assess patient's ability to carry out ADLs; assess patient's baseline for ADL function and identify physical deficits which impact ability to perform ADLs (bathing, care of mouth/teeth, toileting, grooming, dressing, etc )  - Assess/evaluate cause of self-care deficits   - Assess range of motion  - Assess patient's mobility; develop plan if impaired  - Assess patient's need for assistive devices and provide as appropriate  - Encourage maximum independence but intervene and supervise when necessary  - Involve family in performance of ADLs  - Assess for home care needs following discharge   - Consider OT consult to assist with ADL evaluation and planning for discharge  - Provide patient education as appropriate  2/3/2020 1348 by Lizette Serrano RN  Outcome: Adequate for Discharge  2/3/2020 3506 by Lizette Serrano RN  Outcome: Progressing  Goal: Maintain or return mobility status to optimal level  Description  INTERVENTIONS:  - Assess patient's baseline mobility status (ambulation, transfers, stairs, etc )    - Identify cognitive and physical deficits and behaviors that affect mobility  - Identify mobility aids required to assist with transfers and/or ambulation (gait belt, sit-to-stand, lift, walker, cane, etc )  - [place] fall precautions as indicated by assessment  - Record patient progress and toleration of activity level on Mobility SBAR; progress patient to next Phase/Stage  - Instruct patient to call for assistance with activity based on assessment  - Consider rehabilitation consult to assist with strengthening/weightbearing, etc   2/3/2020 1348 by Lizette Serrano RN  Outcome: Adequate for Discharge  2/3/2020 0828 by Lizette Serrano RN  Outcome: Progressing     Problem: DISCHARGE PLANNING  Goal: Discharge to home or other facility with appropriate resources  Description  INTERVENTIONS:  - Identify barriers to discharge w/patient and caregiver  - Arrange for needed discharge resources and transportation as appropriate  - Identify discharge learning needs (meds, wound care, etc )  - Arrange for interpretive services to assist at discharge as needed  - Refer to Case Management Department for coordinating discharge planning if the patient needs post-hospital services based on physician/advanced practitioner order or complex needs related to functional status, cognitive ability, or social support system  2/3/2020 1348 by Kaz Shah RN  Outcome: Adequate for Discharge  2/3/2020 0828 by Kaz Shah RN  Outcome: Progressing     Problem: Knowledge Deficit  Goal: Patient/family/caregiver demonstrates understanding of disease process, treatment plan, medications, and discharge instructions  Description  Complete learning assessment and assess knowledge base    Interventions:  - Provide teaching at level of understanding  - Provide teaching via preferred learning methods  2/3/2020 1348 by Kaz Shah RN  Outcome: Adequate for Discharge  2/3/2020 0828 by Kaz Shah RN  Outcome: Progressing     Problem: Prexisting or High Potential for Compromised Skin Integrity  Goal: Skin integrity is maintained or improved  Description  INTERVENTIONS:  - Identify patients at risk for skin breakdown  - Assess and monitor skin integrity  - Assess and monitor nutrition and hydration status  - Monitor labs   - Assess for incontinence   - Turn and reposition patient  - Assist with mobility/ambulation  - Relieve pressure over bony prominences  - Avoid friction and shearing  - Provide appropriate hygiene as needed including keeping skin clean and dry  - Evaluate need for skin moisturizer/barrier cream  - Collaborate with interdisciplinary team   - Patient/family teaching  - Consider wound care consult   2/3/2020 1348 by Kaz Shah RN  Outcome: Adequate for Discharge  2/3/2020 7886 by Kaz Shah RN  Outcome: Progressing     Problem: DISCHARGE PLANNING - CARE MANAGEMENT  Goal: Discharge to post-acute care or home with appropriate resources  Description  INTERVENTIONS:  - Conduct assessment to determine patient/family and health care team treatment goals, and need for post-acute services based on payer coverage, community resources, and patient preferences, and barriers to discharge  - Address psychosocial, clinical, and financial barriers to discharge as identified in assessment in conjunction with the patient/family and health care team  - Arrange appropriate level of post-acute services according to patient's   needs and preference and payer coverage in collaboration with the physician and health care team  - Communicate with and update the patient/family, physician, and health care team regarding progress on the discharge plan  - Arrange appropriate transportation to post-acute venues  Pt will need STR s/p hip repair     2/3/2020 1348 by Coty Carpenter RN  Outcome: Adequate for Discharge  2/3/2020 5844 by Coty Carpenter, RN  Outcome: Progressing

## 2020-02-03 NOTE — ASSESSMENT & PLAN NOTE
· Chronically on 4 L which is his home regimen  · At baseline now  · Incentive spirometry, early ambulation cough and deep breathing

## 2020-02-03 NOTE — NURSING NOTE
Pt oob in his chair in no acute distress visiting with wife, adilene in reach of the pt and chair alarm is on

## 2020-02-03 NOTE — DISCHARGE INSTR - OTHER ORDERS
1  EHOB waffle cushion to chair when OOB  2  Skin nourishing cream daily  3  Apply Allevyn life silicone bordered foam to the right ischium, luis eduardo with P, date, peel back daily for skin assessment, reapply, change every 3 days or with soilage or dislodgement  4   Apply Allevyn life silicone bordered foam to sacrum, luis eduardo with P, date, peel back daily for skin assessment, reapply, change every 3 days or with soilage or dislodgement

## 2020-02-03 NOTE — PHYSICAL THERAPY NOTE
Physical Therapy Treatment Note       02/03/20 0829   Pain Assessment   Pain Assessment 0-10   Pain Score 5   Pain Type Surgical pain   Pain Location Hip   Pain Orientation Right   Pain Onset Ongoing   Clinical Progression Not changed   Hospital Pain Intervention(s) Repositioned; Ambulation/increased activity; Emotional support; Rest   Restrictions/Precautions   Weight Bearing Precautions Per Order Yes   RLE Weight Bearing Per Order TTWB   Other Precautions O2;Fall Risk;Pain;Hard of hearing;Cognitive; Chair Alarm; Bed Alarm  (4 L O2 via NC)   General   Chart Reviewed Yes   Response to Previous Treatment Patient unable to report, no changes reported from family or staff   Family/Caregiver Present No   Cognition   Arousal/Participation Alert; Responsive; Cooperative  (improved alertness this session)   Attention Attends with cues to redirect   Memory Decreased recall of precautions   Following Commands Follows one step commands with increased time or repetition   Comments pt agreeable to PT session   Subjective   Subjective "I thought I was supposed to leave yesterday, I don't know why I'm still here"   Bed Mobility   Supine to Sit 3  Moderate assistance   Additional items Assist x 2;HOB elevated; Bedrails; Increased time required;Verbal cues;LE management   Transfers   Sit to Stand 4  Minimal assistance   Additional items Assist x 2; Increased time required;Verbal cues  (max VCs to maintain TTWB)   Stand to Sit 4  Minimal assistance   Additional items Assist x 2;Armrests; Increased time required;Verbal cues   Additional Comments SpO2 at rest seated OOB in recliner = 86% on 4 L O2  Immediately following short amb trial, SpO2 on 4 L = 76%, increased to 86-88% within 1 min of seated rest c encouragement for PLB and deep breathing techniques  Pt denies lightheadedness/dizziness c mobility tasks this session   Ambulation/Elevation   Gait pattern Improper Weight shift; Shuffling; Step to;Excessively slow   Gait Assistance 4  Minimal assist   Additional items Assist x 2;Verbal cues; Tactile cues   Assistive Device Rolling walker   Distance 8 ft  forward/backward   Stair Management Assistance Not tested   Balance   Static Sitting Fair   Dynamic Sitting Fair -   Static Standing Fair -   Dynamic Standing Poor +   Ambulatory Poor +   Endurance Deficit   Endurance Deficit Yes   Activity Tolerance   Activity Tolerance Patient limited by fatigue;Patient limited by pain   Nurse Made Aware Yes, RN Milly Folds verbalized pt appropriate for PT session   Exercises   Glute Sets Sitting;AROM; Bilateral;20 reps   Knee AROM Long Arc Quad Sitting;20 reps;AROM; Right;Left   Ankle Pumps Sitting;AROM; Bilateral;25 reps   Marching Sitting;20 reps;AROM; Right;Left   Assessment   Prognosis Good   Problem List Decreased strength;Decreased endurance; Impaired balance;Decreased mobility; Decreased safety awareness;Pain;Orthopedic restrictions;Decreased skin integrity   Assessment Pt seen for PT treatment session this date with interventions consisting of gait training w/ emphasis on improving pt's ability to ambulate level surfaces x 8 ft forward/backward with min A of 2 provided by therapist with RW, Therapeutic exercise consisting of: AROM 20 reps B LE in sitting position and therapeutic activity consisting of training: supine<>sit transfers, sit<>stand transfers and vc and tactile cues for static standing posture faciliation  Pt agreeable to PT treatment session upon arrival, pt found supine in bed w/ HOB elevated, in no apparent distress and responsive  In comparison to previous session, pt with improvements in tolerating increased amb distances c less physical A  Pt c improved ability to maintain TTWB c amb, however still requires mod/max VCs for maintaining such  Post session: pt returned back to recliner, chair alarm engaged, all needs in reach and RN notified of session findings/recommendations, breakfast tray placed in front of patient   Continue to recommend STR at time of d/c in order to maximize pt's functional independence and safety w/ mobility  Pt continues to be functioning below baseline level, and remains limited 2* factors listed above  PT will continue to see pt while here in order to address the deficits listed above and provide interventions consistent w/ POC in effort to achieve STGs  Barriers to Discharge Inaccessible home environment   Goals   Patient Goals "to go home"   STG Expiration Date 02/11/20   Short Term Goal #1 goals remain appropriate   PT Treatment Day 3   Plan   Treatment/Interventions Functional transfer training;LE strengthening/ROM; Elevations; Therapeutic exercise; Endurance training;Patient/family training;Equipment eval/education; Bed mobility;Gait training;Spoke to nursing;Spoke to case management;OT;Family   Progress Slow progress, decreased activity tolerance   PT Frequency 5x/wk; Weekend  (+1x/wkend)   Recommendation   Recommendation Short-term skilled PT   Equipment Recommended Walker  (pt uses RW at baseline)   PT - OK to Discharge Yes  (when medically cleared, if to STR)       Saulo Galloway, PT    Time of PT treatment session: [id number]

## 2020-02-03 NOTE — ASSESSMENT & PLAN NOTE
Lab Results   Component Value Date    HGBA1C 7 3 (H) 12/23/2019       Recent Labs     02/02/20  1109 02/02/20  1624 02/02/20 2019 02/03/20  0634   POCGLU 300* 323* 308* 248*       Blood Sugar Average: Last 72 hrs:  (P) 217 [id number]   · Continue with insulin sliding scale  · Increase lantus

## 2020-02-04 ENCOUNTER — PATIENT OUTREACH (OUTPATIENT)
Dept: CASE MANAGEMENT | Facility: OTHER | Age: 85
End: 2020-02-04

## 2020-02-04 LAB — GLUCOSE SERPL-MCNC: 254 MG/DL (ref 65–140)

## 2020-02-04 PROCEDURE — 82948 REAGENT STRIP/BLOOD GLUCOSE: CPT

## 2020-02-05 LAB
GLUCOSE SERPL-MCNC: 134 MG/DL (ref 65–140)
GLUCOSE SERPL-MCNC: 213 MG/DL (ref 65–140)
GLUCOSE SERPL-MCNC: 227 MG/DL (ref 65–140)

## 2020-02-05 PROCEDURE — 82948 REAGENT STRIP/BLOOD GLUCOSE: CPT

## 2020-02-05 NOTE — PROGRESS NOTES
Email from  asking this CM if patient has active medicare bundle  This CM sent reply Domitila Gaytan that patient is not currently enrolled in medicare bundle

## 2020-02-06 LAB
BACTERIA BLD CULT: NORMAL
BACTERIA BLD CULT: NORMAL

## 2020-02-06 PROCEDURE — 82948 REAGENT STRIP/BLOOD GLUCOSE: CPT

## 2020-02-07 LAB
GLUCOSE SERPL-MCNC: 119 MG/DL (ref 65–140)
GLUCOSE SERPL-MCNC: 126 MG/DL (ref 65–140)
GLUCOSE SERPL-MCNC: 217 MG/DL (ref 65–140)
GLUCOSE SERPL-MCNC: 254 MG/DL (ref 65–140)

## 2020-02-07 PROCEDURE — 82948 REAGENT STRIP/BLOOD GLUCOSE: CPT

## 2020-02-08 PROCEDURE — 82948 REAGENT STRIP/BLOOD GLUCOSE: CPT

## 2020-02-09 PROCEDURE — 82948 REAGENT STRIP/BLOOD GLUCOSE: CPT

## 2020-02-10 ENCOUNTER — HOSPITAL ENCOUNTER (INPATIENT)
Facility: HOSPITAL | Age: 85
LOS: 4 days | Discharge: NURSING, REHAB OR LONG-TERM CARE | DRG: 871 | End: 2020-02-14
Attending: EMERGENCY MEDICINE | Admitting: FAMILY MEDICINE
Payer: MEDICARE

## 2020-02-10 ENCOUNTER — APPOINTMENT (EMERGENCY)
Dept: CT IMAGING | Facility: HOSPITAL | Age: 85
DRG: 871 | End: 2020-02-10
Payer: MEDICARE

## 2020-02-10 ENCOUNTER — TRANSCRIBE ORDERS (OUTPATIENT)
Dept: ADMINISTRATIVE | Facility: HOSPITAL | Age: 85
End: 2020-02-10

## 2020-02-10 DIAGNOSIS — J18.9 PNEUMONIA: ICD-10-CM

## 2020-02-10 DIAGNOSIS — N13.9 OBSTRUCTIVE UROPATHY: ICD-10-CM

## 2020-02-10 DIAGNOSIS — R07.9 CHEST PAIN, UNSPECIFIED TYPE: ICD-10-CM

## 2020-02-10 DIAGNOSIS — Z97.8 FOLEY CATHETER PRESENT: ICD-10-CM

## 2020-02-10 DIAGNOSIS — N39.0 RECURRENT UTI: ICD-10-CM

## 2020-02-10 DIAGNOSIS — J44.1 CHRONIC OBSTRUCTIVE PULMONARY DISEASE WITH ACUTE EXACERBATION (HCC): ICD-10-CM

## 2020-02-10 DIAGNOSIS — J69.0 ASPIRATION PNEUMONITIS (HCC): ICD-10-CM

## 2020-02-10 DIAGNOSIS — J44.1 COPD EXACERBATION (HCC): Primary | ICD-10-CM

## 2020-02-10 DIAGNOSIS — R06.02 SOB (SHORTNESS OF BREATH): Primary | ICD-10-CM

## 2020-02-10 DIAGNOSIS — J96.90 RESPIRATORY FAILURE (HCC): ICD-10-CM

## 2020-02-10 DIAGNOSIS — R05.9 COUGH: ICD-10-CM

## 2020-02-10 DIAGNOSIS — J96.21 ACUTE ON CHRONIC RESPIRATORY FAILURE WITH HYPOXIA (HCC): ICD-10-CM

## 2020-02-10 PROBLEM — A41.9 SEPSIS (HCC): Status: ACTIVE | Noted: 2020-02-10

## 2020-02-10 LAB
ALBUMIN SERPL BCP-MCNC: 3.4 G/DL (ref 3.5–5.7)
ALP SERPL-CCNC: 95 U/L (ref 55–165)
ALT SERPL W P-5'-P-CCNC: 19 U/L (ref 7–52)
ANION GAP SERPL CALCULATED.3IONS-SCNC: 5 MMOL/L (ref 4–13)
APTT PPP: 37 SECONDS (ref 23–37)
AST SERPL W P-5'-P-CCNC: 20 U/L (ref 13–39)
ATRIAL RATE: 88 BPM
BASOPHILS # BLD AUTO: 0 THOUSANDS/ΜL (ref 0–0.1)
BASOPHILS NFR BLD AUTO: 0 % (ref 0–2)
BILIRUB SERPL-MCNC: 0.6 MG/DL (ref 0.2–1)
BUN SERPL-MCNC: 18 MG/DL (ref 7–25)
CALCIUM SERPL-MCNC: 9 MG/DL (ref 8.6–10.5)
CHLORIDE SERPL-SCNC: 97 MMOL/L (ref 98–107)
CO2 SERPL-SCNC: 37 MMOL/L (ref 21–31)
CREAT SERPL-MCNC: 0.72 MG/DL (ref 0.7–1.3)
EOSINOPHIL # BLD AUTO: 0.1 THOUSAND/ΜL (ref 0–0.61)
EOSINOPHIL NFR BLD AUTO: 1 % (ref 0–5)
ERYTHROCYTE [DISTWIDTH] IN BLOOD BY AUTOMATED COUNT: 14.5 % (ref 11.5–14.5)
GFR SERPL CREATININE-BSD FRML MDRD: 86 ML/MIN/1.73SQ M
GLUCOSE SERPL-MCNC: 113 MG/DL (ref 65–99)
GLUCOSE SERPL-MCNC: 253 MG/DL (ref 65–140)
HCT VFR BLD AUTO: 40.4 % (ref 42–47)
HGB BLD-MCNC: 12.6 G/DL (ref 14–18)
INR PPP: 1.11 (ref 0.9–1.5)
LACTATE SERPL-SCNC: 1.8 MMOL/L (ref 0.5–2)
LYMPHOCYTES # BLD AUTO: 0.8 THOUSANDS/ΜL (ref 0.6–4.47)
LYMPHOCYTES NFR BLD AUTO: 6 % (ref 21–51)
MCH RBC QN AUTO: 30.2 PG (ref 26–34)
MCHC RBC AUTO-ENTMCNC: 31.2 G/DL (ref 31–37)
MCV RBC AUTO: 97 FL (ref 81–99)
MONOCYTES # BLD AUTO: 1.3 THOUSAND/ΜL (ref 0.17–1.22)
MONOCYTES NFR BLD AUTO: 10 % (ref 2–12)
NEUTROPHILS # BLD AUTO: 11.5 THOUSANDS/ΜL (ref 1.4–6.5)
NEUTS SEG NFR BLD AUTO: 84 % (ref 42–75)
P AXIS: 68 DEGREES
PLATELET # BLD AUTO: 216 THOUSANDS/UL (ref 149–390)
PMV BLD AUTO: 8.1 FL (ref 8.6–11.7)
POTASSIUM SERPL-SCNC: 4.6 MMOL/L (ref 3.5–5.5)
PR INTERVAL: 222 MS
PROT SERPL-MCNC: 6.2 G/DL (ref 6.4–8.9)
PROTHROMBIN TIME: 12.9 SECONDS (ref 10.2–13)
QRS AXIS: 75 DEGREES
QRSD INTERVAL: 64 MS
QT INTERVAL: 348 MS
QTC INTERVAL: 421 MS
RBC # BLD AUTO: 4.18 MILLION/UL (ref 4.3–5.9)
SODIUM SERPL-SCNC: 139 MMOL/L (ref 134–143)
T WAVE AXIS: 80 DEGREES
VENTRICULAR RATE: 88 BPM
WBC # BLD AUTO: 13.8 THOUSAND/UL (ref 4.8–10.8)

## 2020-02-10 PROCEDURE — 82948 REAGENT STRIP/BLOOD GLUCOSE: CPT

## 2020-02-10 PROCEDURE — 1123F ACP DISCUSS/DSCN MKR DOCD: CPT | Performed by: EMERGENCY MEDICINE

## 2020-02-10 PROCEDURE — 94760 N-INVAS EAR/PLS OXIMETRY 1: CPT

## 2020-02-10 PROCEDURE — 84145 PROCALCITONIN (PCT): CPT | Performed by: NURSE PRACTITIONER

## 2020-02-10 PROCEDURE — 85025 COMPLETE CBC W/AUTO DIFF WBC: CPT | Performed by: EMERGENCY MEDICINE

## 2020-02-10 PROCEDURE — 94644 CONT INHLJ TX 1ST HOUR: CPT

## 2020-02-10 PROCEDURE — 93005 ELECTROCARDIOGRAM TRACING: CPT

## 2020-02-10 PROCEDURE — 85730 THROMBOPLASTIN TIME PARTIAL: CPT | Performed by: EMERGENCY MEDICINE

## 2020-02-10 PROCEDURE — 99285 EMERGENCY DEPT VISIT HI MDM: CPT

## 2020-02-10 PROCEDURE — 99283 EMERGENCY DEPT VISIT LOW MDM: CPT | Performed by: EMERGENCY MEDICINE

## 2020-02-10 PROCEDURE — 85610 PROTHROMBIN TIME: CPT | Performed by: EMERGENCY MEDICINE

## 2020-02-10 PROCEDURE — 80053 COMPREHEN METABOLIC PANEL: CPT | Performed by: EMERGENCY MEDICINE

## 2020-02-10 PROCEDURE — 94660 CPAP INITIATION&MGMT: CPT

## 2020-02-10 PROCEDURE — 99223 1ST HOSP IP/OBS HIGH 75: CPT | Performed by: NURSE PRACTITIONER

## 2020-02-10 PROCEDURE — 36415 COLL VENOUS BLD VENIPUNCTURE: CPT | Performed by: EMERGENCY MEDICINE

## 2020-02-10 PROCEDURE — 87081 CULTURE SCREEN ONLY: CPT | Performed by: NURSE PRACTITIONER

## 2020-02-10 PROCEDURE — 71275 CT ANGIOGRAPHY CHEST: CPT

## 2020-02-10 PROCEDURE — 87040 BLOOD CULTURE FOR BACTERIA: CPT | Performed by: EMERGENCY MEDICINE

## 2020-02-10 PROCEDURE — 96374 THER/PROPH/DIAG INJ IV PUSH: CPT

## 2020-02-10 PROCEDURE — 94640 AIRWAY INHALATION TREATMENT: CPT

## 2020-02-10 PROCEDURE — 93010 ELECTROCARDIOGRAM REPORT: CPT | Performed by: INTERNAL MEDICINE

## 2020-02-10 PROCEDURE — 83605 ASSAY OF LACTIC ACID: CPT | Performed by: EMERGENCY MEDICINE

## 2020-02-10 RX ORDER — GUAIFENESIN 600 MG
600 TABLET, EXTENDED RELEASE 12 HR ORAL 2 TIMES DAILY
Status: DISCONTINUED | OUTPATIENT
Start: 2020-02-10 | End: 2020-02-14 | Stop reason: HOSPADM

## 2020-02-10 RX ORDER — GABAPENTIN 100 MG/1
100 CAPSULE ORAL 3 TIMES DAILY
Status: DISCONTINUED | OUTPATIENT
Start: 2020-02-10 | End: 2020-02-14 | Stop reason: HOSPADM

## 2020-02-10 RX ORDER — FONDAPARINUX SODIUM 2.5 MG/.5ML
2.5 INJECTION SUBCUTANEOUS DAILY
Status: DISCONTINUED | OUTPATIENT
Start: 2020-02-11 | End: 2020-02-14 | Stop reason: HOSPADM

## 2020-02-10 RX ORDER — LEVALBUTEROL 1.25 MG/.5ML
1.25 SOLUTION, CONCENTRATE RESPIRATORY (INHALATION)
Status: DISCONTINUED | OUTPATIENT
Start: 2020-02-10 | End: 2020-02-10

## 2020-02-10 RX ORDER — NICOTINE 21 MG/24HR
1 PATCH, TRANSDERMAL 24 HOURS TRANSDERMAL DAILY
Status: DISCONTINUED | OUTPATIENT
Start: 2020-02-11 | End: 2020-02-10 | Stop reason: SDUPTHER

## 2020-02-10 RX ORDER — ERYTHROMYCIN 5 MG/G
0.5 OINTMENT OPHTHALMIC
Status: DISCONTINUED | OUTPATIENT
Start: 2020-02-10 | End: 2020-02-14 | Stop reason: HOSPADM

## 2020-02-10 RX ORDER — ATORVASTATIN CALCIUM 20 MG/1
20 TABLET, FILM COATED ORAL DAILY
Status: DISCONTINUED | OUTPATIENT
Start: 2020-02-11 | End: 2020-02-14 | Stop reason: HOSPADM

## 2020-02-10 RX ORDER — NICOTINE 21 MG/24HR
21 PATCH, TRANSDERMAL 24 HOURS TRANSDERMAL DAILY
Status: DISCONTINUED | OUTPATIENT
Start: 2020-02-11 | End: 2020-02-14 | Stop reason: HOSPADM

## 2020-02-10 RX ORDER — LEVALBUTEROL 1.25 MG/.5ML
1.25 SOLUTION, CONCENTRATE RESPIRATORY (INHALATION)
Status: DISCONTINUED | OUTPATIENT
Start: 2020-02-11 | End: 2020-02-14 | Stop reason: HOSPADM

## 2020-02-10 RX ORDER — SODIUM CHLORIDE FOR INHALATION 0.9 %
12 VIAL, NEBULIZER (ML) INHALATION ONCE
Status: COMPLETED | OUTPATIENT
Start: 2020-02-10 | End: 2020-02-10

## 2020-02-10 RX ORDER — FINASTERIDE 5 MG/1
5 TABLET, FILM COATED ORAL DAILY
Status: DISCONTINUED | OUTPATIENT
Start: 2020-02-11 | End: 2020-02-14 | Stop reason: HOSPADM

## 2020-02-10 RX ORDER — ALPRAZOLAM 0.5 MG/1
0.5 TABLET ORAL
Status: DISCONTINUED | OUTPATIENT
Start: 2020-02-10 | End: 2020-02-14 | Stop reason: HOSPADM

## 2020-02-10 RX ORDER — CARISOPRODOL 350 MG/1
350 TABLET ORAL EVERY 6 HOURS PRN
Status: DISCONTINUED | OUTPATIENT
Start: 2020-02-10 | End: 2020-02-14 | Stop reason: HOSPADM

## 2020-02-10 RX ORDER — OXYBUTYNIN CHLORIDE 5 MG/1
15 TABLET ORAL DAILY
Status: DISCONTINUED | OUTPATIENT
Start: 2020-02-11 | End: 2020-02-14 | Stop reason: HOSPADM

## 2020-02-10 RX ORDER — GABAPENTIN 100 MG/1
100 CAPSULE ORAL 3 TIMES DAILY
Status: DISCONTINUED | OUTPATIENT
Start: 2020-02-10 | End: 2020-02-10

## 2020-02-10 RX ORDER — ASPIRIN 81 MG/1
81 TABLET ORAL DAILY
Status: DISCONTINUED | OUTPATIENT
Start: 2020-02-11 | End: 2020-02-14 | Stop reason: HOSPADM

## 2020-02-10 RX ORDER — FUROSEMIDE 40 MG/1
40 TABLET ORAL DAILY
Status: DISCONTINUED | OUTPATIENT
Start: 2020-02-11 | End: 2020-02-14 | Stop reason: HOSPADM

## 2020-02-10 RX ORDER — ESCITALOPRAM OXALATE 10 MG/1
10 TABLET ORAL DAILY
Status: DISCONTINUED | OUTPATIENT
Start: 2020-02-11 | End: 2020-02-14 | Stop reason: HOSPADM

## 2020-02-10 RX ORDER — DOCUSATE SODIUM 100 MG/1
100 CAPSULE, LIQUID FILLED ORAL 2 TIMES DAILY
Status: DISCONTINUED | OUTPATIENT
Start: 2020-02-10 | End: 2020-02-14 | Stop reason: HOSPADM

## 2020-02-10 RX ORDER — CARISOPRODOL 350 MG/1
350 TABLET ORAL AS NEEDED
Status: DISCONTINUED | OUTPATIENT
Start: 2020-02-10 | End: 2020-02-10

## 2020-02-10 RX ORDER — SODIUM CHLORIDE FOR INHALATION 0.9 %
3 VIAL, NEBULIZER (ML) INHALATION
Status: DISCONTINUED | OUTPATIENT
Start: 2020-02-10 | End: 2020-02-10

## 2020-02-10 RX ORDER — LEVALBUTEROL 1.25 MG/.5ML
1.25 SOLUTION, CONCENTRATE RESPIRATORY (INHALATION)
Status: DISCONTINUED | OUTPATIENT
Start: 2020-02-10 | End: 2020-02-10 | Stop reason: CLARIF

## 2020-02-10 RX ORDER — VANCOMYCIN HYDROCHLORIDE 1 G/200ML
1000 INJECTION, SOLUTION INTRAVENOUS EVERY 12 HOURS
Status: DISCONTINUED | OUTPATIENT
Start: 2020-02-10 | End: 2020-02-10 | Stop reason: DRUGHIGH

## 2020-02-10 RX ORDER — BUDESONIDE 0.5 MG/2ML
0.5 INHALANT ORAL
Status: DISCONTINUED | OUTPATIENT
Start: 2020-02-10 | End: 2020-02-14 | Stop reason: HOSPADM

## 2020-02-10 RX ORDER — DEXAMETHASONE SODIUM PHOSPHATE 10 MG/ML
10 INJECTION, SOLUTION INTRAMUSCULAR; INTRAVENOUS ONCE
Status: COMPLETED | OUTPATIENT
Start: 2020-02-10 | End: 2020-02-10

## 2020-02-10 RX ORDER — CEFEPIME HYDROCHLORIDE 1 G/1
1000 INJECTION, POWDER, FOR SOLUTION INTRAMUSCULAR; INTRAVENOUS EVERY 12 HOURS
Status: DISCONTINUED | OUTPATIENT
Start: 2020-02-10 | End: 2020-02-10 | Stop reason: CLARIF

## 2020-02-10 RX ORDER — NICOTINE 21 MG/24HR
1 PATCH, TRANSDERMAL 24 HOURS TRANSDERMAL DAILY
Status: DISCONTINUED | OUTPATIENT
Start: 2020-02-11 | End: 2020-02-10

## 2020-02-10 RX ORDER — METHYLPREDNISOLONE SODIUM SUCCINATE 40 MG/ML
40 INJECTION, POWDER, LYOPHILIZED, FOR SOLUTION INTRAMUSCULAR; INTRAVENOUS EVERY 8 HOURS
Status: DISCONTINUED | OUTPATIENT
Start: 2020-02-10 | End: 2020-02-12

## 2020-02-10 RX ORDER — SODIUM CHLORIDE FOR INHALATION 0.9 %
3 VIAL, NEBULIZER (ML) INHALATION
Status: DISCONTINUED | OUTPATIENT
Start: 2020-02-11 | End: 2020-02-14 | Stop reason: HOSPADM

## 2020-02-10 RX ORDER — ALBUTEROL SULFATE 2.5 MG/3ML
2.5 SOLUTION RESPIRATORY (INHALATION) EVERY 4 HOURS PRN
Status: DISCONTINUED | OUTPATIENT
Start: 2020-02-10 | End: 2020-02-14 | Stop reason: HOSPADM

## 2020-02-10 RX ADMIN — IOHEXOL 85 ML: 350 INJECTION, SOLUTION INTRAVENOUS at 12:20

## 2020-02-10 RX ADMIN — DOCUSATE SODIUM 100 MG: 100 CAPSULE, LIQUID FILLED ORAL at 20:47

## 2020-02-10 RX ADMIN — VANCOMYCIN HYDROCHLORIDE 1250 MG: 1 INJECTION, POWDER, LYOPHILIZED, FOR SOLUTION INTRAVENOUS at 22:33

## 2020-02-10 RX ADMIN — BUDESONIDE 0.5 MG: 0.5 INHALANT RESPIRATORY (INHALATION) at 23:01

## 2020-02-10 RX ADMIN — METRONIDAZOLE 500 MG: 500 INJECTION, SOLUTION INTRAVENOUS at 20:42

## 2020-02-10 RX ADMIN — ISODIUM CHLORIDE 12 ML: 0.03 SOLUTION RESPIRATORY (INHALATION) at 11:11

## 2020-02-10 RX ADMIN — GUAIFENESIN 600 MG: 600 TABLET, EXTENDED RELEASE ORAL at 20:47

## 2020-02-10 RX ADMIN — CEFEPIME HYDROCHLORIDE 1000 MG: 1 INJECTION, POWDER, FOR SOLUTION INTRAMUSCULAR; INTRAVENOUS at 22:35

## 2020-02-10 RX ADMIN — LEVALBUTEROL HYDROCHLORIDE 1.25 MG: 1.25 SOLUTION, CONCENTRATE RESPIRATORY (INHALATION) at 23:01

## 2020-02-10 RX ADMIN — Medication 4.5 G: at 14:08

## 2020-02-10 RX ADMIN — IPRATROPIUM BROMIDE 1 MG: 0.5 SOLUTION RESPIRATORY (INHALATION) at 11:10

## 2020-02-10 RX ADMIN — INSULIN LISPRO 3 UNITS: 100 INJECTION, SOLUTION INTRAVENOUS; SUBCUTANEOUS at 22:36

## 2020-02-10 RX ADMIN — DEXAMETHASONE SODIUM PHOSPHATE 10 MG: 10 INJECTION, SOLUTION INTRAMUSCULAR; INTRAVENOUS at 11:16

## 2020-02-10 RX ADMIN — METHYLPREDNISOLONE SODIUM SUCCINATE 40 MG: 40 INJECTION, POWDER, FOR SOLUTION INTRAMUSCULAR; INTRAVENOUS at 20:42

## 2020-02-10 RX ADMIN — ERYTHROMYCIN 0.5 INCH: 5 OINTMENT OPHTHALMIC at 22:35

## 2020-02-10 RX ADMIN — GABAPENTIN 100 MG: 100 CAPSULE ORAL at 20:50

## 2020-02-10 RX ADMIN — ALBUTEROL SULFATE 10 MG: 2.5 SOLUTION RESPIRATORY (INHALATION) at 11:10

## 2020-02-10 NOTE — ASSESSMENT & PLAN NOTE
· Patient meets criteria for sepsis with leukocytosis and tachycardia with underlying infections to be related to aspiration pneumonia  ? aspiration pneumonitis  · Patient received Zosyn in the ED; will change antibiotic regimen to vancomycin, cefepime, and Flagyl  · Check MRSA culture; if this is negative will stop vancomycin  · Blood cultures are pending  · Will obtain urine for Legionella, strep, and sputum culture  · Aspiration precautions  · Check procalcitonin

## 2020-02-10 NOTE — H&P
H&P- Tamia Hermosillo 1935, 80 y o  male MRN: [id number]    Unit/Bed#: SRIRAM Encounter: [id number]    Primary Care Provider: Bradley Jesus MD   Date and time admitted to hospital: 2/10/2020 10:54 AM        * Acute on chronic respiratory failure with hypoxia ([facility] )  Assessment & Plan  · Likely due to aspiration pneumonitis versus pneumonia  · PE is ruled out  · Will continue with BiPAP for now  · The patient is much more comfortable currently  · Able to maintain SpO2 at high 96-98%  · Will keep BiPAP overnight ; titrate off to nasal cannula as tolerated  · Consult critical care  · Will start the patient on Solu-Medrol, nebulizer treatment, respiratory protocol  · Encourage cough and deep breathing and early ambulation  · Will continue with Zosyn for now pending cultures    Sepsis [facility])  Assessment & Plan  · Patient meets criteria for sepsis with leukocytosis and tachycardia with underlying infections to be related to aspiration pneumonia  ? aspiration pneumonitis  · Patient received Zosyn in the ED; will change antibiotic regimen to vancomycin, cefepime, and Flagyl  · Check MRSA culture; if this is negative will stop vancomycin  · Blood cultures are pending  · Will obtain urine for Legionella, strep, and sputum culture  · Aspiration precautions  · Check procalcitonin      Chronic obstructive pulmonary disease with acute exacerbation ([facility] )  Assessment & Plan  · Acute exacerbation is due to above  · Continue treatment outlined above    Closed fracture of neck of right femur with routine healing  Assessment & Plan  · Status post ORIF on 01/31/20  · Continue on Arixtra  · Will consult PT and OT    Tobacco abuse  Assessment & Plan  · Will use nicotine patch while in house    Type 2 diabetes mellitus with complication, with long-term current use of insulin (HCC)  Assessment & Plan  Lab Results   Component Value Date    HGBA1C 7 3 (H) 12/23/2019       No results for input(s): POCGLU in the last 72 hours      Blood Sugar Average: Last 72 hrs:  · Will place the patient on insulin sliding scale  · Will hold all oral diabetic medications while in house      VTE Prophylaxis: Fondaparinux (Arixtra)  Code Status: DNR/DNI   POLST: POLST is not applicable to this patient  Discussion with family: Hosea Lanier- spouse [phone]    Anticipated Length of Stay:  Patient will be admitted on an Inpatient basis with an anticipated length of stay of  > 2 midnights  Justification for Hospital Stay:  Acute on chronic respiratory failure    Chief Complaint:   Shortness of breath    History of Present Illness:    Servando Smith is a 80 y o  male who presents with increasing shortness of breath and hypoxia  The patient was recently discharged to rehab after being admitted for right hip fracture status post ORIF on 01/31/2020  He was found to have SpO2 under 90% on 4 L nasal cannula and further dropped in the ED and eventually required BiPAP  CTA chest done in ED which ruled out PE  The patient does have history of aspiration and the CTA chest is indicating possible aspiration pneumonitis  The patient was seen examined in the ED  Currently he is resting comfortably on a BiPAP  The patient denies any pain  He states that he is feeling better  He denies any fevers or chills  He admitted that he does have history of aspiration and at times choke on food  Review of Systems:  Review of Systems   Constitutional: Negative for chills, fatigue and fever  HENT: Negative for congestion, ear pain, postnasal drip and sore throat  Eyes: Negative for discharge, itching and visual disturbance  Respiratory: Positive for cough and shortness of breath  Negative for chest tightness  Cardiovascular: Negative for chest pain, palpitations and leg swelling  Gastrointestinal: Negative for abdominal pain, nausea and vomiting  Endocrine: Negative for cold intolerance and heat intolerance     Genitourinary: Negative for difficulty urinating, dysuria and hematuria  Musculoskeletal: Negative for back pain, gait problem and neck pain  Skin: Negative for rash and wound  Neurological: Negative for dizziness, speech difficulty, weakness, light-headedness and headaches  Psychiatric/Behavioral: Negative for confusion, dysphoric mood and hallucinations  Past Medical and Surgical History:   Past Medical History:   Diagnosis Date    [facility]     Diabetes mellitus ([facility] )     DJD (degenerative joint disease)     Falls     Gait abnormality     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HCC)     Renal disorder     Stroke ([facility] )     may 2015    Tobacco abuse        Past Surgical History:   Procedure Laterality Date    ANGIOPLASTY  03/01/2016    Right femoral vein    BACK SURGERY  2015    ORIF HIP FRACTURE Right 1/31/2020    Procedure: OPEN REDUCTION W/ INTERNAL FIXATION (ORIF) HIP WITH CANNUALATED SCREWS;  Surgeon: Jamari Parra DO;  Location: [facility] MAIN OR;  Service: Orthopedics    SUPRAPUBIC TUBE PLACEMENT         Meds/Allergies:  Prior to Admission medications    Medication Sig Start Date End Date Taking? Authorizing Provider   albuterol (PROVENTIL HFA,VENTOLIN HFA) 90 mcg/act inhaler Inhale 2 puffs every 6 (six) hours as needed for wheezing    Historical Provider, MD   ALPRAZolam Layvonne Ala) 0 5 mg tablet Take 0 5 mg by mouth daily at bedtime as needed for anxiety    Historical Provider, MD   aspirin (ECOTRIN LOW STRENGTH) 81 mg EC tablet Take 81 mg by mouth daily    Historical Provider, MD   atorvastatin (LIPITOR) 20 mg tablet Take 20 mg by mouth daily    Historical Provider, MD   budesonide (PULMICORT) 0 5 mg/2 mL nebulizer solution Take 1 vial (0 5 mg total) by nebulization every 12 (twelve) hours Rinse mouth after use   2/3/20   Tita Ribeiro MD   carisoprodol (SOMA) 350 mg tablet Take 350 mg by mouth as needed for muscle spasms    Historical Provider, MD   docusate sodium (COLACE) 100 mg capsule Take 1 capsule (100 mg total) by mouth 2 (two) times a day 2/3/20   Jag Lopez MD   erythromycin (ILOTYCIN) ophthalmic ointment 0 5 inches daily at bedtime    Historical Provider, MD   escitalopram (LEXAPRO) 10 mg tablet Take 10 mg by mouth daily    Historical Provider, MD   finasteride (PROSCAR) 5 mg tablet Take 5 mg by mouth daily    Historical Provider, MD   fluticasone-salmeterol (ADVAIR) 250-50 mcg/dose inhaler Inhale 1 puff 2 (two) times a day Rinse mouth after use      Historical Provider, MD   fondaparinux (ARIXTRA) 2 5 mg/0 5 mL Inject 2 5 mg under the skin daily 2/4/20   Jag Lopez MD   furosemide (LASIX) 20 mg tablet Take 1 tablet (20 mg total) by mouth daily  Patient taking differently: Take 40 mg by mouth daily  6/7/19   Lenore Santos MD   gabapentin (NEURONTIN) 100 mg capsule Take 100 mg by mouth 3 (three) times a day     Historical Provider, MD   glimepiride (AMARYL) 2 mg tablet Take 2 mg by mouth every morning before breakfast    Historical Provider, MD   insulin glargine (LANTUS) 100 units/mL subcutaneous injection Inject 20 Units under the skin daily     Historical Provider, MD   insulin lispro (HumaLOG) 100 units/mL injection Inject under the skin    Historical Provider, MD   levalbuterol (XOPENEX) 1 25 mg/0 5 mL nebulizer solution Take 0 5 mL (1 25 mg total) by nebulization 3 (three) times a day 2/3/20   Jag Lopez MD   nicotine (NICODERM CQ) 21 mg/24 hr TD 24 hr patch Place 1 patch on the skin daily 2/4/20   Jag Lopez MD   oxybutynin (DITROPAN) 5 mg tablet Take 15 mg by mouth daily     Historical Provider, MD   polyethylene glycol (GLYCOLAX) powder Take 17 g by mouth daily 2/3/20   Jag Lopez MD   polyethylene glycol (MIRALAX) 17 g packet Take 17 g by mouth daily 2/3/20   DEBBY Villegas   rizatriptan (MAXALT) 10 MG tablet Take 10 mg by mouth once as needed for migraine May repeat in 2 hours if needed    Historical Provider, MD   senna (SENOKOT) 8 6 mg Take 2 tablets (17 2 mg total) by mouth daily as needed for constipation 2/3/20   Ivonne Younger MD   sodium chloride 0 9 % nebulizer solution Take 3 mL by nebulization 3 (three) times a day 2/3/20   Ivonne Younger MD     I have reveiwed home medications using records provided by [facility]  Allergies: No Known Allergies    Social History:  Marital Status: /Civil Union   Occupation: retired   Patient Pre-hospital Living Situation: family   Patient Pre-hospital Level of Mobility: assist   Patient Pre-hospital Diet Restrictions: diabetic   Substance Use History:     Social History     Substance and Sexual Activity   Alcohol Use Never    Frequency: Never     Social History     Tobacco Use   Smoking Status Current Every Day Smoker    Packs/day: 1 00    Years: 70 00    Pack years: 70 00   Smokeless Tobacco Never Used   Tobacco Comment    Wife reports it's a losing matos     Social History     Substance and Sexual Activity   Drug Use No       Family History:  I have reviewed the patients family history    Physical Exam:   Vitals:   Blood Pressure: 113/61 (02/10/20 1700)  Pulse: 94 (02/10/20 1700)  Temperature: (!) 97 1 °F (36 2 °C) (02/10/20 1053)  Temp Source: Temporal (02/10/20 1053)  Respirations: 18 (02/10/20 1700)  Weight - Scale: 75 8 kg (167 lb) (02/10/20 1053)  SpO2: 98 % (02/10/20 1700)    Physical Exam   Constitutional: He is oriented to person, place, and time  He appears well-developed  He appears lethargic  No distress  Frail and chronically ill appearing     HENT:   Head: Normocephalic and atraumatic  Mouth/Throat: Oropharynx is clear and moist    Eyes: Pupils are equal, round, and reactive to light  Conjunctivae and EOM are normal    Neck: Normal range of motion  Neck supple  No thyromegaly present  Cardiovascular: Normal rate, regular rhythm, normal heart sounds and intact distal pulses  Pulmonary/Chest: Effort normal  No respiratory distress  He has wheezes  Abdominal: Soft  Bowel sounds are normal  He exhibits no distension   There is no tenderness  Musculoskeletal: Normal range of motion  He exhibits no edema or deformity  Neurological: He is oriented to person, place, and time  He has normal reflexes  He appears lethargic  No cranial nerve deficit or sensory deficit  Skin: Skin is warm and dry  No erythema  Psychiatric: He has a normal mood and affect  His behavior is normal  Thought content normal    Vitals reviewed  Additional Data:   Lab Results: I have personally reviewed pertinent reports  Results from last 7 days   Lab Units 02/10/20  1115   WBC Thousand/uL 13 80*   HEMOGLOBIN g/dL 12 6*   HEMATOCRIT % 40 4*   PLATELETS Thousands/uL 216   NEUTROS PCT % 84*   LYMPHS PCT % 6*   MONOS PCT % 10   EOS PCT % 1     Results from last 7 days   Lab Units 02/10/20  1115   POTASSIUM mmol/L 4 6   CHLORIDE mmol/L 97*   CO2 mmol/L 37*   BUN mg/dL 18   CREATININE mg/dL 0 72   CALCIUM mg/dL 9 0   ALK PHOS U/L 95   ALT U/L 19   AST U/L 20     Results from last 7 days   Lab Units 02/10/20  1115   INR  1 11     Results from last 7 days   Lab Units 20  1634 20  0424 20  1607 20  0452 20  1646 20  0435 20  1616 20  0603   POC GLUCOSE mg/dl 254* 126 217* 119 134 213* 227* 254*           Imaging: I have personally reviewed pertinent reports  CTA ED chest PE study   Final Result by Kathy Strange MD (02/10 1303)      1  No PE  2   Basilar effusions with associated airspace opacities and right bronchial impaction concerning for aspiration pneumonitis  3   Mild to moderate COPD  Workstation performed: [id number]             EKG, Pathology, and Other Studies Reviewed on Admission:   · EKst degree AV block HR 88    NetAccess/Epic Records Reviewed: Yes     ** Please Note: This note has been constructed using a voice recognition system   **

## 2020-02-10 NOTE — ASSESSMENT & PLAN NOTE
· Likely due to aspiration pneumonitis versus pneumonia  · PE is ruled out  · Will continue with BiPAP for now  · The patient is much more comfortable currently  · Able to maintain SpO2 at high 96-98%  · Will keep BiPAP overnight ; titrate off to nasal cannula as tolerated  · Consult critical care  · Will start the patient on Solu-Medrol, nebulizer treatment, respiratory protocol  · Encourage cough and deep breathing and early ambulation  · Will continue with Zosyn for now pending cultures

## 2020-02-10 NOTE — NURSING NOTE
Pt received to icu #2, dx acute on chronic resp failure with hypoxia  Pt received on bipap at ordered settings  Pt responds to voice  Placed on icu monitors  Assessment as noted in computer charting  Repos for comfort, safety in place

## 2020-02-10 NOTE — ED PROVIDER NOTES
History  Chief Complaint   Patient presents with    Shortness of Breath     Patient who is status post orthopedic surgery in rehab sent from rehab facility for hypoxia saturations measured under 90%  Patient is not complaining of shortness of breath or chest pain at this time  Only complaints are of right leg pain from his surgery  There was a request from the facility physician for CT angio to rule out PE  Patient does have a history of COPD however  Prior to Admission Medications   Prescriptions Last Dose Informant Patient Reported? Taking? ALPRAZolam (XANAX) 0 5 mg tablet   Yes No   Sig: Take 0 5 mg by mouth daily at bedtime as needed for anxiety   albuterol (PROVENTIL HFA,VENTOLIN HFA) 90 mcg/act inhaler   Yes No   Sig: Inhale 2 puffs every 6 (six) hours as needed for wheezing   aspirin (ECOTRIN LOW STRENGTH) 81 mg EC tablet   Yes No   Sig: Take 81 mg by mouth daily   atorvastatin (LIPITOR) 20 mg tablet   Yes No   Sig: Take 20 mg by mouth daily   budesonide (PULMICORT) 0 5 mg/2 mL nebulizer solution   No No   Sig: Take 1 vial (0 5 mg total) by nebulization every 12 (twelve) hours Rinse mouth after use  carisoprodol (SOMA) 350 mg tablet   Yes No   Sig: Take 350 mg by mouth as needed for muscle spasms   docusate sodium (COLACE) 100 mg capsule   No No   Sig: Take 1 capsule (100 mg total) by mouth 2 (two) times a day   erythromycin (ILOTYCIN) ophthalmic ointment   Yes No   Si 5 inches daily at bedtime   escitalopram (LEXAPRO) 10 mg tablet   Yes No   Sig: Take 10 mg by mouth daily   finasteride (PROSCAR) 5 mg tablet  Self Yes No   Sig: Take 5 mg by mouth daily   fluticasone-salmeterol (ADVAIR) 250-50 mcg/dose inhaler   Yes No   Sig: Inhale 1 puff 2 (two) times a day Rinse mouth after use     fondaparinux (ARIXTRA) 2 5 mg/0 5 mL   No No   Sig: Inject 2 5 mg under the skin daily   furosemide (LASIX) 20 mg tablet   No No   Sig: Take 1 tablet (20 mg total) by mouth daily   Patient taking differently: Take 40 mg by mouth daily    gabapentin (NEURONTIN) 100 mg capsule   Yes No   Sig: Take 100 mg by mouth 3 (three) times a day    glimepiride (AMARYL) 2 mg tablet   Yes No   Sig: Take 2 mg by mouth every morning before breakfast   insulin glargine (LANTUS) 100 units/mL subcutaneous injection   Yes No   Sig: Inject 20 Units under the skin daily    insulin lispro (HumaLOG) 100 units/mL injection   Yes No   Sig: Inject under the skin   levalbuterol (XOPENEX) 1 25 mg/0 5 mL nebulizer solution   No No   Sig: Take 0 5 mL (1 25 mg total) by nebulization 3 (three) times a day   nicotine (NICODERM CQ) 21 mg/24 hr TD 24 hr patch   No No   Sig: Place 1 patch on the skin daily   oxybutynin (DITROPAN) 5 mg tablet   Yes No   Sig: Take 15 mg by mouth daily    polyethylene glycol (GLYCOLAX) powder   No No   Sig: Take 17 g by mouth daily   polyethylene glycol (MIRALAX) 17 g packet   No No   Sig: Take 17 g by mouth daily   rizatriptan (MAXALT) 10 MG tablet   Yes No   Sig: Take 10 mg by mouth once as needed for migraine May repeat in 2 hours if needed   senna (SENOKOT) 8 6 mg   No No   Sig: Take 2 tablets (17 2 mg total) by mouth daily as needed for constipation   sodium chloride 0 9 % nebulizer solution   No No   Sig: Take 3 mL by nebulization 3 (three) times a day      Facility-Administered Medications: None       Past Medical History:   Diagnosis Date    COPD (chronic obstructive pulmonary disease) (HCC)     Diabetes mellitus (HCC)     DJD (degenerative joint disease)     Falls     Gait abnormality     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     Renal disorder     Stroke ([facility] )     may 2015    Tobacco abuse        Past Surgical History:   Procedure Laterality Date    ANGIOPLASTY  03/01/2016    Right femoral vein    BACK SURGERY  2015    ORIF HIP FRACTURE Right 1/31/2020    Procedure: OPEN REDUCTION W/ INTERNAL FIXATION (ORIF) HIP WITH CANNUALATED SCREWS;  Surgeon: Tray Newton DO;  Location: [address] [place] [address] OR;  Service: Orthopedics    SUPRAPUBIC TUBE PLACEMENT         History reviewed  No pertinent family history  I have reviewed and agree with the history as documented  Social History     Tobacco Use    Smoking status: Current Every Day Smoker     Packs/day: 1 00     Years: 70 00     Pack years: 70 00    Smokeless tobacco: Never Used    Tobacco comment: Wife reports it's a losing matos   Substance Use Topics    Alcohol use: Never     Frequency: Never    Drug use: No        Review of Systems   Constitutional: Negative for fever  HENT: Negative for rhinorrhea  Eyes: Negative for visual disturbance  Respiratory: Negative for shortness of breath  Hypoxia   Cardiovascular: Negative for chest pain  Gastrointestinal: Negative for abdominal pain, diarrhea and vomiting  Endocrine: Negative for polydipsia  Genitourinary: Negative for dysuria, frequency and hematuria  Musculoskeletal: Negative for neck stiffness  Skin: Negative for rash  Allergic/Immunologic: Negative for immunocompromised state  Neurological: Negative for speech difficulty, weakness and numbness  Physical Exam  Physical Exam   Constitutional: He is oriented to person, place, and time  He appears well-nourished  No distress  HENT:   Head: Normocephalic and atraumatic  Eyes: Conjunctivae and EOM are normal    Neck: Normal range of motion  Neck supple  Cardiovascular: Regular rhythm and normal heart sounds  Pulmonary/Chest: Effort normal  He has wheezes  Abdominal: Soft  Bowel sounds are normal  He exhibits no mass  There is no tenderness  There is no guarding  Musculoskeletal: He exhibits no edema  Neurological: He is alert and oriented to person, place, and time  Skin: Skin is warm and dry  Psychiatric: He has a normal mood and affect         Vital Signs  ED Triage Vitals [02/10/20 1053]   Temperature Pulse Respirations Blood Pressure SpO2   (!) 97 1 °F (36 2 °C) 86 20 121/57 91 %      Temp Source Heart Rate Source Patient Position - Orthostatic VS BP Location FiO2 (%)   Temporal Monitor Lying Left arm --      Pain Score       --           Vitals:    02/10/20 1053   BP: 121/57   Pulse: 86   Patient Position - Orthostatic VS: Lying         Visual Acuity      ED Medications  Medications   albuterol inhalation solution 10 mg (has no administration in time range)   ipratropium (ATROVENT) 0 02 % inhalation solution 1 mg (has no administration in time range)   sodium chloride 0 9 % inhalation solution 12 mL (has no administration in time range)   dexamethasone (PF) (DECADRON) injection 10 mg (has no administration in time range)       Diagnostic Studies  Results Reviewed     Procedure Component Value Units Date/Time    CBC and differential [[id number]]     Lab Status:  No result Specimen:  Blood     Protime-INR [[id number]]     Lab Status:  No result Specimen:  Blood     APTT [[id number]]     Lab Status:  No result Specimen:  Blood     Comprehensive metabolic panel [[id number]]     Lab Status:  No result Specimen:  Blood                  CTA ED chest PE study    (Results Pending)              Procedures  CriticalCare Time  Performed by: Ilya Waterman MD  Authorized by: Ilya Waterman MD     Critical care provider statement:     Critical care time (minutes):  35    Critical care was time spent personally by me on the following activities:  Obtaining history from patient or surrogate, development of treatment plan with patient or surrogate, discussions with consultants, evaluation of patient's response to treatment, examination of patient, ordering and performing treatments and interventions, ordering and review of laboratory studies, ordering and review of radiographic studies, re-evaluation of patient's condition and review of old charts             ED Course                               MDM  Number of Diagnoses or Management Options  Aspiration pneumonitis ([facility] ):   COPD exacerbation ([facility] ):   Respiratory failure ([facility] ): Diagnosis management comments: Hypoxia worsened and patient required BiPAP to get to 90%  No PE detected on CT that likely aspiration pneumonitis        Disposition  Final diagnoses:   None     ED Disposition     None      Follow-up Information    None         Patient's Medications   Discharge Prescriptions    No medications on file     No discharge procedures on file      ED Provider  Electronically Signed by           Halley Goyal MD  02/10/20 9243

## 2020-02-10 NOTE — ASSESSMENT & PLAN NOTE
Lab Results   Component Value Date    HGBA1C 7 3 (H) 12/23/2019       No results for input(s): POCGLU in the last 72 hours      Blood Sugar Average: Last 72 hrs:  · Will place the patient on insulin sliding scale  · Will hold all oral diabetic medications while in house

## 2020-02-11 VITALS
DIASTOLIC BLOOD PRESSURE: 51 MMHG | HEART RATE: 88 BPM | SYSTOLIC BLOOD PRESSURE: 96 MMHG | TEMPERATURE: 97.7 F | OXYGEN SATURATION: 90 %

## 2020-02-11 LAB
ALBUMIN SERPL BCP-MCNC: 3.2 G/DL (ref 3.5–5.7)
ALP SERPL-CCNC: 93 U/L (ref 55–165)
ALT SERPL W P-5'-P-CCNC: 17 U/L (ref 7–52)
ANION GAP SERPL CALCULATED.3IONS-SCNC: 7 MMOL/L (ref 4–13)
AST SERPL W P-5'-P-CCNC: 14 U/L (ref 13–39)
BILIRUB SERPL-MCNC: 0.6 MG/DL (ref 0.2–1)
BUN SERPL-MCNC: 23 MG/DL (ref 7–25)
CALCIUM SERPL-MCNC: 8.8 MG/DL (ref 8.6–10.5)
CHLORIDE SERPL-SCNC: 98 MMOL/L (ref 98–107)
CO2 SERPL-SCNC: 33 MMOL/L (ref 21–31)
CREAT SERPL-MCNC: 0.69 MG/DL (ref 0.7–1.3)
ERYTHROCYTE [DISTWIDTH] IN BLOOD BY AUTOMATED COUNT: 14.1 % (ref 11.5–14.5)
GFR SERPL CREATININE-BSD FRML MDRD: 87 ML/MIN/1.73SQ M
GLUCOSE SERPL-MCNC: 269 MG/DL (ref 65–140)
GLUCOSE SERPL-MCNC: 285 MG/DL (ref 65–99)
GLUCOSE SERPL-MCNC: 288 MG/DL (ref 65–140)
GLUCOSE SERPL-MCNC: 326 MG/DL (ref 65–140)
GLUCOSE SERPL-MCNC: 374 MG/DL (ref 65–140)
HCT VFR BLD AUTO: 36.6 % (ref 42–47)
HGB BLD-MCNC: 11.8 G/DL (ref 14–18)
LYMPHOCYTES # BLD AUTO: 0.38 THOUSAND/UL (ref 0.6–4.47)
LYMPHOCYTES # BLD AUTO: 2 % (ref 20–51)
MCH RBC QN AUTO: 30.9 PG (ref 26–34)
MCHC RBC AUTO-ENTMCNC: 32.1 G/DL (ref 31–37)
MCV RBC AUTO: 96 FL (ref 81–99)
MONOCYTES # BLD AUTO: 0.38 THOUSAND/UL (ref 0–1.22)
MONOCYTES NFR BLD AUTO: 2 % (ref 4–12)
NEUTS SEG # BLD: 18.43 THOUSAND/UL (ref 1.81–6.82)
NEUTS SEG NFR BLD AUTO: 96 % (ref 42–75)
PLATELET # BLD AUTO: 200 THOUSANDS/UL (ref 149–390)
PLATELET BLD QL SMEAR: ADEQUATE
PMV BLD AUTO: 8.9 FL (ref 8.6–11.7)
POTASSIUM SERPL-SCNC: 4.8 MMOL/L (ref 3.5–5.5)
PROCALCITONIN SERPL-MCNC: 0.07 NG/ML
PROCALCITONIN SERPL-MCNC: 0.08 NG/ML
PROT SERPL-MCNC: 5.9 G/DL (ref 6.4–8.9)
RBC # BLD AUTO: 3.81 MILLION/UL (ref 4.3–5.9)
RBC MORPH BLD: NORMAL
SODIUM SERPL-SCNC: 138 MMOL/L (ref 134–143)
TOTAL CELLS COUNTED SPEC: 100
WBC # BLD AUTO: 19.2 THOUSAND/UL (ref 4.8–10.8)

## 2020-02-11 PROCEDURE — 99233 SBSQ HOSP IP/OBS HIGH 50: CPT | Performed by: INTERNAL MEDICINE

## 2020-02-11 PROCEDURE — 97167 OT EVAL HIGH COMPLEX 60 MIN: CPT

## 2020-02-11 PROCEDURE — 82948 REAGENT STRIP/BLOOD GLUCOSE: CPT

## 2020-02-11 PROCEDURE — 94760 N-INVAS EAR/PLS OXIMETRY 1: CPT

## 2020-02-11 PROCEDURE — 85007 BL SMEAR W/DIFF WBC COUNT: CPT | Performed by: NURSE PRACTITIONER

## 2020-02-11 PROCEDURE — 84145 PROCALCITONIN (PCT): CPT | Performed by: NURSE PRACTITIONER

## 2020-02-11 PROCEDURE — 97530 THERAPEUTIC ACTIVITIES: CPT

## 2020-02-11 PROCEDURE — 94660 CPAP INITIATION&MGMT: CPT

## 2020-02-11 PROCEDURE — 94640 AIRWAY INHALATION TREATMENT: CPT

## 2020-02-11 PROCEDURE — 97163 PT EVAL HIGH COMPLEX 45 MIN: CPT

## 2020-02-11 PROCEDURE — 80053 COMPREHEN METABOLIC PANEL: CPT | Performed by: NURSE PRACTITIONER

## 2020-02-11 PROCEDURE — 85027 COMPLETE CBC AUTOMATED: CPT | Performed by: NURSE PRACTITIONER

## 2020-02-11 RX ORDER — CEFEPIME HYDROCHLORIDE 1 G/50ML
1000 INJECTION, SOLUTION INTRAVENOUS EVERY 12 HOURS
Status: DISCONTINUED | OUTPATIENT
Start: 2020-02-11 | End: 2020-02-14 | Stop reason: HOSPADM

## 2020-02-11 RX ORDER — ACETAMINOPHEN 325 MG/1
650 TABLET ORAL EVERY 6 HOURS PRN
Status: DISCONTINUED | OUTPATIENT
Start: 2020-02-11 | End: 2020-02-14 | Stop reason: HOSPADM

## 2020-02-11 RX ORDER — INSULIN GLARGINE 100 [IU]/ML
10 INJECTION, SOLUTION SUBCUTANEOUS
Status: DISCONTINUED | OUTPATIENT
Start: 2020-02-11 | End: 2020-02-14 | Stop reason: HOSPADM

## 2020-02-11 RX ORDER — ONDANSETRON 2 MG/ML
4 INJECTION INTRAMUSCULAR; INTRAVENOUS EVERY 6 HOURS PRN
Status: DISCONTINUED | OUTPATIENT
Start: 2020-02-11 | End: 2020-02-14 | Stop reason: HOSPADM

## 2020-02-11 RX ADMIN — ACETAMINOPHEN 650 MG: 325 TABLET ORAL at 14:19

## 2020-02-11 RX ADMIN — INSULIN LISPRO 4 UNITS: 100 INJECTION, SOLUTION INTRAVENOUS; SUBCUTANEOUS at 17:00

## 2020-02-11 RX ADMIN — INSULIN LISPRO 6 UNITS: 100 INJECTION, SOLUTION INTRAVENOUS; SUBCUTANEOUS at 11:54

## 2020-02-11 RX ADMIN — METHYLPREDNISOLONE SODIUM SUCCINATE 40 MG: 40 INJECTION, POWDER, FOR SOLUTION INTRAMUSCULAR; INTRAVENOUS at 04:21

## 2020-02-11 RX ADMIN — ACETAMINOPHEN 650 MG: 325 TABLET ORAL at 20:42

## 2020-02-11 RX ADMIN — GUAIFENESIN 600 MG: 600 TABLET, EXTENDED RELEASE ORAL at 17:00

## 2020-02-11 RX ADMIN — ACETAMINOPHEN 650 MG: 325 TABLET ORAL at 06:13

## 2020-02-11 RX ADMIN — ATORVASTATIN CALCIUM 20 MG: 20 TABLET, FILM COATED ORAL at 08:14

## 2020-02-11 RX ADMIN — CEFEPIME HYDROCHLORIDE 1000 MG: 1 INJECTION, SOLUTION INTRAVENOUS at 21:46

## 2020-02-11 RX ADMIN — INSULIN LISPRO 3 UNITS: 100 INJECTION, SOLUTION INTRAVENOUS; SUBCUTANEOUS at 06:26

## 2020-02-11 RX ADMIN — OXYBUTYNIN CHLORIDE 15 MG: 5 TABLET ORAL at 08:15

## 2020-02-11 RX ADMIN — ERYTHROMYCIN 0.5 INCH: 5 OINTMENT OPHTHALMIC at 21:20

## 2020-02-11 RX ADMIN — GABAPENTIN 100 MG: 100 CAPSULE ORAL at 21:20

## 2020-02-11 RX ADMIN — DOCUSATE SODIUM 100 MG: 100 CAPSULE, LIQUID FILLED ORAL at 08:13

## 2020-02-11 RX ADMIN — METRONIDAZOLE 500 MG: 500 INJECTION, SOLUTION INTRAVENOUS at 04:21

## 2020-02-11 RX ADMIN — GABAPENTIN 100 MG: 100 CAPSULE ORAL at 08:15

## 2020-02-11 RX ADMIN — INSULIN GLARGINE 10 UNITS: 100 INJECTION, SOLUTION SUBCUTANEOUS at 21:46

## 2020-02-11 RX ADMIN — BUDESONIDE 0.5 MG: 0.5 INHALANT RESPIRATORY (INHALATION) at 20:37

## 2020-02-11 RX ADMIN — ISODIUM CHLORIDE 3 ML: 0.03 SOLUTION RESPIRATORY (INHALATION) at 14:03

## 2020-02-11 RX ADMIN — METHYLPREDNISOLONE SODIUM SUCCINATE 40 MG: 40 INJECTION, POWDER, FOR SOLUTION INTRAMUSCULAR; INTRAVENOUS at 11:54

## 2020-02-11 RX ADMIN — FONDAPARINUX SODIUM 2.5 MG: 2.5 INJECTION, SOLUTION SUBCUTANEOUS at 08:13

## 2020-02-11 RX ADMIN — FUROSEMIDE 40 MG: 40 TABLET ORAL at 08:14

## 2020-02-11 RX ADMIN — CARISOPRODOL 350 MG: 350 TABLET ORAL at 23:52

## 2020-02-11 RX ADMIN — NICOTINE 21 MG: 21 PATCH, EXTENDED RELEASE TRANSDERMAL at 08:13

## 2020-02-11 RX ADMIN — LEVALBUTEROL HYDROCHLORIDE 1.25 MG: 1.25 SOLUTION, CONCENTRATE RESPIRATORY (INHALATION) at 14:03

## 2020-02-11 RX ADMIN — ONDANSETRON 4 MG: 2 INJECTION INTRAMUSCULAR; INTRAVENOUS at 16:49

## 2020-02-11 RX ADMIN — ISODIUM CHLORIDE 3 ML: 0.03 SOLUTION RESPIRATORY (INHALATION) at 08:46

## 2020-02-11 RX ADMIN — METHYLPREDNISOLONE SODIUM SUCCINATE 40 MG: 40 INJECTION, POWDER, FOR SOLUTION INTRAMUSCULAR; INTRAVENOUS at 19:38

## 2020-02-11 RX ADMIN — DOCUSATE SODIUM 100 MG: 100 CAPSULE, LIQUID FILLED ORAL at 17:00

## 2020-02-11 RX ADMIN — ACETAMINOPHEN 650 MG: 325 TABLET ORAL at 08:14

## 2020-02-11 RX ADMIN — LEVALBUTEROL HYDROCHLORIDE 1.25 MG: 1.25 SOLUTION, CONCENTRATE RESPIRATORY (INHALATION) at 08:46

## 2020-02-11 RX ADMIN — VANCOMYCIN HYDROCHLORIDE 1250 MG: 1 INJECTION, POWDER, LYOPHILIZED, FOR SOLUTION INTRAVENOUS at 10:47

## 2020-02-11 RX ADMIN — FINASTERIDE 5 MG: 5 TABLET, FILM COATED ORAL at 08:13

## 2020-02-11 RX ADMIN — LEVALBUTEROL HYDROCHLORIDE 1.25 MG: 1.25 SOLUTION, CONCENTRATE RESPIRATORY (INHALATION) at 20:37

## 2020-02-11 RX ADMIN — GUAIFENESIN 600 MG: 600 TABLET, EXTENDED RELEASE ORAL at 08:14

## 2020-02-11 RX ADMIN — GABAPENTIN 100 MG: 100 CAPSULE ORAL at 16:51

## 2020-02-11 RX ADMIN — ESCITALOPRAM 10 MG: 5 TABLET, FILM COATED ORAL at 08:14

## 2020-02-11 RX ADMIN — CEFEPIME HYDROCHLORIDE 1000 MG: 1 INJECTION, SOLUTION INTRAVENOUS at 10:31

## 2020-02-11 RX ADMIN — ASPIRIN 81 MG: 81 TABLET ORAL at 08:14

## 2020-02-11 RX ADMIN — METRONIDAZOLE 500 MG: 500 INJECTION, SOLUTION INTRAVENOUS at 21:16

## 2020-02-11 RX ADMIN — METRONIDAZOLE 500 MG: 500 INJECTION, SOLUTION INTRAVENOUS at 14:14

## 2020-02-11 RX ADMIN — BUDESONIDE 0.5 MG: 0.5 INHALANT RESPIRATORY (INHALATION) at 08:46

## 2020-02-11 RX ADMIN — VANCOMYCIN HYDROCHLORIDE 1250 MG: 1 INJECTION, POWDER, LYOPHILIZED, FOR SOLUTION INTRAVENOUS at 22:21

## 2020-02-11 NOTE — PLAN OF CARE
Problem: OCCUPATIONAL THERAPY ADULT  Goal: Performs self-care activities at highest level of function for planned discharge setting  See evaluation for individualized goals  Description  Treatment Interventions: ADL retraining, Functional transfer training, Endurance training, Patient/family training, Compensatory technique education, Activityengagement          See flowsheet documentation for full assessment, interventions and recommendations  Note:   Limitation: Decreased ADL status, Decreased Safe judgement during ADL, Decreased endurance, Decreased self-care trans  Prognosis: Fair  Assessment: Pt is a 80 y o  male seen for OT evaluation s/p admit to [address] on 2/10/2020 w/ Acute on chronic respiratory failure with hypoxia ([facility] )  Comorbidities affecting pt's functional performance at time of assessment include: DM, neuropathy and closed Fx neck Rt femur, cognative d/o, COPD, see H & P for lengthy PMHx  Personal factors affecting pt at time of IE include:steps to enter environment, difficulty performing ADLS and limited insight into deficits  Prior to admission, pt was I with self care ADLs,Mobility  Upon evaluation: Pt requires an increased level of assistance with self care ADL's, functional transfers/toileting/mobility r/t  the following deficits impacting occupational performance: decreased balance, decreased tolerance, decreased safety awareness and increased pain  Pt to benefit from continued skilled OT tx while in the hospital to address deficits as defined above and maximize level of functional independence w ADL's and functional mobility  Occupational Performance areas to address include: grooming, bathing/shower, toilet hygiene, dressing and functional mobility  From OT standpoint, recommendation at time of d/c would be return to the [place] with therapies in house          OT Discharge Recommendation: Short Term Rehab  OT - OK to Discharge:  Yes

## 2020-02-11 NOTE — CONSULTS
Vancomycin Assessment    Ana Pearson is a 80 y o  male who is currently receiving vancomycin 1000 mg IV q12 for Pneumonia     Relevant clinical data and objective history reviewed:  Creatinine   Date Value Ref Range Status   02/10/2020 0 72 0 70 - 1 30 mg/dL Final     Comment:     Standardized to IDMS reference method   02/03/2020 0 58 (L) 0 70 - 1 30 mg/dL Final     Comment:     Standardized to IDMS reference method   02/02/2020 0 72 0 70 - 1 30 mg/dL Final     Comment:     Standardized to IDMS reference method     /59   Pulse 90   Temp (!) 96 8 °F (36 °C) (Tympanic)   Resp 19   Ht 5' 10" (1 778 m)   Wt 75 8 kg (167 lb)   SpO2 100%   BMI 23 96 kg/m²   I/O last 3 completed shifts: In: 100 [IV Piggyback:100]  Out: 875 [Urine:875]  Lab Results   Component Value Date/Time    BUN 18 02/10/2020 11:15 AM    WBC 13 80 (H) 02/10/2020 11:15 AM    HGB 12 6 (L) 02/10/2020 11:15 AM    HCT 40 4 (L) 02/10/2020 11:15 AM    MCV 97 02/10/2020 11:15 AM     02/10/2020 11:15 AM     Temp Readings from Last 3 Encounters:   02/10/20 (!) 96 8 °F (36 °C) (Tympanic)   02/03/20 (!) 96 3 °F (35 7 °C) (Temporal)   06/06/19 (!) 97 °F (36 1 °C) (Tympanic)     Vancomycin Days of Therapy: 1    Assessment/Plan  The patient is currently on vancomycin utilizing scheduled dosing based on actual body weight  Baseline risks associated with therapy include: advanced age  The patient is currently receiving 1000 mg IV q12 and after clinical evaluation will be changed to 1250 mg IV q12  Pharmacy will also follow closely for s/sx of nephrotoxicity, infusion reactions and appropriateness of therapy  BMP and CBC will be ordered per protocol  Plan for trough as patient approaches steady state, prior to the 4th  dose at approximately 1000 on 2/12/20  Due to infection severity, will target a trough of 15-20 (appropriate for most indications)     Pharmacy will continue to follow the patients culture results and clinical progress daily     Duglas Galindo, Pharmacist

## 2020-02-11 NOTE — SPEECH THERAPY NOTE
Order received and appreciated, chart reviewed, pt known to this department from prior outpatient VBS and SNF admission  Currently on Bipap  Will initiate swallow evaluation when able to tolerate nasal cannula  Laura Cottrell, CCC/SLP  [id number]  [email or web address]  Available via [employer] text

## 2020-02-11 NOTE — ASSESSMENT & PLAN NOTE
Lab Results   Component Value Date    HGBA1C 7 3 (H) 12/23/2019       Recent Labs     02/10/20  2120 02/11/20  0606   POCGLU 253* 269*       Blood Sugar Average: Last 72 hrs:  · (P) 261Will place the patient on insulin sliding scale  · Will hold all oral diabetic medications while in house  · Start lantus

## 2020-02-11 NOTE — PROGRESS NOTES
Vancomycin IV Pharmacy-to-Dose Consultation    Joana Jones is a 80 y o  male who is currently receiving Vancomycin IV with management by the Pharmacy Consult service  Assessment/Plan:  The patient was reviewed  Renal function is stable and no signs or symptoms of nephrotoxicity and/or infusion reactions were documented in the chart  Based on todays assessment, continue current vancomycin (day # 2) dosing of 1250 mg q12h, with a plan for trough to be drawn at 1000 on 2/12/20  We will continue to follow the patients culture results and clinical progress daily      Florencia Uriarte, JohannaD

## 2020-02-11 NOTE — OCCUPATIONAL THERAPY NOTE
Occupational Therapy Evaluation      Sara London    2/11/2020    Principal Problem:    Acute on chronic respiratory failure with hypoxia (HCC)  Active Problems:    Chronic obstructive pulmonary disease with acute exacerbation (HCC)    Type 2 diabetes mellitus with complication, with long-term current use of insulin (HCC)    Tobacco abuse    Closed fracture of neck of right femur with routine healing    Sepsis ([facility] )      Past Medical History:   Diagnosis Date    [facility])     BPH (benign prostatic hyperplasia)     COPD [facility]chronic obstructive [facility] disease) ([facility])     Diabetes mellitus (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 )     Renal disorder     Stroke ([facility] [facility] )     may 2015    Tobacco abuse        Past Surgical History:   Procedure Laterality Date    ANGIOPLASTY  03/01/2016    Right femoral vein    BACK SURGERY  2015    ORIF HIP FRACTURE Right 1/31/2020    Procedure: OPEN REDUCTION W/ INTERNAL FIXATION (ORIF) HIP WITH CANNUALATED SCREWS;  Surgeon: Lotus Ahn DO;  Location: [address] [place] [address] OR;  Service: Orthopedics    SUPRAPUBIC TUBE PLACEMENT          02/11/20 1011   Note Type   Note type Eval/Treat   Restrictions/Precautions   Weight Bearing Precautions Per Order Yes   RLE Weight Bearing Per Order TTWB   Other Precautions Fall Risk;Pain;O2;Telemetry;Multiple lines;Hard of hearing;Bed Alarm;Aspiration  (ocampo catheter, 4L O2 presently)   Pain Assessment   Pain Assessment 0-10   Pain Score 5   Pain Type Acute pain   Pain Location Hip;Leg   Pain Orientation Right   Pain Descriptors Aching; Sore   Pain Onset Ongoing   Clinical Progression Not changed   Patient's Stated Pain Goal No pain   Hospital Pain Intervention(s) Medication (See MAR); Repositioned; Ambulation/increased activity   Diversional Activities Television   Home Living   Type of [address] [facility] One level;Performs ADLs on one level;Able to live on main level with bedroom/bathroom;Stairs to enter with rails  (10-12 KHANH)   Bathroom Shower/Tub Tub/shower unit   Bathroom Toilet Standard   Bathroom Equipment Shower chair   [address] [place]   Additional Comments Prior to this hospitalization,pt  was receiving STR  He was transitioned from [facility] on 02/03/2020  Prior Function   Level of [place] Needs assistance with IADLs; Needs assistance with ADLs and functional mobility   Lives With Facility staff   Receives Help From Personal care attendant   ADL Assistance Needs assistance   IADLs Needs assistance   Falls in the last 6 months 1 to 4  (X3)   Vocational Retired   Comments at home wife reportedly assisted with feet (shoes/socks)   Psychosocial   Psychosocial (WDL) WDL   Patient Behaviors/Mood Cooperative   Subjective   Subjective agreeable to therapy eval   ADL   Eating Assistance 5  Supervision/Setup   Eating Deficit Setup   Grooming Assistance 4  Minimal Assistance   UB Bathing Assistance 3  Moderate Assistance   LB Bathing Assistance 1  Total Assistance   [address] 3  Moderate Assistance    [facility] [address]  Total [address]    (ocampo catheter)   Additional Comments dizziness and SOB and the presence of multiple lines hindering pt   Bed Mobility   Rolling R 3  Moderate assistance   Additional items Assist x 2;Bedrails;Verbal cues;LE management; Increased time required   Supine to Sit 3  Moderate assistance   Additional items Assist x 2;HOB elevated; Bedrails; Increased time required;Verbal cues;LE management   Sit to Supine 3  Moderate assistance   Additional items Assist x 2;Bedrails; Increased time required;Verbal cues;LE management   Additional Comments pt lightheaded/dizzy with positional changes  (Pt with low BP readings   88/51 as approached, 79/48 moving)   Transfers   Sit to Stand Unable to assess  (unable to safely progress at this time r/t dizziness and vit)   Functional Mobility   Functional Mobility   (unable at this time r/t low BP and overall status)   Balance   Static Sitting Fair   Dynamic Sitting Fair -   Activity Tolerance   Activity Tolerance Patient limited by fatigue;Patient limited by pain;Treatment limited secondary to medical complications (Comment)  (dizziness and low BP)   Nurse Made Aware yes, Vesta   RUE Assessment   RUE Assessment WFL   LUE Assessment   LUE Assessment WFL   Hand Function   Gross Motor Coordination Functional   Fine Motor Coordination Functional   Cognition   Overall Cognitive Status Impaired   Arousal/Participation Alert; Cooperative   Attention Attends with cues to redirect   Orientation Level Oriented to person;Oriented to situation;Disoriented to place; Disoriented to time   Memory Decreased short term memory;Decreased recall of recent events;Decreased recall of precautions   Following Commands Follows one step commands with increased time or repetition   Assessment   Limitation Decreased ADL status; Decreased Safe judgement during ADL;Decreased endurance;Decreased self-care trans   Prognosis Fair   Assessment Pt is a 80 y o  male seen for OT evaluation s/p admit to [address] on 2/10/2020 w/ Acute on chronic respiratory failure with hypoxia ([facility] [address] )  Comorbidities affecting pt's functional performance at time of assessment include: DM, neuropathy and closed Fx neck Rt femur, cognative d/o, COPD, see H & P for lengthy PMHx  Personal factors affecting pt at time of IE include:steps to enter environment, difficulty performing ADLS and limited insight into deficits  Prior to admission, pt was I with self care ADLs,Mobility  Upon evaluation: Pt requires an increased level of assistance with self care ADL's, functional transfers/toileting/mobility r/t  the following deficits impacting occupational performance: decreased balance, decreased tolerance, decreased safety awareness and increased pain  Pt to benefit from continued skilled OT tx while in the hospital to address deficits as defined above and maximize level of functional independence w ADL's and functional mobility  Occupational Performance areas to address include: grooming, bathing/shower, toilet hygiene, dressing and functional mobility  From OT standpoint, recommendation at time of d/c would be return to the [place] with therapies in house        Goals   Patient Goals know what's wrong with me and feel better   STG Time Frame 3-5   Short Term Goal #1 increase knowledge re: adaptations to good t increase ability to safely and independently participate in self care   Short Term Goal #2 increase bed mobility to Mod A X 1 for self care participation   LTG Time Frame 7-10   Long Term Goal #1 increase bed mobility to Min A X 1-2 for self care   Long Term Goal #2 assess and increase OOB functional trabnsfers X 1 level, for self toileting   Long Term Goal increase UB self care to Min A/set up w/ VC's, progress to LB to Mod A following set up andVC's   Plan   Treatment Interventions ADL retraining;Functional transfer training; Endurance training;Patient/family training; Compensatory technique education; Activityengagement   Goal Expiration Date 02/21/20   OT Treatment Day 0   OT Frequency 3-5x/wk   Recommendation   OT Discharge Recommendation Short Term Rehab   OT - OK to Discharge Yes   Barthel Index   Feeding 5   Bathing 0   Grooming Score 0   Dressing Score 0   Bladder Score 0   Bowels Score 5   Toilet Use Score 0   Transfers (Bed/Chair) Score 5   Mobility (Level Surface) Score 0   Stairs Score 0   Barthel Index Score 15   Yenny Syed, OT

## 2020-02-11 NOTE — NURSING NOTE
Pt awake, alert, and oriented on entering room  bipap on and pt 99% on same  Pt changed to 4l n/c  Pt agitated and angry  He could not find his callbell which was out of reach, and he was in pain  I calmed pt and resolved issues  Assessment as noted in computer charting  Pt denies sob at rest  Afebrile  Repos for comfort  Breakfast ordered and safety maintained

## 2020-02-11 NOTE — RESPIRATORY THERAPY NOTE
RT Protocol Note  Lisa Grissom 80 y o  male MRN: [id number]  Unit/Bed#: ICU 02 Encounter: [id number]    Assessment    Principal Problem:    Acute on chronic respiratory failure with hypoxia (HCC)  Active Problems:    Chronic obstructive pulmonary disease with acute exacerbation (HCC)    Type 2 diabetes mellitus with complication, with long-term current use of insulin (HCC)    Tobacco abuse    Closed fracture of neck of right femur with routine healing    Sepsis ([facility] )      Home Pulmonary Medications:    Home Devices/Therapy: Home O2, Other (Comment)(nebs and mdi)    Past Medical History:   Diagnosis Date    [facility]) ([facility])     BPH (benign prostatic hyperplasia)     COPD (chronic obstructive pulmonary disease) ([facility])     Diabetes mellitus ([facility] )     DJD (degenerative joint disease)     Falls     Gait abnormality     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[place] [address] )     Renal disorder     Stroke ([place] [address] )     may 2015    Tobacco abuse      Social History     Socioeconomic History    Marital status: /Civil Union     Spouse name: Toribio Shannon Number of children: 3    Years of education: 15    Highest education level: 12th grade   Occupational History    None   Social Needs    Financial resource strain: Somewhat hard    Food insecurity:     Worry: Never true     Inability: Never true    Transportation needs:     Medical: No     Non-medical: No   Tobacco Use    Smoking status: Current Every Day Smoker     Packs/day: 1 00     Years: 70 00     Pack years: 70 00    Smokeless tobacco: Never Used    Tobacco comment: Wife reports it's a losing matos   Substance and Sexual Activity    Alcohol use: Never     Frequency: Never    Drug use: No    Sexual activity: Yes     Partners: Female   Lifestyle    Physical activity:     Days per week: 0 days     Minutes per session: 0 min    Stress: Only a little   Relationships    Social connections:     Talks on phone:  Three times a week     Gets together: Once a week     Attends Jew service: Never     Active member of club or organization: No     Attends meetings of clubs or organizations: Never     Relationship status:     Intimate partner violence:     Fear of current or ex partner: No     Emotionally abused: No     Physically abused: No     Forced sexual activity: No   Other Topics Concern    None   Social History Narrative    None       Subjective         Objective    Physical Exam:   Assessment Type: Pre-treatment  General Appearance: Drowsy  Respiratory Pattern: Normal  Chest Assessment: Chest expansion symmetrical  Bilateral Breath Sounds: (P) Diminished  Cough: (P) None    Vitals:  Blood pressure 102/60, pulse 84, temperature 97 6 °F (36 4 °C), temperature source Tympanic, resp  rate (!) 26, height 5' 10" (1 778 m), weight 75 8 kg (167 lb), SpO2 100 %  Imaging and other studies: I have personally reviewed pertinent reports              Plan    Respiratory Plan: Mild Distress pathway        Resp Comments: (P) pt assessed as per protoocol, pt on bipap w/ ordered settings of 12/5 and 70%, pt resting comfortably, unable to answer questions at this time

## 2020-02-11 NOTE — NURSING NOTE
Verbal report given to receiving RN on gmf  Pt to be transported to f via bed with meds and belongings  Spouse aware of downgrade and transfer

## 2020-02-11 NOTE — ASSESSMENT & PLAN NOTE
· Patient meets criteria for sepsis with leukocytosis and tachycardia with underlying infections to be related to aspiration pneumonia  ? aspiration pneumonitis  · Continue antibiotic regimen of vancomycin, cefepime, and Flagyl  · Check MRSA culture; if this is negative will stop vancomycin  · Blood cultures are pending  · Will obtain urine for Legionella, strep, and sputum culture  · Aspiration precautions  · F/u procalcitonin

## 2020-02-11 NOTE — CONSULTS
STACEY Shelton#  :1935 [address] Sauer  L:[id number]    GCQ:[id number]  Adm Date: 2/10/2020 1054  10:54 AM   ATT PHY: [facility]  [address]       Chief Complaint: shortness of breath    History of Presenting Illness: Servando Smith is a(n) 80y o  year old male presenting [place] [facility] [place] for shortness of breath and hypoxia  Work up in the ED showed WBC of 13 80 and possible evidence of aspiration pneumonitis on CTA of the chest  PE was ruled out  Based on mild tachycardia, tachypnea and hypotension he met criteria for sepsis  He was initially started on 4L via NC, but his SpO2 continued to drop below 90%, warranting BiPap and admission to ICU for further treatment  The patient was initially started on Zosyn in the ED, but in the ICU, the patient was started on IV Vancomycin, Cefepime and Flagyl as well as Solu-Medrol and nebulizer treatments  To date, MRSA culture, blood culture and procalcitonin levels are still in process  Sputum culture was not collected yet  The patient quickly improved in the ICU, transitioning off of BiPap and on to 4L of oxygen via NC  He is now transferred to Med/Surg and on to our services for further management  The patient was seen after reviewing the chart and discussing the case with caring staff  Today during our encounter, the patient reported no acute concerns other than hip pains with relation to recent fracture  He is a poor historian overall, thinking that he came to the hospital because of his leg and not because his oxygen levels were low  Labs today are significant for leukocytosis of 19 20  No Known Allergies    No current facility-administered medications on file prior to encounter        Current Outpatient Medications on File Prior to Encounter   Medication Sig Dispense Refill    albuterol (PROVENTIL HFA,VENTOLIN HFA) 90 mcg/act inhaler Inhale 2 puffs every 6 (six) hours as needed for wheezing      ALPRAZolam (XANAX) 0 5 mg tablet Take 0 5 mg by mouth daily at bedtime as needed for anxiety      aspirin (ECOTRIN LOW STRENGTH) 81 mg EC tablet Take 81 mg by mouth daily      atorvastatin (LIPITOR) 20 mg tablet Take 20 mg by mouth daily      budesonide (PULMICORT) 0 5 mg/2 mL nebulizer solution Take 1 vial (0 5 mg total) by nebulization every 12 (twelve) hours Rinse mouth after use   0    carisoprodol (SOMA) 350 mg tablet Take 350 mg by mouth as needed for muscle spasms      docusate sodium (COLACE) 100 mg capsule Take 1 capsule (100 mg total) by mouth 2 (two) times a day 10 capsule 0    erythromycin (ILOTYCIN) ophthalmic ointment 0 5 inches daily at bedtime      escitalopram (LEXAPRO) 10 mg tablet Take 10 mg by mouth daily      finasteride (PROSCAR) 5 mg tablet Take 5 mg by mouth daily      fluticasone-salmeterol (ADVAIR) 250-50 mcg/dose inhaler Inhale 1 puff 2 (two) times a day Rinse mouth after use        fondaparinux (ARIXTRA) 2 5 mg/0 5 mL Inject 2 5 mg under the skin daily  0    furosemide (LASIX) 20 mg tablet Take 1 tablet (20 mg total) by mouth daily (Patient taking differently: Take 40 mg by mouth daily )  0    gabapentin (NEURONTIN) 100 mg capsule Take 100 mg by mouth 3 (three) times a day       glimepiride (AMARYL) 2 mg tablet Take 2 mg by mouth every morning before breakfast      insulin glargine (LANTUS) 100 units/mL subcutaneous injection Inject 20 Units under the skin daily       insulin lispro (HumaLOG) 100 units/mL injection Inject under the skin      levalbuterol (XOPENEX) 1 25 mg/0 5 mL nebulizer solution Take 0 5 mL (1 25 mg total) by nebulization 3 (three) times a day  0    nicotine (NICODERM CQ) 21 mg/24 hr TD 24 hr patch Place 1 patch on the skin daily 28 patch 0    oxybutynin (DITROPAN) 5 mg tablet Take 15 mg by mouth daily       polyethylene glycol (GLYCOLAX) powder Take 17 g by mouth daily  0    polyethylene glycol (MIRALAX) 17 g packet Take 17 g by mouth daily 14 each 0    rizatriptan (MAXALT) 10 MG tablet Take 10 mg by mouth once as needed for migraine May repeat in 2 hours if needed      senna (SENOKOT) 8 6 mg Take 2 tablets (17 2 mg total) by mouth daily as needed for constipation 120 each 0    sodium chloride 0 9 % nebulizer solution Take 3 mL by nebulization 3 (three) times a day  0       Active Ambulatory Problems     Diagnosis Date Noted    Chronic obstructive pulmonary disease with acute exacerbation ([facility] ) 10/16/2018    Type 2 diabetes mellitus with complication, with long-term current use of insulin [facility] ) 10/16/2018    Stroke ([facility] ) 10/16/2018    Renal disorder 10/16/2018    Diabetic polyneuropathy associated with type 2 diabetes mellitus ([facility] ) 10/16/2018    Renovascular hypertension 10/16/2018    Tobacco abuse 10/16/2018    Abdominal aortic aneurysm without rupture ([facility] ) 11/11/2015    Aneurysm of iliac artery ([facility] ) 11/11/2015    Antalgic gait 04/04/2017    Arthritis 04/04/2017    Benign prostatic hyperplasia 04/04/2017    Presence of suprapubic catheter ([facility] ) 08/20/2015    Chronic depression 04/28/2017    Chronic diarrhea 11/25/2013    Chronic bilateral low back pain without sciatica 04/04/2017    Chronic orthostatic hypotension 05/01/2018    Congestive heart failure ([facility] ) 04/18/2017    DDD (degenerative disc disease), lumbosacral 11/19/2013    Diabetic macular edema ([facility] ) 06/02/2019    Diabetic peripheral neuropathy associated with type 2 diabetes mellitus ([facility] ) 04/17/2019    Edema of both legs 02/25/2015    Hyperlipidemia associated with type 2 diabetes mellitus ([facility] ) 11/19/2013    Insomnia 07/23/2014    Mild cognitive disorder 11/07/2018    Nicotine dependence 09/26/2017    Mild nonproliferative diabetic retinopathy ([facility] ) 12/28/2018    Osteoporosis 02/01/2018    Peripheral arterial occlusive disease ([facility] ) 03/07/2016    Peripheral vascular disease ([facility] ) 06/22/2018    Recurrent UTI 10/24/2016    Thrombocytopenia ([facility] ) 12/22/2013    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) 04/04/2017    Vitamin D deficiency 07/23/2014    Localized edema 06/03/2019    Lung mass 12/30/2019    Swallowing problem 01/21/2020    Uncontrolled type 2 diabetes mellitus ([facility] ) 10/15/2019    Diabetic nephropathy ([facility] ) 04/04/2017    Closed fracture of neck of right femur with routine healing 01/30/2020    Acute on chronic respiratory failure with hypoxia ([facility] ) 01/31/2020     Resolved Ambulatory Problems     Diagnosis Date Noted    No Resolved Ambulatory Problems     Past Medical History:   Diagnosis Date    [facility]     BPH (benign prostatic hyperplasia)     COPD [facility]chronic obstructive pulmonary disease) ([facility]     Diabetes mellitus ([facility] )     DJD (degenerative joint disease)     Falls     Gait abnormality     Left middle cerebral artery stroke [facility])        Past Surgical History:   Procedure Laterality Date    ANGIOPLASTY  03/01/2016    Right femoral vein    BACK SURGERY  2015    ORIF HIP FRACTURE Right 1/31/2020    Procedure: OPEN REDUCTION W/ INTERNAL FIXATION (ORIF) HIP WITH CANNUALATED SCREWS;  Surgeon: Deanne Espino DO;  Location: [facility] MAIN OR;  Service: Orthopedics    SUPRAPUBIC TUBE PLACEMENT         Social History     Socioeconomic History    Marital status: /Civil Union     Spouse name: Evin Ga Number of children: 3    Years of education: 15    Highest education level: 12th grade   Occupational History    None   Social Needs    Financial resource strain: Somewhat hard    Food insecurity:     Worry: Never true     Inability: Never true    Transportation needs:     Medical: No     Non-medical: No   Tobacco Use    Smoking status: Current Every Day Smoker     Packs/day: 1 00     Years: 70 00     Pack years: 70 00    Smokeless tobacco: Never Used    Tobacco comment: Wife reports it's a losing matos   Substance and Sexual Activity    Alcohol use: Never     Frequency: Never  Drug use: No    Sexual activity: Yes     Partners: Female   Lifestyle    Physical activity:     Days per week: 0 days     Minutes per session: 0 min    Stress: Only a little   Relationships    Social connections:     Talks on phone: Three times a week     Gets together: Once a week     Attends Oriental orthodox service: Never     Active member of club or organization: No     Attends meetings of clubs or organizations: Never     Relationship status:     Intimate partner violence:     Fear of current or ex partner: No     Emotionally abused: No     Physically abused: No     Forced sexual activity: No   Other Topics Concern    None   Social History Narrative    None       Family History: non-contributory  Review of Systems   Gastrointestinal: Positive for abdominal pain  Musculoskeletal: Positive for arthralgias  All other systems reviewed and are negative  Vitals:    02/11/20 0900   BP: (!) 88/51   Pulse: 73   Resp: (!) 23   Temp:    SpO2: 98%       Physical Exam   Constitutional: Awake and Alert  Well-developed and well-nourished  No distress  HENT:   Head: Normocephalic and atraumatic  Cardiovascular: RRR with normal heart sounds  Exam reveals no friction rub  No murmur heard  Pulmonary/Chest: Effort normal, and breath sounds decreased  No overt respiratory distress  Patient has no noticeable wheezes, rales, or rhonchi  Abdominal: Soft  Bowel sounds are normal  No distension  There is no tenderness  There is no rebound and no guarding  Neurological: Cranial Nerves grossly intact  No sensory deficit  Coordination normal    Skin: Skin is warm and dry  No rash noted  No diaphoresis  No erythema  No edema  No cyanosis  Assessment     Neo Contreras is a(n) 80y o  year old male with acute on chronic respiratory failiure  1  Acute on Chronic Respiratory Failure  Initially met criteria for sepsis  Likely secondary to aspiration pneumonitis versus pneumonia   Will continue Vancomycin, Cefepime and Flagyl IV as ordered, pending MRSA culture  Continue Solu-Medrol 40mg every 8 hours for now, with plan to transition to PO Prednisone  Continue respiratory protocol, incentive spirometry, and nebulizer treatment consisting of Pulmicort and Xopenex  Speech and Swallow on consult  AM labs  2  Right hip fracture status post ORIF  Patient has been receiving extensive physical therapy occupational therapy as a part of short-term rehabilitation at the [facility]  Continue with PT/OT here, with pain regimen consisting of PRN Soma along with Tylenol on as needed basis  Patient is also on Arixtra for VTE prophylaxis  2  Cardiac with history of hypertension, coronary artery disease, dyslipidemia  Lasix 40mg, atorvastatin 20mg and aspirin 81mg  3  Type 2 diabetes mellitus requiring insulin along with diabetic neuropathy  Patient will be continued acute on Lantus 10 units and low dose sliding scale  Level 2 Carb Controlled Diet  Accu checks Q6HR  Gabapentin 100mg TID for neuropathy  3  COPD  Per records, patient's baseline oxygen is 3-4L NC  Continue Pulmicort, Xopenex and PRN albuterol  Mucinex BID  4  BPH  Oxybutynin 15mg and finasteride 5mg  5  Anxiety and depression  Patient is on Lexapro 10mg and Xanax 0 5mg PRN at bedtime for acute anxiety  6  Migraine headaches  Patient may continue to receive Maxalt along with Tylenol on as needed basis  7  Tobacco abuse  Nicotine transdermal patch 21mg  8  Constipation  Colace 100mg BID  9  Dry eyes  Patient is getting artificial tears and erythromycin ointment  Prognosis: Fair  Discharge Plan: In progress  Advanced Directives: I have discussed in detail the patient the advanced directives  The patient does have a POA and a living will  First contact is the spouse, Miguelina Villela who can be reached at [phone] or [phone]   When discussing cardiac and pulmonary resuscitation efforts with the patient, per advanced directives, the patient wishes to be a DNR  I have spent more than 50 minutes gathering data, doing physical examination, and discussing the advanced directives, which was witnessed by caring staff  The patient was discussed with Dr Beau Vargas and he is in agreement with the above note

## 2020-02-11 NOTE — SPEECH THERAPY NOTE
Spoke w/ pt  And nursing pt  Tolerated regular meal and medication today w/o difficulty  Maintaining O2 sats at 95% on 4 liters which is baseline  Pt had VBS on 1/29/20 w/ following results: Mild oral-pharyngeal stage dysphagia related to reduced dentition, suspected oral dryness and mild swallow delay  Mildly slowed but effective esophageal emptying        Recommendations:    Regular-soft diet as tolerated, adequately chewed   Small bites of food   Alternated w/ liquids   slower rate of ingestion   Aspiration and reflux precautions  No formal swallow evaluation indicated  Will acknowledge and complete order  Irena Kawasaki [place], MA, CCC/SLP  [id number]  [email or web address]  Available via tiger text

## 2020-02-11 NOTE — PHYSICAL THERAPY NOTE
Physical Therapy Evaluation     Patient's Name: Cammy Queen    Admitting Diagnosis  Aspiration pneumonitis ([place] [address] ) [J69 0]  Respiratory failure ([place] [address] ) [J96 90]  Respiratory distress [R06 03]  COPD exacerbation ([place] [address] ) [J44 1]    Problem List  Patient Active Problem List   Diagnosis    Chronic obstructive pulmonary disease with acute exacerbation ([place] [address] )    Type 2 diabetes mellitus with complication, with long-term current use of insulin ([place] [address] )    Stroke ([place] [address] )    Renal disorder    Diabetic polyneuropathy associated with type 2 diabetes mellitus ([place] [address] )    Renovascular hypertension    Tobacco abuse    Abdominal aortic aneurysm without rupture ([place] [address] )    Aneurysm of iliac artery ([place] [address] )    Antalgic gait    Arthritis    Benign prostatic hyperplasia    Presence of suprapubic catheter ([facility])    Chronic depression    Chronic diarrhea    Chronic bilateral low back pain without sciatica    Chronic orthostatic hypotension    Congestive heart failure (HCC)    DDD (degenerative disc disease), lumbosacral    Diabetic macular edema (HCC)    Diabetic peripheral neuropathy associated with type 2 diabetes mellitus (HCC)    Edema of both legs    Hyperlipidemia associated with type 2 diabetes mellitus (HCC)    Insomnia    Mild cognitive disorder    Nicotine dependence    Mild nonproliferative diabetic retinopathy ([place] [address] )    Osteoporosis    Peripheral arterial occlusive disease (HCC)    Peripheral vascular disease ([place] [address] )    Recurrent UTI    Thrombocytopenia (HCC)    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)    Vitamin D deficiency    Localized edema    Lung mass    Swallowing problem    [facility]  )    Diabetic nephropathy (HCC)    Closed fracture of neck of right femur with routine healing    Acute on chronic respiratory failure with hypoxia (HCC)    Sepsis ([place] [address] )       Past Medical History  Past Medical History:   Diagnosis Date    AAA (abdominal aortic aneurysm) ([facility] )     BPH (benign prostatic hyperplasia)     COPD (chronic obstructive pulmonary disease) (HCC)     Diabetes mellitus ([facility] )     DJD (degenerative joint disease)     Falls     Gait abnormality     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[facility] )     Renal disorder     Stroke [facility])     may 2015    Tobacco abuse        Past Surgical History  Past Surgical History:   Procedure Laterality Date    ANGIOPLASTY  03/01/2016    Right femoral vein    BACK SURGERY  2015    ORIF HIP FRACTURE Right 1/31/2020    Procedure: OPEN REDUCTION W/ INTERNAL FIXATION (ORIF) HIP WITH CANNUALATED SCREWS;  Surgeon: Kayce Randhawa DO;  Location: [facility] MAIN OR;  Service: Orthopedics    SUPRAPUBIC TUBE PLACEMENT          02/11/20 1010   Note Type   Note type Eval/Treat   Pain Assessment   Pain Assessment 0-10   Pain Score 5   Pain Type Acute pain   Pain Location Hip;Leg   Pain Orientation Right   Pain Descriptors Aching; Sore   Pain Onset Ongoing   Clinical Progression Not changed   Patient's Stated Pain Goal No pain   Hospital Pain Intervention(s) Medication (See MAR); Repositioned; Ambulation/increased activity   Diversional Activities Television   Multiple Pain Sites Yes   Home Living   Type of [address] One level;Performs ADLs on one level; Able to live on main level with bedroom/bathroom;Stairs to enter with rails  (10-12 KHANH)   Bathroom Shower/Tub Tub/shower unit   Bathroom Toilet Standard   Bathroom Equipment Shower chair   [address] Walker  (rolling)   Additional Comments Prior to this hospitalization,pt  was receiving STR  He was transitioned from [facility] on 02/03/2020  Prior Function   Level of [place] Needs assistance with IADLs; Needs assistance with ADLs and functional mobility   Lives With Facility staff   Receives Help From Personal care attendant   ADL Assistance Needs assistance   IADLs Needs assistance   Falls in the last 6 months 1 to 4  (x 3 falls)   Restrictions/Precautions   Weight Bearing Precautions Per Order Yes   RLE Weight Bearing Per Order TTWB   Other Precautions Fall Risk;Pain;O2;Telemetry;Multiple lines;WBS;Hard of hearing  (4 L O2 via NC)   General   Additional Pertinent History s/p ORIF 01/31/2020 following a fall and subsequent R hip fx  Family/Caregiver Present No   Cognition   Overall Cognitive Status Impaired   Arousal/Participation Alert   Attention Attends with cues to redirect   Orientation Level Oriented to person;Oriented to situation;Disoriented to time;Disoriented to place   Memory Decreased short term memory;Decreased recall of recent events;Decreased recall of precautions   Following Commands Follows one step commands with increased time or repetition   RUE Assessment   RUE Assessment WFL   LUE Assessment   LUE Assessment WFL   RLE Assessment   RLE Assessment X   Strength RLE   R Hip Flexion 3-/5   R Knee Extension 3-/5   R Ankle Dorsiflexion 3-/5   LLE Assessment   LLE Assessment X   Strength LLE   LLE Overall Strength 4-/5  (gross musculature)   Coordination   Movements are Fluid and Coordinated 0   Coordination and Movement Description Incremental, antalgic mobility requiring increased time and verbal/tactile cues of 2  Bed Mobility   Supine to Sit 3  Moderate assistance   Additional items Assist x 2;HOB elevated; Bedrails; Increased time required;Verbal cues;LE management   Sit to Supine 3  Moderate assistance   Additional items Assist x 2;Bedrails; Increased time required;Verbal cues;LE management   Additional Comments Pt  reported increased dizziness and lightheadedness w/positional changes  Transfers   Sit to Stand Unable to assess  (2/2 safety concerns w/current medical status)   Ambulation/Elevation   Gait pattern Not tested; Not appropriate   Balance   Static Sitting Fair   Dynamic Sitting Fair -   Static Standing   (unable to assess)   Endurance Deficit   Endurance Deficit Yes   Endurance Deficit Description dizziness,SOB,ART w/ positional changes  (BP 79/48 pre->102/79 during session HR 87->118->105 BPM)   Activity Tolerance   Activity Tolerance Patient limited by fatigue;Patient limited by pain;Treatment limited secondary to medical complications (Comment)   Nurse Made Aware yes,Danyell RN   Assessment   Prognosis Fair   Problem List Decreased strength;Decreased endurance; Impaired balance;Decreased mobility; Decreased coordination; Impaired judgement;Decreased safety awareness;Decreased cognition;Decreased skin integrity;Orthopedic restrictions;Pain   Assessment Pt is 80 y o  male seen for PT evaluation s/p admit to [facility] ICU on 2/10/2020 w/ Acute on chronic respiratory failure with hypoxia ([facility] )  PT consulted to assess pt's functional mobility and d/c needs  Order placed for PT eval and tx, w/ eval & treat,TTWB RLE s/p ORIF R hip 01/31/2020 order  Comorbidities affecting pt's physical performance at time of assessment include: weakness,pain, ART/SOB w/acute on chronic respiratory failure w/hypoxia, COPD w/acute exacerbation, DM, tobacco abuse,sepsis,diabetic polyneuropathy  PTA, pt was receiving STR at [facility]  Personal factors affecting pt at time of IE include: communication issues, inability to ambulate household distances, inability to navigate level surfaces w/o external assistance, decreased cognition, positive fall history, hearing impairments, tobacco use, unable to perform physical activity, limited insight into impairments and inability to perform ADLs   Please find objective findings from PT assessment regarding body systems outlined above with impairments and limitations including weakness, impaired balance, decreased endurance, impaired coordination, gait deviations, pain, decreased activity tolerance, decreased functional mobility tolerance, decreased safety awareness, impaired judgement, fall risk, orthopedic restrictions, SOB upon exertion and decreased skin integrity  The following objective measures performed on IE also reveal limitations: Barthel Index: 15/100  Pt's clinical presentation is currently unstable/unpredictable seen in pt's presentation of SOB,ART /w acute respiratory failure,pain, abnormal lab values including notably elevated WBCs   Pt to benefit from continued PT tx to address deficits as defined above and maximize level of functional independent mobility and consistency  From PT/mobility standpoint, recommendation at time of d/c would be continued STR at [facility] pending progress in order to facilitate return to PLOF  Goals   Patient Goals feel better soon and to know what's wrong with him   LTG Expiration Date 02/21/20   Long Term Goal #1 Pt will(dependent on patient's ability to safely participate in interventions) perform bed mobility tasks to min A of 2 to decrease burden of care  Perform transfers to mod A of 2 to decrease risk of skin breakdown with change of position  Perform ambulation with RW for 15-30 feet, mod A of 2 to improve activity tolerance & endurance  Patient will tolerate AAROM,->AROM BLE's to decrease risk of contractures and facilitate joint proprioception  Patient will demonstrate increased static sitting balance to Fair+, <50% tactile cues  to facilitate activation of stabilizing muscles and prepare for safe transfers  PT Treatment Day 1   Plan   Treatment/Interventions Functional transfer training;LE strengthening/ROM; Therapeutic exercise; Endurance training;Patient/family training;Equipment eval/education; Bed mobility;Gait training; Compensatory technique education;Spoke to nursing;OT   PT Frequency 5x/wk   Recommendation   Recommendation Short-term skilled PT  (resume at the [facility])   PT - OK to Discharge No   Additional Comments Upon conclusion, pt  wa resting in bed w/SCD's active & all needs within reach     Barthel Index   Feeding 5   Bathing 0   Grooming Score 0   Dressing Score 0   Bladder Score 0   Bowels Score 5   Toilet Use Score 0   Transfers (Bed/Chair) Score 5   Mobility (Level Surface) Score 0   Stairs Score 0   Barthel Index Score 15     Additional skilled interventions: Therapeutic Activity x 10 minutes    S: 5/10 R hip & leg pain prior to session,A&O x2 Berhane was agreeable to participate w/verbal encouragement,as he was fearful of falling  O: therapeutic activity x 10 minutes including mobilization education: bed mobility, supine<->sit, static/dynamic sitting balance w/ postural facilitation, continued safety training for increased awareness,and breathing conservation techniques 2/2 SOB/ART,dizziness  A: Patient exhibited pain, weakness, impaired balance, decreased endurance, activity intolerance, and decline from PLOF to benefit from continued interventions  P: Continue PT tx with progression of functional tasks as patient can safely tolerate, 5x/week      Hector , PT

## 2020-02-11 NOTE — ASSESSMENT & PLAN NOTE
· Likely due to aspiration pneumonitis versus pneumonia  · PE is ruled out  · Titrated off of Bipap to home oxygen  · Will start the patient on Solu-Medrol, nebulizer treatment, respiratory protocol  · Encourage cough and deep breathing and early ambulation  · Will continue cefepime and flagyl pending consult results  · Stable for transfer to [facility]

## 2020-02-11 NOTE — PROGRESS NOTES
Progress Note - Patrick Bones 1935, 80 y o  male MRN: [id number]    Unit/Bed#: ICU 02 Encounter: [id number]    Primary Care Provider: Nathalie Campos MD   Date and time admitted to hospital: 2/10/2020 10:54 AM        * Acute on chronic respiratory failure with hypoxia ([facility] )  Assessment & Plan  · Likely due to aspiration pneumonitis versus pneumonia  · PE is ruled out  · Titrated off of Bipap to home oxygen  · Will start the patient on Solu-Medrol, nebulizer treatment, respiratory protocol  · Encourage cough and deep breathing and early ambulation  · Will continue cefepime and flagyl pending consult results  · Stable for transfer to [facility]    Sepsis [facility])  Assessment & Plan  · Patient meets criteria for sepsis with leukocytosis and tachycardia with underlying infections to be related to aspiration pneumonia  ? aspiration pneumonitis  · Continue antibiotic regimen of vancomycin, cefepime, and Flagyl  · Check MRSA culture; if this is negative will stop vancomycin  · Blood cultures are pending  · Will obtain urine for Legionella, strep, and sputum culture  · Aspiration precautions  · F/u procalcitonin      Closed fracture of neck of right femur with routine healing  Assessment & Plan  · Status post ORIF on 01/31/20  · Continue on Arixtra  · Will consult PT and OT    Type 2 diabetes mellitus with complication, with long-term current use of insulin [facility])  Assessment & Plan  Lab Results   Component Value Date    HGBA1C 7 3 (H) 12/23/2019       Recent Labs     02/10/20  2120 02/11/20  0606   POCGLU 253* 269*       Blood Sugar Average: Last 72 hrs:  · (P) 261Will place the patient on insulin sliding scale  · Will hold all oral diabetic medications while in house  · Start lantus    Chronic obstructive pulmonary disease with acute exacerbation (HCC)  Assessment & Plan  · Acute exacerbation is due to above  · Continue treatment outlined above        VTE Pharmacologic Prophylaxis: Pharmacologic: Fondaparinux (Arixtra)    Patient Centered Rounds: I have performed bedside rounds with nursing staff today  Discussions with Specialists or Other Care Team Provider: n/a  Education and Discussions with Family / Patient: patient    Current Length of Stay: 1 day(s)    Current Patient Status: Inpatient   Certification Statement: The patient will continue to require additional inpatient hospital stay due to respiratory failure    Discharge Plan: pending hospital course    Code Status: Level 3 - DNAR and DNI    Subjective:   Patient seen and examined  Feels better this morning, on home oxygen regimen    Objective:     Vitals:   Temp (24hrs), Av 2 °F (36 2 °C), Min:96 8 °F (36 °C), Max:97 6 °F (36 4 °C)    Temp:  [96 8 °F (36 °C)-97 6 °F (36 4 °C)] 97 6 °F (36 4 °C)  HR:  [] 78  Resp:  [12-26] 17  BP: ()/(52-71) 101/57  SpO2:  [85 %-100 %] 98 %  Body mass index is 22 43 kg/m²  Input and Output Summary (last 24 hours): Intake/Output Summary (Last 24 hours) at 2020 0838  Last data filed at 2020 0801  Gross per 24 hour   Intake 670 ml   Output 1300 ml   Net -630 ml       Physical Exam:     Physical Exam   Constitutional: He is oriented to person, place, and time  He appears well-developed and well-nourished  HENT:   Head: Normocephalic and atraumatic  Eyes: Pupils are equal, round, and reactive to light  EOM are normal    Neck: Normal range of motion  Neck supple  Cardiovascular: Normal rate and regular rhythm  Pulmonary/Chest: Effort normal  He has rales  Poor air movement   Abdominal: Soft  Bowel sounds are normal    Musculoskeletal: Normal range of motion  He exhibits tenderness  Neurological: He is alert and oriented to person, place, and time  Skin: Skin is warm  Capillary refill takes less than 2 seconds  Psychiatric: He has a normal mood and affect  His behavior is normal  Thought content normal    Nursing note and vitals reviewed        Additional Data:     Labs:    Results from last 7 days   Lab Units 02/11/20  0515 02/10/20  1115   WBC Thousand/uL 19 20* 13 80*   HEMOGLOBIN g/dL 11 8* 12 6*   HEMATOCRIT % 36 6* 40 4*   PLATELETS Thousands/uL 200 216   NEUTROS PCT %  --  84*   LYMPHS PCT %  --  6*   LYMPHO PCT % 2*  --    MONOS PCT %  --  10   MONO PCT % 2*  --    EOS PCT %  --  1     Results from last 7 days   Lab Units 02/11/20  0515   POTASSIUM mmol/L 4 8   CHLORIDE mmol/L 98   CO2 mmol/L 33*   BUN mg/dL 23   CREATININE mg/dL 0 69*   CALCIUM mg/dL 8 8   ALK PHOS U/L 93   ALT U/L 17   AST U/L 14     Results from last 7 days   Lab Units 02/10/20  1115   INR  1 11     Results from last 7 days   Lab Units 02/11/20  0606 02/10/20  2120 02/07/20  1634 02/07/20  0424 02/06/20  1607 02/06/20  0452 02/05/20  1646 02/05/20  0435 02/04/20  1616   POC GLUCOSE mg/dl 269* 253* 254* 126 217* 119 134 213* 227*           * I Have Reviewed All Lab Data Listed Above  * Additional Pertinent Lab Tests Reviewed: [facility] Admission  Reviewed    Imaging:  Imaging Reports Reviewed Today Include: n/a    Recent Cultures (last 7 days):     Results from last 7 days   Lab Units 02/10/20  1356   BLOOD CULTURE  Received in Microbiology Lab  Culture in Progress  Received in Microbiology Lab  Culture in Progress         Last 24 Hours Medication List:     Current Facility-Administered Medications:  acetaminophen 650 mg Oral Q6H PRN Shivam Koguzman PA-C    albuterol 2 5 mg Nebulization Q4H PRN Doyce Martinez, CRNP    ALPRAZolam 0 5 mg Oral HS PRN Doyce Martinez, CRNP    aspirin 81 mg Oral Daily Doyce Martinez, CRNP    atorvastatin 20 mg Oral Daily Doyce Martinez, CRNP    budesonide 0 5 mg Nebulization Q12H Doyce Martinez, CRNP    carisoprodol 350 mg Oral Q6H PRN Doyce Martinez, CRNP    cefepime 1,000 mg Intravenous Q12H Doyce Martinez, CRNP    docusate sodium 100 mg Oral BID Doyce Martinez, CRNP    erythromycin 0 5 inch Both Eyes HS Doyce DEBBY Henriquez    escitalopram 10 mg Oral Daily ShadiaDEBBY Manning    finasteride 5 mg Oral Daily DEBBY Mae    fondaparinux 2 5 mg Subcutaneous Daily DEBBY Mae    furosemide 40 mg Oral Daily DEBBY Mae    gabapentin 100 mg Oral TID DEBBY Mae    guaiFENesin 600 mg Oral BID DEBBY Mae    insulin lispro 1-6 Units Subcutaneous Q6H [facility] DEBBY Mae    levalbuterol 1 25 mg Nebulization TID Jorge Peck MD    And        sodium chloride 3 mL Nebulization TID Jorge Peck MD    methylPREDNISolone sodium succinate 40 mg Intravenous Q8H DEBBY Mae    metroNIDAZOLE 500 mg Intravenous Q8H DEBBY Mae Last Rate: 500 mg (02/11/20 0421)   nicotine 21 mg Transdermal Daily DEBBY Mae    oxybutynin 15 mg Oral Daily DEBBY Mae    vancomycin 17 5 mg/kg Intravenous Q12H DEBBY Mae Last Rate: 1,250 mg (02/10/20 2233)        Today, Patient Was Seen By: Elana Ambrocio MD    ** Please Note: Dictation voice to text software may have been used in the creation of this document   **

## 2020-02-11 NOTE — SOCIAL WORK
Chart reviewed by case management, assessment completed, pt lives with his wife in a 2 story home,  Steps outside the front & 8 steps in the back, 1/2 bath 1st floor & full bath the 2nd floor,pt has walker, toilet riser, oxygen 4 liters and inogen, shower chair, w/c,pt has had hhc in the past and he does not know the agency, pt has rx plan at [facility], pt was admitted to [facility] from the [place] where he was receiving rehab from his surgery, Sharath Hart called today and the pt is not a bedhold, pt was down graded today as discussed at care coordination rounds, I did met with the pt and he would like to return , he did give permission to call his wife, I left messages at 15:03 to # [phone] & [phone], he aries did call me back to discuss d/c plan, she would like the pt to return the [place], new referral was sent as requested, ,Patient/caregiver received discharge checklist   Content reviewed  Patient/caregiver encouraged to participate in discharge plan of care prior to discharge home  CM reviewed d/c planning process including the following: identifying help at home, patient preference for d/c planning needs, availability of treatment team to discuss questions or concerns patient and/or family may have regarding understanding medications and recognizing signs and symptoms once discharged  CM also encouraged patient to follow up with all recommended appointments after discharge  Patient advised of importance for patient and family to participate in managing patients medical well being

## 2020-02-11 NOTE — PLAN OF CARE
Problem: DISCHARGE PLANNING - CARE MANAGEMENT  Goal: Discharge to post-acute care or home with appropriate resources  Description  INTERVENTIONS:  - Conduct assessment to determine patient/family and health care team treatment goals, and need for post-acute services based on payer coverage, community resources, and patient preferences, and barriers to discharge  - Address psychosocial, clinical, and financial barriers to discharge as identified in assessment in conjunction with the patient/family and health care team  - Arrange appropriate level of post-acute services according to patient's   needs and preference and payer coverage in collaboration with the physician and health care team  - Communicate with and update the patient/family, physician, and health care team regarding progress on the discharge plan  - Arrange appropriate transportation to post-acute venues    Pt's goal is to return home after rehab   Outcome: Progressing

## 2020-02-12 LAB
ANION GAP SERPL CALCULATED.3IONS-SCNC: 9 MMOL/L (ref 4–13)
BUN SERPL-MCNC: 29 MG/DL (ref 7–25)
CALCIUM SERPL-MCNC: 8.4 MG/DL (ref 8.6–10.5)
CHLORIDE SERPL-SCNC: 96 MMOL/L (ref 98–107)
CO2 SERPL-SCNC: 29 MMOL/L (ref 21–31)
CREAT SERPL-MCNC: 0.7 MG/DL (ref 0.7–1.3)
ERYTHROCYTE [DISTWIDTH] IN BLOOD BY AUTOMATED COUNT: 14.3 % (ref 11.5–14.5)
GFR SERPL CREATININE-BSD FRML MDRD: 87 ML/MIN/1.73SQ M
GLUCOSE SERPL-MCNC: 170 MG/DL (ref 65–140)
GLUCOSE SERPL-MCNC: 200 MG/DL (ref 65–140)
GLUCOSE SERPL-MCNC: 256 MG/DL (ref 65–140)
GLUCOSE SERPL-MCNC: 265 MG/DL (ref 65–140)
GLUCOSE SERPL-MCNC: 281 MG/DL (ref 65–140)
GLUCOSE SERPL-MCNC: 288 MG/DL (ref 65–99)
GLUCOSE SERPL-MCNC: 334 MG/DL (ref 65–140)
GLUCOSE SERPL-MCNC: 377 MG/DL (ref 65–140)
HCT VFR BLD AUTO: 33.5 % (ref 42–47)
HGB BLD-MCNC: 10.5 G/DL (ref 14–18)
INR PPP: 1.12 (ref 0.9–1.5)
MCH RBC QN AUTO: 29.7 PG (ref 26–34)
MCHC RBC AUTO-ENTMCNC: 31.2 G/DL (ref 31–37)
MCV RBC AUTO: 95 FL (ref 81–99)
MRSA NOSE QL CULT: NORMAL
PLATELET # BLD AUTO: 224 THOUSANDS/UL (ref 149–390)
PMV BLD AUTO: 9.1 FL (ref 8.6–11.7)
POTASSIUM SERPL-SCNC: 4.3 MMOL/L (ref 3.5–5.5)
PROCALCITONIN SERPL-MCNC: 0.06 NG/ML
PROTHROMBIN TIME: 13 SECONDS (ref 10.2–13)
RBC # BLD AUTO: 3.52 MILLION/UL (ref 4.3–5.9)
SODIUM SERPL-SCNC: 134 MMOL/L (ref 134–143)
VANCOMYCIN TROUGH SERPL-MCNC: 11.9 UG/ML (ref 5–12)
WBC # BLD AUTO: 23.6 THOUSAND/UL (ref 4.8–10.8)

## 2020-02-12 PROCEDURE — 85027 COMPLETE CBC AUTOMATED: CPT | Performed by: INTERNAL MEDICINE

## 2020-02-12 PROCEDURE — 85610 PROTHROMBIN TIME: CPT | Performed by: INTERNAL MEDICINE

## 2020-02-12 PROCEDURE — 80202 ASSAY OF VANCOMYCIN: CPT | Performed by: PHYSICIAN ASSISTANT

## 2020-02-12 PROCEDURE — 94760 N-INVAS EAR/PLS OXIMETRY 1: CPT

## 2020-02-12 PROCEDURE — 80048 BASIC METABOLIC PNL TOTAL CA: CPT | Performed by: INTERNAL MEDICINE

## 2020-02-12 PROCEDURE — 84145 PROCALCITONIN (PCT): CPT | Performed by: INTERNAL MEDICINE

## 2020-02-12 PROCEDURE — 82948 REAGENT STRIP/BLOOD GLUCOSE: CPT

## 2020-02-12 PROCEDURE — 94640 AIRWAY INHALATION TREATMENT: CPT

## 2020-02-12 RX ORDER — PANTOPRAZOLE SODIUM 40 MG/1
40 TABLET, DELAYED RELEASE ORAL
Status: DISCONTINUED | OUTPATIENT
Start: 2020-02-12 | End: 2020-02-14 | Stop reason: HOSPADM

## 2020-02-12 RX ORDER — GINSENG 100 MG
1 CAPSULE ORAL 2 TIMES DAILY
Status: DISCONTINUED | OUTPATIENT
Start: 2020-02-12 | End: 2020-02-14 | Stop reason: HOSPADM

## 2020-02-12 RX ORDER — METHYLPREDNISOLONE SODIUM SUCCINATE 40 MG/ML
40 INJECTION, POWDER, LYOPHILIZED, FOR SOLUTION INTRAMUSCULAR; INTRAVENOUS EVERY 12 HOURS SCHEDULED
Status: DISCONTINUED | OUTPATIENT
Start: 2020-02-12 | End: 2020-02-13

## 2020-02-12 RX ORDER — MAGNESIUM HYDROXIDE/ALUMINUM HYDROXICE/SIMETHICONE 120; 1200; 1200 MG/30ML; MG/30ML; MG/30ML
30 SUSPENSION ORAL EVERY 4 HOURS PRN
Status: DISCONTINUED | OUTPATIENT
Start: 2020-02-12 | End: 2020-02-14 | Stop reason: HOSPADM

## 2020-02-12 RX ADMIN — METRONIDAZOLE 500 MG: 500 INJECTION, SOLUTION INTRAVENOUS at 06:19

## 2020-02-12 RX ADMIN — METRONIDAZOLE 500 MG: 500 INJECTION, SOLUTION INTRAVENOUS at 14:28

## 2020-02-12 RX ADMIN — FINASTERIDE 5 MG: 5 TABLET, FILM COATED ORAL at 08:26

## 2020-02-12 RX ADMIN — VANCOMYCIN HYDROCHLORIDE 1500 MG: 1 INJECTION, POWDER, LYOPHILIZED, FOR SOLUTION INTRAVENOUS at 23:17

## 2020-02-12 RX ADMIN — ALUMINUM HYDROXIDE, MAGNESIUM HYDROXIDE, AND SIMETHICONE 30 ML: 200; 200; 20 SUSPENSION ORAL at 14:06

## 2020-02-12 RX ADMIN — METHYLPREDNISOLONE SODIUM SUCCINATE 40 MG: 40 INJECTION, POWDER, FOR SOLUTION INTRAMUSCULAR; INTRAVENOUS at 03:23

## 2020-02-12 RX ADMIN — GUAIFENESIN 600 MG: 600 TABLET, EXTENDED RELEASE ORAL at 08:25

## 2020-02-12 RX ADMIN — VANCOMYCIN HYDROCHLORIDE 1250 MG: 1 INJECTION, POWDER, LYOPHILIZED, FOR SOLUTION INTRAVENOUS at 12:30

## 2020-02-12 RX ADMIN — NICOTINE 21 MG: 21 PATCH, EXTENDED RELEASE TRANSDERMAL at 08:27

## 2020-02-12 RX ADMIN — METHYLPREDNISOLONE SODIUM SUCCINATE 40 MG: 40 INJECTION, POWDER, FOR SOLUTION INTRAMUSCULAR; INTRAVENOUS at 11:50

## 2020-02-12 RX ADMIN — ERYTHROMYCIN 0.5 INCH: 5 OINTMENT OPHTHALMIC at 21:35

## 2020-02-12 RX ADMIN — ATORVASTATIN CALCIUM 20 MG: 20 TABLET, FILM COATED ORAL at 08:26

## 2020-02-12 RX ADMIN — ESCITALOPRAM 10 MG: 5 TABLET, FILM COATED ORAL at 08:25

## 2020-02-12 RX ADMIN — ISODIUM CHLORIDE 3 ML: 0.03 SOLUTION RESPIRATORY (INHALATION) at 13:41

## 2020-02-12 RX ADMIN — ACETAMINOPHEN 650 MG: 325 TABLET ORAL at 03:23

## 2020-02-12 RX ADMIN — BACITRACIN 1 SMALL APPLICATION: 500 OINTMENT TOPICAL at 14:28

## 2020-02-12 RX ADMIN — INSULIN LISPRO 2 UNITS: 100 INJECTION, SOLUTION INTRAVENOUS; SUBCUTANEOUS at 11:55

## 2020-02-12 RX ADMIN — INSULIN LISPRO 4 UNITS: 100 INJECTION, SOLUTION INTRAVENOUS; SUBCUTANEOUS at 17:23

## 2020-02-12 RX ADMIN — BACITRACIN 1 SMALL APPLICATION: 500 OINTMENT TOPICAL at 17:23

## 2020-02-12 RX ADMIN — ISODIUM CHLORIDE 3 ML: 0.03 SOLUTION RESPIRATORY (INHALATION) at 20:52

## 2020-02-12 RX ADMIN — DOCUSATE SODIUM 100 MG: 100 CAPSULE, LIQUID FILLED ORAL at 17:23

## 2020-02-12 RX ADMIN — LEVALBUTEROL HYDROCHLORIDE 1.25 MG: 1.25 SOLUTION, CONCENTRATE RESPIRATORY (INHALATION) at 13:41

## 2020-02-12 RX ADMIN — PANTOPRAZOLE SODIUM 40 MG: 40 TABLET, DELAYED RELEASE ORAL at 14:29

## 2020-02-12 RX ADMIN — FUROSEMIDE 40 MG: 40 TABLET ORAL at 08:26

## 2020-02-12 RX ADMIN — METHYLPREDNISOLONE SODIUM SUCCINATE 40 MG: 40 INJECTION, POWDER, FOR SOLUTION INTRAMUSCULAR; INTRAVENOUS at 21:32

## 2020-02-12 RX ADMIN — OXYBUTYNIN CHLORIDE 15 MG: 5 TABLET ORAL at 08:26

## 2020-02-12 RX ADMIN — BUDESONIDE 0.5 MG: 0.5 INHALANT RESPIRATORY (INHALATION) at 20:52

## 2020-02-12 RX ADMIN — INSULIN GLARGINE 10 UNITS: 100 INJECTION, SOLUTION SUBCUTANEOUS at 21:34

## 2020-02-12 RX ADMIN — ACETAMINOPHEN 650 MG: 325 TABLET ORAL at 10:48

## 2020-02-12 RX ADMIN — CEFEPIME HYDROCHLORIDE 1000 MG: 1 INJECTION, SOLUTION INTRAVENOUS at 11:50

## 2020-02-12 RX ADMIN — GABAPENTIN 100 MG: 100 CAPSULE ORAL at 21:32

## 2020-02-12 RX ADMIN — LEVALBUTEROL HYDROCHLORIDE 1.25 MG: 1.25 SOLUTION, CONCENTRATE RESPIRATORY (INHALATION) at 20:52

## 2020-02-12 RX ADMIN — DOCUSATE SODIUM 100 MG: 100 CAPSULE, LIQUID FILLED ORAL at 08:25

## 2020-02-12 RX ADMIN — INSULIN LISPRO 5 UNITS: 100 INJECTION, SOLUTION INTRAVENOUS; SUBCUTANEOUS at 00:11

## 2020-02-12 RX ADMIN — GABAPENTIN 100 MG: 100 CAPSULE ORAL at 16:31

## 2020-02-12 RX ADMIN — GUAIFENESIN 600 MG: 600 TABLET, EXTENDED RELEASE ORAL at 17:23

## 2020-02-12 RX ADMIN — CEFEPIME HYDROCHLORIDE 1000 MG: 1 INJECTION, SOLUTION INTRAVENOUS at 22:00

## 2020-02-12 RX ADMIN — METRONIDAZOLE 500 MG: 500 INJECTION, SOLUTION INTRAVENOUS at 21:35

## 2020-02-12 RX ADMIN — ASPIRIN 81 MG: 81 TABLET ORAL at 08:26

## 2020-02-12 RX ADMIN — GABAPENTIN 100 MG: 100 CAPSULE ORAL at 08:26

## 2020-02-12 RX ADMIN — FONDAPARINUX SODIUM 2.5 MG: 2.5 INJECTION, SOLUTION SUBCUTANEOUS at 08:26

## 2020-02-12 RX ADMIN — INSULIN LISPRO 3 UNITS: 100 INJECTION, SOLUTION INTRAVENOUS; SUBCUTANEOUS at 08:27

## 2020-02-12 NOTE — NURSING NOTE
Patient stated and pointing at ceiling stating that someone put his call light "up there", call light is not on the ceiling but rather on patient's lap  Patient becoming more confused and agitated throughout shift, SpO2 88-90% on 5L RR 24, consulted respiratory, RT evaluated Pt at bedside  Increased monitoring established

## 2020-02-12 NOTE — NURSING NOTE
Patient complaining that "his penis is burning" and questioning if it's related to his catheter  Explained to patient that he does not have a catheter in his penis but rather a suprapubic catheter  Witnessed patient scratching at his penis several times throughout shift  Informed patient that his skin is intact and ice pack applied above blanket for comfort

## 2020-02-12 NOTE — NURSING NOTE
Patient is alert but unable to accept any teaching by nursing  He insists that his call bell was taken from him earlier today, said he searched for it to call the nurse but he was not able to get help  He appears to be alert and oriented but he has no idea why he is in the hospital  He said he had pain in his foot (rt) and that is why he came to the hospital  He has an incision rt hip but "my foot still hurts and I'm short of breathe" but I don't any problem with my breathing"  Pt uses oxygen at home,continually, but says he didn't come here for that  He is confused and angry  Unable to teach or reason with him  Says he has pain but refuses pain med and has the call bell in his hand "but noone comes to him"! I offered pain med and something for nausea but he refused several times  He has constant staff presence but does not want them because "they don't do anything for me"  The lights are on in his bathroom and he has been shown how to use the call bell but he is constantly angry and difficult to please  He is close to the desk and someone checking on him continually to prevent any accidents  Continue teaching and explaining what  Is going on to prevent aggitation, lights on and frequent visits by staff  Encouraging pain med , and nutrition

## 2020-02-12 NOTE — SOCIAL WORK
I met with the pt and his wife, I explained that since they [facility] not hold the [facility], the bed may not be available, she did give me permission to send to [facility] as a back up, she does want him to go back to [facility] as here first choice, no d/c for today, d/c plan was discussed at care coordination rounds, cm heidy continue to follow

## 2020-02-12 NOTE — NURSING NOTE
Passed on from [facility] nurse that patient is agitated,confused, and yelling at staff  Patient is confused as to why he is here  He states that he was admitted "for his leg and not for his breathing " Patient states he is in "excrutiating pain" but refuses pain medicine besides tylenol  Pain now controlled with ice and tylenol  Patient remains upset but states "it's not you, its this place"  Supervisor made aware

## 2020-02-12 NOTE — PLAN OF CARE
Problem: Potential for Falls  Goal: Patient will remain free of falls  Description  INTERVENTIONS:  - Assess patient frequently for physical needs  -  Identify cognitive and physical deficits and behaviors that affect risk of falls    -  Start fall precautions as indicated by assessment   - Educate patient/family on patient safety including physical limitations  - Instruct patient to call for assistance with activity based on assessment  - Modify environment to reduce risk of injury  - Consider OT/PT consult to assist with strengthening/mobility  2/12/2020 0844 by Gisell Hernandez RN  Outcome: Progressing  2/12/2020 0843 by Gisell Hernandez RN  Outcome: Progressing     Problem: Prexisting or High Potential for Compromised Skin Integrity  Goal: Skin integrity is maintained or improved  Description  INTERVENTIONS:  - Identify patients at risk for skin breakdown  - Assess and monitor skin integrity  - Assess and monitor nutrition and hydration status  - Monitor labs   - Assess for incontinence   - Turn and reposition patient  - Assist with mobility/ambulation  - Relieve pressure over bony prominences  - Avoid friction and shearing  - Provide appropriate hygiene as needed including keeping skin clean and dry  - Evaluate need for skin moisturizer/barrier cream  - Collaborate with interdisciplinary team   - Patient/family teaching  - Consider wound care consult   2/12/2020 0844 by Gisell Hernandez RN  Outcome: Progressing  2/12/2020 0843 by Gisell Hernandez RN  Outcome: Progressing     Problem: PAIN - ADULT  Goal: Verbalizes/displays adequate comfort level or baseline comfort level  Description  Interventions:  - Encourage patient to monitor pain and request assistance  - Assess pain using appropriate pain scale  - Administer analgesics based on type and severity of pain and evaluate response  - Implement non-pharmacological measures as appropriate and evaluate response  - Consider cultural and social influences on pain and pain management  - Notify physician/advanced practitioner if interventions unsuccessful or patient reports new pain  2/12/2020 0844 by Gt Valencia RN  Outcome: Progressing  2/12/2020 0843 by Gt Valencia RN  Outcome: Progressing     Problem: INFECTION - ADULT  Goal: Absence or prevention of progression during hospitalization  Description  INTERVENTIONS:  - Assess and monitor for signs and symptoms of infection  - Monitor lab/diagnostic results  - Monitor all insertion sites, i e  indwelling lines, tubes, and drains  - Monitor endotracheal if appropriate and nasal secretions for changes in amount and color  - [place] appropriate cooling/warming therapies per order  - Administer medications as ordered  - Instruct and encourage patient and family to use good hand hygiene technique  - Identify and instruct in appropriate isolation precautions for identified infection/condition  2/12/2020 0844 by Gt Valencia RN  Outcome: Progressing  2/12/2020 0843 by Gt Valencia RN  Outcome: Progressing     Problem: SAFETY ADULT  Goal: Maintain or return to baseline ADL function  Description  INTERVENTIONS:  -  Assess patient's ability to carry out ADLs; assess patient's baseline for ADL function and identify physical deficits which impact ability to perform ADLs (bathing, care of mouth/teeth, toileting, grooming, dressing, etc )  - Assess/evaluate cause of self-care deficits   - Assess range of motion  - Assess patient's mobility; develop plan if impaired  - Assess patient's need for assistive devices and provide as appropriate  - Encourage maximum independence but intervene and supervise when necessary  - Involve family in performance of ADLs  - Assess for home care needs following discharge   - Consider OT consult to assist with ADL evaluation and planning for discharge  - Provide patient education as appropriate  2/12/2020 0844 by Gt aVlencia RN  Outcome: Progressing  2/12/2020 0843 by Dong Yao RN  Outcome: Progressing  Goal: Maintain or return mobility status to optimal level  Description  INTERVENTIONS:  - Assess patient's baseline mobility status (ambulation, transfers, stairs, etc )    - Identify cognitive and physical deficits and behaviors that affect mobility  - Identify mobility aids required to assist with transfers and/or ambulation (gait belt, sit-to-stand, lift, walker, cane, etc )  - [place] fall precautions as indicated by assessment  - Record patient progress and toleration of activity level on Mobility SBAR; progress patient to next Phase/Stage  - Instruct patient to call for assistance with activity based on assessment  - Consider rehabilitation consult to assist with strengthening/weightbearing, etc   2/12/2020 0844 by Dong Yao RN  Outcome: Progressing  2/12/2020 0843 by Dong Yao RN  Outcome: Progressing     Problem: DISCHARGE PLANNING  Goal: Discharge to home or other facility with appropriate resources  Description  INTERVENTIONS:  - Identify barriers to discharge w/patient and caregiver  - Arrange for needed discharge resources and transportation as appropriate  - Identify discharge learning needs (meds, wound care, etc )  - Arrange for interpretive services to assist at discharge as needed  - Refer to Case Management Department for coordinating discharge planning if the patient needs post-hospital services based on physician/advanced practitioner order or complex needs related to functional status, cognitive ability, or social support system  2/12/2020 0844 by Dong Yao RN  Outcome: Progressing  2/12/2020 0843 by Dong Yao RN  Outcome: Progressing     Problem: Knowledge Deficit  Goal: Patient/family/caregiver demonstrates understanding of disease process, treatment plan, medications, and discharge instructions  Description  Complete learning assessment and assess knowledge base    Interventions:  - Provide teaching at level of understanding  - Provide teaching via preferred learning methods  2/12/2020 0844 by Daryl Baez RN  Outcome: Progressing  2/12/2020 0843 by Daryl Baez RN  Outcome: Progressing     Problem: Nutrition/Hydration-ADULT  Goal: Nutrient/Hydration intake appropriate for improving, restoring or maintaining nutritional needs  Description  Monitor and assess patient's nutrition/hydration status for malnutrition  Collaborate with interdisciplinary team and initiate plan and interventions as ordered  Monitor patient's weight and dietary intake as ordered or per policy  Utilize nutrition screening tool and intervene as necessary  Determine patient's food preferences and provide high-protein, high-caloric foods as appropriate       INTERVENTIONS:  - Monitor oral intake, urinary output, labs, and treatment plans  - Assess nutrition and hydration status and recommend course of action  - Evaluate amount of meals eaten  - Assist patient with eating if necessary   - Allow adequate time for meals  - Recommend/ encourage appropriate diets, oral nutritional supplements, and vitamin/mineral supplements  - Order, calculate, and assess calorie counts as needed  - Recommend, monitor, and adjust tube feedings and TPN/PPN based on assessed needs  - Assess need for intravenous fluids  - Provide specific nutrition/hydration education as appropriate  - Include patient/family/caregiver in decisions related to nutrition  2/12/2020 0844 by Daryl Baez RN  Outcome: Progressing  2/12/2020 0843 by Daryl Baez RN  Outcome: Progressing     Problem: DISCHARGE PLANNING - CARE MANAGEMENT  Goal: Discharge to post-acute care or home with appropriate resources  Description  INTERVENTIONS:  - Conduct assessment to determine patient/family and health care team treatment goals, and need for post-acute services based on payer coverage, community resources, and patient preferences, and barriers to discharge  - Address psychosocial, clinical, and financial barriers to discharge as identified in assessment in conjunction with the patient/family and health care team  - Arrange appropriate level of post-acute services according to patient's   needs and preference and payer coverage in collaboration with the physician and health care team  - Communicate with and update the patient/family, physician, and health care team regarding progress on the discharge plan  - Arrange appropriate transportation to post-acute venues    Pt's goal is to return home after rehab   2/12/2020 0844 by Rika Jamison RN  Outcome: Progressing  2/12/2020 0843 by Rika Jamison RN  Outcome: Progressing     Problem: RESPIRATORY - ADULT  Goal: Achieves optimal ventilation and oxygenation  Description  INTERVENTIONS:  - Assess for changes in respiratory status  - Assess for changes in mentation and behavior  - Position to facilitate oxygenation and minimize respiratory effort  - Oxygen administered by appropriate delivery if ordered  - Initiate smoking cessation education as indicated  - Encourage broncho-pulmonary hygiene including cough, deep breathe, Incentive Spirometry  - Assess the need for suctioning and aspirate as needed  - Assess and instruct to report SOB or any respiratory difficulty  - Respiratory Therapy support as indicated  Outcome: Progressing

## 2020-02-12 NOTE — NURSING NOTE
PT complained of abd pain after eating a few bites and stated he was not able to eat much of his lunch, Dr: priscila informed and new orders placed for Mylanta and 40 MG Protonix PO, RN administered and patient was able to eat 100% of his dinner, patient stated the med's helped decrease his pain

## 2020-02-12 NOTE — PHYSICAL THERAPY NOTE
Physical Therapy Cancellation Note    Attempted to see pt at 9:03 for PT treatment  He refused ex in bed, transfers & amb attempts citing pain & not sleeping last night  Attempt again as able      Jez Mejia, PTA

## 2020-02-12 NOTE — CONSULTS
Vancomycin Assessment    Cammy Queen is a 80 y o  male who is currently receiving vancomycin 1250 mg IV Q 12 hrs for Pneumonia     Relevant clinical data and objective history reviewed:  Creatinine   Date Value Ref Range Status   02/12/2020 0 70 0 70 - 1 30 mg/dL Final     Comment:     Standardized to IDMS reference method   02/11/2020 0 69 (L) 0 70 - 1 30 mg/dL Final     Comment:     Standardized to IDMS reference method   02/10/2020 0 72 0 70 - 1 30 mg/dL Final     Comment:     Standardized to IDMS reference method     /57 (BP Location: Right arm)   Pulse 64   Temp (!) 97 °F (36 1 °C) (Tympanic)   Resp 22   Ht 5' 10" (1 778 m)   Wt 70 9 kg (156 lb 4 9 oz)   SpO2 96%   BMI 22 43 kg/m²   I/O last 3 completed shifts: In: 696 [P O :240; I V :6; IV Piggyback:450]  Out: 725 [Urine:725]  Lab Results   Component Value Date/Time    BUN 29 (H) 02/12/2020 06:29 AM    WBC 23 60 (H) 02/12/2020 06:29 AM    HGB 10 5 (L) 02/12/2020 06:29 AM    HCT 33 5 (L) 02/12/2020 06:29 AM    MCV 95 02/12/2020 06:29 AM     02/12/2020 06:29 AM     Temp Readings from Last 3 Encounters:   02/12/20 (!) 97 °F (36 1 °C) (Tympanic)   02/11/20 97 7 °F (36 5 °C) (Temporal)   02/03/20 (!) 96 3 °F (35 7 °C) (Temporal)     Vancomycin Days of Therapy: 3    Assessment/Plan  The patient is currently on vancomycin utilizing scheduled dosing  Baseline risks associated with therapy include: advanced age  The patient is receiving 1250 mg IV Q 12 hrs with the most recent vancomycin level being at steady-state and sub-therapeutic based on a goal of 15-20 (appropriate for most indications) ; therefore, after clinical evaluation will be changed to 1500 mg Iv Q 12 hrs   Pharmacy will continue to follow closely for s/sx of nephrotoxicity, infusion reactions and appropriateness of therapy  BMP and CBC will be ordered per protocol    Plan for trough as patient approaches steady state, prior to the 4th  dose at approximately 1100 on 2/14/20  Pharmacy will continue to follow the patients culture results and clinical progress daily      Faraz Nogueira, Pharmacist

## 2020-02-12 NOTE — PROGRESS NOTES
Progress Note - Robert Cummings 80 y o  male MRN: [id number]    Unit/Bed#: -01 Encounter: [id number]        Subjective:   Patient seen and examined at bedside after reviewing the chart and discussing the case with the caring staff  Patient seen and examined at bedside  Patient is complaining of epigastric pain which is making him difficult to eat any food  Patient describes when he eats it causes severe discomfort  Patient continues to have leg weakness  It was reported by the caring staff nurse that patient patient was requesting to go to the bathroom to pee although he has suprapubic catheter  Patient's bladder scan showed less than 30 cc of urine  Later patient discharged is scratching the area  Patient has a small scratch on the head of the penis which is bothering him  I reviewed patient's blood cultures which are negative till now  Patient's Legionella and strep pneumo test is also negative  Patient's urine culture showed mixed contaminant  Patient's MRSA is still pending  Patient's sputum culture showed [place] and Candida species  Physical Exam   Vitals: Blood pressure 117/57, pulse 64, temperature (!) 97 °F (36 1 °C), temperature source Tympanic, resp  rate 22, height 5' 10" (1 778 m), weight 70 9 kg (156 lb 4 9 oz), SpO2 96 %  ,Body mass index is 22 43 kg/m²  Constitutional: He appears well-developed  HEENT: PERR, EOMI, MMM  Cardiovascular: Normal rate and regular rhythm  Pulmonary/Chest: Effort normal and breath sounds normal    Abdomen: Soft, + BS, mild tenderness on palpation in the epigastrium, no guarding rigidity or rebound tenderness  Assessment/Plan:  Robert Cummings is a(n) 80y o  year old male with acute on chronic respiratory failiure      1  Acute on Chronic Respiratory Failure  Initially met criteria for sepsis  Likely secondary to aspiration pneumonitis versus pneumonia   Will continue Vancomycin, Cefepime and Flagyl IV as ordered, pending MRSA culture  Reduce Solu-Medrol 40mg every 12 hours for now, with plan to transition to PO Prednisone  Continue respiratory protocol, incentive spirometry, and nebulizer treatment consisting of Pulmicort and Xopenex  Speech and swallow cleared the patient as he was not found to have any difficulty  2  Right hip fracture status post ORIF   Patient has been receiving extensive physical therapy occupational therapy as a part of short-term rehabilitation at the [facility]  Continue with PT/OT here, with pain regimen consisting of PRN Soma along with Tylenol on as needed basis  Patient is also on Arixtra for VTE prophylaxis  2  Cardiac with history of hypertension, coronary artery disease, dyslipidemia  Lasix 40mg, atorvastatin 20mg and aspirin 81mg  3  Type 2 diabetes mellitus requiring insulin along with diabetic neuropathy   Patient will be continued acute on Lantus 10 units and low dose sliding scale  Level 2 Carb Controlled Diet  Accu checks Q6HR  Gabapentin 100mg TID for neuropathy  3  COPD  Per records, patient's baseline oxygen is 3-4L NC  Continue Pulmicort, Xopenex and PRN albuterol  Mucinex BID  4  BPH   Oxybutynin 15mg and finasteride 5mg  5  Anxiety and depression   Patient is on Lexapro 10mg and Xanax 0 5mg PRN at bedtime for acute anxiety  6  Migraine headaches   Patient may continue to receive Maxalt along with Tylenol on as needed basis  7  Tobacco abuse  Nicotine transdermal patch 21mg  8  Constipation   Colace 100mg BID  9  Dry eyes   Patient is getting artificial tears and erythromycin ointment  10  GERD/gastritis  I will treat the patient with Protonix 40 mg daily  11  Scratched open wound on the head of the penis  I will put the patient on bacitracin ointment twice daily

## 2020-02-13 LAB
GLUCOSE SERPL-MCNC: 156 MG/DL (ref 65–140)
GLUCOSE SERPL-MCNC: 253 MG/DL (ref 65–140)
GLUCOSE SERPL-MCNC: 264 MG/DL (ref 65–140)
GLUCOSE SERPL-MCNC: 276 MG/DL (ref 65–140)
GLUCOSE SERPL-MCNC: 309 MG/DL (ref 65–140)
GLUCOSE SERPL-MCNC: 319 MG/DL (ref 65–140)
GLUCOSE SERPL-MCNC: 47 MG/DL (ref 65–140)
GLUCOSE SERPL-MCNC: 57 MG/DL (ref 65–140)
GLUCOSE SERPL-MCNC: 80 MG/DL (ref 65–140)
GLUCOSE SERPL-MCNC: 83 MG/DL (ref 65–140)

## 2020-02-13 PROCEDURE — 97535 SELF CARE MNGMENT TRAINING: CPT

## 2020-02-13 PROCEDURE — 94760 N-INVAS EAR/PLS OXIMETRY 1: CPT

## 2020-02-13 PROCEDURE — 97110 THERAPEUTIC EXERCISES: CPT

## 2020-02-13 PROCEDURE — 94640 AIRWAY INHALATION TREATMENT: CPT

## 2020-02-13 PROCEDURE — 97530 THERAPEUTIC ACTIVITIES: CPT

## 2020-02-13 PROCEDURE — 82948 REAGENT STRIP/BLOOD GLUCOSE: CPT

## 2020-02-13 RX ORDER — METHYLPREDNISOLONE 4 MG/1
4 TABLET ORAL DAILY
Status: DISCONTINUED | OUTPATIENT
Start: 2020-02-18 | End: 2020-02-14 | Stop reason: HOSPADM

## 2020-02-13 RX ORDER — METHYLPREDNISOLONE 4 MG/1
24 TABLET ORAL DAILY
Status: COMPLETED | OUTPATIENT
Start: 2020-02-13 | End: 2020-02-13

## 2020-02-13 RX ORDER — METHYLPREDNISOLONE 4 MG/1
16 TABLET ORAL DAILY
Status: DISCONTINUED | OUTPATIENT
Start: 2020-02-15 | End: 2020-02-14 | Stop reason: HOSPADM

## 2020-02-13 RX ORDER — METHYLPREDNISOLONE 4 MG/1
20 TABLET ORAL DAILY
Status: COMPLETED | OUTPATIENT
Start: 2020-02-14 | End: 2020-02-14

## 2020-02-13 RX ORDER — METHYLPREDNISOLONE 4 MG/1
12 TABLET ORAL DAILY
Status: DISCONTINUED | OUTPATIENT
Start: 2020-02-16 | End: 2020-02-14 | Stop reason: HOSPADM

## 2020-02-13 RX ORDER — METHYLPREDNISOLONE 4 MG/1
8 TABLET ORAL DAILY
Status: DISCONTINUED | OUTPATIENT
Start: 2020-02-17 | End: 2020-02-14 | Stop reason: HOSPADM

## 2020-02-13 RX ADMIN — METRONIDAZOLE 500 MG: 500 INJECTION, SOLUTION INTRAVENOUS at 21:15

## 2020-02-13 RX ADMIN — DOCUSATE SODIUM 100 MG: 100 CAPSULE, LIQUID FILLED ORAL at 18:20

## 2020-02-13 RX ADMIN — LEVALBUTEROL HYDROCHLORIDE 1.25 MG: 1.25 SOLUTION, CONCENTRATE RESPIRATORY (INHALATION) at 13:49

## 2020-02-13 RX ADMIN — INSULIN GLARGINE 10 UNITS: 100 INJECTION, SOLUTION SUBCUTANEOUS at 21:46

## 2020-02-13 RX ADMIN — ESCITALOPRAM 10 MG: 5 TABLET, FILM COATED ORAL at 08:44

## 2020-02-13 RX ADMIN — CEFEPIME HYDROCHLORIDE 1000 MG: 1 INJECTION, SOLUTION INTRAVENOUS at 21:46

## 2020-02-13 RX ADMIN — BACITRACIN 1 SMALL APPLICATION: 500 OINTMENT TOPICAL at 18:20

## 2020-02-13 RX ADMIN — INSULIN LISPRO 3 UNITS: 100 INJECTION, SOLUTION INTRAVENOUS; SUBCUTANEOUS at 18:20

## 2020-02-13 RX ADMIN — METHYLPREDNISOLONE SODIUM SUCCINATE 40 MG: 40 INJECTION, POWDER, FOR SOLUTION INTRAMUSCULAR; INTRAVENOUS at 08:47

## 2020-02-13 RX ADMIN — BUDESONIDE 0.5 MG: 0.5 INHALANT RESPIRATORY (INHALATION) at 07:37

## 2020-02-13 RX ADMIN — FUROSEMIDE 40 MG: 40 TABLET ORAL at 08:44

## 2020-02-13 RX ADMIN — ATORVASTATIN CALCIUM 20 MG: 20 TABLET, FILM COATED ORAL at 08:45

## 2020-02-13 RX ADMIN — GABAPENTIN 100 MG: 100 CAPSULE ORAL at 21:15

## 2020-02-13 RX ADMIN — OXYBUTYNIN CHLORIDE 15 MG: 5 TABLET ORAL at 08:43

## 2020-02-13 RX ADMIN — INSULIN LISPRO 3 UNITS: 100 INJECTION, SOLUTION INTRAVENOUS; SUBCUTANEOUS at 11:46

## 2020-02-13 RX ADMIN — DOCUSATE SODIUM 100 MG: 100 CAPSULE, LIQUID FILLED ORAL at 08:45

## 2020-02-13 RX ADMIN — ISODIUM CHLORIDE 3 ML: 0.03 SOLUTION RESPIRATORY (INHALATION) at 20:02

## 2020-02-13 RX ADMIN — INSULIN LISPRO 4 UNITS: 100 INJECTION, SOLUTION INTRAVENOUS; SUBCUTANEOUS at 06:34

## 2020-02-13 RX ADMIN — GUAIFENESIN 600 MG: 600 TABLET, EXTENDED RELEASE ORAL at 18:20

## 2020-02-13 RX ADMIN — GABAPENTIN 100 MG: 100 CAPSULE ORAL at 16:47

## 2020-02-13 RX ADMIN — LEVALBUTEROL HYDROCHLORIDE 1.25 MG: 1.25 SOLUTION, CONCENTRATE RESPIRATORY (INHALATION) at 07:37

## 2020-02-13 RX ADMIN — CEFEPIME HYDROCHLORIDE 1000 MG: 1 INJECTION, SOLUTION INTRAVENOUS at 11:05

## 2020-02-13 RX ADMIN — VANCOMYCIN HYDROCHLORIDE 1500 MG: 1 INJECTION, POWDER, LYOPHILIZED, FOR SOLUTION INTRAVENOUS at 11:54

## 2020-02-13 RX ADMIN — ALPRAZOLAM 0.5 MG: 0.5 TABLET ORAL at 19:40

## 2020-02-13 RX ADMIN — GABAPENTIN 100 MG: 100 CAPSULE ORAL at 08:44

## 2020-02-13 RX ADMIN — ISODIUM CHLORIDE 3 ML: 0.03 SOLUTION RESPIRATORY (INHALATION) at 07:37

## 2020-02-13 RX ADMIN — ISODIUM CHLORIDE 3 ML: 0.03 SOLUTION RESPIRATORY (INHALATION) at 13:50

## 2020-02-13 RX ADMIN — GUAIFENESIN 600 MG: 600 TABLET, EXTENDED RELEASE ORAL at 08:45

## 2020-02-13 RX ADMIN — BUDESONIDE 0.5 MG: 0.5 INHALANT RESPIRATORY (INHALATION) at 20:01

## 2020-02-13 RX ADMIN — METRONIDAZOLE 500 MG: 500 INJECTION, SOLUTION INTRAVENOUS at 05:14

## 2020-02-13 RX ADMIN — ACETAMINOPHEN 650 MG: 325 TABLET ORAL at 00:21

## 2020-02-13 RX ADMIN — ASPIRIN 81 MG: 81 TABLET ORAL at 08:45

## 2020-02-13 RX ADMIN — ACETAMINOPHEN 650 MG: 325 TABLET ORAL at 19:40

## 2020-02-13 RX ADMIN — FONDAPARINUX SODIUM 2.5 MG: 2.5 INJECTION, SOLUTION SUBCUTANEOUS at 08:47

## 2020-02-13 RX ADMIN — LEVALBUTEROL HYDROCHLORIDE 1.25 MG: 1.25 SOLUTION, CONCENTRATE RESPIRATORY (INHALATION) at 20:01

## 2020-02-13 RX ADMIN — PANTOPRAZOLE SODIUM 40 MG: 40 TABLET, DELAYED RELEASE ORAL at 05:13

## 2020-02-13 RX ADMIN — FINASTERIDE 5 MG: 5 TABLET, FILM COATED ORAL at 08:44

## 2020-02-13 RX ADMIN — METRONIDAZOLE 500 MG: 500 INJECTION, SOLUTION INTRAVENOUS at 16:47

## 2020-02-13 RX ADMIN — METHYLPREDNISOLONE 24 MG: 4 TABLET ORAL at 16:47

## 2020-02-13 RX ADMIN — ERYTHROMYCIN 0.5 INCH: 5 OINTMENT OPHTHALMIC at 21:15

## 2020-02-13 RX ADMIN — NICOTINE 21 MG: 21 PATCH, EXTENDED RELEASE TRANSDERMAL at 08:46

## 2020-02-13 RX ADMIN — INSULIN LISPRO 4 UNITS: 100 INJECTION, SOLUTION INTRAVENOUS; SUBCUTANEOUS at 00:18

## 2020-02-13 RX ADMIN — CARISOPRODOL 350 MG: 350 TABLET ORAL at 09:22

## 2020-02-13 RX ADMIN — ALUMINUM HYDROXIDE, MAGNESIUM HYDROXIDE, AND SIMETHICONE 30 ML: 200; 200; 20 SUSPENSION ORAL at 09:25

## 2020-02-13 RX ADMIN — BACITRACIN 1 SMALL APPLICATION: 500 OINTMENT TOPICAL at 09:11

## 2020-02-13 NOTE — PROGRESS NOTES
Progress Note - Tamica Crow 80 y o  male MRN: [id number]    Unit/Bed#: -01 Encounter: [id number]        Subjective:   Patient seen and examined at bedside after reviewing the chart and discussing the case with the caring staff  Patient examined at bedside  Patient does not report any acute issues  Patient's feels that he has improved and is coming back to his baseline  I reviewed patient's blood cultures which are negative for 48 hours  Patient's Legionella and strep pneumo test is also negative  Patient's urine culture showed mixed contaminant  Patient's MRSA is negative  Patient's sputum culture showed [place] and Candida species  Physical Exam   Vitals: Blood pressure 116/67, pulse 72, temperature (!) 97 3 °F (36 3 °C), temperature source Temporal, resp  rate 18, height 5' 10" (1 778 m), weight 65 1 kg (143 lb 8 3 oz), SpO2 93 %  ,Body mass index is 20 59 kg/m²  Constitutional: He appears well-developed  HEENT: PERR, EOMI, MMM  Cardiovascular: Normal rate and regular rhythm  Pulmonary/Chest: Effort normal and breath sounds normal    Abdomen: Soft, + BS, mild tenderness on palpation in the epigastrium, no guarding rigidity or rebound tenderness  Assessment/Plan:  Tamica Crow is a(n) 80y o  year old male with acute on chronic respiratory failiure      1  Acute on Chronic Respiratory Failure  Initially met criteria for sepsis  Likely secondary to aspiration pneumonitis versus pneumonia  Will continue Cefepime and Flagyl IV as ordered  I will discontinue vancomycin  MRSA culture is negative  I will discontinue Solu-Medrol and start the patient on prednisone 40 mg daily  Continue respiratory protocol, incentive spirometry, and nebulizer treatment consisting of Pulmicort and Xopenex  2  Right hip fracture status post ORIF   Patient has been receiving extensive physical therapy occupational therapy as a part of short-term rehabilitation at the [facility]   Continue with PT/OT here, with pain regimen consisting of PRN Soma along with Tylenol on as needed basis  Patient is also on Arixtra for VTE prophylaxis  2  Cardiac with history of hypertension, coronary artery disease, dyslipidemia  Lasix 40mg, atorvastatin 20mg and aspirin 81mg  3  Type 2 diabetes mellitus requiring insulin along with diabetic neuropathy   Patient will be continued acute on Lantus 10 units and low dose sliding scale  Level 2 Carb Controlled Diet  Accu checks Q6HR  Gabapentin 100mg TID for neuropathy  3  COPD  Per records, patient's baseline oxygen is 3-4L NC  Continue Pulmicort, Xopenex and PRN albuterol  Mucinex BID  4  BPH   Oxybutynin 15mg and finasteride 5mg  5  Anxiety and depression   Patient is on Lexapro 10mg and Xanax 0 5mg PRN at bedtime for acute anxiety  6  Migraine headaches   Patient may continue to receive Maxalt along with Tylenol on as needed basis  7  Tobacco abuse  Nicotine transdermal patch 21mg  8  Constipation   Colace 100mg BID  9  Dry eyes   Patient is getting artificial tears and erythromycin ointment  10  GERD/gastritis  I will treat the patient with Protonix 40 mg daily  11  Scratched open wound on the head of the penis  I will put the patient on bacitracin ointment twice daily  Disposition  If patient continues to show improvement patient may be discharged tomorrow back to [facility]

## 2020-02-13 NOTE — CONSULTS
The patient's vancomycin therapy has been discontinued  Pharmacy will sign off now, Thank you for this consult

## 2020-02-13 NOTE — PLAN OF CARE
Problem: PHYSICAL THERAPY ADULT  Goal: Performs mobility at highest level of function for planned discharge setting  See evaluation for individualized goals  Description  Treatment/Interventions: Functional transfer training, LE strengthening/ROM, Therapeutic exercise, Endurance training, Patient/family training, Equipment eval/education, Bed mobility, Gait training, Compensatory technique education, Spoke to nursing, OT          See flowsheet documentation for full assessment, interventions and recommendations  Outcome: Progressing  Note:   Prognosis: Fair  Problem List: Decreased strength, Decreased endurance, Impaired balance, Decreased mobility, Decreased safety awareness, Decreased skin integrity, Orthopedic restrictions, Pain  Assessment: Pt seen for PT treatment session this date with interventions consisting of Therapeutic exercise consisting of: AROM and AAROM 20 reps B LE in sitting and supine position and therapeutic activity consisting of training: supine<>sit transfers, sit<>stand transfers, static sitting tolerance at EOB for 15 minutes w/ B UE support and static standing tolerance for 1 x 3trials minutes w/ B UE support  Pt agreeable to PT treatment session upon arrival, pt found supine in bed w/ HOB elevated, in no apparent distress  In comparison to previous session, pt with improvements in activity tolerance, participation  Post session: pt returned BTB, bed alarm engaged and all needs in reach seated at EOB with PETERSON for bathing  Continue to recommend STR at time of d/c in order to maximize pt's functional independence and safety w/ mobility  Pt continues to be functioning below baseline level, and remains limited 2* factors listed above and including decreased strength, endurance & safe functional mobility  PT will continue to see pt while here in order to address the deficits listed above and provide interventions consistent w/ POC in effort to achieve STGs    Barriers to Discharge: Inaccessible home environment     Recommendation: Short-term skilled PT     PT - OK to Discharge: Yes(when med cleared to STR)    See flowsheet documentation for full assessment

## 2020-02-13 NOTE — OCCUPATIONAL THERAPY NOTE
02/13/20 1026   Restrictions/Precautions   Weight Bearing Precautions Per Order Yes   RLE Weight Bearing Per Order TTWB   Other Precautions Bed Alarm;WBS;O2;Fall Risk;Pain   ADL   Where Assessed Edge of bed   Grooming Comments patient combed hair but declined mouth care at this time    UB Bathing Assistance 5  Supervision/Setup   UB Bathing Deficit Setup;Supervision/safety; Increased time to complete   LB Bathing Assistance 3  Moderate Assistance   LB Bathing Deficit Setup;Verbal cueing;Supervision/safety; Increased time to complete;Right lower leg including foot; Left lower leg including foot  (patient declined buttocks )   UB Dressing Assistance 4  Minimal Assistance   UB Dressing Deficit Setup   UB Dressing Comments doff/don hospital gown    LB Dressing Deficit Don/doff R sock; Don/doff L sock; Don/doff R shoe;Don/doff L shoe   LB Dressing Comments pants not tested   Bed Mobility   Sit to Supine 4  Minimal assistance   Additional items Assist x 1;Bedrails; Increased time required;LE management   Transfers   Additional Comments see PT note   Coordination   Gross Motor WFL   Dexterity WFL   Cognition   Overall Cognitive Status Impaired   Arousal/Participation Alert; Cooperative   Comments seems to enjoy verbal interaction - displays sense of humor at times;  appears motivated to improve mobility status   Activity Tolerance   Activity Tolerance Patient limited by fatigue;Patient limited by pain   Assessment   Assessment Patient participated in Skilled OT session this date with interventions consisting of ADL re training with the use of correct body mechnaics, Energy Conservation techniques and safety awareness and fall prevention techniques   Patient agreeable to OT treatment session, upon arrival patient was found seated at edge of bed  In comparison to previous session, patient with improvements in overall activity level and tolerance    Patient requiring verbal cues for correct technique, frequent rest periods and ocassional safety reminders and LB assistance as noted in flow sheet  Patient continues to be functioning below baseline level, occupational performance remains limited secondary to factors listed above and increased risk for falls and injury  From OT standpoint, recommendation at time of d/c would be Short Term Rehab  Patient to benefit from continued Occupational Therapy treatment while in the hospital to address deficits as defined above and maximize level of functional independence with ADLs and functional mobility  Plan   Treatment Interventions ADL retraining;Functional transfer training;Energy conservation; Activityengagement   Goal Expiration Date 02/21/20   OT Treatment Day 172 KRISTEN Jack/L

## 2020-02-13 NOTE — PHYSICAL THERAPY NOTE
02/13/20 1006   Pain Assessment   Pain Assessment 0-10   Pain Score 6   Pain Type Acute pain;Surgical pain   Pain Location Hip   Pain Orientation Right   Pain Descriptors Aching; Sore   Pain Onset Ongoing   Clinical Progression Gradually improving   Patient's Stated Pain Goal No pain   Hospital Pain Intervention(s) Medication (See MAR); Repositioned; Ambulation/increased activity   Restrictions/Precautions   Weight Bearing Precautions Per Order Yes   RLE Weight Bearing Per Order TTWB   Other Precautions Telemetry;O2;Fall Risk;Pain;WBS; Bed Alarm   General   Chart Reviewed Yes   Family/Caregiver Present No   Cognition   Overall Cognitive Status Impaired   Arousal/Participation Alert; Cooperative   Attention Attends with cues to redirect   Orientation Level Oriented to person;Oriented to place;Oriented to time   Memory Decreased short term memory;Decreased recall of precautions;Decreased recall of recent events   Following Commands Follows one step commands with increased time or repetition   Comments pt agreed to PT treatment   Subjective   Subjective "I'll do what I can with you  I'm going rehab today "   Bed Mobility   Supine to Sit 4  Minimal assistance   Additional items Assist x 1;HOB elevated; Bedrails; Increased time required;Verbal cues;LE management   Additional Comments pt sat at EOB x 15 mins, performed LE ex, sitting balance   Transfers   Sit to Stand 4  Minimal assistance   Additional items Assist x 1; Increased time required;Verbal cues   Stand to Sit 4  Minimal assistance   Additional items Assist x 1;Bedrails; Increased time required;Verbal cues   Additional Comments stood at EOB with RW CG x 1 x 1 min x 3 trials, constant VCs for R LE WBS TTWB   Ambulation/Elevation   Gait pattern Improper Weight shift;Decreased foot clearance;Decreased R stance; Excessively slow   Gait Assistance 4  Minimal assist   Additional items Assist x 1;Verbal cues; Tactile cues  (constant cueing for WBS)   Assistive Device Rolling walker   Distance 3 side steps to HOB-3 feet   Stair Management Assistance Not tested   Balance   Static Sitting Fair +   Dynamic Sitting Fair   Static Standing Fair -   Dynamic Standing Poor +   Ambulatory Poor +   Endurance Deficit   Endurance Deficit Yes   Activity Tolerance   Activity Tolerance Patient limited by fatigue;Patient limited by pain   Exercises   Quad Sets Supine;20 reps;AROM; Bilateral   Heelslides Supine;20 reps;AROM; Bilateral   Hip Flexion Sitting;20 reps;AROM; Bilateral   Hip Abduction Supine;20 reps;AROM; Left;AAROM; Right   Knee AROM Long Arc Quad Sitting;20 reps;AROM; Bilateral   Ankle Pumps Supine;20 reps;AROM; Bilateral   Assessment   Prognosis Fair   Problem List Decreased strength;Decreased endurance; Impaired balance;Decreased mobility; Decreased safety awareness;Decreased skin integrity;Orthopedic restrictions;Pain   Assessment Pt seen for PT treatment session this date with interventions consisting of Therapeutic exercise consisting of: AROM and AAROM 20 reps B LE in sitting and supine position and therapeutic activity consisting of training: supine<>sit transfers, sit<>stand transfers, static sitting tolerance at EOB for 15 minutes w/ B UE support and static standing tolerance for 1 x 3trials minutes w/ B UE support  Pt agreeable to PT treatment session upon arrival, pt found supine in bed w/ HOB elevated, in no apparent distress  In comparison to previous session, pt with improvements in activity tolerance, participation  Post session: pt returned BTB, bed alarm engaged and all needs in reach seated at EOB with PETERSON for bathing  Continue to recommend STR at time of d/c in order to maximize pt's functional independence and safety w/ mobility  Pt continues to be functioning below baseline level, and remains limited 2* factors listed above and including decreased strength, endurance & safe functional mobility   PT will continue to see pt while here in order to address the deficits listed above and provide interventions consistent w/ POC in effort to achieve STGs  Barriers to Discharge Inaccessible home environment   Goals   Patient Goals to get to rehab   PT Treatment Day 2   Plan   Treatment/Interventions Functional transfer training;LE strengthening/ROM; Elevations; Therapeutic exercise;Cognitive reorientation;Patient/family training;Equipment eval/education; Bed mobility;Gait training;Spoke to nursing;OT   Progress Improving as expected   PT Frequency 5x/wk  (+1 weekend)   Recommendation   Recommendation Short-term skilled PT   Equipment Recommended Walker   PT - OK to Discharge Yes  (when med cleared to STR)   Avtar Alvarez PTA

## 2020-02-13 NOTE — CONSULTS
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  Plan:  Suprapubic catheter was changed with 22 [place] Lucia with 15 cc of water in the balloon  Continue suprapubic catheter changes monthly  History of Present Illness   Attending: Michelle Leon MD  Reason for Consult / Principal Problem:  Suprapubic catheter management  HPI: Georgina Leon is a 80y o  year old male who presents with acute on chronic respiratory failure  He is status post right hip repair  He currently is residing at the [facility]  He is known to me with a chronic suprapubic catheter for urinary retention  I change the Lucia catheter in the office approximately every 5 weeks  He is now due for tube change  Urology was consulted to change the catheter  Consults    Review of Systems   Gastrointestinal: Negative for abdominal distention and abdominal pain  Genitourinary: Negative for flank pain, hematuria and testicular pain         Historical Information   Past Medical History:   Diagnosis Date    AAA [facility]abdominal aortic aneurysm) ([facility]     BPH (benign prostatic hyperplasia)     COPD (chronic obstructive pulmonary disease) ([facility])     Diabetes mellitus ([address] )     DJD (degenerative joint disease)     Falls     Gait abnormality     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[facility] )     Renal disorder     Stroke [facility])     may 2015    Tobacco abuse      Past Surgical History:   Procedure Laterality Date    ANGIOPLASTY  03/01/2016    Right femoral vein    BACK SURGERY  2015    ORIF HIP FRACTURE Right 1/31/2020    Procedure: OPEN REDUCTION W/ INTERNAL FIXATION (ORIF) HIP WITH CANNUALATED SCREWS;  Surgeon: Davonte Mak DO;  Location: [address] [place] [address] OR;  Service: Orthopedics    SUPRAPUBIC TUBE PLACEMENT       Social History   Social History     Substance and Sexual Activity   Alcohol Use Never    Frequency: Never     Social History     Substance and Sexual Activity   Drug Use No     Social History     Tobacco Use   Smoking Status Current Every Day Smoker    Packs/day: 1 00    Years: 70 00    Pack years: 70 00   Smokeless Tobacco Never Used   Tobacco Comment    Wife reports it's a losing matos     Family History: non-contributory    Meds/Allergies   current meds:   Current Facility-Administered Medications   Medication Dose Route Frequency    acetaminophen (TYLENOL) tablet 650 mg  650 mg Oral Q6H PRN    albuterol inhalation solution 2 5 mg  2 5 mg Nebulization Q4H PRN    ALPRAZolam (XANAX) tablet 0 5 mg  0 5 mg Oral HS PRN    aluminum-magnesium hydroxide-simethicone (MYLANTA) 200-200-20 mg/5 mL oral suspension 30 mL  30 mL Oral Q4H PRN    aspirin (ECOTRIN LOW STRENGTH) EC tablet 81 mg  81 mg Oral Daily    atorvastatin (LIPITOR) tablet 20 mg  20 mg Oral Daily    bacitracin topical ointment 1 small application  1 small application Topical BID    budesonide (PULMICORT) inhalation solution 0 5 mg  0 5 mg Nebulization Q12H    carisoprodol (SOMA) tablet 350 mg  350 mg Oral Q6H PRN    cefepime (MAXIPIME) IVPB (premix) 1,000 mg  1,000 mg Intravenous Q12H    docusate sodium (COLACE) capsule 100 mg  100 mg Oral BID    erythromycin (ILOTYCIN) 0 5 % ophthalmic ointment 0 5 inch  0 5 inch Both Eyes HS    escitalopram (LEXAPRO) tablet 10 mg  10 mg Oral Daily    finasteride (PROSCAR) tablet 5 mg  5 mg Oral Daily    fondaparinux (ARIXTRA) subcutaneous injection 2 5 mg  2 5 mg Subcutaneous Daily    furosemide (LASIX) tablet 40 mg  40 mg Oral Daily    gabapentin (NEURONTIN) capsule 100 mg  100 mg Oral TID    guaiFENesin (MUCINEX) 12 hr tablet 600 mg  600 mg Oral BID    insulin glargine (LANTUS) subcutaneous injection 10 Units 0 1 mL  10 Units Subcutaneous HS    insulin lispro (HumaLOG) 100 units/mL subcutaneous injection 1-6 Units  1-6 Units Subcutaneous Q6H [facility]    levalbuterol (XOPENEX) inhalation solution 1 25 mg  1 25 mg Nebulization TID    And    sodium chloride 0 9 % inhalation solution 3 mL  3 mL Nebulization TID    methylprednisolone (MEDROL) tablet 24 mg  24 mg Oral Daily    Followed by   Danika Ours ON 2/14/2020] methylprednisolone (MEDROL) tablet 20 mg  20 mg Oral Daily    Followed by   Danika Ours ON 2/15/2020] methylPREDNISolone (MEDROL) tablet 16 mg  16 mg Oral Daily    Followed by   Danika Ours ON 2/16/2020] methylprednisolone (MEDROL) tablet 12 mg  12 mg Oral Daily    Followed by   Danika Ours ON 2/17/2020] methylprednisolone (MEDROL) tablet 8 mg  8 mg Oral Daily    Followed by   Danika Ours ON 2/18/2020] methylprednisolone (MEDROL) tablet 4 mg  4 mg Oral Daily    metroNIDAZOLE (FLAGYL) IVPB (premix) 500 mg  500 mg Intravenous Q8H    nicotine (NICODERM CQ) 21 mg/24 hr TD 24 hr patch 21 mg  21 mg Transdermal Daily    ondansetron (ZOFRAN) injection 4 mg  4 mg Intravenous Q6H PRN    oxybutynin (DITROPAN) tablet 15 mg  15 mg Oral Daily    pantoprazole (PROTONIX) EC tablet 40 mg  40 mg Oral Early Morning     No Known Allergies    Objective   Vitals: Blood pressure 114/56, pulse 73, temperature (!) 97 2 °F (36 2 °C), temperature source Temporal, resp  rate 22, height 5' 10" (1 778 m), weight 65 1 kg (143 lb 8 3 oz), SpO2 90 %  I/O last 24 hours: In: 26 [P O :460]  Out: 3975 [Urine:3975]    Invasive Devices     Peripheral Intravenous Line            Peripheral IV 02/10/20 Left Antecubital 3 days    Peripheral IV 02/10/20 Left;Ventral (anterior) Forearm 2 days          Drain            Suprapubic Catheter Latex 22 Fr  -- days                Physical Exam   Abdominal: Soft  He exhibits no distension  There is no tenderness  Genitourinary: Penis normal    Normal testicles and spermatic cords bilaterally  Suprapubic catheter in place and draining clear yellow urine      Lab Results: CBC: No results found for: WBC, HGB, HCT, MCV, PLT, ADJUSTEDWBC, MCH, MCHC, RDW, MPV, NRBC  CMP: No results found for: SODIUM, CL, CO2, ANIONGAP, BUN, CREATININE, GLUCOSE, CALCIUM, AST, ALT, ALKPHOS, PROT, BILITOT, EGFR  Urinalysis: No results found for: Graydon Ej, SPECGRAV, PHUR, LEUKOCYTESUR, NITRITE, PROTEINUA, GLUCOSEU, KETONESU, BILIRUBINUR, BLOODU  Urine Culture: No results found for: URINECX  Imaging Studies: I have personally reviewed pertinent reports  EKG, Pathology, and Other Studies: I have personally reviewed pertinent reports  VTE Prophylaxis: Sequential compression device Acquanetta Beck)     Code Status: Level 3 - DNAR and DNI  Advance Directive and Living Will: Yes    Power of :    POLST:      Counseling / Coordination of Care  Total floor / unit time spent today 30 minutes  Greater than 50% of total time was spent with the patient and / or family counseling and / or coordination of care  A description of the counseling / coordination of care:  I have discussed the case with Dr Maury Ramachandran

## 2020-02-13 NOTE — SOCIAL WORK
Chart reviewed, I spoke with dr Eyal Puri and the plan is for the pt to be d/c tomorrow, I called [facility] at the [place] to see if they are able to accept the pt back and if not the pt was accepted to [facility], pt is able to return to rm [id number] repot [id number], pt and his wife would like him to return, d/c plan was discussed at care coordination rounds today  Cm will continue to follow

## 2020-02-13 NOTE — PLAN OF CARE
Problem: OCCUPATIONAL THERAPY ADULT  Goal: Performs self-care activities at highest level of function for planned discharge setting  See evaluation for individualized goals  Description  Treatment Interventions: ADL retraining, Functional transfer training, Endurance training, Patient/family training, Compensatory technique education, Activityengagement          See flowsheet documentation for full assessment, interventions and recommendations  Outcome: Progressing  Note:   Limitation: Decreased ADL status, Decreased Safe judgement during ADL, Decreased endurance, Decreased self-care trans  Prognosis: Fair  Assessment: Patient participated in Skilled OT session this date with interventions consisting of ADL re training with the use of correct body mechnaics, Energy Conservation techniques and safety awareness and fall prevention techniques   Patient agreeable to OT treatment session, upon arrival patient was found seated at edge of bed  In comparison to previous session, patient with improvements in overall activity level and tolerance  Patient requiring verbal cues for correct technique, frequent rest periods and ocassional safety reminders and LB assistance as noted in flow sheet  Patient continues to be functioning below baseline level, occupational performance remains limited secondary to factors listed above and increased risk for falls and injury  From OT standpoint, recommendation at time of d/c would be Short Term Rehab  Patient to benefit from continued Occupational Therapy treatment while in the hospital to address deficits as defined above and maximize level of functional independence with ADLs and functional mobility        OT Discharge Recommendation: Short Term Rehab  OT - OK to Discharge: Yes  KRISTEN Hicks/JOHNNIE

## 2020-02-14 ENCOUNTER — HOSPITAL ENCOUNTER (INPATIENT)
Facility: HOSPITAL | Age: 85
LOS: 11 days | Discharge: STILL ADMITTED OR ADMINISTRATIVE | End: 2020-02-25
Attending: FAMILY MEDICINE | Admitting: FAMILY MEDICINE

## 2020-02-14 VITALS
BODY MASS INDEX: 21.15 KG/M2 | OXYGEN SATURATION: 95 % | HEIGHT: 70 IN | HEART RATE: 69 BPM | SYSTOLIC BLOOD PRESSURE: 129 MMHG | DIASTOLIC BLOOD PRESSURE: 59 MMHG | RESPIRATION RATE: 16 BRPM | TEMPERATURE: 97.5 F | WEIGHT: 147.71 LBS

## 2020-02-14 PROBLEM — J44.1 CHRONIC OBSTRUCTIVE PULMONARY DISEASE WITH ACUTE EXACERBATION (HCC): Status: RESOLVED | Noted: 2018-10-16 | Resolved: 2020-02-14

## 2020-02-14 PROBLEM — J96.21 ACUTE ON CHRONIC RESPIRATORY FAILURE WITH HYPOXIA (HCC): Status: RESOLVED | Noted: 2020-01-31 | Resolved: 2020-02-14

## 2020-02-14 PROBLEM — A41.9 SEPSIS (HCC): Status: RESOLVED | Noted: 2020-02-10 | Resolved: 2020-02-14

## 2020-02-14 LAB
GLUCOSE SERPL-MCNC: 194 MG/DL (ref 65–140)
GLUCOSE SERPL-MCNC: 222 MG/DL (ref 65–140)
GLUCOSE SERPL-MCNC: 317 MG/DL (ref 65–140)

## 2020-02-14 PROCEDURE — 82948 REAGENT STRIP/BLOOD GLUCOSE: CPT

## 2020-02-14 PROCEDURE — 97535 SELF CARE MNGMENT TRAINING: CPT

## 2020-02-14 PROCEDURE — 94760 N-INVAS EAR/PLS OXIMETRY 1: CPT

## 2020-02-14 PROCEDURE — 97530 THERAPEUTIC ACTIVITIES: CPT

## 2020-02-14 PROCEDURE — 94640 AIRWAY INHALATION TREATMENT: CPT

## 2020-02-14 PROCEDURE — 97116 GAIT TRAINING THERAPY: CPT

## 2020-02-14 RX ORDER — CEFEPIME HYDROCHLORIDE 1 G/50ML
1000 INJECTION, SOLUTION INTRAVENOUS EVERY 12 HOURS
Qty: 350 ML | Refills: 0 | Status: SHIPPED | OUTPATIENT
Start: 2020-02-14 | End: 2020-02-18

## 2020-02-14 RX ORDER — METRONIDAZOLE 500 MG/1
500 TABLET ORAL EVERY 8 HOURS SCHEDULED
Status: DISCONTINUED | OUTPATIENT
Start: 2020-02-14 | End: 2020-02-14 | Stop reason: HOSPADM

## 2020-02-14 RX ORDER — METRONIDAZOLE 500 MG/1
500 TABLET ORAL EVERY 8 HOURS SCHEDULED
Refills: 0
Start: 2020-02-14 | End: 2020-02-21

## 2020-02-14 RX ORDER — METHYLPREDNISOLONE 8 MG/1
8 TABLET ORAL DAILY
Qty: 1 TABLET | Refills: 0
Start: 2020-02-17 | End: 2020-02-18

## 2020-02-14 RX ORDER — GUAIFENESIN 600 MG
600 TABLET, EXTENDED RELEASE 12 HR ORAL 2 TIMES DAILY
Refills: 0 | Status: ON HOLD
Start: 2020-02-14 | End: 2020-02-27

## 2020-02-14 RX ORDER — METHYLPREDNISOLONE 4 MG/1
12 TABLET ORAL DAILY
Qty: 3 TABLET | Refills: 0
Start: 2020-02-16 | End: 2020-02-17

## 2020-02-14 RX ORDER — METHYLPREDNISOLONE 16 MG/1
16 TABLET ORAL DAILY
Qty: 1 TABLET | Refills: 0
Start: 2020-02-15 | End: 2020-02-16

## 2020-02-14 RX ORDER — METHYLPREDNISOLONE 4 MG/1
4 TABLET ORAL DAILY
Qty: 1 TABLET | Refills: 0
Start: 2020-02-18 | End: 2020-02-19

## 2020-02-14 RX ADMIN — METRONIDAZOLE 500 MG: 500 INJECTION, SOLUTION INTRAVENOUS at 05:54

## 2020-02-14 RX ADMIN — LEVALBUTEROL HYDROCHLORIDE 1.25 MG: 1.25 SOLUTION, CONCENTRATE RESPIRATORY (INHALATION) at 13:23

## 2020-02-14 RX ADMIN — GABAPENTIN 100 MG: 100 CAPSULE ORAL at 09:52

## 2020-02-14 RX ADMIN — DOCUSATE SODIUM 100 MG: 100 CAPSULE, LIQUID FILLED ORAL at 09:50

## 2020-02-14 RX ADMIN — FUROSEMIDE 40 MG: 40 TABLET ORAL at 09:53

## 2020-02-14 RX ADMIN — ATORVASTATIN CALCIUM 20 MG: 20 TABLET, FILM COATED ORAL at 09:53

## 2020-02-14 RX ADMIN — CEFEPIME HYDROCHLORIDE 1000 MG: 1 INJECTION, SOLUTION INTRAVENOUS at 09:56

## 2020-02-14 RX ADMIN — ESCITALOPRAM 10 MG: 5 TABLET, FILM COATED ORAL at 09:52

## 2020-02-14 RX ADMIN — OXYBUTYNIN CHLORIDE 15 MG: 5 TABLET ORAL at 09:50

## 2020-02-14 RX ADMIN — METHYLPREDNISOLONE 20 MG: 4 TABLET ORAL at 09:51

## 2020-02-14 RX ADMIN — ISODIUM CHLORIDE 3 ML: 0.03 SOLUTION RESPIRATORY (INHALATION) at 07:34

## 2020-02-14 RX ADMIN — METRONIDAZOLE 500 MG: 500 TABLET, FILM COATED ORAL at 15:40

## 2020-02-14 RX ADMIN — BUDESONIDE 0.5 MG: 0.5 INHALANT RESPIRATORY (INHALATION) at 07:34

## 2020-02-14 RX ADMIN — PANTOPRAZOLE SODIUM 40 MG: 40 TABLET, DELAYED RELEASE ORAL at 05:54

## 2020-02-14 RX ADMIN — ASPIRIN 81 MG: 81 TABLET ORAL at 09:51

## 2020-02-14 RX ADMIN — GABAPENTIN 100 MG: 100 CAPSULE ORAL at 15:40

## 2020-02-14 RX ADMIN — NICOTINE 21 MG: 21 PATCH, EXTENDED RELEASE TRANSDERMAL at 09:56

## 2020-02-14 RX ADMIN — BACITRACIN 1 SMALL APPLICATION: 500 OINTMENT TOPICAL at 09:55

## 2020-02-14 RX ADMIN — ACETAMINOPHEN 650 MG: 325 TABLET ORAL at 11:37

## 2020-02-14 RX ADMIN — INSULIN LISPRO 5 UNITS: 100 INJECTION, SOLUTION INTRAVENOUS; SUBCUTANEOUS at 00:49

## 2020-02-14 RX ADMIN — FONDAPARINUX SODIUM 2.5 MG: 2.5 INJECTION, SOLUTION SUBCUTANEOUS at 09:55

## 2020-02-14 RX ADMIN — LEVALBUTEROL HYDROCHLORIDE 1.25 MG: 1.25 SOLUTION, CONCENTRATE RESPIRATORY (INHALATION) at 07:34

## 2020-02-14 RX ADMIN — INSULIN LISPRO 2 UNITS: 100 INJECTION, SOLUTION INTRAVENOUS; SUBCUTANEOUS at 05:54

## 2020-02-14 RX ADMIN — INSULIN LISPRO 2 UNITS: 100 INJECTION, SOLUTION INTRAVENOUS; SUBCUTANEOUS at 11:32

## 2020-02-14 RX ADMIN — FINASTERIDE 5 MG: 5 TABLET, FILM COATED ORAL at 09:51

## 2020-02-14 RX ADMIN — ISODIUM CHLORIDE 3 ML: 0.03 SOLUTION RESPIRATORY (INHALATION) at 13:23

## 2020-02-14 RX ADMIN — GUAIFENESIN 600 MG: 600 TABLET, EXTENDED RELEASE ORAL at 09:53

## 2020-02-14 NOTE — SOCIAL WORK
D/c order written, [facility] was called [facility] and made aware of the d/c , rm# [id number] report [id number] rn was made aware, I met with the pt and his wife and they are in agreement with the d/c and d/c plan to the [place]

## 2020-02-14 NOTE — DISCHARGE SUMMARY
Discharge Summary - Jefe Gaffney 80 y o  male MRN: [id number]    Unit/Bed#: -01 Encounter: [id number]    Admission Date:   Admission Orders (From admission, onward)     Ordered        02/10/20 1348  Inpatient Admission  Once                     Admitting Diagnosis: Aspiration pneumonitis ([address] ) [J69 0]  Respiratory failure ([address] ) [J96 90]  Respiratory distress [R06 03]  COPD exacerbation ([facility] ) [J44 1]    HPI/Summary of Hospital Course/Significant Findings: Jefe Gaffney is a(n) 80year old male with a past medical history significant for COPD (baseline of 3-4L [place] O2) presenting to [facility]'s ED on 2/10/20 for shortness of breath and hypoxia  Work up in the ED showed WBC of 13 80 and possible evidence of aspiration pneumonitis on CTA of the chest  Based on mild tachycardia, tachypnea and hypotension he met criteria for sepsis  He was initially started on 4L via NC, but his SpO2 continued to drop below 90%, warranting BiPap and admission to ICU for further treatment      The patient was initially started on Zosyn in the ED, but in the ICU, the patient was started on IV Vancomycin, Cefepime and Flagyl as well as Solu-Medrol and nebulizer treatments  The patient quickly improved in the ICU, transitioning off of BiPap and on to 4L of oxygen via NC,warranting transfer to Med/Surg  On Med/Surg, the patient's MRSA culture came back negative, and so Vancomycin was discontinued  Solu-Medrol was changed to a tapering dose of PO methylprednisolone  Based on overall clinical improvement, the patient was deemed medically clear for discharge back to the [place] [address] (where he was actively receiving rehab for right hip fracture s/p ORIF), where the patient will complete the steroid tapering and antibiotic course consisting of Cefepime IV and Flagyl PO x4 more days      Ultimately, the diagnosis was suspected to likely be aspiration pneumonitis which, in combination with meeting criteria for sepsis and COPD exacerbation, likely contributed to the patient's initial presentation of metabolic encephalopathy  Of note, he seems to have some confusion at baseline  There were no meaningful complications during this admission, other than an abrasion on the penis requiring bacitracin ointment and consultation to Dr Nakia Crockett with Urology for scheduled Lucia catheter change  Procedures Performed:   Orders Placed This Encounter   Procedures    Critical Care     Discharge Diagnosis: Respiratory Failure, Aspiration Pneumonitis, COPD exacerbation, metabolic encephalopathy    Condition at Discharge: fair     Discharge instructions/Information to patient and family:   See after visit summary for information provided to patient and family  Provisions for Follow-Up Care:  See after visit summary for information related to follow-up care and any pertinent home health orders  PCP: Patrick Pizarro MD    Disposition: Skilled nursing facility at [facility]    Planned Readmission: No    Discharge Statement   I spent more than 30 minutes discharging the patient  This time was spent on the day of discharge  I had direct contact with the patient on the day of discharge  Discharge Medications:  See after visit summary for reconciled discharge medications provided to patient and family

## 2020-02-14 NOTE — PLAN OF CARE
Problem: DISCHARGE PLANNING - CARE MANAGEMENT  Goal: Discharge to post-acute care or home with appropriate resources  Description  INTERVENTIONS:  - Conduct assessment to determine patient/family and health care team treatment goals, and need for post-acute services based on payer coverage, community resources, and patient preferences, and barriers to discharge  - Address psychosocial, clinical, and financial barriers to discharge as identified in assessment in conjunction with the patient/family and health care team  - Arrange appropriate level of post-acute services according to patient's   needs and preference and payer coverage in collaboration with the physician and health care team  - Communicate with and update the patient/family, physician, and health care team regarding progress on the discharge plan  - Arrange appropriate transportation to post-acute venues    Pt's goal is to return home after rehab     Pt;s goal is to return home after some rehab   Outcome: Completed

## 2020-02-14 NOTE — PLAN OF CARE
Problem: PHYSICAL THERAPY ADULT  Goal: Performs mobility at highest level of function for planned discharge setting  See evaluation for individualized goals  Description  Treatment/Interventions: Functional transfer training, LE strengthening/ROM, Therapeutic exercise, Endurance training, Patient/family training, Equipment eval/education, Bed mobility, Gait training, Compensatory technique education, Spoke to nursing, OT          See flowsheet documentation for full assessment, interventions and recommendations  Outcome: Progressing  Note:   Prognosis: Fair  Problem List: Decreased strength, Decreased endurance, Impaired balance, Decreased mobility, Decreased safety awareness, Decreased skin integrity, Orthopedic restrictions, Impaired judgement, Decreased cognition, Impaired hearing  Assessment: Pt seen for PT treatment session this date with interventions consisting of gait training w/ emphasis on improving pt's ability to ambulate level surfaces x 10 feet with mod A of 2 provided by therapist with RW and therapeutic activity consisting of training: bed mobility, supine<>sit transfers, sit<>stand transfers, static sitting tolerance at EOB for 5 minutes w/ B UE support, static standing tolerance for 3 minutes w/ B UE support, vc and tactile cues for static sitting posture faciliation, vc and tactile cues for static standing posture faciliation, stand pivot transfers towards L direction and safety awareness education w/reiteration of WB status RLE  Pt agreeable to PT treatment session upon arrival, pt found supine in bed w/ HOB elevated, in no apparent distress, A&O x 2 and no pain prior or after session  In comparison to previous session, pt with improvements in ambulatory distance, static and dynamic balance   Post session: chair alarm engaged, all needs in reach and RN notified of session findings/recommendations Continue to recommend STR at time of d/c in order to maximize pt's functional independence and safety w/ mobility  Pt continues to be functioning below baseline level, and remains limited 2* factors listed above and including weakness,impaired balance,decreased endurance,activity intolerance,WBS RLE, decline from PLOF  PT will continue to see pt while here in order to address the deficits listed above and provide interventions consistent w/ POC in effort to achieve LTGs  Barriers to Discharge: Inaccessible home environment     Recommendation: Short-term skilled PT     PT - OK to Discharge: Yes(if medically stable to STR)    See flowsheet documentation for full assessment

## 2020-02-14 NOTE — OCCUPATIONAL THERAPY NOTE
Occupational Therapy Treatment Note      Rod Brenner    2/14/2020    Principal Problem:    Acute on chronic respiratory failure with hypoxia [facility])  Active Problems:    Chronic obstructive pulmonary disease with acute exacerbation (HCC)    Type 2 diabetes mellitus with complication, with long-term current use of insulin ([facility])    Tobacco abuse    Closed fracture of neck of right femur with routine healing    Sepsis ([facility] )      Past Medical History:   Diagnosis Date    AAA (abdominal aortic aneurysm) ([facility])     BPH (benign prostatic hyperplasia)     COPD (chronic obstructive pulmonary disease) (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[facility] )     Renal disorder     Stroke ([facility] )     may 2015    Tobacco abuse        Past Surgical History:   Procedure Laterality Date    ANGIOPLASTY  03/01/2016    Right femoral vein    BACK SURGERY  2015    ORIF HIP FRACTURE Right 1/31/2020    Procedure: OPEN REDUCTION W/ INTERNAL FIXATION (ORIF) HIP WITH CANNUALATED SCREWS;  Surgeon: Nathan Drake DO;  Location: [facility] MAIN OR;  Service: Orthopedics    SUPRAPUBIC TUBE PLACEMENT          02/14/20 1205   Restrictions/Precautions   Weight Bearing Precautions Per Order Yes   RLE Weight Bearing Per Order TTWB   Other Precautions Hard of hearing; Fall Risk;O2;Pain;Bed Alarm; Chair Alarm  (4L O2)   Pain Assessment   Pain Assessment 0-10   Pain Score 3   Pain Type Acute pain;Surgical pain   Pain Location Leg   Pain Orientation Right   Pain Descriptors Aching   Pain Frequency Intermittent   Pain Onset Ongoing   Clinical Progression Not changed   Patient's Stated Pain Goal No pain   Hospital Pain Intervention(s) Repositioned;Medication (See MAR); Ambulation/increased activity   ADL   Eating Assistance 7  Independent   Grooming Assistance 5  Supervision/Setup   Grooming Deficit Setup  (combed hair, light grooming/face/hands)   Bed Mobility Supine to Sit 4  Minimal assistance   Additional items Assist x 1;HOB elevated; Bedrails; Increased time required;LE management;Verbal cues   Additional Comments OOB in chair   Transfers   Sit to Stand 4  Minimal assistance   Additional items Assist x 2;Bedrails; Increased time required;Verbal cues   Stand to Sit 4  Minimal assistance   Additional items Assist x 2;Armrests; Increased time required;Verbal cues   Stand pivot 3  Moderate assistance   Additional items Assist x 2; Increased time required;Verbal cues   Therapeutic Exercise - ROM   UE-ROM   (declined)   Cognition   Overall Cognitive Status Impaired   Arousal/Participation Alert   Attention Attends with cues to redirect   Orientation Level Oriented to person;Oriented to place; Disoriented to time;Disoriented to situation   Memory Decreased recall of precautions;Decreased recall of recent events;Decreased short term memory   Following Commands Follows one step commands with increased time or repetition   Activity Tolerance   Activity Tolerance Patient limited by fatigue;Patient limited by pain   Assessment   Assessment Pt initially reluctant to participate in tx session as approached  Agreeable following much encouragement  Pt w/ multiple c/o's and negativity  Declined UE ex, stating   'my arms are okay'    Functional transfers as noted with Mod A X 2 and VC's  Light UB self care/grooming tasks with set up  Pt focused on meal    Encouraged to complete UE reaching inbetween session  Continue OT tx with established POC to increase level of safety and independence  Plan   Treatment Interventions ADL retraining;Functional transfer training;UE strengthening/ROM; Endurance training; Activityengagement; Energy conservation; Compensatory technique education;Equipment evaluation/education;Patient/family training;Cognitive reorientation   Goal Expiration Date 02/21/20   OT Treatment Day 2   OT Frequency 3-5x/wk   Recommendation   OT Discharge Recommendation Short Term Rehab   OT - OK to Discharge Yes   Carol Escamilla, OT

## 2020-02-14 NOTE — NURSING NOTE
Patient discharged back to [facility] in stable condition  Report called to Rene Ramos RN by Linh Mcgee RN  Discharge paperwork unable to be printed prior to discharge due to Reconciliation not complete  Erin Lise [place] with Dr Camden Rosario office is aware and will complete as soon as able to get to a computer  Rene Ramos RN at [employer]  also aware and states will print instructions from their computer when completed  Patient transported to [facility] via wheelchair and was escorted there by [facility]

## 2020-02-14 NOTE — PHYSICAL THERAPY NOTE
Physical Therapy Treatment Session Note    Patient's Name: Shima Gray    Admitting Diagnosis  Aspiration pneumonitis ([place] [address] ) [J69 0]  Respiratory failure ([place] [address] ) [J96 90]  Respiratory distress [R06 03]  COPD exacerbation ([place] [address] ) [J44 1]    Problem List  Patient Active Problem List   Diagnosis    Chronic obstructive pulmonary disease with acute exacerbation ([place] [address] )    Type 2 diabetes mellitus with complication, with long-term current use of insulin ([place] [address] )    Stroke ([place] [address] )    Renal disorder    Diabetic polyneuropathy associated with type 2 diabetes mellitus ([place] [address] )    Renovascular hypertension    Tobacco abuse    Abdominal aortic aneurysm without rupture ([place] [address] )    Aneurysm of iliac artery ([place] [address] )    Antalgic gait    Arthritis    Benign prostatic hyperplasia    Presence of suprapubic catheter (HCC)    Chronic depression    Chronic diarrhea    Chronic bilateral low back pain without sciatica    Chronic orthostatic hypotension    Congestive heart failure (HCC)    DDD (degenerative disc disease), lumbosacral    Diabetic macular edema (HCC)    Diabetic peripheral neuropathy associated with type 2 diabetes mellitus (HCC)    Edema of both legs    Hyperlipidemia associated with type 2 diabetes mellitus (HCC)    Insomnia    Mild cognitive disorder    Nicotine dependence    [facility]    Osteoporosis    Peripheral arterial occlusive disease (HCC)    Peripheral vascular disease ([facility] )    Recurrent UTI    Thrombocytopenia (HCC)    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)    Vitamin D deficiency    Localized edema    Lung mass    Swallowing problem    Uncontrolled type 2 diabetes mellitus ([facility]    Diabetic nephropathy ([facility]    Closed fracture of neck of right femur with routine healing    Acute on chronic respiratory failure with hypoxia (HCC)    Sepsis ([place] [address] )       Past Medical History  Past Medical History:   Diagnosis Date    AAA (abdominal aortic aneurysm) (HCC)     BPH (benign prostatic hyperplasia)     COPD (chronic obstructive pulmonary disease) (HCC)     Diabetes mellitus (HCC)     DJD (degenerative joint disease)     Falls     Gait abnormality     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[facility] )     Renal disorder     Stroke [facility])     may 2015    Tobacco abuse        Past Surgical History  Past Surgical History:   Procedure Laterality Date    ANGIOPLASTY  03/01/2016    Right femoral vein    BACK SURGERY  2015    ORIF HIP FRACTURE Right 1/31/2020    Procedure: OPEN REDUCTION W/ INTERNAL FIXATION (ORIF) HIP WITH CANNUALATED SCREWS;  Surgeon: Nathan Drake DO;  Location: [facility] MAIN OR;  Service: Orthopedics    SUPRAPUBIC TUBE PLACEMENT          02/14/20 1109   Pain Assessment   Pain Assessment No/denies pain   Pain Score No Pain   Restrictions/Precautions   Weight Bearing Precautions Per Order Yes   RLE Weight Bearing Per Order TTWB  (reiteration of WBS provided prior to WB tasks)   Other Precautions Chair Alarm; Bed Alarm;WBS;Fall Risk;O2;Hard of hearing  (4 L O2 via NC)   General   Chart Reviewed Yes   Additional Pertinent History s/p ORIF 01/31/2020 following a fall and subsequent R hip fx   Response to Previous Treatment Patient unable to report, no changes reported from family or staff   Family/Caregiver Present No   Cognition   Overall Cognitive Status Impaired   Arousal/Participation Alert   Attention Attends with cues to redirect   Orientation Level Oriented to person;Oriented to place; Disoriented to time;Disoriented to situation   Memory Decreased recall of recent events;Decreased recall of precautions;Decreased short term memory   Following Commands Follows one step commands with increased time or repetition   Comments Pt  agreeable to participate in PT session  Tuan Swartz required some verbal cueing for focus, as he was unhappy with breakfast    Subjective   Subjective "yeah,I can get up"   Bed Mobility   Supine to Sit 4  Minimal assistance Additional items Assist x 1;HOB elevated; Bedrails; Increased time required;Verbal cues;LE management   Sit to Supine   (DNT re:pt  remained OOB in chair upon conclusion )   Transfers   Sit to Stand 4  Minimal assistance   Additional items Assist x 2;Bedrails; Increased time required;Verbal cues   Stand to Sit 4  Minimal assistance   Additional items Assist x 2;Armrests; Increased time required;Verbal cues   Stand pivot 3  Moderate assistance   Additional items Assist x 2; Increased time required;Verbal cues   Ambulation/Elevation   Gait pattern Improper Weight shift; Forward Flexion; Step to; Inconsistent evangelista   Gait Assistance 3  Moderate assist   Additional items Assist x 2;Verbal cues; Tactile cues   Assistive Device Rolling walker   Distance 10 feet  (from bedside->chair)   Stair Management Assistance Not tested   Balance   Static Sitting Good   Dynamic Sitting Fair +   Static Standing Fair   Dynamic Standing Fair -   Ambulatory Fair -   Endurance Deficit   Endurance Deficit Yes   Endurance Deficit Description Fatigue present w/OOB tasks,resolved w/rest sitting on chair x 5 minutes   (DO GABRIELLEE also exhibited w/O2 maintained throughout session)   Activity Tolerance   Activity Tolerance Patient limited by fatigue;Treatment limited secondary to medical complications (Comment)  (SOB,ART)   Nurse Made Aware yes,Tisha PCA   Assessment   Prognosis Fair   Problem List Decreased strength;Decreased endurance; Impaired balance;Decreased mobility; Decreased safety awareness;Decreased skin integrity;Orthopedic restrictions; Impaired judgement;Decreased cognition; Impaired hearing   Assessment Pt seen for PT treatment session this date with interventions consisting of gait training w/ emphasis on improving pt's ability to ambulate level surfaces x 10 feet with mod A of 2 provided by therapist with RW and therapeutic activity consisting of training: bed mobility, supine<>sit transfers, sit<>stand transfers, static sitting tolerance at EOB for 5 minutes w/ B UE support, static standing tolerance for 3 minutes w/ B UE support, vc and tactile cues for static sitting posture faciliation, vc and tactile cues for static standing posture faciliation, stand pivot transfers towards L direction and safety awareness education w/reiteration of WB status RLE  Pt agreeable to PT treatment session upon arrival, pt found supine in bed w/ HOB elevated, in no apparent distress, A&O x 2 and no pain prior or after session  In comparison to previous session, pt with improvements in ambulatory distance, static and dynamic balance  Post session: chair alarm engaged, all needs in reach and RN notified of session findings/recommendations Continue to recommend STR at time of d/c in order to maximize pt's functional independence and safety w/ mobility  Pt continues to be functioning below baseline level, and remains limited 2* factors listed above and including weakness,impaired balance,decreased endurance,activity intolerance,WBS RLE, decline from PLOF  PT will continue to see pt while here in order to address the deficits listed above and provide interventions consistent w/ POC in effort to achieve LTGs  Goals   Patient Goals get out of here soon   LTG Expiration Date 02/21/20   Long Term Goal #1 LTGs remain appropriate   PT Treatment Day 3   Plan   Treatment/Interventions Functional transfer training;LE strengthening/ROM; Therapeutic exercise; Endurance training;Patient/family training;Equipment eval/education; Bed mobility;Cognitive reorientation;Gait training;Spoke to nursing;OT   Progress Progressing toward goals   PT Frequency 5x/wk   Recommendation   Recommendation Short-term skilled PT   Equipment Recommended Walker  (utilized PTA)   PT - OK to Discharge Yes  (if medically stable to STR)   Additional Comments Upon conclusion, pt  was sitting OOB in chair w/chair alarm engaged & all needs within reach       Efrain Valdez, PT

## 2020-02-15 LAB
BACTERIA BLD CULT: NORMAL
BACTERIA BLD CULT: NORMAL
GLUCOSE SERPL-MCNC: 116 MG/DL (ref 65–140)
GLUCOSE SERPL-MCNC: 315 MG/DL (ref 65–140)

## 2020-02-15 PROCEDURE — 82948 REAGENT STRIP/BLOOD GLUCOSE: CPT

## 2020-02-15 NOTE — H&P
STACEY Shelton#  OER:7/55/1936 Isac Noriega  OOQ:[id number]    DLU:[id number]  Adm Date: 2/14/2020 1604  4:04 PM   ATT PHY: Jeimy Myers, [address]         Chief Complaint:  Readmission from the hospital due to COPD exacerbation with respiratory failure    History of Presenting Illness: Ana Pearson is a(n) 80y o  year old male who was readmitted from the hospital after recovering from COPD exacerbation with hypoxemia  Patient is currently finishing up his IV cefepime and p o  Flagyl  On examination at bedside, patient denied any acute issues including difficulty breathing      No Known Allergies    Current Facility-Administered Medications on File Prior to Encounter   Medication Dose Route Frequency Provider Last Rate Last Dose    [DISCONTINUED] acetaminophen (TYLENOL) tablet 650 mg  650 mg Oral Q6H PRN Clara Machado MD   650 mg at 02/14/20 1137    [DISCONTINUED] albuterol inhalation solution 2 5 mg  2 5 mg Nebulization Q4H PRN Clara Machado MD        [DISCONTINUED] ALPRAZolam Durward Ambrosia) tablet 0 5 mg  0 5 mg Oral HS PRN Clara Machado MD   0 5 mg at 02/13/20 1940    [DISCONTINUED] aluminum-magnesium hydroxide-simethicone (MYLANTA) 200-200-20 mg/5 mL oral suspension 30 mL  30 mL Oral Q4H PRN Jeimy Myers MD   30 mL at 02/13/20 0925    [DISCONTINUED] aspirin (ECOTRIN LOW STRENGTH) EC tablet 81 mg  81 mg Oral Daily Clara Machado MD   81 mg at 02/14/20 0951    [DISCONTINUED] atorvastatin (LIPITOR) tablet 20 mg  20 mg Oral Daily Clara Machado MD   20 mg at 02/14/20 0930    [DISCONTINUED] bacitracin topical ointment 1 small application  1 small application Topical BID Jeimy Myers MD   1 small application at 04/35/90 0955    [DISCONTINUED] budesonide (PULMICORT) inhalation solution 0 5 mg  0 5 mg Nebulization Q12H Clara Machado MD   0 5 mg at 02/14/20 0734    [DISCONTINUED] carisoprodol (SOMA) tablet 350 mg  350 mg Oral Q6H PRN Clara Machado MD   350 mg at 02/13/20 0922    [DISCONTINUED] cefepime (MAXIPIME) IVPB (premix) 1,000 mg  1,000 mg Intravenous Q12H Jennette Severin,  mL/hr at 02/14/20 0956 1,000 mg at 02/14/20 0956    [DISCONTINUED] docusate sodium (COLACE) capsule 100 mg  100 mg Oral BID Jennette Severin, MD   100 mg at 02/14/20 0950    [DISCONTINUED] erythromycin (ILOTYCIN) 0 5 % ophthalmic ointment 0 5 inch  0 5 inch Both Eyes HS Jennette Severin, MD   0 5 inch at 02/13/20 2115    [DISCONTINUED] escitalopram (LEXAPRO) tablet 10 mg  10 mg Oral Daily Jennette Severin, MD   10 mg at 02/14/20 6446    [DISCONTINUED] finasteride (PROSCAR) tablet 5 mg  5 mg Oral Daily Jennette Severin, MD   5 mg at 02/14/20 0951    [DISCONTINUED] fondaparinux (ARIXTRA) subcutaneous injection 2 5 mg  2 5 mg Subcutaneous Daily Jennette Severin, MD   2 5 mg at 02/14/20 0955    [DISCONTINUED] furosemide (LASIX) tablet 40 mg  40 mg Oral Daily Jennette Severin, MD   40 mg at 02/14/20 8614    [DISCONTINUED] gabapentin (NEURONTIN) capsule 100 mg  100 mg Oral TID Jennette Severin, MD   100 mg at 02/14/20 1540    [DISCONTINUED] guaiFENesin (MUCINEX) 12 hr tablet 600 mg  600 mg Oral BID Jennette Severin, MD   600 mg at 02/14/20 0953    [DISCONTINUED] insulin glargine (LANTUS) subcutaneous injection 10 Units 0 1 mL  10 Units Subcutaneous HS Jennette Severin, MD   10 Units at 02/13/20 2146    [DISCONTINUED] insulin lispro (HumaLOG) 100 units/mL subcutaneous injection 1-6 Units  1-6 Units Subcutaneous Q6H [address] Melba Vasquez PA-C   2 Units at 02/14/20 1132    [DISCONTINUED] levalbuterol (Luis Eduardo Lee) inhalation solution 1 25 mg  1 25 mg Nebulization TID Jennette Severin, MD   1 25 mg at 02/14/20 1323    [DISCONTINUED] methylprednisolone (MEDROL) tablet 12 mg  12 mg Oral Daily May Ramirez MD        [DISCONTINUED] methylPREDNISolone (MEDROL) tablet 16 mg  16 mg Oral Daily May Ramirez MD        [DISCONTINUED] methylprednisolone (MEDROL) tablet 4 mg  4 mg Oral Daily May Ramirez MD        [DISCONTINUED] methylprednisolone (MEDROL) tablet 8 mg  8 mg Oral Daily Leonor Shah MD        [DISCONTINUED] metroNIDAZOLE (FLAGYL) tablet 500 mg  500 mg Oral [facility] Juaquin Whitney PA-C   500 mg at 02/14/20 1540    [DISCONTINUED] nicotine (NICODERM CQ) 21 mg/24 hr TD 24 hr patch 21 mg  21 mg Transdermal Daily Mickey Frias MD   21 mg at 02/14/20 0956    [DISCONTINUED] ondansetron (ZOFRAN) injection 4 mg  4 mg Intravenous Q6H PRN Severa Prose Rockovits, PA-C   4 mg at 02/11/20 1649    [DISCONTINUED] oxybutynin (DITROPAN) tablet 15 mg  15 mg Oral Daily Mickey Frias MD   15 mg at 02/14/20 0950    [DISCONTINUED] pantoprazole (PROTONIX) EC tablet 40 mg  40 mg Oral Early Morning Leonor Shah MD   40 mg at 02/14/20 0554    [DISCONTINUED] sodium chloride 0 9 % inhalation solution 3 mL  3 mL Nebulization TID Mickey Frias MD   3 mL at 02/14/20 1323     Current Outpatient Medications on File Prior to Encounter   Medication Sig Dispense Refill    albuterol (PROVENTIL HFA,VENTOLIN HFA) 90 mcg/act inhaler Inhale 2 puffs every 6 (six) hours as needed for wheezing      ALPRAZolam (XANAX) 0 5 mg tablet Take 0 5 mg by mouth daily at bedtime as needed for anxiety      aspirin (ECOTRIN LOW STRENGTH) 81 mg EC tablet Take 81 mg by mouth daily      atorvastatin (LIPITOR) 20 mg tablet Take 20 mg by mouth daily      budesonide (PULMICORT) 0 5 mg/2 mL nebulizer solution Take 1 vial (0 5 mg total) by nebulization every 12 (twelve) hours Rinse mouth after use   0    carisoprodol (SOMA) 350 mg tablet Take 350 mg by mouth as needed for muscle spasms      cefepime (MAXIPIME) 1000 mg IVPB Infuse 50 mL (1,000 mg total) into a venous catheter every 12 (twelve) hours for 7 doses 350 mL 0    docusate sodium (COLACE) 100 mg capsule Take 1 capsule (100 mg total) by mouth 2 (two) times a day 10 capsule 0    erythromycin (ILOTYCIN) ophthalmic ointment 0 5 inches daily at bedtime      escitalopram (LEXAPRO) 10 mg tablet Take 10 mg by mouth daily      finasteride (PROSCAR) 5 mg tablet Take 5 mg by mouth daily      fluticasone-salmeterol (ADVAIR) 250-50 mcg/dose inhaler Inhale 1 puff 2 (two) times a day Rinse mouth after use        fondaparinux (ARIXTRA) 2 5 mg/0 5 mL Inject 2 5 mg under the skin daily  0    furosemide (LASIX) 20 mg tablet Take 1 tablet (20 mg total) by mouth daily (Patient taking differently: Take 40 mg by mouth daily )  0    gabapentin (NEURONTIN) 100 mg capsule Take 100 mg by mouth 3 (three) times a day       glimepiride (AMARYL) 2 mg tablet Take 2 mg by mouth every morning before breakfast      guaiFENesin (MUCINEX) 600 mg 12 hr tablet Take 1 tablet (600 mg total) by mouth 2 (two) times a day for 10 days  0    insulin glargine (LANTUS) 100 units/mL subcutaneous injection Inject 20 Units under the skin daily       insulin lispro (HumaLOG) 100 units/mL injection Inject under the skin      levalbuterol (XOPENEX) 1 25 mg/0 5 mL nebulizer solution Take 0 5 mL (1 25 mg total) by nebulization 3 (three) times a day  0    methylprednisolone (MEDROL) 16 mg tablet Take 1 tablet (16 mg total) by mouth daily for 1 dose 1 tablet 0    [START ON 2/16/2020] methylprednisolone (MEDROL) 4 mg tablet Take 3 tablets (12 mg total) by mouth daily for 1 dose 3 tablet 0    [START ON 2/18/2020] methylprednisolone (MEDROL) 4 mg tablet Take 1 tablet (4 mg total) by mouth daily for 1 dose 1 tablet 0    [START ON 2/17/2020] methylprednisolone (MEDROL) 8 MG tablet Take 1 tablet (8 mg total) by mouth daily for 1 dose 1 tablet 0    metroNIDAZOLE (FLAGYL) 500 mg tablet Take 1 tablet (500 mg total) by mouth every 8 (eight) hours for 7 days  0    nicotine (NICODERM CQ) 21 mg/24 hr TD 24 hr patch Place 1 patch on the skin daily 28 patch 0    oxybutynin (DITROPAN) 5 mg tablet Take 15 mg by mouth daily       polyethylene glycol (GLYCOLAX) powder Take 17 g by mouth daily  0    rizatriptan (MAXALT) 10 MG tablet Take 10 mg by mouth once as needed for migraine May repeat in 2 hours if needed      senna (SENOKOT) 8 6 mg Take 2 tablets (17 2 mg total) by mouth daily as needed for constipation 120 each 0    sodium chloride 0 9 % nebulizer solution Take 3 mL by nebulization 3 (three) times a day  0       Active Ambulatory Problems     Diagnosis Date Noted    Type 2 diabetes mellitus with complication, with long-term current use of insulin ([facility] ) 10/16/2018    Stroke ([facility] ) 10/16/2018    Renal disorder 10/16/2018    Diabetic polyneuropathy associated with type 2 diabetes mellitus ([facility] ) 10/16/2018    Renovascular hypertension 10/16/2018    Tobacco abuse 10/16/2018    Abdominal aortic aneurysm without rupture ([facility] ) 11/11/2015    Aneurysm of iliac artery ([facility] ) 11/11/2015    Antalgic gait 04/04/2017    Arthritis 04/04/2017    Benign prostatic hyperplasia 04/04/2017    Presence of suprapubic catheter ([facility] ) 08/20/2015    Chronic depression 04/28/2017    Chronic diarrhea 11/25/2013    Chronic bilateral low back pain without sciatica 04/04/2017    Chronic orthostatic hypotension 05/01/2018    Congestive heart failure ([facility] ) 04/18/2017    DDD (degenerative disc disease), lumbosacral 11/19/2013    Diabetic macular edema ([facility] ) 06/02/2019    Diabetic peripheral neuropathy associated with type 2 diabetes mellitus ([facility] ) 04/17/2019    Edema of both legs 02/25/2015    Hyperlipidemia associated with type 2 diabetes mellitus ([facility] ) 11/19/2013    Insomnia 07/23/2014    Mild cognitive disorder 11/07/2018    Nicotine dependence 09/26/2017    Mild nonproliferative diabetic retinopathy ([facility] ) 12/28/2018    Osteoporosis 02/01/2018    Peripheral arterial occlusive disease ([facility] ) 03/07/2016    Peripheral vascular disease ([facility] ) 06/22/2018    Recurrent UTI 10/24/2016    Thrombocytopenia ([facility] ) 12/22/2013    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) 04/04/2017    Vitamin D deficiency 07/23/2014    Localized edema 06/03/2019    Lung mass 12/30/2019    Swallowing problem 01/21/2020    Uncontrolled type 2 diabetes mellitus ([facility] ) 10/15/2019    Diabetic nephropathy ([facility] ) 04/04/2017    Closed fracture of neck of right femur with routine healing 01/30/2020     Resolved Ambulatory Problems     Diagnosis Date Noted    Chronic obstructive pulmonary disease with acute exacerbation ([facility] ) 10/16/2018    Acute on chronic respiratory failure with hypoxia [facility] ) 01/31/2020    Sepsis ([place] [id number] ) 02/10/2020     Past Medical History:   Diagnosis Date    AAA (abdominal aortic aneurysm) (HCC)     BPH (benign prostatic hyperplasia)     COPD (chronic obstructive pulmonary disease) (HCC)     Diabetes mellitus (HCC)     DJD (degenerative joint disease)     Falls     Gait abnormality     Left middle cerebral artery stroke [facility])        Past Surgical History:   Procedure Laterality Date    ANGIOPLASTY  03/01/2016    Right femoral vein    BACK SURGERY  2015    ORIF HIP FRACTURE Right 1/31/2020    Procedure: OPEN REDUCTION W/ INTERNAL FIXATION (ORIF) HIP WITH CANNUALATED SCREWS;  Surgeon: Albert Vega DO;  Location: [address] [place] [address] MAIN OR;  Service: Orthopedics    SUPRAPUBIC TUBE PLACEMENT         Social History:   Social History     Socioeconomic History    Marital status: /Civil Union     Spouse name: Servando Phillips Number of children: 3    Years of education: 15    Highest education level: 12th grade   Occupational History    None   Social Needs    Financial resource strain: Somewhat hard    Food insecurity:     Worry: Never true     Inability: Never true    Transportation needs:     Medical: No     Non-medical: No   Tobacco Use    Smoking status: Current Every Day Smoker     Packs/day: 1 00     Years: 70 00     Pack years: 70 00    Smokeless tobacco: Never Used    Tobacco comment: Wife reports it's a losing amtos   Substance and Sexual Activity    Alcohol use: Never     Frequency: Never    Drug use: No    Sexual activity: Yes     Partners: Female   Lifestyle    Physical activity:     Days per week: 0 days     Minutes per session: 0 min    Stress: Only a little   Relationships    Social connections:     Talks on phone: Three times a week     Gets together: Once a week     Attends Spiritism service: Never     Active member of club or organization: No     Attends meetings of clubs or organizations: Never     Relationship status:     Intimate partner violence:     Fear of current or ex partner: No     Emotionally abused: No     Physically abused: No     Forced sexual activity: No   Other Topics Concern    None   Social History Narrative    None       Family History:   Family History   Problem Relation Age of Onset    No Known Problems Mother        Review of Systems   Musculoskeletal: Positive for arthralgias, back pain and gait problem  Neurological: Positive for weakness  All other systems reviewed and are negative  Physical Exam   Vitals  Patient's weight on admission was found to be 154 4 lb  Temperature 98 3°, heart rate 57, respirations 20, blood pressure 126/61, O2 sats 98% on room air  Constitutional: Awake and Alert  Well-developed and well-nourished  No distress  HENT: PERR,EOMI, conjunctiva normal  Head: Normocephalic and atraumatic  Mouth/Throat: Oropharynx is clear and moist     Eyes: Conjunctivae and EOM are normal  Pupils are equal, round, and reactive to light  Right and left eye exhibits no discharge  Neck: Neck supple  No tracheal deviation present  No thyromegaly present  Cardiovascular: Normal rate, regular rhythm and normal heart sounds  Exam reveals no friction rub  No murmur heard  Pulmonary/Chest: Effort normal and breath sounds normal  No respiratory distress  She has no wheezes  Abdominal: Soft  Bowel sounds are normal  She exhibits no distension  There is no tenderness  There is no rebound and no guarding  Neurological: Cranial Nerves grossly intact  No sensory deficit  Coordination normal    Musculoskeletal:   Nontender spine  Skin: Skin is warm and dry  No rash noted   No diaphoresis  No erythema  No edema  No cyanosis  Assessment     Analiaantoni Jenkins is a(n) 80y o  year old male readmission to [place] [facility]     1  COPD Exacerbation with s/p Acute on Chronic Respiratory Failure   Seems to be resolving  Patient will continue and finish IV cefepime along with Flagyl for 4 more days  Patient is also on tapered prednisone dose  Patient's baseline oxygen is 3-4L NC  Continue Pulmicort, Xopenex and PRN albuterol  Mucinex BID  2  Right hip fracture status post ORIF   Patient has been receiving extensive physical therapy occupational therapy as a part of short-term rehabilitation at the [facility]  Continue with PT/OT here, with pain regimen consisting of Tramadol PRN, Soma along with Tylenol on as needed basis  Patient is also on Arixtra for VTE prophylaxis  3  Cardiac with history of hypertension, coronary artery disease, dyslipidemia   Lasix 40mg, atorvastatin 20mg and aspirin 81mg  4  Type 2 diabetes mellitus requiring insulin along with diabetic neuropathy   Patient will be continued acute on Lantus 10 units and low dose sliding scale  Level 2 Carb Controlled Diet  Accu checks Q6HR  Gabapentin 100mg TID for neuropathy  5  BPH   Oxybutynin 15mg and finasteride 5mg  6  Anxiety and depression   Patient is on Lexapro 10mg and Xanax 0 5mg PRN at bedtime for acute anxiety  7  Migraine headaches   Patient may continue to receive Maxalt along with Tylenol on as needed basis  8  Tobacco abuse  Nicotine transdermal patch 21mg  9  Constipation   Colace 100mg BID  10  Dry eyes   Patient is getting artificial tears and erythromycin ointment  11  GERD/gastritis  I will treat the patient with Protonix 40 mg daily  Prognosis: fair      Discharge Plan: in progress      Advanced Directives: I have discussed in detail the patient the advanced directives  Patient's wife Duncan Nelson is patient's POA and her phone number is [phone]    Patient also reports that he has a living will with advanced directives  I called patient's wife and discussed patient's resuscitation status  She informed me that patient is DNR/DNI        I have spent more than 50 minutes gathering data, doing physical examination, and discussing the advanced directives, which was witnessed by caring staff

## 2020-02-16 LAB — GLUCOSE SERPL-MCNC: 159 MG/DL (ref 65–140)

## 2020-02-16 PROCEDURE — 82948 REAGENT STRIP/BLOOD GLUCOSE: CPT

## 2020-02-17 ENCOUNTER — EPISODE CHANGES (OUTPATIENT)
Dept: CASE MANAGEMENT | Facility: HOSPITAL | Age: 85
End: 2020-02-17

## 2020-02-17 LAB
GLUCOSE SERPL-MCNC: 208 MG/DL (ref 65–140)
GLUCOSE SERPL-MCNC: 211 MG/DL (ref 65–140)
GLUCOSE SERPL-MCNC: 350 MG/DL (ref 65–140)

## 2020-02-17 PROCEDURE — 82948 REAGENT STRIP/BLOOD GLUCOSE: CPT

## 2020-02-18 ENCOUNTER — PATIENT OUTREACH (OUTPATIENT)
Dept: CASE MANAGEMENT | Facility: OTHER | Age: 85
End: 2020-02-18

## 2020-02-18 LAB
GLUCOSE SERPL-MCNC: 122 MG/DL (ref 65–140)
GLUCOSE SERPL-MCNC: 291 MG/DL (ref 65–140)

## 2020-02-18 PROCEDURE — 82948 REAGENT STRIP/BLOOD GLUCOSE: CPT

## 2020-02-19 PROCEDURE — 82948 REAGENT STRIP/BLOOD GLUCOSE: CPT

## 2020-02-20 PROCEDURE — 82948 REAGENT STRIP/BLOOD GLUCOSE: CPT

## 2020-02-21 PROCEDURE — 82948 REAGENT STRIP/BLOOD GLUCOSE: CPT

## 2020-02-22 LAB
GLUCOSE SERPL-MCNC: 108 MG/DL (ref 65–140)
GLUCOSE SERPL-MCNC: 209 MG/DL (ref 65–140)
GLUCOSE SERPL-MCNC: 239 MG/DL (ref 65–140)
GLUCOSE SERPL-MCNC: 259 MG/DL (ref 65–140)
GLUCOSE SERPL-MCNC: 277 MG/DL (ref 65–140)
GLUCOSE SERPL-MCNC: 357 MG/DL (ref 65–140)

## 2020-02-25 ENCOUNTER — PATIENT OUTREACH (OUTPATIENT)
Dept: CASE MANAGEMENT | Facility: OTHER | Age: 85
End: 2020-02-25

## 2020-02-25 ENCOUNTER — APPOINTMENT (EMERGENCY)
Dept: RADIOLOGY | Facility: HOSPITAL | Age: 85
DRG: 189 | End: 2020-02-25
Payer: MEDICARE

## 2020-02-25 ENCOUNTER — HOSPITAL ENCOUNTER (INPATIENT)
Facility: HOSPITAL | Age: 85
LOS: 2 days | Discharge: HOME HEALTH | DRG: 189 | End: 2020-02-27
Attending: EMERGENCY MEDICINE | Admitting: FAMILY MEDICINE
Payer: MEDICARE

## 2020-02-25 DIAGNOSIS — F32.A DEPRESSION: ICD-10-CM

## 2020-02-25 DIAGNOSIS — R33.8 BENIGN PROSTATIC HYPERPLASIA WITH URINARY RETENTION: ICD-10-CM

## 2020-02-25 DIAGNOSIS — S72.001D CLOSED FRACTURE OF NECK OF RIGHT FEMUR WITH ROUTINE HEALING: ICD-10-CM

## 2020-02-25 DIAGNOSIS — G43.909 MIGRAINE: ICD-10-CM

## 2020-02-25 DIAGNOSIS — E11.8 TYPE 2 DIABETES MELLITUS WITH COMPLICATION, WITH LONG-TERM CURRENT USE OF INSULIN (HCC): ICD-10-CM

## 2020-02-25 DIAGNOSIS — M54.50 CHRONIC BILATERAL LOW BACK PAIN WITHOUT SCIATICA: ICD-10-CM

## 2020-02-25 DIAGNOSIS — G89.29 CHRONIC BILATERAL LOW BACK PAIN WITHOUT SCIATICA: ICD-10-CM

## 2020-02-25 DIAGNOSIS — N40.1 BENIGN PROSTATIC HYPERPLASIA WITH URINARY RETENTION: ICD-10-CM

## 2020-02-25 DIAGNOSIS — S72.001A CLOSED FRACTURE OF NECK OF RIGHT FEMUR, INITIAL ENCOUNTER (HCC): ICD-10-CM

## 2020-02-25 DIAGNOSIS — I50.9 CONGESTIVE HEART FAILURE, UNSPECIFIED HF CHRONICITY, UNSPECIFIED HEART FAILURE TYPE (HCC): ICD-10-CM

## 2020-02-25 DIAGNOSIS — K59.00 CONSTIPATION: ICD-10-CM

## 2020-02-25 DIAGNOSIS — E87.2 LACTIC ACIDOSIS: ICD-10-CM

## 2020-02-25 DIAGNOSIS — Z72.0 TOBACCO ABUSE: Chronic | ICD-10-CM

## 2020-02-25 DIAGNOSIS — Z79.4 TYPE 2 DIABETES MELLITUS WITH COMPLICATION, WITH LONG-TERM CURRENT USE OF INSULIN (HCC): ICD-10-CM

## 2020-02-25 DIAGNOSIS — R09.02 HYPOXIA: Primary | ICD-10-CM

## 2020-02-25 DIAGNOSIS — D64.9 ANEMIA: ICD-10-CM

## 2020-02-25 DIAGNOSIS — E11.42 DIABETIC POLYNEUROPATHY ASSOCIATED WITH TYPE 2 DIABETES MELLITUS (HCC): ICD-10-CM

## 2020-02-25 DIAGNOSIS — M19.90 ARTHRITIS: ICD-10-CM

## 2020-02-25 DIAGNOSIS — D72.829 LEUKOCYTOSIS: ICD-10-CM

## 2020-02-25 DIAGNOSIS — M81.0 OSTEOPOROSIS: ICD-10-CM

## 2020-02-25 DIAGNOSIS — E78.5 DYSLIPIDEMIA: ICD-10-CM

## 2020-02-25 DIAGNOSIS — J44.1 CHRONIC OBSTRUCTIVE PULMONARY DISEASE WITH ACUTE EXACERBATION (HCC): ICD-10-CM

## 2020-02-25 DIAGNOSIS — J18.9 PNEUMONIA: ICD-10-CM

## 2020-02-25 PROBLEM — A41.9 SEPSIS (HCC): Status: ACTIVE | Noted: 2020-02-25

## 2020-02-25 PROBLEM — J96.21 ACUTE ON CHRONIC RESPIRATORY FAILURE WITH HYPOXIA (HCC): Status: ACTIVE | Noted: 2020-02-25

## 2020-02-25 LAB
ALBUMIN SERPL BCP-MCNC: 3.4 G/DL (ref 3.5–5.7)
ALP SERPL-CCNC: 96 U/L (ref 55–165)
ALT SERPL W P-5'-P-CCNC: 13 U/L (ref 7–52)
ANION GAP SERPL CALCULATED.3IONS-SCNC: 6 MMOL/L (ref 4–13)
ARTERIAL PATENCY WRIST A: YES
AST SERPL W P-5'-P-CCNC: 14 U/L (ref 13–39)
BACTERIA UR QL AUTO: ABNORMAL /HPF
BASE EXCESS BLDA CALC-SCNC: 5.9 MMOL/L (ref -2–3)
BASOPHILS # BLD AUTO: 0 THOUSANDS/ΜL (ref 0–0.1)
BASOPHILS NFR BLD AUTO: 0 % (ref 0–2)
BILIRUB SERPL-MCNC: 0.7 MG/DL (ref 0.2–1)
BILIRUB UR QL STRIP: NEGATIVE
BNP SERPL-MCNC: 27 PG/ML (ref 1–100)
BUN SERPL-MCNC: 17 MG/DL (ref 7–25)
CALCIUM SERPL-MCNC: 8.9 MG/DL (ref 8.6–10.5)
CHLORIDE SERPL-SCNC: 93 MMOL/L (ref 98–107)
CLARITY UR: CLEAR
CO2 SERPL-SCNC: 35 MMOL/L (ref 21–31)
COLOR UR: YELLOW
CREAT SERPL-MCNC: 0.64 MG/DL (ref 0.7–1.3)
EOSINOPHIL # BLD AUTO: 0.1 THOUSAND/ΜL (ref 0–0.61)
EOSINOPHIL NFR BLD AUTO: 1 % (ref 0–5)
ERYTHROCYTE [DISTWIDTH] IN BLOOD BY AUTOMATED COUNT: 14.3 % (ref 11.5–14.5)
GFR SERPL CREATININE-BSD FRML MDRD: 90 ML/MIN/1.73SQ M
GLUCOSE SERPL-MCNC: 166 MG/DL (ref 65–140)
GLUCOSE SERPL-MCNC: 263 MG/DL (ref 65–99)
GLUCOSE SERPL-MCNC: 44 MG/DL (ref 65–140)
GLUCOSE UR STRIP-MCNC: NEGATIVE MG/DL
HCO3 BLDA-SCNC: 29.6 MMOL/L (ref 22–28)
HCT VFR BLD AUTO: 36.8 % (ref 42–47)
HGB BLD-MCNC: 11.7 G/DL (ref 14–18)
HGB UR QL STRIP.AUTO: ABNORMAL
KETONES UR STRIP-MCNC: NEGATIVE MG/DL
L PNEUMO1 AG UR QL IA.RAPID: NEGATIVE
LACTATE SERPL-SCNC: 1.1 MMOL/L (ref 0.5–2)
LACTATE SERPL-SCNC: 2.1 MMOL/L (ref 0.5–2)
LEUKOCYTE ESTERASE UR QL STRIP: ABNORMAL
LYMPHOCYTES # BLD AUTO: 0.5 THOUSANDS/ΜL (ref 0.6–4.47)
LYMPHOCYTES NFR BLD AUTO: 4 % (ref 21–51)
MCH RBC QN AUTO: 31.1 PG (ref 26–34)
MCHC RBC AUTO-ENTMCNC: 31.9 G/DL (ref 31–37)
MCV RBC AUTO: 98 FL (ref 81–99)
MONOCYTES # BLD AUTO: 1.4 THOUSAND/ΜL (ref 0.17–1.22)
MONOCYTES NFR BLD AUTO: 11 % (ref 2–12)
NASAL CANNULA: 4
NEUTROPHILS # BLD AUTO: 10.1 THOUSANDS/ΜL (ref 1.4–6.5)
NEUTS SEG NFR BLD AUTO: 84 % (ref 42–75)
NITRITE UR QL STRIP: NEGATIVE
NON-SQ EPI CELLS URNS QL MICRO: ABNORMAL /HPF
O2 CT BLDA-SCNC: 13.2 ML/DL
OXYHGB MFR BLDA: 85.8 % (ref 94–100)
PCO2 BLDA: 38.9 MM HG (ref 35–45)
PH BLDA: 7.49 [PH] (ref 7.35–7.45)
PH UR STRIP.AUTO: 6.5 [PH]
PLATELET # BLD AUTO: 224 THOUSANDS/UL (ref 149–390)
PMV BLD AUTO: 8.5 FL (ref 8.6–11.7)
PO2 BLDA: 56 MM HG (ref 80–100)
POTASSIUM SERPL-SCNC: 4.9 MMOL/L (ref 3.5–5.5)
PROCALCITONIN SERPL-MCNC: <0.05 NG/ML
PROT SERPL-MCNC: 6.4 G/DL (ref 6.4–8.9)
PROT UR STRIP-MCNC: ABNORMAL MG/DL
RBC # BLD AUTO: 3.77 MILLION/UL (ref 4.3–5.9)
RBC #/AREA URNS AUTO: ABNORMAL /HPF
S PNEUM AG UR QL: NEGATIVE
SODIUM SERPL-SCNC: 134 MMOL/L (ref 134–143)
SP GR UR STRIP.AUTO: 1.01 (ref 1–1.03)
SPECIMEN SOURCE: ABNORMAL
TROPONIN I SERPL-MCNC: <0.03 NG/ML
UROBILINOGEN UR QL STRIP.AUTO: 0.2 E.U./DL
WBC # BLD AUTO: 12.1 THOUSAND/UL (ref 4.8–10.8)
WBC #/AREA URNS AUTO: ABNORMAL /HPF

## 2020-02-25 PROCEDURE — 85025 COMPLETE CBC W/AUTO DIFF WBC: CPT | Performed by: EMERGENCY MEDICINE

## 2020-02-25 PROCEDURE — 82948 REAGENT STRIP/BLOOD GLUCOSE: CPT

## 2020-02-25 PROCEDURE — 87086 URINE CULTURE/COLONY COUNT: CPT | Performed by: EMERGENCY MEDICINE

## 2020-02-25 PROCEDURE — 94760 N-INVAS EAR/PLS OXIMETRY 1: CPT

## 2020-02-25 PROCEDURE — 96366 THER/PROPH/DIAG IV INF ADDON: CPT

## 2020-02-25 PROCEDURE — 99285 EMERGENCY DEPT VISIT HI MDM: CPT

## 2020-02-25 PROCEDURE — 83880 ASSAY OF NATRIURETIC PEPTIDE: CPT | Performed by: EMERGENCY MEDICINE

## 2020-02-25 PROCEDURE — 96367 TX/PROPH/DG ADDL SEQ IV INF: CPT

## 2020-02-25 PROCEDURE — 84145 PROCALCITONIN (PCT): CPT | Performed by: INTERNAL MEDICINE

## 2020-02-25 PROCEDURE — 84145 PROCALCITONIN (PCT): CPT | Performed by: EMERGENCY MEDICINE

## 2020-02-25 PROCEDURE — 82805 BLOOD GASES W/O2 SATURATION: CPT | Performed by: EMERGENCY MEDICINE

## 2020-02-25 PROCEDURE — 94640 AIRWAY INHALATION TREATMENT: CPT

## 2020-02-25 PROCEDURE — 36600 WITHDRAWAL OF ARTERIAL BLOOD: CPT

## 2020-02-25 PROCEDURE — 96365 THER/PROPH/DIAG IV INF INIT: CPT

## 2020-02-25 PROCEDURE — 81001 URINALYSIS AUTO W/SCOPE: CPT | Performed by: EMERGENCY MEDICINE

## 2020-02-25 PROCEDURE — 87040 BLOOD CULTURE FOR BACTERIA: CPT | Performed by: EMERGENCY MEDICINE

## 2020-02-25 PROCEDURE — 83605 ASSAY OF LACTIC ACID: CPT | Performed by: EMERGENCY MEDICINE

## 2020-02-25 PROCEDURE — 99285 EMERGENCY DEPT VISIT HI MDM: CPT | Performed by: EMERGENCY MEDICINE

## 2020-02-25 PROCEDURE — 87449 NOS EACH ORGANISM AG IA: CPT | Performed by: INTERNAL MEDICINE

## 2020-02-25 PROCEDURE — 93005 ELECTROCARDIOGRAM TRACING: CPT

## 2020-02-25 PROCEDURE — 99223 1ST HOSP IP/OBS HIGH 75: CPT | Performed by: INTERNAL MEDICINE

## 2020-02-25 PROCEDURE — 84484 ASSAY OF TROPONIN QUANT: CPT | Performed by: EMERGENCY MEDICINE

## 2020-02-25 PROCEDURE — 36415 COLL VENOUS BLD VENIPUNCTURE: CPT | Performed by: EMERGENCY MEDICINE

## 2020-02-25 PROCEDURE — 71045 X-RAY EXAM CHEST 1 VIEW: CPT

## 2020-02-25 PROCEDURE — 80053 COMPREHEN METABOLIC PANEL: CPT | Performed by: EMERGENCY MEDICINE

## 2020-02-25 PROCEDURE — 94660 CPAP INITIATION&MGMT: CPT

## 2020-02-25 RX ORDER — ATORVASTATIN CALCIUM 20 MG/1
20 TABLET, FILM COATED ORAL
Status: DISCONTINUED | OUTPATIENT
Start: 2020-02-25 | End: 2020-02-27 | Stop reason: HOSPADM

## 2020-02-25 RX ORDER — OXYBUTYNIN CHLORIDE 5 MG/1
5 TABLET ORAL DAILY
Status: DISCONTINUED | OUTPATIENT
Start: 2020-02-26 | End: 2020-02-27 | Stop reason: HOSPADM

## 2020-02-25 RX ORDER — ASPIRIN 81 MG/1
81 TABLET ORAL DAILY
Status: DISCONTINUED | OUTPATIENT
Start: 2020-02-26 | End: 2020-02-27 | Stop reason: HOSPADM

## 2020-02-25 RX ORDER — POLYETHYLENE GLYCOL 3350 17 G/17G
17 POWDER, FOR SOLUTION ORAL DAILY
Status: DISCONTINUED | OUTPATIENT
Start: 2020-02-26 | End: 2020-02-27 | Stop reason: HOSPADM

## 2020-02-25 RX ORDER — BUDESONIDE 0.5 MG/2ML
0.5 INHALANT ORAL
Status: DISCONTINUED | OUTPATIENT
Start: 2020-02-25 | End: 2020-02-27 | Stop reason: HOSPADM

## 2020-02-25 RX ORDER — SENNOSIDES 8.6 MG
2 TABLET ORAL DAILY PRN
Status: DISCONTINUED | OUTPATIENT
Start: 2020-02-25 | End: 2020-02-27 | Stop reason: HOSPADM

## 2020-02-25 RX ORDER — SODIUM CHLORIDE 9 MG/ML
75 INJECTION, SOLUTION INTRAVENOUS CONTINUOUS
Status: DISCONTINUED | OUTPATIENT
Start: 2020-02-25 | End: 2020-02-26

## 2020-02-25 RX ORDER — PHENOL 1.4 %
600 AEROSOL, SPRAY (ML) MUCOUS MEMBRANE 2 TIMES DAILY WITH MEALS
Status: ON HOLD | COMMUNITY
End: 2020-02-27 | Stop reason: SDUPTHER

## 2020-02-25 RX ORDER — DOCUSATE SODIUM 100 MG/1
100 CAPSULE, LIQUID FILLED ORAL 2 TIMES DAILY
Status: DISCONTINUED | OUTPATIENT
Start: 2020-02-25 | End: 2020-02-27 | Stop reason: HOSPADM

## 2020-02-25 RX ORDER — FINASTERIDE 5 MG/1
5 TABLET, FILM COATED ORAL DAILY
Status: DISCONTINUED | OUTPATIENT
Start: 2020-02-25 | End: 2020-02-27 | Stop reason: HOSPADM

## 2020-02-25 RX ORDER — NICOTINE 21 MG/24HR
1 PATCH, TRANSDERMAL 24 HOURS TRANSDERMAL DAILY
Status: DISCONTINUED | OUTPATIENT
Start: 2020-02-25 | End: 2020-02-27 | Stop reason: HOSPADM

## 2020-02-25 RX ORDER — FUROSEMIDE 40 MG/1
40 TABLET ORAL DAILY
Status: DISCONTINUED | OUTPATIENT
Start: 2020-02-26 | End: 2020-02-27 | Stop reason: HOSPADM

## 2020-02-25 RX ORDER — INSULIN GLARGINE 100 [IU]/ML
20 INJECTION, SOLUTION SUBCUTANEOUS DAILY
Status: DISCONTINUED | OUTPATIENT
Start: 2020-02-26 | End: 2020-02-27 | Stop reason: HOSPADM

## 2020-02-25 RX ORDER — GUAIFENESIN 600 MG
600 TABLET, EXTENDED RELEASE 12 HR ORAL 2 TIMES DAILY
Status: DISCONTINUED | OUTPATIENT
Start: 2020-02-25 | End: 2020-02-27 | Stop reason: HOSPADM

## 2020-02-25 RX ORDER — ESCITALOPRAM OXALATE 10 MG/1
10 TABLET ORAL DAILY
Status: DISCONTINUED | OUTPATIENT
Start: 2020-02-26 | End: 2020-02-27 | Stop reason: HOSPADM

## 2020-02-25 RX ORDER — IPRATROPIUM BROMIDE AND ALBUTEROL SULFATE 2.5; .5 MG/3ML; MG/3ML
3 SOLUTION RESPIRATORY (INHALATION)
Status: DISCONTINUED | OUTPATIENT
Start: 2020-02-25 | End: 2020-02-26

## 2020-02-25 RX ORDER — CEFEPIME HYDROCHLORIDE 2 G/50ML
2000 INJECTION, SOLUTION INTRAVENOUS EVERY 12 HOURS
Status: DISCONTINUED | OUTPATIENT
Start: 2020-02-25 | End: 2020-02-26

## 2020-02-25 RX ORDER — ALPRAZOLAM 0.5 MG/1
0.5 TABLET ORAL
Status: DISCONTINUED | OUTPATIENT
Start: 2020-02-25 | End: 2020-02-27 | Stop reason: HOSPADM

## 2020-02-25 RX ORDER — VANCOMYCIN HYDROCHLORIDE 1 G/200ML
15 INJECTION, SOLUTION INTRAVENOUS ONCE
Status: DISCONTINUED | OUTPATIENT
Start: 2020-02-25 | End: 2020-02-25

## 2020-02-25 RX ORDER — FONDAPARINUX SODIUM 2.5 MG/.5ML
2.5 INJECTION SUBCUTANEOUS DAILY
Status: DISCONTINUED | OUTPATIENT
Start: 2020-02-25 | End: 2020-02-27 | Stop reason: HOSPADM

## 2020-02-25 RX ORDER — GABAPENTIN 100 MG/1
100 CAPSULE ORAL 3 TIMES DAILY
Status: DISCONTINUED | OUTPATIENT
Start: 2020-02-25 | End: 2020-02-27 | Stop reason: HOSPADM

## 2020-02-25 RX ORDER — METRONIDAZOLE 500 MG/1
500 TABLET ORAL EVERY 8 HOURS SCHEDULED
Status: DISCONTINUED | OUTPATIENT
Start: 2020-02-25 | End: 2020-02-26

## 2020-02-25 RX ORDER — CEFEPIME HYDROCHLORIDE 2 G/50ML
2000 INJECTION, SOLUTION INTRAVENOUS ONCE
Status: COMPLETED | OUTPATIENT
Start: 2020-02-25 | End: 2020-02-25

## 2020-02-25 RX ADMIN — INSULIN LISPRO 1 UNITS: 100 INJECTION, SOLUTION INTRAVENOUS; SUBCUTANEOUS at 17:36

## 2020-02-25 RX ADMIN — METRONIDAZOLE 500 MG: 500 INJECTION, SOLUTION INTRAVENOUS at 13:47

## 2020-02-25 RX ADMIN — FONDAPARINUX SODIUM 2.5 MG: 2.5 INJECTION, SOLUTION SUBCUTANEOUS at 17:36

## 2020-02-25 RX ADMIN — BUDESONIDE 0.5 MG: 0.5 INHALANT RESPIRATORY (INHALATION) at 19:16

## 2020-02-25 RX ADMIN — SODIUM CHLORIDE 75 ML/HR: 0.9 INJECTION, SOLUTION INTRAVENOUS at 14:55

## 2020-02-25 RX ADMIN — CEFEPIME HYDROCHLORIDE 2000 MG: 2 INJECTION, SOLUTION INTRAVENOUS at 21:29

## 2020-02-25 RX ADMIN — GABAPENTIN 100 MG: 100 CAPSULE ORAL at 21:25

## 2020-02-25 RX ADMIN — SODIUM CHLORIDE 1000 ML: 0.9 INJECTION, SOLUTION INTRAVENOUS at 12:51

## 2020-02-25 RX ADMIN — IPRATROPIUM BROMIDE AND ALBUTEROL SULFATE 3 ML: 2.5; .5 SOLUTION RESPIRATORY (INHALATION) at 15:17

## 2020-02-25 RX ADMIN — NICOTINE 1 PATCH: 21 PATCH, EXTENDED RELEASE TRANSDERMAL at 17:35

## 2020-02-25 RX ADMIN — SODIUM CHLORIDE 1000 ML: 0.9 INJECTION, SOLUTION INTRAVENOUS at 11:37

## 2020-02-25 RX ADMIN — VANCOMYCIN HYDROCHLORIDE 1750 MG: 1 INJECTION, POWDER, LYOPHILIZED, FOR SOLUTION INTRAVENOUS at 11:09

## 2020-02-25 RX ADMIN — IPRATROPIUM BROMIDE AND ALBUTEROL SULFATE 3 ML: 2.5; .5 SOLUTION RESPIRATORY (INHALATION) at 19:16

## 2020-02-25 RX ADMIN — CEFEPIME HYDROCHLORIDE 2000 MG: 2 INJECTION, SOLUTION INTRAVENOUS at 10:34

## 2020-02-25 RX ADMIN — METRONIDAZOLE 500 MG: 500 TABLET, FILM COATED ORAL at 21:25

## 2020-02-25 NOTE — RESPIRATORY THERAPY NOTE
RT Protocol Note  Georgina Leon 80 y o  male MRN: [id number]  Unit/Bed#: ICU 05 Encounter: [id number]    Assessment    Principal Problem:    Sepsis ([place] [address] )  Active Problems:    Type 2 diabetes mellitus with complication, with long-term current use of insulin ([facility])    Tobacco abuse    Acute on chronic respiratory failure with hypoxia ([facility])      Home Pulmonary Medications:  Duoneb Pulmicort  Home Devices/Therapy: Home O2    Past Medical History:   Diagnosis Date    AAA (abdominal aortic aneurysm) ([facility])     BPH (benign prostatic hyperplasia)     COPD (chronic obstructive pulmonary disease) ([facility])     Diabetes mellitus ([place] [address] )     DJD (degenerative joint disease)     Falls     Gait abnormality     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[place] [address] )     Renal disorder     Stroke ([place] [address] )     may 2015    Tobacco abuse      Social History     Socioeconomic History    Marital status: /Civil Union     Spouse name: Lyndon Tilley Number of children: 3    Years of education: 15    Highest education level: 12th grade   Occupational History    None   Social Needs    Financial resource strain: Somewhat hard    Food insecurity:     Worry: Never true     Inability: Never true    Transportation needs:     Medical: No     Non-medical: No   Tobacco Use    Smoking status: Current Every Day Smoker     Packs/day: 1 00     Years: 70 00     Pack years: 70 00    Smokeless tobacco: Never Used    Tobacco comment: Wife reports it's a losing matos   Substance and Sexual Activity    Alcohol use: Never     Frequency: Never    Drug use: No    Sexual activity: Yes     Partners: Female   Lifestyle    Physical activity:     Days per week: 0 days     Minutes per session: 0 min    Stress: Only a little   Relationships    Social connections:     Talks on phone:  Three times a week     Gets together: Once a week     Attends [employer] service: Never     Active member of club or organization: No     Attends meetings of clubs or organizations: Never     Relationship status:     Intimate partner violence:     Fear of current or ex partner: No     Emotionally abused: No     Physically abused: No     Forced sexual activity: No   Other Topics Concern    None   Social History Narrative    None       Subjective         Objective    Physical Exam:   Assessment Type: Pre-treatment  General Appearance: Drowsy  Respiratory Pattern: Normal  Chest Assessment: Chest expansion symmetrical  Bilateral Breath Sounds: Diminished, Rales  Cough: None    Vitals:  Blood pressure 93/50, pulse 85, temperature 98 3 °F (36 8 °C), temperature source Tympanic, resp  rate (!) 23, height 5' 10" (1 778 m), weight 72 4 kg (159 lb 9 8 oz), SpO2 100 %  Results from last 7 days   Lab Units 02/25/20  1033   PH ART  7 490*   PCO2 ART mm Hg 38 9   PO2 ART mm Hg 56 0*   HCO3 ART mmol/L 29 6*   BASE EXC ART mmol/L 5 9*   O2 CONTENT ART mL/dL 13 2   O2 HGB, ARTERIAL % 85 8*   ABG SOURCE  Radial, Right   HUSAM TEST  Yes       Imaging and other studies: I have personally reviewed pertinent reports              Plan    Respiratory Plan: Home Bronchodilator Patient pathway        Resp Comments: Pt home respiratory meds have already been ordered

## 2020-02-25 NOTE — ED PROVIDER NOTES
History  Chief Complaint   Patient presents with    Shortness of Breath     70-year-old male with past medical history of COPD on 4 L nasal cannula at baseline, aspiration pneumonitis, diabetes type 2, recent right hip fracture is s/p ORIF, CVA presents from nursing facility for hypoxia  Patient was noticed to be satting in the low to mid 80s  Patient is chronically on 4 L nasal cannula  Patient states that he has felt short of breath x 24 hours and has had increased sputum production and a change in color  Please feels as if he cannot get enough air  Patient's EF is 57% on last echo  Patient discharged on February 14 2020 for similar symptoms  Patient was placed on BiPAP at that time admitted to the hospital     Patient denies chest pain, back pain, diarrhea, hematuria, abdominal pain, flank pain, shoulder pain, jaw pain, arm pain    Patient meets SIRS criteria, febrile 100 8 rectally, heart rate 106, resp is 24  Full septic panel drawn  Patient started on vancomycin and cefepime for probable HCAP  Prior to Admission Medications   Prescriptions Last Dose Informant Patient Reported? Taking? ALPRAZolam (XANAX) 0 5 mg tablet   Yes No   Sig: Take 0 5 mg by mouth daily at bedtime as needed for anxiety   albuterol (PROVENTIL HFA,VENTOLIN HFA) 90 mcg/act inhaler   Yes Yes   Sig: Inhale 2 puffs every 6 (six) hours as needed for wheezing   aspirin (ECOTRIN LOW STRENGTH) 81 mg EC tablet   Yes Yes   Sig: Take 81 mg by mouth daily   atorvastatin (LIPITOR) 20 mg tablet   Yes Yes   Sig: Take 20 mg by mouth daily   budesonide (PULMICORT) 0 5 mg/2 mL nebulizer solution   No Yes   Sig: Take 1 vial (0 5 mg total) by nebulization every 12 (twelve) hours Rinse mouth after use     calcium carbonate (OS-MAXIMO) 600 MG tablet   Yes Yes   Sig: Take 600 mg by mouth 2 (two) times a day with meals   carisoprodol (SOMA) 350 mg tablet   Yes Yes   Sig: Take 350 mg by mouth as needed for muscle spasms   docusate sodium (COLACE) 100 mg capsule   No No   Sig: Take 1 capsule (100 mg total) by mouth 2 (two) times a day   erythromycin (ILOTYCIN) ophthalmic ointment   Yes No   Si 5 inches daily at bedtime   escitalopram (LEXAPRO) 10 mg tablet   Yes Yes   Sig: Take 10 mg by mouth daily   finasteride (PROSCAR) 5 mg tablet  Self Yes Yes   Sig: Take 5 mg by mouth daily   fluticasone-salmeterol (ADVAIR) 250-50 mcg/dose inhaler   Yes Yes   Sig: Inhale 1 puff 2 (two) times a day Rinse mouth after use     fondaparinux (ARIXTRA) 2 5 mg/0 5 mL   No Yes   Sig: Inject 2 5 mg under the skin daily   furosemide (LASIX) 20 mg tablet   No Yes   Sig: Take 1 tablet (20 mg total) by mouth daily   Patient taking differently: Take 40 mg by mouth daily    gabapentin (NEURONTIN) 100 mg capsule   Yes Yes   Sig: Take 100 mg by mouth 3 (three) times a day    glimepiride (AMARYL) 2 mg tablet   Yes Yes   Sig: Take 2 mg by mouth every morning before breakfast   guaiFENesin (MUCINEX) 600 mg 12 hr tablet   No No   Sig: Take 1 tablet (600 mg total) by mouth 2 (two) times a day for 10 days   insulin glargine (LANTUS) 100 units/mL subcutaneous injection   Yes Yes   Sig: Inject 20 Units under the skin daily    insulin lispro (HumaLOG) 100 units/mL injection   Yes No   Sig: Inject under the skin   levalbuterol (XOPENEX) 1 25 mg/0 5 mL nebulizer solution   No Yes   Sig: Take 0 5 mL (1 25 mg total) by nebulization 3 (three) times a day   nicotine (NICODERM CQ) 21 mg/24 hr TD 24 hr patch   No Yes   Sig: Place 1 patch on the skin daily   oxybutynin (DITROPAN) 5 mg tablet   Yes Yes   Sig: Take 15 mg by mouth daily    polyethylene glycol (GLYCOLAX) powder   No Yes   Sig: Take 17 g by mouth daily   rizatriptan (MAXALT) 10 MG tablet   Yes Yes   Sig: Take 10 mg by mouth once as needed for migraine May repeat in 2 hours if needed   senna (SENOKOT) 8 6 mg   No Yes   Sig: Take 2 tablets (17 2 mg total) by mouth daily as needed for constipation   sodium chloride 0 9 % nebulizer solution   No Yes   Sig: Take 3 mL by nebulization 3 (three) times a day      Facility-Administered Medications: None       Past Medical History:   Diagnosis Date    [facility]     BPH (benign prostatic hyperplasia)     COPD (chronic obstructive pulmonary disease) (HCC)     Diabetes mellitus (HCC)     DJD (degenerative joint disease)     Falls     Gait abnormality     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 [address] [facility]     Renal disorder     Stroke ([facility] [address] )     may 2015    Tobacco abuse        Past Surgical History:   Procedure Laterality Date    ANGIOPLASTY  03/01/2016    Right femoral vein    BACK SURGERY  2015    ORIF HIP FRACTURE Right 1/31/2020    Procedure: OPEN REDUCTION W/ INTERNAL FIXATION (ORIF) HIP WITH CANNUALATED SCREWS;  Surgeon: Misha Barry DO;  Location: [address] [place] [address] MAIN OR;  Service: Orthopedics    SUPRAPUBIC TUBE PLACEMENT         Family History   Problem Relation Age of Onset    No Known Problems Mother      I have reviewed and agree with the history as documented  E-Cigarette/Vaping     E-Cigarette/Vaping Substances     Social History     Tobacco Use    Smoking status: Current Every Day Smoker     Packs/day: 1 00     Years: 70 00     Pack years: 70 00    Smokeless tobacco: Never Used    Tobacco comment: Wife reports it's a losing matos   Substance Use Topics    Alcohol use: Never     Frequency: Never    Drug use: No       Review of Systems   Constitutional: Negative for chills, diaphoresis, fatigue and fever  HENT: Negative for congestion, ear discharge, facial swelling, hearing loss, rhinorrhea, sinus pressure, sinus pain, sneezing, sore throat, tinnitus and trouble swallowing  Eyes: Negative for pain, discharge and redness  Respiratory: Positive for cough and shortness of breath  Negative for choking, chest tightness, wheezing and stridor  Cardiovascular: Negative for chest pain, palpitations and leg swelling     Gastrointestinal: Negative for abdominal distention, abdominal pain, blood in stool, constipation, diarrhea, nausea and vomiting  Endocrine: Negative for cold intolerance, polydipsia and polyuria  Genitourinary: Negative for difficulty urinating, dysuria, enuresis, flank pain, frequency and hematuria  Musculoskeletal: Negative for arthralgias, back pain, gait problem and neck stiffness  Skin: Negative for rash and wound  Neurological: Negative for dizziness, seizures, syncope, weakness, numbness and headaches  Hematological: Negative for adenopathy  Psychiatric/Behavioral: Negative for agitation, confusion, hallucinations, sleep disturbance and suicidal ideas  All other systems reviewed and are negative  Physical Exam  Physical Exam   Constitutional: He is oriented to person, place, and time  He appears well-developed and well-nourished  No distress  HENT:   Head: Normocephalic and atraumatic  Eyes: EOM are normal    Neck: Normal range of motion  Cardiovascular: Regular rhythm  Tachycardia present  Exam reveals no gallop and no friction rub  No murmur heard  Pulmonary/Chest: Tachypnea noted  No respiratory distress  He has no decreased breath sounds  He has no wheezes  He has rhonchi in the left lower field  He has rales in the right middle field, the right lower field and the left lower field  Abdominal: Soft  Normal appearance and bowel sounds are normal  There is no tenderness  There is no rigidity, no rebound, no guarding, no CVA tenderness, no tenderness at McBurney's point and negative Morrison's sign  Neurological: He is alert and oriented to person, place, and time  No cranial nerve deficit or sensory deficit  GCS eye subscore is 4  GCS verbal subscore is 5  GCS motor subscore is 6  Reflex Scores:       Bicep reflexes are 2+ on the right side and 2+ on the left side  Patellar reflexes are 2+ on the right side and 2+ on the left side  Patient has equal 5/5 strength b/l UE and Le  No focal neuro deficits noted     Skin: Skin is warm and dry  Capillary refill takes less than 2 seconds  Psychiatric: He has a normal mood and affect  His behavior is normal  Judgment and thought content normal    Nursing note and vitals reviewed  Vital Signs  ED Triage Vitals   Temperature Pulse Respirations Blood Pressure SpO2   02/25/20 1007 02/25/20 0956 02/25/20 0956 02/25/20 0956 02/25/20 0956   (!) 100 8 °F (38 2 °C) (!) 106 (!) 24 112/59 (!) 87 %      Temp Source Heart Rate Source Patient Position - Orthostatic VS BP Location FiO2 (%)   02/25/20 1007 -- -- -- --   Rectal          Pain Score       --                  Vitals:    02/25/20 0956   BP: 112/59   Pulse: (!) 106         Visual Acuity      ED Medications  Medications   vancomycin (VANCOCIN) 1,750 mg in sodium chloride 0 9 % 500 mL IVPB (1,750 mg Intravenous New Bag 2/25/20 1109)   sodium chloride 0 9 % bolus 1,000 mL (1,000 mL Intravenous New Bag 2/25/20 1251)   metroNIDAZOLE (FLAGYL) IVPB (premix) 500 mg (has no administration in time range)   cefepime (MAXIPIME) IVPB (premix) 2,000 mg (0 mg Intravenous Stopped 2/25/20 1104)   sodium chloride 0 9 % bolus 1,000 mL (0 mL Intravenous Stopped 2/25/20 1235)       Diagnostic Studies  Results Reviewed     Procedure Component Value Units Date/Time    Urine culture [[id number]] Collected:  02/25/20 1209    Lab Status: In process Specimen:  Urine, Indwelling Lucia Catheter Updated:  02/25/20 1251    Lactic acid, plasma x2 [[id number]]  (Normal) Collected:  02/25/20 1217    Lab Status:  Final result Specimen:  Blood from Hand, Left Updated:  02/25/20 1239     LACTIC ACID 1 1 mmol/L     Narrative:       Result may be elevated if tourniquet was used during collection      UA w Reflex to Microscopic w Reflex to Culture [[id number]]  (Abnormal) Collected:  02/25/20 1209    Lab Status:  Final result Specimen:  Urine, Indwelling Lucia Catheter Updated:  02/25/20 1217     Color, UA Yellow     Clarity, UA Clear     Specific Gravity, UA 1 010     pH, UA 6 5     Leukocytes, UA 2+     Nitrite, UA Negative     Protein, UA Trace mg/dl      Glucose, UA Negative mg/dl      Ketones, UA Negative mg/dl      Urobilinogen, UA 0 2 E U /dl      Bilirubin, UA Negative     Blood, UA 3+    Urine Microscopic [[id number]] Collected:  02/25/20 1209    Lab Status: In process Specimen:  Urine, Indwelling Lucia Catheter Updated:  02/25/20 1216    Blood culture #2 [[id number]] Collected:  02/25/20 1051    Lab Status: In process Specimen:  Blood from Arm, Left Updated:  02/25/20 1053    B-Type Natriuretic Peptide ([address] [place] [address]) [[id number]]  (Normal) Collected:  02/25/20 1014    Lab Status:  Final result Specimen:  Blood from Arm, Right Updated:  02/25/20 1051     BNP 27 pg/mL     Troponin I [[id number]]  (Normal) Collected:  02/25/20 1014    Lab Status:  Final result Specimen:  Blood from Arm, Right Updated:  02/25/20 1047     Troponin I <0 03 ng/mL     Lactic acid, plasma x2 [[id number]]  (Abnormal) Collected:  02/25/20 1014    Lab Status:  Final result Specimen:  Blood from Arm, Right Updated:  02/25/20 1046     LACTIC ACID 2 1 mmol/L     Narrative:       Result may be elevated if tourniquet was used during collection      Blood gas, arterial [[id number]]  (Abnormal) Collected:  02/25/20 1033    Lab Status:  Final result Specimen:  Blood, Arterial from Radial, Right Updated:  02/25/20 1045     pH, Arterial 7 490     pCO2, Arterial 38 9 mm Hg      pO2, Arterial 56 0 mm Hg      HCO3, Arterial 29 6 mmol/L      Base Excess, Arterial 5 9 mmol/L      O2 Content, Arterial 13 2 mL/dL      O2 HGB,Arterial  85 8 %      SOURCE Radial, Right     HUSAM TEST Yes     Nasal Cannula 4    Comprehensive metabolic panel [[id number]]  (Abnormal) Collected:  02/25/20 1014    Lab Status:  Final result Specimen:  Blood from Arm, Right Updated:  02/25/20 1044     Sodium 134 mmol/L      Potassium 4 9 mmol/L      Chloride 93 mmol/L      CO2 35 mmol/L      ANION GAP 6 mmol/L      BUN 17 mg/dL      Creatinine 0 64 mg/dL      Glucose 263 mg/dL      Calcium 8 9 mg/dL      AST 14 U/L      ALT 13 U/L      Alkaline Phosphatase 96 U/L      Total Protein 6 4 g/dL      Albumin 3 4 g/dL      Total Bilirubin 0 70 mg/dL      eGFR 90 ml/min/1 73sq m     Narrative:       National Kid[employer] Disease Foundation guidelines for Chronic Kidney Disease (CKD):     Stage 1 with normal or high GFR (GFR > 90 mL/min/1 73 square meters)    Stage 2 Mild CKD (GFR = 60-89 mL/min/1 73 square meters)    Stage 3A Moderate CKD (GFR = 45-59 mL/min/1 73 square meters)    Stage 3B Moderate CKD (GFR = 30-44 mL/min/1 73 square meters)    Stage 4 Severe CKD (GFR = 15-29 mL/min/1 73 square meters)    Stage 5 End Stage CKD (GFR <15 mL/min/1 73 square meters)  Note: GFR calculation is accurate only with a steady state creatinine    CBC and differential [[id number]]  (Abnormal) Collected:  02/25/20 1014    Lab Status:  Final result Specimen:  Blood from Arm, Right Updated:  02/25/20 1029     WBC 12 10 Thousand/uL      RBC 3 77 Million/uL      Hemoglobin 11 7 g/dL      Hematocrit 36 8 %      MCV 98 fL      MCH 31 1 pg      MCHC 31 9 g/dL      RDW 14 3 %      MPV 8 5 fL      Platelets 036 Thousands/uL      Neutrophils Relative 84 %      Lymphocytes Relative 4 %      Monocytes Relative 11 %      Eosinophils Relative 1 %      Basophils Relative 0 %      Neutrophils Absolute 10 10 Thousands/µL      Lymphocytes Absolute 0 50 Thousands/µL      Monocytes Absolute 1 40 Thousand/µL      Eosinophils Absolute 0 10 Thousand/µL      Basophils Absolute 0 00 Thousands/µL     Blood culture #1 [[id number]] Collected:  02/25/20 1014    Lab Status: In process Specimen:  Blood from Arm, Right Updated:  02/25/20 1027    Procalcitonin [[id number]] Collected:  02/25/20 1014    Lab Status:   In process Specimen:  Blood from Arm, Right Updated:  02/25/20 1023    POCT urinalysis dipstick [[id number]]     Lab Status:  No result Specimen:  Urine                  XR chest 1 view portable   Final Result by Patrick Roberts MD (02/25 1240)      Moderate bibasilar opacity which could be due to atelectasis or pneumonia  The study was marked in [facility] for immediate notification  Workstation performed: [id number]                    Procedures  ECG 12 Lead Documentation Only  Date/Time: 2/25/2020 11:32 AM  Performed by: Sierra Beltran DO  Authorized by: Sierra Beltran DO     Indications / Diagnosis:  Sepsis  Patient location:  ED  Previous ECG:     Comparison to cardiac monitor: Yes    Interpretation:     Interpretation: normal    Rate:     ECG rate:  106  Rhythm:     Rhythm: sinus tachycardia    Ectopy:     Ectopy: none    QRS:     QRS axis:  Normal    QRS intervals:  Normal  Conduction:     Conduction: abnormal      Abnormal conduction: 1st degree    ST segments:     ST segments:  Normal  T waves:     T waves: normal               ED Course  ED Course as of Feb 25 1252   Tue Feb 25, 2020   1208 XR chest 1 view portable         HEART Risk Score      Most Recent Value   Heart Score Risk Calculator   History  0 Filed at: 02/25/2020 1132   ECG  0 Filed at: 02/25/2020 1132   Age  2 Filed at: 02/25/2020 1132   Risk Factors  2 Filed at: 02/25/2020 1132   Troponin  0 Filed at: 02/25/2020 1132   HEART Score  4 Filed at: 02/25/2020 1132                Initial Sepsis Screening     Row Name 02/25/20 1129                Is the patient's history suggestive of a new or worsening infection?         Suspected source of infection  pneumonia  -JE        Are two or more of the following signs & symptoms of infection both present and new to the patient? (!) Yes (Proceed)  -JE        Indicate SIRS criteria  Hyperthemia > 38 3C (100 9F); Tachypnea > 20 resp per min; Tachycardia > 90 bpm  -JE        If the answer is yes to both questions, suspicion of sepsis is present          If severe sepsis is present AND tissue hypoperfusion perists in the hour after fluid resuscitation or lactate > 4, the patient meets criteria for SEPTIC SHOCK          Are any of the following organ dysfunction criteria present within 6 hours of suspected infection and SIRS criteria that are NOT considered to be chronic conditions?         Organ dysfunction  Lactate > 2 0 mmol/L  -        Date of presentation of severe sepsis          Time of presentation of severe sepsis          Tissue hypoperfusion persists in the hour after crystalloid fluid administration, evidenced, by either:          Was hypotension present within one hour of the conclusion of crystalloid fluid administration?         Date of presentation of septic shock          Time of presentation of septic shock            User Key  (r) = Recorded By, (t) = Taken By, (c) = Cosigned By    Initials Name Provider Type    Jamari Six, DO Physician                  MDM  Number of Diagnoses or Management Options  Anemia: new and requires workup  Hypoxia: new and requires workup  Lactic acidosis: new and requires workup  Leukocytosis: new and requires workup  Pneumonia: new and requires workup  Diagnosis management comments: Patient meets SIRS criteria with likely source of pulmonary, Will obtain Blood cultures, CBC, CMP, [id number], UA, CXR, ECG, proCalc  Patient is not hypotensive  Pt started on Abx for likely source of Pneumonia  Cefepime 2g, Vancomycin 25 mg/kg, metronidazole  Initial Sepsis note competed  Heart score complete, heart score 4  Chest x-ray shows right lower lobe infiltrate, will treat for Hcap and will at metronidazole for possible anaerobic coverage    1  Pneumonia  -started on cefepime and vancomycin for Hcap    2  Hypoxia, low to mid 80s  -patient on BiPAP  -ABG shows hypoxemia 56, pH 7 49, pCO2 38 9    3  Lactic acidosis 2 1  -patient given fluid    4  Hyperglycemia, tooth 6 3    5  Leukocytosis, 12,000    6   Anemia, 11 7 near baseline               Disposition  Final diagnoses:   Hypoxia   Pneumonia   Leukocytosis   Lactic acidosis   Anemia     Time reflects when diagnosis was documented in both MDM as applicable and the Disposition within this note     Time User Action Codes Description Comment    2/25/2020 12:48 PM Polina Puckett Add [R09 02] Hypoxia     2/25/2020 12:48 PM Marcus Cline Add [J18 9] Pneumonia     2/25/2020 12:48 PM Polina Puckett Add [D72 829] Leukocytosis     2/25/2020 12:48 PM Lesli Cline Add [E87 2] Lactic acidosis     2/25/2020 12:49 PM Poilna Puckett Add [D64 9] Anemia       ED Disposition     ED Disposition Condition Date/Time Comment    Admit Stable Tue Feb 25, 2020 12:48 PM Case was discussed with HARRIS and the patient's admission status was agreed to be Admission Status: inpatient status to the service of [facility]        Follow-up Information    None         Patient's Medications   Discharge Prescriptions    No medications on file     No discharge procedures on file      PDMP Review     None          ED Provider  Electronically Signed by           Cliff Holliday DO  02/25/20 Via Connie Nieves DO  02/25/20 4871

## 2020-02-25 NOTE — H&P
H&P- Johana Vann 1935, 80 y o  male MRN: [id number]    Unit/Bed#: ED 01 Encounter: [id number]    Primary Care Provider: Natan Gandhi MD   Date and time admitted to hospital: 2/25/2020  9:55 AM        * Sepsis [facility])  Assessment & Plan  · Sepsis as evidenced by fever of 100 8, leukocytosis, tachycardia  · Secondary to pneumonia  · Patient was recently hospitalized for aspiration pneumonia, as a result will treat with cefepime and Flagyl for HCAP  · MRSA swabs have been negative, will hold on vancomycin  · Check sputum culture  · Will check for strep pneumo, Legionella  · Trend procalcitonin  · Monitor closely in the ICU    Acute on chronic respiratory failure with hypoxia ([facility] )  Assessment & Plan  · Patient currently on BiPAP  · Patient normally on 4 L nasal cannula chronically    Tobacco abuse  Assessment & Plan  · Nicotine patch    Type 2 diabetes mellitus with complication, with long-term current use of insulin ([facility] )  Assessment & Plan  Lab Results   Component Value Date    HGBA1C 7 3 (H) 12/23/2019       No results for input(s): POCGLU in the last 72 hours  Blood Sugar Average: Last 72 hrs:   continue home regimen of Lantus along with sliding scale          VTE Prophylaxis: Fondaparinux (Arixtra)  Code Status: dnr/dni  POLST: POLST is not applicable to this patient  Discussion with family: wife over the phone    Anticipated Length of Stay:  Patient will be admitted on an Inpatient basis with an anticipated length of stay of  > 2 midnights  Justification for Hospital Stay: respiratory failure    Total Time for Visit, including Counseling / Coordination of Care: 1 hour  Greater than 50% of this total time spent on direct patient counseling and coordination of care  Chief Complaint:   Shortness of breath    History of Present Illness:    Johana Vann is a 80 y o  male with advanced COPD on 4 L nasal cannula chronically who presents with shortness of breath    Patient's past month has been complicated by a mechanical fall and hip fracture, and is postoperative course has been complicated by aspiration pneumonitis  He was recently discharged approximately 11 days ago for his pneumonia  He had been doing relatively well at a skilled nursing facility, however he developed shortness of breath and subsequently was sent to the ER for further evaluation  Upon my examination, he feels much better after being placed on the BiPAP  He denies any fevers, chills, chest pain, nausea, vomiting is otherwise well  Review of Systems:  Review of Systems   Constitutional: Positive for fatigue  Respiratory: Positive for cough and shortness of breath  All other systems reviewed and are negative  Past Medical and Surgical History:   Past Medical History:   Diagnosis Date    [facility]     BPH (benign prostatic hyperplasia)     [facility]     Diabetes mellitus (HCC)     DJD (degenerative joint disease)     Falls     Gait abnormality     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[facility] )     Renal disorder     Stroke ([facility] )     may 2015    Tobacco abuse        Past Surgical History:   Procedure Laterality Date    ANGIOPLASTY  03/01/2016    Right femoral vein    BACK SURGERY  2015    ORIF HIP FRACTURE Right 1/31/2020    Procedure: OPEN REDUCTION W/ INTERNAL FIXATION (ORIF) HIP WITH CANNUALATED SCREWS;  Surgeon: Davonte Mak DO;  [facility] MAIN OR;  Service: Orthopedics    SUPRAPUBIC TUBE PLACEMENT         Meds/Allergies:  Prior to Admission medications    Medication Sig Start Date End Date Taking?  Authorizing Provider   albuterol (PROVENTIL HFA,VENTOLIN HFA) 90 mcg/act inhaler Inhale 2 puffs every 6 (six) hours as needed for wheezing   Yes Historical Provider, MD   aspirin (ECOTRIN LOW STRENGTH) 81 mg EC tablet Take 81 mg by mouth daily   Yes Historical Provider, MD   atorvastatin (LIPITOR) 20 mg tablet Take 20 mg by mouth daily   Yes Historical Provider, MD   budesonide (PULMICORT) 0 5 mg/2 mL nebulizer solution Take 1 vial (0 5 mg total) by nebulization every 12 (twelve) hours Rinse mouth after use  2/3/20  Yes Alexandra Rm MD   calcium carbonate (OS-MAXIMO) 600 MG tablet Take 600 mg by mouth 2 (two) times a day with meals   Yes Historical Provider, MD   carisoprodol (SOMA) 350 mg tablet Take 350 mg by mouth as needed for muscle spasms   Yes Historical Provider, MD   escitalopram (LEXAPRO) 10 mg tablet Take 10 mg by mouth daily   Yes Historical Provider, MD   finasteride (PROSCAR) 5 mg tablet Take 5 mg by mouth daily   Yes Historical Provider, MD   fluticasone-salmeterol (ADVAIR) 250-50 mcg/dose inhaler Inhale 1 puff 2 (two) times a day Rinse mouth after use     Yes Historical Provider, MD   fondaparinux (ARIXTRA) 2 5 mg/0 5 mL Inject 2 5 mg under the skin daily 2/4/20  Yes Alexandra Rm MD   furosemide (LASIX) 20 mg tablet Take 1 tablet (20 mg total) by mouth daily  Patient taking differently: Take 40 mg by mouth daily  6/7/19  Yes Kyaw Alba MD   gabapentin (NEURONTIN) 100 mg capsule Take 100 mg by mouth 3 (three) times a day    Yes Historical Provider, MD   glimepiride (AMARYL) 2 mg tablet Take 2 mg by mouth every morning before breakfast   Yes Historical Provider, MD   insulin glargine (LANTUS) 100 units/mL subcutaneous injection Inject 20 Units under the skin daily    Yes Historical Provider, MD   levalbuterol (XOPENEX) 1 25 mg/0 5 mL nebulizer solution Take 0 5 mL (1 25 mg total) by nebulization 3 (three) times a day 2/3/20  Yes Alexandra Rm MD   nicotine (NICODERM CQ) 21 mg/24 hr TD 24 hr patch Place 1 patch on the skin daily 2/4/20  Yes Alexandra Rm MD   oxybutynin (DITROPAN) 5 mg tablet Take 15 mg by mouth daily    Yes Historical Provider, MD   polyethylene glycol (GLYCOLAX) powder Take 17 g by mouth daily 2/3/20  Yes Alexandra Rm MD   rizatriptan (MAXALT) 10 MG tablet Take 10 mg by mouth once as needed for migraine May repeat in 2 hours if needed   Yes Historical Provider, MD   senna (SENOKOT) 8 6 mg Take 2 tablets (17 2 mg total) by mouth daily as needed for constipation 2/3/20  Yes Elana Ambrocio MD   sodium chloride 0 9 % nebulizer solution Take 3 mL by nebulization 3 (three) times a day 2/3/20  Yes Elana Ambrocio MD   ALPRAZolam Florestine Olya) 0 5 mg tablet Take 0 5 mg by mouth daily at bedtime as needed for anxiety    Historical Provider, MD   docusate sodium (COLACE) 100 mg capsule Take 1 capsule (100 mg total) by mouth 2 (two) times a day 2/3/20   Elana Ambrocio MD   erythromycin (ILOTYCIN) ophthalmic ointment 0 5 inches daily at bedtime    Historical Provider, MD   guaiFENesin (MUCINEX) 600 mg 12 hr tablet Take 1 tablet (600 mg total) by mouth 2 (two) times a day for 10 days 2/14/20 2/24/20  Brad Etienne PA-C   insulin lispro (HumaLOG) 100 units/mL injection Inject under the skin    Historical Provider, MD     I have reviewed home medications with patient personally      Allergies: No Known Allergies    Social History:  Marital Status: /Civil Union   Occupation: n/a  Patient Pre-hospital Living Situation: snf  Patient Pre-hospital Level of Mobility: limited  Patient Pre-hospital Diet Restrictions: diabetic  Substance Use History:     Social History     Substance and Sexual Activity   Alcohol Use Never    Frequency: Never     Social History     Tobacco Use   Smoking Status Current Every Day Smoker    Packs/day: 1 00    Years: 70 00    Pack years: 70 00   Smokeless Tobacco Never Used   Tobacco Comment    Wife reports it's a losing matos     Social History     Substance and Sexual Activity   Drug Use No       Family History:  I have reviewed the patients family history    Physical Exam:   Vitals:   Blood Pressure: 94/51 (02/25/20 1230)  Pulse: 91 (02/25/20 1245)  Temperature: 98 4 °F (36 9 °C) (02/25/20 1255)  Temp Source: Temporal (02/25/20 1255)  Respirations: (!) 24 (02/25/20 1245)  Weight - Scale: 67 kg (147 lb 11 3 oz) (02/25/20 9101)  SpO2: 94 % (02/25/20 1245)    Physical Exam   Constitutional: He is oriented to person, place, and time  He appears well-developed  Frail, elderly male   HENT:   Head: Normocephalic and atraumatic  Eyes: Pupils are equal, round, and reactive to light  EOM are normal    Neck: Normal range of motion  Neck supple  Cardiovascular: Regular rhythm  Tachycardia   Pulmonary/Chest: He is in respiratory distress  He has rales  Very poor air movement   Abdominal: Soft  Bowel sounds are normal    Musculoskeletal: Normal range of motion  He exhibits no edema  Neurological: He is alert and oriented to person, place, and time  Skin: Skin is warm  Capillary refill takes less than 2 seconds  Psychiatric: He has a normal mood and affect  His behavior is normal  Judgment and thought content normal    Nursing note and vitals reviewed  Additional Data:   Lab Results: I have personally reviewed pertinent reports  Results from last 7 days   Lab Units 02/25/20  1014   WBC Thousand/uL 12 10*   HEMOGLOBIN g/dL 11 7*   HEMATOCRIT % 36 8*   PLATELETS Thousands/uL 224   NEUTROS PCT % 84*   LYMPHS PCT % 4*   MONOS PCT % 11   EOS PCT % 1     Results from last 7 days   Lab Units 02/25/20  1014   POTASSIUM mmol/L 4 9   CHLORIDE mmol/L 93*   CO2 mmol/L 35*   BUN mg/dL 17   CREATININE mg/dL 0 64*   CALCIUM mg/dL 8 9   ALK PHOS U/L 96   ALT U/L 13   AST U/L 14         Results from last 7 days   Lab Units 02/21/20  1546 02/21/20  0509 02/20/20  1637 02/20/20  0524 02/19/20  1607 02/19/20  0459 02/18/20  1618   POC GLUCOSE mg/dl 357* 259* 277* 239* 209* 108 291*           Imaging: I have personally reviewed pertinent reports  XR chest 1 view portable   Final Result by Earl Cook MD (02/25 1240)      Moderate bibasilar opacity which could be due to atelectasis or pneumonia  The study was marked in [facility] for immediate notification              Workstation performed: [id number]             EKG, Pathology, and Other Studies Reviewed on Admission:   · EKG: n/a    NetAccess/Epic Records Reviewed: Yes     ** Please Note: This note has been constructed using a voice recognition system   **

## 2020-02-25 NOTE — NURSING NOTE
Received to ICU bed 5 from ER  Alert and oriented times 4  Answering questions appropiately  Bipap placed by resp therapy ; see noted settings  Pulse ox in upper 90's  Breath sounds diminished/coarse  Encouraged to deep breath  Heart regular SR 70's-80's on monitor  No chest discomfort per patient  No edema to lower extremities  Positive pulses upper/lower +2/+2  Positive BS times 4 quads; no notable pain on light palpation  B/L heels elevated to pillow  HOB to remain at [address] or greater at all times  Chronic suprapubic cath inplace; noted light norm urine to ocampo bag  Last BM on 2/24/20  No current nausea/emesis  IV fluids continuous; NSS/75ML/HR  IV antibiotics as ordered  Callbell in reach; rails up times 3  Safety maintained

## 2020-02-25 NOTE — ASSESSMENT & PLAN NOTE
· Sepsis as evidenced by fever of 100 8, leukocytosis, tachycardia  · Secondary to pneumonia  · Patient was recently hospitalized for aspiration pneumonia, as a result will treat with cefepime and Flagyl for HCAP  · MRSA swabs have been negative, will hold on vancomycin  · Check sputum culture  · Will check for strep pneumo, Legionella  · Trend procalcitonin  · Monitor closely in the ICU

## 2020-02-25 NOTE — ASSESSMENT & PLAN NOTE
Lab Results   Component Value Date    HGBA1C 7 3 (H) 12/23/2019       No results for input(s): POCGLU in the last 72 hours      Blood Sugar Average: Last 72 hrs:   continue home regimen of Lantus along with sliding scale

## 2020-02-25 NOTE — SEPSIS NOTE
Sepsis Note   Kurt Barton 80 y o  male MRN: [id number]  Unit/Bed#: ED 01 Encounter: [id number]      qSOFA     Row Name 02/25/20 9658                Altered mental status GCS < 15          Respiratory Rate > / =62  1        Systolic BP < / =813  0        Q Sofa Score  1            Initial Sepsis Screening     Row Name 02/25/20 1129                Is the patient's history suggestive of a new or worsening infection?         Suspected source of infection  pneumonia  -JE        Are two or more of the following signs & symptoms of infection both present and new to the patient? (!) Yes (Proceed)  -JE        Indicate SIRS criteria  Hyperthemia > 38 3C (100 9F); Tachypnea > 20 resp per min; Tachycardia > 90 bpm  -JE        If the answer is yes to both questions, suspicion of sepsis is present          If severe sepsis is present AND tissue hypoperfusion perists in the hour after fluid resuscitation or lactate > 4, the patient meets criteria for SEPTIC SHOCK          Are any of the following organ dysfunction criteria present within 6 hours of suspected infection and SIRS criteria that are NOT considered to be chronic conditions?         Organ dysfunction  Lactate > 2 0 mmol/L  -JE        Date of presentation of severe sepsis          Time of presentation of severe sepsis          Tissue hypoperfusion persists in the hour after crystalloid fluid administration, evidenced, by either:          Was hypotension present within one hour of the conclusion of crystalloid fluid administration?           Date of presentation of septic shock          Time of presentation of septic shock            User Key  (r) = Recorded By, (t) = Taken By, (c) = Cosigned By    Initials Name Provider Type    [address] [place] [address] Physician

## 2020-02-25 NOTE — PROGRESS NOTES
Epic notification received 7/11 for patient being roomed at [place] [address] Emergency Department for c/o SOB  This care manager will continue to monitor via chart review throughout bundle episode

## 2020-02-26 ENCOUNTER — APPOINTMENT (INPATIENT)
Dept: NON INVASIVE DIAGNOSTICS | Facility: HOSPITAL | Age: 85
DRG: 189 | End: 2020-02-26
Payer: MEDICARE

## 2020-02-26 LAB
ALBUMIN SERPL BCP-MCNC: 2.8 G/DL (ref 3.5–5.7)
ALP SERPL-CCNC: 74 U/L (ref 55–165)
ALT SERPL W P-5'-P-CCNC: 11 U/L (ref 7–52)
ANION GAP SERPL CALCULATED.3IONS-SCNC: 4 MMOL/L (ref 4–13)
AST SERPL W P-5'-P-CCNC: 12 U/L (ref 13–39)
ATRIAL RATE: 100 BPM
BACTERIA UR CULT: NORMAL
BILIRUB SERPL-MCNC: 0.4 MG/DL (ref 0.2–1)
BUN SERPL-MCNC: 17 MG/DL (ref 7–25)
CALCIUM SERPL-MCNC: 7.9 MG/DL (ref 8.6–10.5)
CHLORIDE SERPL-SCNC: 100 MMOL/L (ref 98–107)
CO2 SERPL-SCNC: 29 MMOL/L (ref 21–31)
CREAT SERPL-MCNC: 0.57 MG/DL (ref 0.7–1.3)
ERYTHROCYTE [DISTWIDTH] IN BLOOD BY AUTOMATED COUNT: 14.1 % (ref 11.5–14.5)
GFR SERPL CREATININE-BSD FRML MDRD: 94 ML/MIN/1.73SQ M
GLUCOSE SERPL-MCNC: 113 MG/DL (ref 65–140)
GLUCOSE SERPL-MCNC: 147 MG/DL (ref 65–140)
GLUCOSE SERPL-MCNC: 178 MG/DL (ref 65–140)
GLUCOSE SERPL-MCNC: 183 MG/DL (ref 65–140)
GLUCOSE SERPL-MCNC: 195 MG/DL (ref 65–140)
GLUCOSE SERPL-MCNC: 208 MG/DL (ref 65–99)
GLUCOSE SERPL-MCNC: 327 MG/DL (ref 65–140)
HCT VFR BLD AUTO: 30.6 % (ref 42–47)
HGB BLD-MCNC: 10.2 G/DL (ref 14–18)
MCH RBC QN AUTO: 32.3 PG (ref 26–34)
MCHC RBC AUTO-ENTMCNC: 33.4 G/DL (ref 31–37)
MCV RBC AUTO: 97 FL (ref 81–99)
PLATELET # BLD AUTO: 188 THOUSANDS/UL (ref 149–390)
PMV BLD AUTO: 8.2 FL (ref 8.6–11.7)
POTASSIUM SERPL-SCNC: 4.2 MMOL/L (ref 3.5–5.5)
PROCALCITONIN SERPL-MCNC: 0.06 NG/ML
PROCALCITONIN SERPL-MCNC: <0.05 NG/ML
PROT SERPL-MCNC: 5.3 G/DL (ref 6.4–8.9)
QRS AXIS: 56 DEGREES
QRSD INTERVAL: 68 MS
QT INTERVAL: 308 MS
QTC INTERVAL: 540 MS
RBC # BLD AUTO: 3.17 MILLION/UL (ref 4.3–5.9)
SODIUM SERPL-SCNC: 133 MMOL/L (ref 134–143)
T WAVE AXIS: 77 DEGREES
VENTRICULAR RATE: 185 BPM
WBC # BLD AUTO: 9.6 THOUSAND/UL (ref 4.8–10.8)

## 2020-02-26 PROCEDURE — 94640 AIRWAY INHALATION TREATMENT: CPT

## 2020-02-26 PROCEDURE — 92610 EVALUATE SWALLOWING FUNCTION: CPT

## 2020-02-26 PROCEDURE — 97167 OT EVAL HIGH COMPLEX 60 MIN: CPT

## 2020-02-26 PROCEDURE — 94760 N-INVAS EAR/PLS OXIMETRY 1: CPT

## 2020-02-26 PROCEDURE — 82948 REAGENT STRIP/BLOOD GLUCOSE: CPT

## 2020-02-26 PROCEDURE — 94660 CPAP INITIATION&MGMT: CPT

## 2020-02-26 PROCEDURE — 97530 THERAPEUTIC ACTIVITIES: CPT

## 2020-02-26 PROCEDURE — 93010 ELECTROCARDIOGRAM REPORT: CPT | Performed by: INTERNAL MEDICINE

## 2020-02-26 PROCEDURE — 93970 EXTREMITY STUDY: CPT

## 2020-02-26 PROCEDURE — 97163 PT EVAL HIGH COMPLEX 45 MIN: CPT

## 2020-02-26 PROCEDURE — 85027 COMPLETE CBC AUTOMATED: CPT | Performed by: INTERNAL MEDICINE

## 2020-02-26 PROCEDURE — 84145 PROCALCITONIN (PCT): CPT | Performed by: INTERNAL MEDICINE

## 2020-02-26 PROCEDURE — 80053 COMPREHEN METABOLIC PANEL: CPT | Performed by: INTERNAL MEDICINE

## 2020-02-26 PROCEDURE — 99232 SBSQ HOSP IP/OBS MODERATE 35: CPT | Performed by: HOSPITALIST

## 2020-02-26 RX ORDER — HYDROCODONE BITARTRATE AND ACETAMINOPHEN 5; 325 MG/1; MG/1
1 TABLET ORAL EVERY 6 HOURS PRN
Status: DISCONTINUED | OUTPATIENT
Start: 2020-02-26 | End: 2020-02-27 | Stop reason: HOSPADM

## 2020-02-26 RX ORDER — ALBUTEROL SULFATE 2.5 MG/3ML
2.5 SOLUTION RESPIRATORY (INHALATION) EVERY 6 HOURS PRN
Status: DISCONTINUED | OUTPATIENT
Start: 2020-02-26 | End: 2020-02-26

## 2020-02-26 RX ORDER — SODIUM CHLORIDE FOR INHALATION 0.9 %
3 VIAL, NEBULIZER (ML) INHALATION
Status: DISCONTINUED | OUTPATIENT
Start: 2020-02-26 | End: 2020-02-27 | Stop reason: HOSPADM

## 2020-02-26 RX ORDER — LEVALBUTEROL 1.25 MG/.5ML
1.25 SOLUTION, CONCENTRATE RESPIRATORY (INHALATION)
Status: DISCONTINUED | OUTPATIENT
Start: 2020-02-26 | End: 2020-02-27 | Stop reason: HOSPADM

## 2020-02-26 RX ORDER — ALBUTEROL SULFATE 2.5 MG/3ML
2.5 SOLUTION RESPIRATORY (INHALATION) EVERY 4 HOURS PRN
Status: DISCONTINUED | OUTPATIENT
Start: 2020-02-26 | End: 2020-02-27 | Stop reason: HOSPADM

## 2020-02-26 RX ADMIN — GUAIFENESIN 600 MG: 600 TABLET, EXTENDED RELEASE ORAL at 17:19

## 2020-02-26 RX ADMIN — BUDESONIDE 0.5 MG: 0.5 INHALANT RESPIRATORY (INHALATION) at 21:23

## 2020-02-26 RX ADMIN — INSULIN GLARGINE 20 UNITS: 100 INJECTION, SOLUTION SUBCUTANEOUS at 10:11

## 2020-02-26 RX ADMIN — NICOTINE 1 PATCH: 21 PATCH, EXTENDED RELEASE TRANSDERMAL at 10:08

## 2020-02-26 RX ADMIN — ASPIRIN 81 MG: 81 TABLET ORAL at 10:10

## 2020-02-26 RX ADMIN — FINASTERIDE 5 MG: 5 TABLET, FILM COATED ORAL at 10:10

## 2020-02-26 RX ADMIN — BUDESONIDE 0.5 MG: 0.5 INHALANT RESPIRATORY (INHALATION) at 08:34

## 2020-02-26 RX ADMIN — ATORVASTATIN CALCIUM 20 MG: 20 TABLET, FILM COATED ORAL at 17:19

## 2020-02-26 RX ADMIN — INSULIN LISPRO 5 UNITS: 100 INJECTION, SOLUTION INTRAVENOUS; SUBCUTANEOUS at 08:21

## 2020-02-26 RX ADMIN — FUROSEMIDE 40 MG: 40 TABLET ORAL at 10:10

## 2020-02-26 RX ADMIN — GABAPENTIN 100 MG: 100 CAPSULE ORAL at 17:19

## 2020-02-26 RX ADMIN — GABAPENTIN 100 MG: 100 CAPSULE ORAL at 20:18

## 2020-02-26 RX ADMIN — DOCUSATE SODIUM 100 MG: 100 CAPSULE, LIQUID FILLED ORAL at 10:09

## 2020-02-26 RX ADMIN — HYDROCODONE BITARTRATE AND ACETAMINOPHEN 1 TABLET: 5; 325 TABLET ORAL at 00:47

## 2020-02-26 RX ADMIN — DOCUSATE SODIUM 100 MG: 100 CAPSULE, LIQUID FILLED ORAL at 17:19

## 2020-02-26 RX ADMIN — LEVALBUTEROL HYDROCHLORIDE 1.25 MG: 1.25 SOLUTION, CONCENTRATE RESPIRATORY (INHALATION) at 21:23

## 2020-02-26 RX ADMIN — SODIUM CHLORIDE 75 ML/HR: 0.9 INJECTION, SOLUTION INTRAVENOUS at 04:21

## 2020-02-26 RX ADMIN — INSULIN LISPRO 1 UNITS: 100 INJECTION, SOLUTION INTRAVENOUS; SUBCUTANEOUS at 17:19

## 2020-02-26 RX ADMIN — GABAPENTIN 100 MG: 100 CAPSULE ORAL at 10:09

## 2020-02-26 RX ADMIN — ESCITALOPRAM OXALATE 10 MG: 10 TABLET ORAL at 10:10

## 2020-02-26 RX ADMIN — METRONIDAZOLE 500 MG: 500 TABLET, FILM COATED ORAL at 06:45

## 2020-02-26 RX ADMIN — HYDROCODONE BITARTRATE AND ACETAMINOPHEN 1 TABLET: 5; 325 TABLET ORAL at 10:17

## 2020-02-26 RX ADMIN — OXYBUTYNIN CHLORIDE 5 MG: 5 TABLET ORAL at 10:09

## 2020-02-26 RX ADMIN — INSULIN LISPRO 1 UNITS: 100 INJECTION, SOLUTION INTRAVENOUS; SUBCUTANEOUS at 21:15

## 2020-02-26 RX ADMIN — IPRATROPIUM BROMIDE AND ALBUTEROL SULFATE 3 ML: 2.5; .5 SOLUTION RESPIRATORY (INHALATION) at 08:34

## 2020-02-26 RX ADMIN — FONDAPARINUX SODIUM 2.5 MG: 2.5 INJECTION, SOLUTION SUBCUTANEOUS at 10:11

## 2020-02-26 RX ADMIN — POLYETHYLENE GLYCOL 3350 17 G: 17 POWDER, FOR SOLUTION ORAL at 10:11

## 2020-02-26 RX ADMIN — GUAIFENESIN 600 MG: 600 TABLET, EXTENDED RELEASE ORAL at 10:10

## 2020-02-26 RX ADMIN — ISODIUM CHLORIDE 3 ML: 0.03 SOLUTION RESPIRATORY (INHALATION) at 14:54

## 2020-02-26 RX ADMIN — LEVALBUTEROL HYDROCHLORIDE 1.25 MG: 1.25 SOLUTION, CONCENTRATE RESPIRATORY (INHALATION) at 14:54

## 2020-02-26 NOTE — PHYSICAL THERAPY NOTE
Physical Therapy Evaluation     Patient's Name: Tita Chairez    Admitting Diagnosis  Shortness of breath [R06 02]  Lactic acidosis [E87 2]  Leukocytosis [D72 829]  Pneumonia [J18 9]  Anemia [D64 9]  Hypoxia [R09 02]    Problem List  Patient Active Problem List   Diagnosis    Type 2 diabetes mellitus with complication, with long-term current use of insulin ([facility] )    Stroke ([place] [address] )    Renal disorder    Diabetic polyneuropathy associated with type 2 diabetes mellitus ([facility] )    Renovascular hypertension    Tobacco abuse    Abdominal aortic aneurysm without rupture ([facility] )    Aneurysm of iliac artery ([facility] )    Antalgic gait    Arthritis    Benign prostatic hyperplasia    Presence of suprapubic catheter (HCC)    Chronic depression    Chronic diarrhea    Chronic bilateral low back pain without sciatica    Chronic orthostatic hypotension    Congestive heart failure (HCC)    DDD (degenerative disc disease), lumbosacral    Diabetic macular edema (HCC)    Diabetic peripheral neuropathy associated with type 2 diabetes mellitus (HCC)    Edema of both legs    Hyperlipidemia associated with type 2 diabetes mellitus (HCC)    Insomnia    Mild cognitive disorder    Nicotine dependence    Mild nonproliferative diabetic retinopathy ([facility] )    Osteoporosis    Peripheral arterial occlusive disease (HCC)    Peripheral vascular disease ([facility] )    Recurrent UTI    Thrombocytopenia (HCC)    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)    Vitamin D deficiency    Localized edema    Lung mass    Swallowing problem    Uncontrolled type 2 diabetes mellitus ([facility] )    Diabetic nephropathy ([facility] )    Closed fracture of neck of right femur with routine healing    Sepsis ([facility] )    Acute on chronic respiratory failure with hypoxia ([facility] )       Past Medical History  Past Medical History:   Diagnosis Date    AAA (abdominal aortic aneurysm) ([facility] )     BPH (benign prostatic hyperplasia)     COPD (chronic obstructive pulmonary disease) (HCC)     Diabetes mellitus [facility] )     DJD (degenerative joint disease)     Falls     Gait abnormality     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[facility] )     Renal disorder     Stroke [facility])     may 2015    Tobacco abuse        Past Surgical History  Past Surgical History:   Procedure Laterality Date    ANGIOPLASTY  03/01/2016    Right femoral vein    BACK SURGERY  2015    ORIF HIP FRACTURE Right 1/31/2020    Procedure: OPEN REDUCTION W/ INTERNAL FIXATION (ORIF) HIP WITH CANNUALATED SCREWS;  Surgeon: Joanna Caraballo DO;  Location: [address] [place] [address] OR;  Service: Orthopedics    SUPRAPUBIC TUBE PLACEMENT          02/26/20 0843   Note Type   Note type Eval/Treat   Pain Assessment   Pain Assessment 0-10   Pain Score 8   Pain Type Acute pain   Pain Location Leg  ("it moves around")   Pain Orientation Right; Anterior;Mid   Pain Descriptors Aching; Jonathan Devonte; Sore   Pain Frequency Intermittent   Pain Onset Ongoing   Clinical Progression Not changed   Patient's Stated Pain Goal No pain   Hospital Pain Intervention(s) Medication (See MAR); Repositioned; Ambulation/increased activity   Diversional Activities Television   Multiple Pain Sites No   Home Living   Type of [address] One level;Performs ADLs on one level; Able to live on main level with bedroom/bathroom;Stairs to enter with rails  (10-12 KHANH)   Bathroom Shower/Tub Tub/shower unit   Bathroom Toilet Standard   Bathroom Equipment Shower chair   [address] Walker  (rolling)   Additional Comments Prior to this hospitalization,pt  was receiving STR s/p R hip fx with surgical intervention as per Dr Aliza Fabian  He was transitioned [facility] on 02/03/2020  Prior Function   Level of [place] Needs assistance with IADLs; Needs assistance with ADLs and functional mobility   Lives With Facility staff   Receives Help From Personal care attendant   ADL Assistance Needs assistance   IADLs Needs assistance   Falls in the last 6 months 1 to 4  (x 3)   Vocational Retired   Restrictions/Precautions   Wells Larisa Bearing Precautions Per Order Yes   RLE Weight Bearing Per Order TTWB   Other Precautions Bed Alarm;Multiple lines;Telemetry;O2;Fall Risk;Pain;Hard of hearing  (5 L O2 via NC,Bipap removed approx 0500 today )   General   Additional Pertinent History s/p ORIF 01/31/2020 following a fall and subsequent R hip fx   Family/Caregiver Present No   Cognition   Overall Cognitive Status Impaired   Arousal/Participation Alert   Attention Attends with cues to redirect   Orientation Level Oriented to person;Oriented to place; Disoriented to time;Disoriented to situation  ("no one can tell me what happened")   Memory Decreased recall of recent events;Decreased recall of precautions;Decreased short term memory   Following Commands Follows one step commands with increased time or repetition   RUE Assessment   RUE Assessment   (Please see OT assessment )   LUE Assessment   LUE Assessment   (Please see OT assessment )   RLE Assessment   RLE Assessment X   Strength RLE   R Hip Flexion 3/5   R Knee Extension 3/5   R Ankle Dorsiflexion 3+/5   LLE Assessment   LLE Assessment X   Strength LLE   LLE Overall Strength 4-/5  (gross musculature)   Coordination   Movements are Fluid and Coordinated 0   Coordination and Movement Description Incremental, antalgic mobility requiring increased time and verbal/tactile cues of 2  Additional time was also provided 2/2 ART,SOB with positional changes  Bed Mobility   Supine to Sit 4  Minimal assistance   Additional items Assist x 2;HOB elevated; Bedrails; Increased time required;Verbal cues;LE management   Sit to Supine   (DNT re:pt  was sitting OOB in chair upon conclusion )   Transfers   Sit to Stand 3  Moderate assistance   Additional items Assist x 2;Bedrails; Increased time required;Verbal cues   Stand to Sit 2  Maximal assistance   Additional items Assist x 2;Armrests; Increased time required;Verbal cues; Impulsive   Stand pivot 3  Moderate assistance   Additional items Assist x 2; Increased time required;Verbal cues   Ambulation/Elevation   Gait pattern Improper Weight shift; Forward Flexion;Decreased R stance; Antalgic; Short stride; Inconsistent evangelista   Gait Assistance 3  Moderate assist   Additional items Assist x 2;Verbal cues; Tactile cues  (to reiterate WBS RLE)   Assistive Device Rolling walker   Distance 5 feet  (from bedside->chair)   Stair Management Assistance Not tested   Balance   Static Sitting Fair +   Dynamic Sitting Fair   Static Standing Poor +   Dynamic Standing Poor +   Ambulatory Poor +   Endurance Deficit   Endurance Deficit Yes   Endurance Deficit Description ART, SOB present w/all positional changes  Increase time allowed for symptom modulation  (/59 after 139/63 / O2 97%-86-78->93% w/O2 maintained)   Activity Tolerance   Activity Tolerance Patient limited by fatigue;Patient limited by pain;Treatment limited secondary to medical complications (Comment)   Medical Staff Made Aware yes, Dr Suha Foster yes, Ana Wilson RN   Assessment   Prognosis Fair   Problem List Decreased strength;Decreased endurance; Impaired balance;Decreased mobility; Decreased safety awareness;Decreased skin integrity;Orthopedic restrictions; Impaired judgement;Decreased cognition; Impaired hearing;Pain   Assessment Pt is 80 y o  male seen for PT evaluation s/p admit to [facility] ICU on 2/25/2020 w/ Sepsis ([address] )  PT consulted to assess pt's functional mobility and d/c needs  Order placed for PT eval and tx, w/ TTWB R LE and eval/treat order  Comorbidities affecting pt's physical performance at time of assessment include: weakness, SOB,ART, sepsis, DM, tobacco use, ARF w/hypoxia,depression, s/p ORIF R hip with TTWB RLE  PTA, pt was was receiving STR at the [facility]   Personal factors affecting pt at time of IE include: communication issues, inability to ambulate household distances, inability to navigate community distances, inability to navigate level surfaces w/o external assistance, unable to perform dynamic tasks in community, positive fall history, depression, tobacco use, unable to perform physical activity, limited insight into impairments and inability to perform ADLs  Please find objective findings from PT assessment regarding body systems outlined above with impairments and limitations including weakness, decreased ROM, impaired balance, decreased endurance, impaired coordination, gait deviations, pain, decreased activity tolerance, decreased functional mobility tolerance, decreased safety awareness, impaired judgement, fall risk, orthopedic restrictions, SOB upon exertion, decreased skin integrity and decreased cognition  The following objective measures performed on IE also reveal limitations: Barthel Index: 15/100  Pt's clinical presentation is currently unstable/unpredictable seen in pt's presentation of pain severity, multiple hospitalizations, SOB,ART,abnormal lab values  Pt to benefit from continued PT tx to address deficits as defined above and maximize level of functional independent mobility and consistency  From PT/mobility standpoint, recommendation at time of d/c would be STR pending progress in order to facilitate return to PLOF  Goals   Patient Goals have less pain   LTG Expiration Date 03/07/20   Long Term Goal #1 1 )Patient will complete bed mobility with CGA of 1 for decrease need for caregiver assistance, decrease burden of care  2 ) Patient will complete transfers with mod A of 1 to decrease risk of falls, facilitate upright standing posture  3 ) RLE strength to greater than/equal to 4-/5 gross musculature to increase ability to safely transfer, control descent to chair  4 ) Patient will exhibit increase static standing balance to Fair+, for 30-45 seconds without LOB and min A  of 1 to improve activity tolerance & endurance   5 ) Patient will exhibit increase dynamic ambulatory balance to Fair for 35-50 feet w/RW min A of 2 to improve ability to mobilize to toilet, chair and decrease risk for additional medical complications  6 ) Patient will exhibit good self monitoring and ability to consistently follow 2 step commands to increase complexity of tasks and resume ADL's without LOB  PT Treatment Day 1   Plan   Treatment/Interventions Functional transfer training;LE strengthening/ROM; Therapeutic exercise; Endurance training;Cognitive reorientation;Equipment eval/education; Bed mobility;Gait training;Spoke to nursing;Spoke to MD;OT   PT Frequency 5x/wk   Recommendation   Recommendation Short-term skilled PT   Equipment Recommended Walker   PT - OK to Discharge No   Additional Comments Upon conclusion, pt  was resting OOB in chair w/all needs within reach & medical team informed of assessment outcome  Barthel Index   Feeding 5   Bathing 0   Grooming Score 0   Dressing Score 0   Bladder Score 0   Bowels Score 5   Toilet Use Score 0   Transfers (Bed/Chair) Score 5   Mobility (Level Surface) Score 0   Stairs Score 0   Barthel Index Score 15     Additional skilled interventions: Therapeutic Activity x 10 minutes    S: 8/10 R leg pain prior to session,agreeable to mobilize OOB;A&Ox2  O: therapeutic activity x 10 minutes including mobilization education: bed mobility, supine->sit, static/dynamic sitting balance w/ postural facilitation, sit<->stand transfers, static/dynamic standing balance w/ postural facilitation,WBS reiteration, and continued safety training for increased awareness  A: Patient exhibited pain, weakness, impaired balance, gait dysfunction, decreased endurance with SOB/ART, activity intolerance, and decline from PLOF to benefit from continued interventions  P: Continue PT tx with progression of functional tasks as patient can safely tolerate, 5x/week      Sarita Garcia, PT

## 2020-02-26 NOTE — SPEECH THERAPY NOTE
Speech-Language Pathology Bedside Swallow Evaluation      Patient Name: Teresa Simpson    UHYED'B Date: 2/26/2020     Problem List  Principal Problem:    Sepsis [facility])  Active Problems:    Type 2 diabetes mellitus with complication, with long-term current use of insulin ([facility])    Tobacco abuse    Benign prostatic hyperplasia    Chronic depression    Closed fracture of neck of right femur with routine healing    Acute on chronic respiratory failure with hypoxia [facility])      Past Medical History  Past Medical History:   Diagnosis Date    AAA [facility]abdominal aortic aneurysm) [facility] )     BPH (benign prostatic hyperplasia)     COPD (chronic obstructive pulmonary disease) ([facility])     Diabetes mellitus ([facility] )     DJD (degenerative joint disease)     Falls     Gait abnormality     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     Renal disorder     Stroke ([facility] )     may 2015    Tobacco abuse        Past Surgical History  Past Surgical History:   Procedure Laterality Date    ANGIOPLASTY  03/01/2016    Right femoral vein    BACK SURGERY  2015    ORIF HIP FRACTURE Right 1/31/2020    Procedure: OPEN REDUCTION W/ INTERNAL FIXATION (ORIF) HIP WITH CANNUALATED SCREWS;  Surgeon: Kristy Abraham DO;  [facility] MAIN OR;  Service: Orthopedics    SUPRAPUBIC TUBE PLACEMENT         Summary   Pt presented with minimally impaired to  functional appearing oral and pharyngeal stage swallowing skills with materials administered today  Pt exhibit xerostomia and limited dentition that increase risk for retention/choking  Due to increased O2 demands at this time, would downgrade diet to dysphagia 3 and continue thin liquids  Risk/s for Aspiration: mild risk for reflux    Recommended Diet: soft/level 3 diet and thin liquids      Brief swallow therapy to review swallow startegies and assess mealtime management of current diet    Recommended Form of Meds: whole with liquid as preferred  Aspiration/reflux precautions and swallowing strategies: upright posture, slow rate of feeding, small bites/sips and alternating bites and sips    Current Medical Status per admitting H&p:  Pt is a 80 y o  male who [facility] with advanced COPD on 4 L nasal cannula chronically who presents with shortness of breath  Patient's past month has been complicated by a mechanical fall and hip fracture, and is postoperative course has been complicated by aspiration pneumonitis  He was recently discharged approximately 11 days ago for his pneumonia  He had been doing relatively well at a skilled nursing facility, however he developed shortness of breath and subsequently was sent to the ER for further evaluation  Upon my examination, he feels much better after being placed on the BiPAP  He denies any fevers, chills, chest pain, nausea, vomiting is otherwise well      Today pt  Endorses occasional dysphagia for solids, but not recently  He reports he is here more for his leg/calf pain  He reports poor appetite  See results of VBS on 1/29/20:  Assessment Summary:    Mild oral-pharyngeal stage dysphagia related to reduced dentition, suspected oral dryness and mild swallow delay  Mildly slowed but effective esophageal emptying        Recommendations:    Regular-soft diet as tolerated, adequately chewed   Small bites of food   Alternated w/ liquids   slower rate of ingestion   Aspiration and reflux precautions      Current Precautions:  Fall  Aspiration      Past medical history:     Please see H&P for details    Special Studies:  Xray chest 2/25/20:   Moderate bibasilar opacity which could be due to atelectasis or pneumonia      Social/Education/Vocational Hx:  Pt lives with family, currently rehabing at [facility] for R hip fx/repair  Swallow Information   Current Risks for Dysphagia & Aspiration: known history of esophageal, mild dysphagia  Current Symptoms/Concerns: change in respiratory status  Current Diet: regular diet and thin liquids   Baseline Diet: regular diet and thin liquids    Baseline Assessment   Behavior/Cognition: alert and and oriented, impaired STM  Speech/Language Status: able to participate in conversation and able to follow commands  Patient Positioning: upright in bed  Pain Status/Interventions/Response to Interventions:   No report of or nonverbal indications of pain  Swallow Mechanism Exam  Facial: symmetrical  Labial: WFL  Lingual: WFL  Velum: symmetrical  Mandible: WFL  Dentition: limited dentition  Vocal quality:rough   Volitional Cough: congested   Respiratory Status:   on 5 L O2  Nasal cannula, O2 appears to be fluctuating at times  Pt expectorated thick bro/yelliwsh phlegm  Consistencies Assessed and Performance   Consistencies Administered: thin liquids, puree and soft solids  Materials administered included  Donut, apple sauce, apple juice via straw    Oral Stage: minimal  Mastication was adequate with the materials administered today  Bolus formation and transfer were functional with no significant oral residue noted  No overt s/s reduced oral control  Minimally prolonged manipulation d/t xerostomia and edentulous state  Pharyngeal Stage: WFL and suspected  Swallow Mechanics:  Swallowing initiation appeared prompt  Laryngeal rise was palpated and judged to be within functional limits  No coughing, throat clearing, change in vocal quality or respiratory status noted today  Congested cough noted post trials but do not suspect related to swallow  Esophageal Concerns:  mild dysmotility on VBs noted  Strategies and Efficacy: small bites, softer foods, slow rate, reflux precautions    Summary and Recommendations (see above)    Results Reviewed with: patient     Caregiver Education: initiated  I educated pt   in basics re: swallowing A/P and means of minimizing aspiration risk (eg benefit of upright position c use of pillow/s to ensure head in neutral or slightly flexed position, use of stimulation to maximize engagement, use of slow rate/small bites and sips, ensuring laryngeal rise/swallow initiation prior to administering the next tsp etc)  Treatment Recommended:  brief    Frequency of treatment:  1-2 follow up sessions    Patient Stated Goal: to get help for his leg  Dysphagia LTG per SLP  -Patient will demonstrate optimum safety and efficacy of oral intake and swallowing function without overt s/sx of penetration or aspiration for the highest appropriate diet level  Short Term Goals:    -Pt will tolerate Dysphagia 3/advanced (dental soft) diet and honey thick nectar thick thin liquid with no significant s/s oral or pharyngeal dysphagia across 1-3 diagnostic session/s     -Re: Compensatory Strategies  --Patient will demonstrate independent use of recommended safe swallowing strategies during a clinician assessed meal across three sessions  --Patient will demonstrate the ability to adequately self-monitor swallowing skills and perform appropriate compensatory techniques to reduce overt s/sx of penetration/aspiration to safely tolerate least restrictive food/liquid consistencies  Speech Therapy Prognosis   Prognosis: fair    Prognosis Considerations: medically fragile status    Peggy ORTIZ, [address], [facility]/SLP  [id number]  [email or web address]  Available via tiger text

## 2020-02-26 NOTE — ASSESSMENT & PLAN NOTE
· Continue finasteride  · Continue oxybutynin for overactive bladder  · Suprapubic catheter in place

## 2020-02-26 NOTE — NURSING NOTE
Pt requesting to eat several times between 1930 and 2045  States that he is starving to death  BiPap removed and placed on nc 5lpm O2 at approx 2050 so pt can eat  Bld glucose done - 44  Pt asymptomatic and wants to eat  Edward Mejia CABRAL notified of pt wanting to eat with BS 44 and asymptomatic  Miller CABRAL agreed to leave the pt off of the BiPap until he has eaten and to recheck the bld glucose in 2-3 hrs  Pt did well eating his snack    Agreeable to let the BiPap on until the am

## 2020-02-26 NOTE — DISCHARGE INSTR - DIET
Dysphagia 3/Soft to [employer] (no raw vegetables, no skins on fruit, avoid very dense chewy meat)  thin

## 2020-02-26 NOTE — NURSING NOTE
[facility]  Wife aware of patient transfer  Report given to [facility]  All valuables sent with patient and nurse  Safety maintained

## 2020-02-26 NOTE — SOCIAL WORK
Evaluated the pt at the bedside  Pt was at [facility] for STR s/p hip repair  Spoke to the pt at the bedside  TC to pt wife Abrahan Avery Who states she does not want the pt to go back to [employer]  and they are not holding the bed  Pt home is a 2 story home with 6 KHANH in the front and  11 KHANH in the back  Pt bedroom and bath are on the first floor  Pt has a walker, WC, lift on the toilet, shower chair and oxygen at home  Pt is on 4l/min at home and utilizes [facility]     Wife states she is not ready for  hospice, however would like to bring her spouse home on [address] services  Wife is aware the pt is not weight bearing to the leg  Wife states she can transfer him to the [facility] with the help of her son who is willing to assist    Wife states they had utilized [address] in the past and would like to use them again  A referral was made for [facility]  Pt will need BLS transport home upon discharge  Pt gets his medications at [place]  Patient/caregiver received discharge checklist   Content reviewed  Patient/caregiver encouraged to participate in discharge plan of care prior to discharge home  CM reviewed d/c planning process including the following: identifying help at home, patient preference for d/c planning needs, availability of treatment team to discuss questions or concerns patient and/or family may have regarding understanding medications and recognizing signs and symptoms once discharged  CM also encouraged patient to follow up with all recommended appointments after discharge  Patient advised of importance for patient and family to participate in managing patients medical well being

## 2020-02-26 NOTE — ASSESSMENT & PLAN NOTE
· Initially, the patient met sepsis criteria as evidenced by fever of 100 8, leukocytosis, tachycardia  · Sepsis has been ruled out - initial concern was secondary to a bibasilar pneumonia  · Patient is completely afebrile, white blood cell count is normal, procalcitonin testing x2 is less than 0 05  · MRSA swabs have been negative  · Blood cultures negative thus far  · Urine for Streptococcus pneumoniae and Legionella antigen negative  · DC antibiotics  · Will monitor closely clinically

## 2020-02-26 NOTE — OCCUPATIONAL THERAPY NOTE
Occupational Therapy Evaluation      Shima Gray    2/26/2020    Principal Problem:    Sepsis ([facility] )  Active Problems:    Type 2 diabetes mellitus with complication, with long-term current use of insulin ([facility])    Tobacco abuse    Benign prostatic hyperplasia    Chronic depression    Closed fracture of neck of right femur with routine healing    Acute on chronic respiratory failure with hypoxia [facility])      Past Medical History:   Diagnosis Date    AAA (abdominal aortic aneurysm) ([facility])     BPH (benign prostatic hyperplasia)     COPD (chronic obstructive pulmonary disease) ([facility])     Diabetes mellitus ([facility] )     DJD (degenerative joint disease)     Falls     Gait abnormality     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[facility] )     Renal disorder     Stroke ([facility] )     may 2015    Tobacco abuse        Past Surgical History:   Procedure Laterality Date    ANGIOPLASTY  03/01/2016    Right femoral vein    BACK SURGERY  2015    ORIF HIP FRACTURE Right 1/31/2020    Procedure: OPEN REDUCTION W/ INTERNAL FIXATION (ORIF) HIP WITH CANNUALATED SCREWS;  Surgeon: Saravanan Swartz DO;  Location: [facility] MAIN OR;  Service: Orthopedics    SUPRAPUBIC TUBE PLACEMENT          02/26/20 0838   Note Type   Note type Eval/Treat   Restrictions/Precautions   Weight Bearing Precautions Per Order Yes   RLE Weight Bearing Per Order TTWB   Other Precautions Pain; Fall Risk;O2;Telemetry;Multiple lines; Bed Alarm;Cognitive;Hard of hearing;Aspiration  (5L O2)   Pain Assessment   Pain Assessment 0-10   Pain Score 8   Pain Type Acute pain   Pain Location Leg   Pain Orientation Right; Anterior;Mid   Pain Descriptors Aching; [place]; Sore   Pain Frequency Intermittent   Pain Onset Ongoing   Clinical Progression Not changed   Patient's Stated Pain Goal No pain   Hospital Pain Intervention(s) Medication (See MAR); Repositioned; Ambulation/increased activity   Multiple Pain Sites No   Home Living   Type of [facility] One level;Performs ADLs on one level;Able to live on main level with bedroom/bathroom;Stairs to enter with rails  (10-12 KHANH)   Bathroom Shower/Tub Tub/shower unit   Bathroom Toilet Standard   Bathroom Equipment Shower chair   [facility]  ([place])   Additional Comments pt most recently at [employer]  for STR   Prior Function   Level of [place] Needs assistance with IADLs; Needs assistance with ADLs and functional mobility   Lives With Facility staff   Receives Help From Personal care attendant   ADL Assistance Needs assistance  (shoes/socks/feet PRN by spouse)   IADLs Needs assistance   Falls in the last 6 months 1 to 4   Vocational Retired   Psychosocial   Psychosocial (PPDai) WDL   Patient Behaviors/Mood Anxious; Cooperative   Ability to Express Needs Able to express   Subjective   Subjective Why am I here? Agreeable to therapy vinayak, pt familiar with therapists   ADL   Eating Assistance 5  Supervision/Setup   Eating Deficit Setup  ([employer])   Grooming Assistance 4  Minimal Assistance   UB Bathing Assistance 3  Moderate Assistance   LB Bathing Assistance 1  Total Assistance   [address] 3  Moderate Assistance   LB Dressing Assistance 1  Total Assistance   Additional Comments SOB/decreased pulse O2/medical status hinder   Bed Mobility   Supine to Sit 4  Minimal assistance   Additional items Assist x 2;HOB elevated; Bedrails; Increased time required;Verbal cues;LE management   Additional Comments OOB to chair   Transfers   Sit to Stand 3  Moderate assistance   Additional items Assist x 2;Bedrails; Increased time required;Verbal cues   Stand to Sit 2  Maximal assistance   Additional items Assist x 2;Armrests; Increased time required;Verbal cues; Impulsive   Additional Comments ART readily   Functional Mobility   Additional Comments pt aware of being on WB precautions RLE   Balance   Static Sitting Fair +   Dynamic Sitting Fair   Static Standing Poor +   Dynamic Standing Poor + Ambulatory Poor +   Activity Tolerance   Activity Tolerance Patient limited by fatigue;Patient limited by pain;Treatment limited secondary to medical complications (Comment); Other (Comment)  (ART/Pulse O2)   Medical Staff Made Aware yes   Nurse Made Aware yes   RUE Assessment   RUE Assessment WFL   LUE Assessment   LUE Assessment WFL   Hand Function   Gross Motor Coordination Functional   Fine Motor Coordination Functional   Cognition   Overall Cognitive Status Impaired   Arousal/Participation Alert; Cooperative   Attention Attends with cues to redirect   Orientation Level Oriented to person;Oriented to place; Disoriented to time;Disoriented to situation   Memory Decreased short term memory;Decreased recall of recent events;Decreased recall of precautions   Following Commands Follows one step commands with increased time or repetition   Assessment   Limitation Decreased ADL status; Decreased Safe judgement during ADL;Decreased endurance;Decreased self-care trans   Prognosis Fair   Assessment Pt is a 80 y o  male seen for OT evaluation s/p admit to [address] on 2/25/2020 w/ Sepsis ([facility] [address] )  Comorbidities affecting pt's functional performance at time of assessment include: DM, CHF, neuropathy and closed Fx Rt neck of femur, AAA, Arthritis, DDD, Depression, see H & P for lengthy PMHx  Personal factors affecting pt at time of IE include:steps to enter environment, difficulty performing ADLS and limited insight into deficits  Prior to admission, pt was most recently at the [place] for [facility]  Pt living at home with wife prior with assistance PRN (D IADL's)  Theresa Burnette Upon evaluation: Pt requires an increased level of assistance with self care ADL's and functional transfers/toileting/mobility r/t ART and the following deficits impacting occupational performance: weakness, decreased balance, decreased tolerance, decreased safety awareness, increased pain and orthopedic restrictions   Pt to benefit from continued skilled OT tx while in the hospital to address deficits as defined above and maximize level of functional independence w ADL's and functional mobility  Occupational Performance areas to address include: grooming, bathing/shower, toilet hygiene, dressing and functional mobility  From OT standpoint, recommendation at time of d/c would be STR  Goals   Patient Goals hurt less   STG Time Frame 3-5   Short Term Goal #1 increase bed mobility to Min A X 1 for self care participation   Short Term Goal #2 increase UB self care to min A following set up   LTG Time Frame 7-10   Long Term Goal #1 increase bed mobility to CGA/VC's for self care participation   Long Term Goal #2 increase OOB functional transfers to Min A X 2 for self toileting needs   Long Term Goal increase OOB sitting tolerance > 30', increase ability to participate in functional tasks to 5-7' w/ increased ART/decreased pulse O2   Plan   Treatment Interventions ADL retraining;Functional transfer training; Endurance training;Patient/family training; Compensatory technique education; Activityengagement; Energy conservation   Goal Expiration Date 03/07/20   OT Treatment Day 0   OT Frequency 3-5x/wk   Recommendation   OT Discharge Recommendation Short Term Rehab   OT - OK to Discharge Yes   Barthel Index   Feeding 5   Bathing 0   Grooming Score 0   Dressing Score 0   Bladder Score 0   Bowels Score 5   Toilet Use Score 0   Transfers (Bed/Chair) Score 5   Mobility (Level Surface) Score 0   Stairs Score 0   Barthel Index Score 15   Liudmila Henry OT

## 2020-02-26 NOTE — NURSING NOTE
Staples removed from R hip post surgical site  Site well approximated, healing without signs of infection; noted C/D/I  Steri strips placed after staple removal  Patient tolerated well  Safety maintained

## 2020-02-26 NOTE — PLAN OF CARE
Problem: PHYSICAL THERAPY ADULT  Goal: Performs mobility at highest level of function for planned discharge setting  See evaluation for individualized goals  Description  Treatment/Interventions: Functional transfer training, LE strengthening/ROM, Therapeutic exercise, Endurance training, Cognitive reorientation, Equipment eval/education, Bed mobility, Gait training, Spoke to nursing, Spoke to MD, OT  Equipment Recommended: Lupillo Shabazz       See flowsheet documentation for full assessment, interventions and recommendations  Note:   Prognosis: Fair  Problem List: Decreased strength, Decreased endurance, Impaired balance, Decreased mobility, Decreased safety awareness, Decreased skin integrity, Orthopedic restrictions, Impaired judgement, Decreased cognition, Impaired hearing, Pain  Assessment: Pt is 80 y o  male seen for PT evaluation s/p admit to [facility] ICU on 2/25/2020 w/ Sepsis ([address] )  PT consulted to assess pt's functional mobility and d/c needs  Order placed for PT eval and tx, w/ TTWB R LE and eval/treat order  Comorbidities affecting pt's physical performance at time of assessment include: weakness, SOB,ART, sepsis, DM, tobacco use, ARF w/hypoxia,depression, s/p ORIF R hip with TTWB RLE  PTA, pt was was receiving STR at the [facility]  Personal factors affecting pt at time of IE include: communication issues, inability to ambulate household distances, inability to navigate community distances, inability to navigate level surfaces w/o external assistance, unable to perform dynamic tasks in community, positive fall history, depression, tobacco use, unable to perform physical activity, limited insight into impairments and inability to perform ADLs   Please find objective findings from PT assessment regarding body systems outlined above with impairments and limitations including weakness, decreased ROM, impaired balance, decreased endurance, impaired coordination, gait deviations, pain, decreased activity tolerance, decreased functional mobility tolerance, decreased safety awareness, impaired judgement, fall risk, orthopedic restrictions, SOB upon exertion, decreased skin integrity and decreased cognition  The following objective measures performed on IE also reveal limitations: Barthel Index: 15/100  Pt's clinical presentation is currently unstable/unpredictable seen in pt's presentation of pain severity, multiple hospitalizations, SOB,ART,abnormal lab values  Pt to benefit from continued PT tx to address deficits as defined above and maximize level of functional independent mobility and consistency  From PT/mobility standpoint, recommendation at time of d/c would be STR pending progress in order to facilitate return to PLOF  Recommendation: Short-term skilled PT     PT - OK to Discharge: No    See flowsheet documentation for full assessment

## 2020-02-26 NOTE — PLAN OF CARE
Problem: DISCHARGE PLANNING - CARE MANAGEMENT  Goal: Discharge to post-acute care or home with appropriate resources  Description  INTERVENTIONS:  - Conduct assessment to determine patient/family and health care team treatment goals, and need for post-acute services based on payer coverage, community resources, and patient preferences, and barriers to discharge  - Address psychosocial, clinical, and financial barriers to discharge as identified in assessment in conjunction with the patient/family and health care team  - Arrange appropriate level of post-acute services according to patient's   needs and preference and payer coverage in collaboration with the physician and health care team  - Communicate with and update the patient/family, physician, and health care team regarding progress on the discharge plan  - Arrange appropriate transportation to post-acute venues  PT is recommending STR  Pt is only TTWB S/P hip repair  Wife wants the pt to go home with [facility] and assist with her son  Will need S transport home     Outcome: Progressing

## 2020-02-26 NOTE — PROGRESS NOTES
Progress Note - Ingrid Andrew 1935, 80 y o  male MRN: [id number]    Unit/Bed#: ICU 05 Encounter: [id number]    Primary Care Provider: Elida Koyanagi, MD   Date and time admitted to hospital: 2/25/2020  9:55 AM    Verbal sign-out provided to Dr Joe Hilliard - he will be taking over role as primary once the patient arrives on to [facility] Medicine will sign off at that point    * Sepsis [facility])  Assessment & Plan  · Initially, the patient met sepsis criteria as evidenced by fever of 100 8, leukocytosis, tachycardia  · Sepsis has been ruled out - initial concern was secondary to a bibasilar pneumonia  · Patient is completely afebrile, white blood cell count is normal, procalcitonin testing x2 is less than 0 05  · MRSA swabs have been negative  · Blood cultures negative thus far  · Urine for Streptococcus pneumoniae and Legionella antigen negative  · DC antibiotics  · Will monitor closely clinically    Acute on chronic respiratory failure with hypoxia ([address] )  Assessment & Plan  · Status post BiPAP use  · Patient has a chronic hypoxic respiratory failure which has rendered him dependent on 4 L of supplemental oxygen via nasal cannula 24/7/365  · His current requirements are 5 L of supplemental oxygen via nasal cannula  · A recent CTA chest PE protocol was negative  · Patient complains of calf pain, will check bilateral lower extremity venous Dopplers  · Continue Arixtra for DVT prophylaxis, patient has been on Arixtra since his initial surgery  · Patient is otherwise okay for transfer out of the ICU to [facility]    Closed fracture of neck of right femur with routine healing  Assessment & Plan  · Sutures are in place  · Will consult orthopedic for follow-up evaluation    Type 2 diabetes mellitus with complication, with long-term current use of insulin [facility])  Assessment & Plan  Lab Results   Component Value Date    HGBA1C 7 3 (H) 12/23/2019       Recent Labs     02/25/20  1613 02/25/20  2437 20  0010 20  0743   POCGLU 166* 44* 195* 327*       Blood Sugar Average: Last 72 hrs: With diabetic neuropathy  Continue Lantus, Accu-Cheks AC and HS with sliding scale coverage    Tobacco abuse  Assessment & Plan  · Nicotine patch    Chronic depression  Assessment & Plan  · Continue Lexapro    Benign prostatic hyperplasia  Assessment & Plan  · Continue finasteride  · Continue oxybutynin for overactive bladder  · Suprapubic catheter in place        VTE Pharmacologic Prophylaxis: Pharmacologic: Fondaparinux (Arixtra)    Patient Centered Rounds: I have performed bedside rounds with nursing staff today  Discussions with Specialists or Other Care Team Provider:  Case management, nursing and pharmacy  Education and Discussions with Family / Patient:  Patient was brought up to par with the plan of care for today, all questions answered to his satisfaction    Current Length of Stay: 1 day(s)    Current Patient Status: Inpatient   Certification Statement: The patient will continue to require additional inpatient hospital stay due to The need for bilateral lower extremity venous Doppler testing, as well as an orthopedic evaluation    Discharge Plan:  Discharge planning will commence once the patient is medically stable    Code Status: Level 3 - DNAR and DNI    Subjective:   Patient seen and examined, patient is sitting up in a chair, feels well  He denies any recent cough, fevers, chills, and or shortness of breath  His main complaint is his right leg pain he complains of bilateral lower extremity calf pain that he rates as an 8/10    Objective:     Vitals:   Temp (24hrs), Av 2 °F (36 8 °C), Min:97 1 °F (36 2 °C), Max:100 8 °F (38 2 °C)    Temp:  [97 1 °F (36 2 °C)-100 8 °F (38 2 °C)] 97 6 °F (36 4 °C)  HR:  [] 77  Resp:  [15-25] 23  BP: ()/(50-68) 108/56  SpO2:  [10 %-100 %] 92 %  Body mass index is 22 62 kg/m²  Input and Output Summary (last 24 hours):        Intake/Output Summary (Last 24 hours) at 2/26/2020 2464  Last data filed at 2/26/2020 0600  Gross per 24 hour   Intake 4206 25 ml   Output 2375 ml   Net 1831 25 ml       Physical Exam:     Physical Exam   Constitutional: He is oriented to person, place, and time  Thin, frail, elderly   HENT:   Head: Normocephalic and atraumatic  Nose: Nose normal    Mouth/Throat: Oropharynx is clear and moist    Eyes: Pupils are equal, round, and reactive to light  Conjunctivae and EOM are normal    Neck: Normal range of motion  Neck supple  No JVD present  No thyromegaly present  Cardiovascular: Normal rate, regular rhythm and intact distal pulses  Exam reveals no gallop and no friction rub  No murmur heard  Pulmonary/Chest: Effort normal and breath sounds normal  No respiratory distress  Abdominal: Soft  Bowel sounds are normal  He exhibits no distension and no mass  There is no tenderness  There is no guarding  Suprapubic catheter in place   Musculoskeletal: Normal range of motion  He exhibits no edema  Positive tenderness to palpation of the bilateral lower extremity calfs  Sutures in place on the right right hip   Lymphadenopathy:     He has no cervical adenopathy  Neurological: He is alert and oriented to person, place, and time  No cranial nerve deficit  Skin: Skin is warm  No rash noted  No erythema  Psychiatric: He has a normal mood and affect  His behavior is normal    Vitals reviewed        Additional Data:     Labs:    Results from last 7 days   Lab Units 02/26/20  0638 02/25/20  1014   WBC Thousand/uL 9 60 12 10*   HEMOGLOBIN g/dL 10 2* 11 7*   HEMATOCRIT % 30 6* 36 8*   PLATELETS Thousands/uL 188 224   NEUTROS PCT %  --  84*   LYMPHS PCT %  --  4*   MONOS PCT %  --  11   EOS PCT %  --  1     Results from last 7 days   Lab Units 02/26/20  0638   POTASSIUM mmol/L 4 2   CHLORIDE mmol/L 100   CO2 mmol/L 29   BUN mg/dL 17   CREATININE mg/dL 0 57*   CALCIUM mg/dL 7 9*   ALK PHOS U/L 74   ALT U/L 11   AST U/L 12*         Results from last 7 days   Lab Units 02/26/20  0743 02/26/20  0010 02/25/20  2109 02/25/20  1613 02/21/20  1546 02/21/20  0509 02/20/20  1637 02/20/20  0524 02/19/20  1607   POC GLUCOSE mg/dl 327* 195* 44* 166* 357* 259* 277* 239* 209*           * I Have Reviewed All Lab Data Listed Above  * Additional Pertinent Lab Tests Reviewed: [address] Admission  Reviewed    Imaging:  Imaging Reports Reviewed Today Include:  None    Recent Cultures (last 7 days):     Results from last 7 days   Lab Units 02/25/20  1842 02/25/20  1051 02/25/20  1014   BLOOD CULTURE   --  Received in Microbiology Lab  Culture in Progress  Received in Microbiology Lab  Culture in Progress     LEGIONELLA URINARY ANTIGEN  Negative  --   --        Last 24 Hours Medication List:     Current Facility-Administered Medications:  ALPRAZolam 0 5 mg Oral HS PRN Claudia Martinez MD   aspirin 81 mg Oral Daily Claudia Martinez MD   atorvastatin 20 mg Oral Daily With Heath Welch MD   budesonide 0 5 mg Nebulization Q12H Claudia Martinez MD   docusate sodium 100 mg Oral BID Claudia Martinez MD   escitalopram 10 mg Oral Daily Claudia Martinez MD   finasteride 5 mg Oral Daily Claudia Martinez MD   fondaparinux 2 5 mg Subcutaneous Daily Claudia Martinez MD   furosemide 40 mg Oral Daily Claudia Martinez MD   gabapentin 100 mg Oral TID Claudia Martinez MD   guaiFENesin 600 mg Oral BID Claudia Martinez MD   HYDROcodone-acetaminophen 1 tablet Oral Q6H PRN Elmer Phan PA-C   insulin glargine 20 Units Subcutaneous Daily Claudia Martinez MD   insulin lispro 1-5 Units Subcutaneous HS Claudia Martinez MD   insulin lispro 1-6 Units Subcutaneous TID Noelle Aden MD   ipratropium-albuterol 3 mL Nebulization Q6H While awake Claudia Martinez MD   nicotine 1 patch Transdermal Daily Claudia Martinez MD   oxybutynin 5 mg Oral Daily Claudia Martinez MD   polyethylene glycol 17 g Oral Daily Claudia Martinez MD   senna 2 tablet Oral Daily PRN Claudia Martinez MD        Today, Patient Was Seen By: Placido Sanabria MD    ** Please Note: Dictation voice to text software may have been used in the creation of this document   **

## 2020-02-26 NOTE — ASSESSMENT & PLAN NOTE
Lab Results   Component Value Date    HGBA1C 7 3 (H) 12/23/2019       Recent Labs     02/25/20  1613 02/25/20  2109 02/26/20  0010 02/26/20  0743   POCGLU 166* 44* 195* 327*       Blood Sugar Average: Last 72 hrs:   With diabetic neuropathy  Continue Lantus, Accu-Cheks AC and HS with sliding scale coverage

## 2020-02-26 NOTE — CONSULTS
REASON FOR CONSULTATION:  Medical management    HPI: Johana Vann is a 80y o  year old male who was admitted initially to ICU in respiratory failure with hypoxemia  Patient was initially put on BiPAP  There was suspicion for sepsis and bibasilar pneumonia as patient was found to have fever, leukocytosis, tachycardia  Sepsis was ruled out as patient became afebrile, resolution of elevated white cell count and procalcitonin levels stayed less than 0 05 x 2  Patient initially receive cefepime and Flagyl which were discontinued  Patient was moved out of intensive care unit under my services  On examination at bedside patient reports continued mild cough and shortness of breath  Patient had no other acute symptoms  Review of Systems   Respiratory: Positive for cough and shortness of breath  Musculoskeletal: Positive for arthralgias, back pain, gait problem and myalgias  Rt Leg Pain   Neurological: Positive for weakness         Historical Information   Past Medical History:   Diagnosis Date    [facility]abdominal [facility]) ([facility])     BPH (benign prostatic hyperplasia)     COPD (chronic obstructive pulmonary disease) (HCC)     Diabetes mellitus (HCC)     DJD (degenerative joint disease)     Falls     Gait abnormality     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     Renal disorder     Stroke ([facility] )     may 2015    Tobacco abuse      Past Surgical History:   Procedure Laterality Date    ANGIOPLASTY  03/01/2016    Right femoral vein    BACK SURGERY  2015    ORIF HIP FRACTURE Right 1/31/2020    Procedure: OPEN REDUCTION W/ INTERNAL FIXATION (ORIF) HIP WITH CANNUALATED SCREWS;  Surgeon: Kayce Randhawa DO;  Location: [address] [place] [address];  Service: Orthopedics    SUPRAPUBIC TUBE PLACEMENT       Social History   Social History     Substance and Sexual Activity   Alcohol Use Never    Frequency: Never     Social History     Substance and Sexual Activity   Drug Use No     Social History     Tobacco Use Smoking Status Current Every Day Smoker    Packs/day: 1 00    Years: 70 00    Pack years: 70 00   Smokeless Tobacco Never Used   Tobacco Comment    Wife reports it's a losing matos     Family History: non-contributory    Meds/Allergies   all current active meds have been reviewed  No Known Allergies    Social History:   Social History     Socioeconomic History    Marital status: /Civil Union     Spouse name: Efrain Yuen Number of children: 3    Years of education: 15    Highest education level: 12th grade   Occupational History    None   Social Needs    Financial resource strain: Somewhat hard    Food insecurity:     Worry: Never true     Inability: Never true    Transportation needs:     Medical: No     Non-medical: No   Tobacco Use    Smoking status: Current Every Day Smoker     Packs/day: 1 00     Years: 70 00     Pack years: 70 00    Smokeless tobacco: Never Used    Tobacco comment: Wife reports it's a losing matos   Substance and Sexual Activity    Alcohol use: Never     Frequency: Never    Drug use: No    Sexual activity: Yes     Partners: Female   Lifestyle    Physical activity:     Days per week: 0 days     Minutes per session: 0 min    Stress: Only a little   Relationships    Social connections:     Talks on phone: Three times a week     Gets together: Once a week     Attends Yarsani service: Never     Active member of club or organization: No     Attends meetings of clubs or organizations: Never     Relationship status:     Intimate partner violence:     Fear of current or ex partner: No     Emotionally abused: No     Physically abused: No     Forced sexual activity: No   Other Topics Concern    None   Social History Narrative    None       Family History:   Family History   Problem Relation Age of Onset    No Known Problems Mother          Objective   Vitals: Blood pressure 98/54, pulse 82, temperature 98 5 °F (36 9 °C), temperature source Temporal, resp   rate (!) 25, height 5' 10" (1 778 m), weight 71 5 kg (157 lb 10 1 oz), SpO2 91 %  Intake/Output Summary (Last 24 hours) at 2/26/2020 1715  Last data filed at 2/26/2020 0600  Gross per 24 hour   Intake 1531 25 ml   Output 2375 ml   Net -843 75 ml     Invasive Devices     Peripheral Intravenous Line            Peripheral IV 02/25/20 Right Antecubital 1 day          Drain            Suprapubic Catheter Latex 22 Fr  -- days                Physical Exam   Constitutional: He is oriented to person, place, and time  He appears well-developed and well-nourished  HENT:   Head: Normocephalic and atraumatic  Eyes: Pupils are equal, round, and reactive to light  EOM are normal    Neck: Normal range of motion  Neck supple  Cardiovascular: Normal rate, regular rhythm and normal heart sounds  Pulmonary/Chest: Effort normal  No respiratory distress  He has wheezes  He exhibits no tenderness  Abdominal: Soft  Bowel sounds are normal  There is no tenderness  Musculoskeletal: Normal range of motion  Neurological: He is alert and oriented to person, place, and time  Skin: Skin is warm and dry  Psychiatric: He has a normal mood and affect  ASSESSMENT AND PLAN:    Analia Jenkins is a(n) 80y o  year old male with Hypoxemia, Respiratory Failure, Suspicion for Sepsis  1  COPD with s/p Acute on Chronic Respiratory Failure with hypoxemia, suspicion for bibasilar pneumonia and sepsis    initially patient met sepsis criteria but sepsis was ruled out as patient was afebrile, WBC count was normal, procalcitonin levels were less than 0 05 x 2, MRSA swab was negative, blood cultures stayed negative, urine Legionella antigen was found to be negative  Patient's antibiotics were discontinued including cefepime and Flagyl  Patient O2 Sats are within normal limits with baseline oxygen is 3-4L NC  Continue Pulmicort, Xopenex and PRN albuterol     2  Cardiac with history of hypertension, coronary artery disease, dyslipidemia   Lasix 40mg, atorvastatin 20mg and aspirin 81mg  4  Type 2 diabetes mellitus requiring insulin along with diabetic neuropathy   Patient will be continued acute on Lantus 20 units and low dose sliding scale  Level 2 Carb Controlled Diet  Accu checks Q6HR  Gabapentin 100mg TID for neuropathy  Hemoglobin A1c on 12/23/2019 was found to be 7 3   5  BPH   Oxybutynin 15mg and finasteride 5mg  6  Anxiety and depression   Patient is on Lexapro 10mg and Xanax 0 5mg PRN at bedtime for acute anxiety  7  Migraine headaches   Patient may continue to receive Maxalt along with Tylenol on as needed basis  8  Tobacco abuse  Nicotine transdermal patch 21mg  9  Constipation   Colace 100mg BID  10  Constipation   Patient is on Senokot and MiraLax  11  GERD/gastritis   I will treat the patient with Protonix 40 mg daily  12  Right hip fracture status post ORIF and new right leg cough muscle tenderness  Patient is scheduled to receive vascular study to rule out DVT today  Patient is also on Arixtra for VTE prophylaxis  Patient may continue to receive Norco for pain management on as-needed basis        Prognosis: fair      Discharge Plan: in progress      Advanced Directives: I have discussed in detail the patient the advanced directives   Patient's wife Alexa Shelton is patient's POA and her phone number is [phone] also reports that he has a living will with advanced directives   I called patient's wife and discussed patient's resuscitation status   She informed me that patient is DNR/DNI         I have spent more than 50 minutes gathering data, doing physical examination, and discussing the advanced directives, which was witnessed by caring staff

## 2020-02-26 NOTE — ASSESSMENT & PLAN NOTE
· Status post BiPAP use  · Patient has a chronic hypoxic respiratory failure which has rendered him dependent on 4 L of supplemental oxygen via nasal cannula 24/7/365  · His current requirements are 5 L of supplemental oxygen via nasal cannula  · A recent CTA chest PE protocol was negative  · Patient complains of calf pain, will check bilateral lower extremity venous Dopplers  · Continue Arixtra for DVT prophylaxis, patient has been on Arixtra since his initial surgery  · Patient is otherwise okay for transfer out of the ICU to [facility]

## 2020-02-26 NOTE — PLAN OF CARE
Problem: OCCUPATIONAL THERAPY ADULT  Goal: Performs self-care activities at highest level of function for planned discharge setting  See evaluation for individualized goals  Description  Treatment Interventions: ADL retraining, Functional transfer training, Endurance training, Patient/family training, Compensatory technique education, Activityengagement, Energy conservation          See flowsheet documentation for full assessment, interventions and recommendations  Note:   Limitation: Decreased ADL status, Decreased Safe judgement during ADL, Decreased endurance, Decreased self-care trans  Prognosis: Fair  Assessment: Pt is a 80 y o  male seen for OT evaluation s/p admit to [facility] on 2/25/2020 w/ Sepsis ([facility] )  Comorbidities affecting pt's functional performance at time of assessment include: DM, CHF, neuropathy and closed Fx Rt neck of femur, AAA, Arthritis, DDD, Depression, see H & P for lengthy PMHx  Personal factors affecting pt at time of IE include:steps to enter environment, difficulty performing ADLS and limited insight into deficits  Prior to admission, pt was most recently at the [facility] for STR  Pt living at home with wife prior with assistance PRN (D IADL's)  Radha Dominguez Upon evaluation: Pt requires an increased level of assistance with self care ADL's and functional transfers/toileting/mobility r/t ART and the following deficits impacting occupational performance: weakness, decreased balance, decreased tolerance, decreased safety awareness, increased pain and orthopedic restrictions  Pt to benefit from continued skilled OT tx while in the hospital to address deficits as defined above and maximize level of functional independence w ADL's and functional mobility  Occupational Performance areas to address include: grooming, bathing/shower, toilet hygiene, dressing and functional mobility  From OT standpoint, recommendation at time of d/c would be STR         OT Discharge Recommendation: Short Term Rehab  OT - OK to Discharge:  Yes

## 2020-02-27 VITALS
TEMPERATURE: 98 F | RESPIRATION RATE: 18 BRPM | HEIGHT: 70 IN | HEART RATE: 87 BPM | WEIGHT: 157.63 LBS | SYSTOLIC BLOOD PRESSURE: 110 MMHG | OXYGEN SATURATION: 92 % | DIASTOLIC BLOOD PRESSURE: 62 MMHG | BODY MASS INDEX: 22.57 KG/M2

## 2020-02-27 LAB
ANION GAP SERPL CALCULATED.3IONS-SCNC: 4 MMOL/L (ref 4–13)
BASOPHILS # BLD AUTO: 0 THOUSANDS/ΜL (ref 0–0.1)
BASOPHILS NFR BLD AUTO: 0 % (ref 0–2)
BUN SERPL-MCNC: 13 MG/DL (ref 7–25)
CALCIUM SERPL-MCNC: 8.1 MG/DL (ref 8.6–10.5)
CHLORIDE SERPL-SCNC: 103 MMOL/L (ref 98–107)
CO2 SERPL-SCNC: 30 MMOL/L (ref 21–31)
CREAT SERPL-MCNC: 0.52 MG/DL (ref 0.7–1.3)
EOSINOPHIL # BLD AUTO: 0.3 THOUSAND/ΜL (ref 0–0.61)
EOSINOPHIL NFR BLD AUTO: 4 % (ref 0–5)
ERYTHROCYTE [DISTWIDTH] IN BLOOD BY AUTOMATED COUNT: 13.9 % (ref 11.5–14.5)
GFR SERPL CREATININE-BSD FRML MDRD: 98 ML/MIN/1.73SQ M
GLUCOSE SERPL-MCNC: 144 MG/DL (ref 65–140)
GLUCOSE SERPL-MCNC: 159 MG/DL (ref 65–99)
GLUCOSE SERPL-MCNC: 174 MG/DL (ref 65–140)
GLUCOSE SERPL-MCNC: 197 MG/DL (ref 65–140)
HCT VFR BLD AUTO: 30.6 % (ref 42–47)
HGB BLD-MCNC: 10.2 G/DL (ref 14–18)
LYMPHOCYTES # BLD AUTO: 0.8 THOUSANDS/ΜL (ref 0.6–4.47)
LYMPHOCYTES NFR BLD AUTO: 13 % (ref 21–51)
MCH RBC QN AUTO: 32.6 PG (ref 26–34)
MCHC RBC AUTO-ENTMCNC: 33.2 G/DL (ref 31–37)
MCV RBC AUTO: 98 FL (ref 81–99)
MONOCYTES # BLD AUTO: 0.9 THOUSAND/ΜL (ref 0.17–1.22)
MONOCYTES NFR BLD AUTO: 14 % (ref 2–12)
NEUTROPHILS # BLD AUTO: 4.3 THOUSANDS/ΜL (ref 1.4–6.5)
NEUTS SEG NFR BLD AUTO: 69 % (ref 42–75)
PLATELET # BLD AUTO: 209 THOUSANDS/UL (ref 149–390)
PMV BLD AUTO: 8.3 FL (ref 8.6–11.7)
POTASSIUM SERPL-SCNC: 4 MMOL/L (ref 3.5–5.5)
RBC # BLD AUTO: 3.12 MILLION/UL (ref 4.3–5.9)
SODIUM SERPL-SCNC: 137 MMOL/L (ref 134–143)
WBC # BLD AUTO: 6.3 THOUSAND/UL (ref 4.8–10.8)

## 2020-02-27 PROCEDURE — 87077 CULTURE AEROBIC IDENTIFY: CPT | Performed by: HOSPITALIST

## 2020-02-27 PROCEDURE — 94640 AIRWAY INHALATION TREATMENT: CPT

## 2020-02-27 PROCEDURE — 87070 CULTURE OTHR SPECIMN AEROBIC: CPT | Performed by: HOSPITALIST

## 2020-02-27 PROCEDURE — 97530 THERAPEUTIC ACTIVITIES: CPT

## 2020-02-27 PROCEDURE — 87205 SMEAR GRAM STAIN: CPT | Performed by: HOSPITALIST

## 2020-02-27 PROCEDURE — 93970 EXTREMITY STUDY: CPT | Performed by: SURGERY

## 2020-02-27 PROCEDURE — 94760 N-INVAS EAR/PLS OXIMETRY 1: CPT

## 2020-02-27 PROCEDURE — 85025 COMPLETE CBC W/AUTO DIFF WBC: CPT | Performed by: HOSPITALIST

## 2020-02-27 PROCEDURE — 82948 REAGENT STRIP/BLOOD GLUCOSE: CPT

## 2020-02-27 PROCEDURE — 87186 SC STD MICRODIL/AGAR DIL: CPT | Performed by: HOSPITALIST

## 2020-02-27 PROCEDURE — 80048 BASIC METABOLIC PNL TOTAL CA: CPT | Performed by: HOSPITALIST

## 2020-02-27 PROCEDURE — 97110 THERAPEUTIC EXERCISES: CPT

## 2020-02-27 RX ORDER — FUROSEMIDE 20 MG/1
20 TABLET ORAL DAILY
Qty: 30 TABLET | Refills: 0 | Status: SHIPPED | OUTPATIENT
Start: 2020-02-27 | End: 2020-03-28

## 2020-02-27 RX ORDER — GABAPENTIN 100 MG/1
100 CAPSULE ORAL 3 TIMES DAILY
Qty: 90 CAPSULE | Refills: 0 | Status: SHIPPED | OUTPATIENT
Start: 2020-02-27 | End: 2020-03-28

## 2020-02-27 RX ORDER — ALPRAZOLAM 0.5 MG/1
0.5 TABLET ORAL
Qty: 30 TABLET | Refills: 0 | Status: SHIPPED | OUTPATIENT
Start: 2020-02-27 | End: 2020-03-28

## 2020-02-27 RX ORDER — ASPIRIN 81 MG/1
81 TABLET ORAL DAILY
Qty: 30 TABLET | Refills: 0 | Status: SHIPPED | OUTPATIENT
Start: 2020-02-27 | End: 2020-03-28

## 2020-02-27 RX ORDER — SENNOSIDES 8.6 MG
2 TABLET ORAL DAILY PRN
Qty: 120 EACH | Refills: 0 | Status: SHIPPED | OUTPATIENT
Start: 2020-02-27 | End: 2020-03-28

## 2020-02-27 RX ORDER — ALBUTEROL SULFATE 90 UG/1
2 AEROSOL, METERED RESPIRATORY (INHALATION) EVERY 6 HOURS PRN
Qty: 1 INHALER | Refills: 0 | Status: SHIPPED | OUTPATIENT
Start: 2020-02-27

## 2020-02-27 RX ORDER — POLYETHYLENE GLYCOL 3350 17 G/17G
17 POWDER, FOR SOLUTION ORAL DAILY
Qty: 510 G | Refills: 0 | Status: SHIPPED | OUTPATIENT
Start: 2020-02-28 | End: 2020-03-29

## 2020-02-27 RX ORDER — LEVALBUTEROL 1.25 MG/.5ML
1.25 SOLUTION, CONCENTRATE RESPIRATORY (INHALATION)
Qty: 45 ML | Refills: 0 | Status: SHIPPED | OUTPATIENT
Start: 2020-02-27 | End: 2020-03-28

## 2020-02-27 RX ORDER — ESCITALOPRAM OXALATE 10 MG/1
10 TABLET ORAL DAILY
Qty: 30 TABLET | Refills: 0 | Status: SHIPPED | OUTPATIENT
Start: 2020-02-27 | End: 2020-03-28

## 2020-02-27 RX ORDER — OXYBUTYNIN CHLORIDE 5 MG/1
15 TABLET ORAL DAILY
Qty: 90 TABLET | Refills: 0 | Status: SHIPPED | OUTPATIENT
Start: 2020-02-27 | End: 2020-03-28

## 2020-02-27 RX ORDER — RIZATRIPTAN BENZOATE 10 MG/1
10 TABLET ORAL ONCE AS NEEDED
Qty: 15 TABLET | Refills: 0 | Status: SHIPPED | OUTPATIENT
Start: 2020-02-27 | End: 2020-03-28

## 2020-02-27 RX ORDER — FINASTERIDE 5 MG/1
5 TABLET, FILM COATED ORAL DAILY
Qty: 30 TABLET | Refills: 0 | Status: SHIPPED | OUTPATIENT
Start: 2020-02-27 | End: 2020-03-28

## 2020-02-27 RX ORDER — NICOTINE 21 MG/24HR
1 PATCH, TRANSDERMAL 24 HOURS TRANSDERMAL DAILY
Qty: 28 PATCH | Refills: 0 | Status: SHIPPED | OUTPATIENT
Start: 2020-02-27

## 2020-02-27 RX ORDER — GUAIFENESIN 600 MG
600 TABLET, EXTENDED RELEASE 12 HR ORAL 2 TIMES DAILY
Qty: 60 TABLET | Refills: 0 | Status: SHIPPED | OUTPATIENT
Start: 2020-02-27 | End: 2020-03-28

## 2020-02-27 RX ORDER — SODIUM CHLORIDE FOR INHALATION 0.9 %
3 VIAL, NEBULIZER (ML) INHALATION
Qty: 270 ML | Refills: 0 | Status: SHIPPED | OUTPATIENT
Start: 2020-02-27 | End: 2020-03-28

## 2020-02-27 RX ORDER — GLIMEPIRIDE 2 MG/1
2 TABLET ORAL
Qty: 30 TABLET | Refills: 0 | Status: SHIPPED | OUTPATIENT
Start: 2020-02-27 | End: 2020-03-28

## 2020-02-27 RX ORDER — INSULIN GLARGINE 100 [IU]/ML
20 INJECTION, SOLUTION SUBCUTANEOUS DAILY
Qty: 600 UNITS | Refills: 0 | Status: SHIPPED | OUTPATIENT
Start: 2020-02-27 | End: 2020-03-28

## 2020-02-27 RX ORDER — CARISOPRODOL 350 MG/1
350 TABLET ORAL AS NEEDED
Qty: 30 TABLET | Refills: 0 | Status: SHIPPED | OUTPATIENT
Start: 2020-02-27 | End: 2020-03-28

## 2020-02-27 RX ORDER — PHENOL 1.4 %
600 AEROSOL, SPRAY (ML) MUCOUS MEMBRANE 2 TIMES DAILY WITH MEALS
Qty: 60 TABLET | Refills: 0 | Status: SHIPPED | OUTPATIENT
Start: 2020-02-27 | End: 2020-03-28

## 2020-02-27 RX ORDER — HYDROCODONE BITARTRATE AND ACETAMINOPHEN 5; 325 MG/1; MG/1
1 TABLET ORAL EVERY 6 HOURS PRN
Qty: 60 TABLET | Refills: 0 | Status: SHIPPED | OUTPATIENT
Start: 2020-02-27 | End: 2020-03-13

## 2020-02-27 RX ORDER — ATORVASTATIN CALCIUM 20 MG/1
20 TABLET, FILM COATED ORAL DAILY
Qty: 30 TABLET | Refills: 0 | Status: SHIPPED | OUTPATIENT
Start: 2020-02-27 | End: 2020-03-28

## 2020-02-27 RX ADMIN — ASPIRIN 81 MG: 81 TABLET ORAL at 08:52

## 2020-02-27 RX ADMIN — ISODIUM CHLORIDE 3 ML: 0.03 SOLUTION RESPIRATORY (INHALATION) at 14:27

## 2020-02-27 RX ADMIN — GABAPENTIN 100 MG: 100 CAPSULE ORAL at 16:48

## 2020-02-27 RX ADMIN — ESCITALOPRAM OXALATE 10 MG: 10 TABLET ORAL at 08:52

## 2020-02-27 RX ADMIN — LEVALBUTEROL HYDROCHLORIDE 1.25 MG: 1.25 SOLUTION, CONCENTRATE RESPIRATORY (INHALATION) at 07:36

## 2020-02-27 RX ADMIN — OXYBUTYNIN CHLORIDE 5 MG: 5 TABLET ORAL at 08:51

## 2020-02-27 RX ADMIN — FUROSEMIDE 40 MG: 40 TABLET ORAL at 08:51

## 2020-02-27 RX ADMIN — ISODIUM CHLORIDE 3 ML: 0.03 SOLUTION RESPIRATORY (INHALATION) at 07:36

## 2020-02-27 RX ADMIN — INSULIN LISPRO 2 UNITS: 100 INJECTION, SOLUTION INTRAVENOUS; SUBCUTANEOUS at 12:02

## 2020-02-27 RX ADMIN — GUAIFENESIN 600 MG: 600 TABLET, EXTENDED RELEASE ORAL at 08:52

## 2020-02-27 RX ADMIN — DOCUSATE SODIUM 100 MG: 100 CAPSULE, LIQUID FILLED ORAL at 08:51

## 2020-02-27 RX ADMIN — ATORVASTATIN CALCIUM 20 MG: 20 TABLET, FILM COATED ORAL at 16:48

## 2020-02-27 RX ADMIN — LEVALBUTEROL HYDROCHLORIDE 1.25 MG: 1.25 SOLUTION, CONCENTRATE RESPIRATORY (INHALATION) at 14:27

## 2020-02-27 RX ADMIN — BUDESONIDE 0.5 MG: 0.5 INHALANT RESPIRATORY (INHALATION) at 07:36

## 2020-02-27 RX ADMIN — NICOTINE 1 PATCH: 21 PATCH, EXTENDED RELEASE TRANSDERMAL at 08:52

## 2020-02-27 RX ADMIN — INSULIN GLARGINE 20 UNITS: 100 INJECTION, SOLUTION SUBCUTANEOUS at 08:53

## 2020-02-27 RX ADMIN — GABAPENTIN 100 MG: 100 CAPSULE ORAL at 08:51

## 2020-02-27 RX ADMIN — FONDAPARINUX SODIUM 2.5 MG: 2.5 INJECTION, SOLUTION SUBCUTANEOUS at 08:52

## 2020-02-27 RX ADMIN — FINASTERIDE 5 MG: 5 TABLET, FILM COATED ORAL at 08:51

## 2020-02-27 RX ADMIN — POLYETHYLENE GLYCOL 3350 17 G: 17 POWDER, FOR SOLUTION ORAL at 08:53

## 2020-02-27 NOTE — TRANSPORTATION MEDICAL NECESSITY
Section I - General Information    Name of Patient: Keegan Meyer                 : 1935    Medicare #: [id number]  Transport Date: 20 (PCS is valid for round trips on this date and for all repetitive trips in the 60-day range as noted below )  Origin: [address]  Is the pt's stay covered under Medicare Part A (PPS/DRG)   [x]     Closest appropriate facility? If no, why is transport to more distant facility required? Yes  If hospice pt, is this transport related to pt's terminal illness? No       Section II - Medical Necessity Questionnaire  Ambulance transportation is medically necessary only if other means of transport are contraindicated or would be potentially harmful to the patient  To meet this requirement, the patient must either be "bed confined" or suffer from a condition such that transport by means other than ambulance is contraindicated by the patient's condition  The following questions must be answered by the medical professional signing below for this form to be valid:    1)  Describe the MEDICAL CONDITION (physical and/or mental) of this patient AT [address] [place] [address] that requires the patient to be transported in an ambulance and why transport by other means is contraindicated by the patient's condition: Pneumonia, ORIF Rt hip    2) Is the patient "bed confined" as defined below? Yes  To be "be confined" the patient must satisfy all three of the following conditions: (1) unable to get up from bed without Assistance; AND (2) unable to ambulate; AND (3) unable to sit in a chair or wheelchair  3) Can this patient safely be transported by car or wheelchair van (i e , seated during transport without a medical attendant or monitoring)?    Yes    4) In addition to completing questions 1-3 above, please check any of the following conditions that apply*:   *Note: supporting documentation for any boxes checked must be maintained in the patient's medical records  If hosp-hosp transfer, describe services needed at 2nd facility not available at 1st facility? Requires oxygen-unable to self administer      Section III - Signature of Physician or Healthcare Professional  I certify that the above information is true and correct based on my evaluation of this patient, and represent that the patient requires transport by ambulance and that other forms of transport are contraindicated  I understand that this information will be used by the Centers for Medicare and Medicaid Services (CMS) to support the determination of medical necessity for ambulance services, and I represent that I have personal knowledge of the patient's condition at time of transport  [x]  If this box is checked, I also certify that the patient is physically or mentally incapable of signing the ambulance service's claim and that the institution with which I am affiliated has furnished care, services, or assistance to the patient  My signature below is made on behalf of the patient pursuant to 42 CFR §424 36(b)(4)  In accordance with 42 CFR §424 37, the specific reason(s) that the patient is physically or mentally incapable of signing the claim form is as follows:       Signature of Physician* or [address] RN_____________________________________________________________  Signature Date 02/27/20 (For scheduled repetitive transports, this form is not valid for transports performed more than 60 days after this date)    Printed Name & Credentials of Physician or Healthcare Professional (MD, DO, RN, etc )________________________________  *Form must be signed by patient's attending physician for scheduled, repetitive transports   For non-repetitive, unscheduled ambulance transports, if unable to obtain the signature of the attending physician, any of the following may sign (choose appropriate option below)  [] Physician Assistant []  Clinical Nurse Specialist [x]  Registered Nurse  []  Nurse Practitioner  [x] Discharge Planner

## 2020-02-27 NOTE — PLAN OF CARE
Problem: PHYSICAL THERAPY ADULT  Goal: Performs mobility at highest level of function for planned discharge setting  See evaluation for individualized goals  Description  Treatment/Interventions: Functional transfer training, LE strengthening/ROM, Therapeutic exercise, Endurance training, Cognitive reorientation, Equipment eval/education, Bed mobility, Gait training, Spoke to nursing, Spoke to MD, OT  Equipment Recommended: Cristian Brewer       See flowsheet documentation for full assessment, interventions and recommendations  Outcome: Progressing  Note:   Prognosis: Fair  Problem List: Decreased strength, Decreased endurance, Impaired balance, Decreased mobility, Decreased coordination, Decreased cognition, Impaired judgement, Decreased safety awareness, Orthopedic restrictions, Pain  Assessment: Pt seen for PT treatment session this date with interventions consisting of Therapeutic exercise consisting of: AROM 20 reps B LE in sitting and supine position and therapeutic activity consisting of training: bed mobility, supine<>sit transfers, static sitting tolerance at EOB for 14  minutes w/ min UE support and vc and tactile cues for static sitting posture faciliation  Pt agreeable to PT treatment session upon arrival, pt found supine in bed w/ HOB elevated, in no apparent distress and responsive  In comparison to previous session, pt with improvements in functional activity tolerance  Post session: pt returned BTB, bed alarm engaged and all needs in reach Continue to recommend STR at time of d/c in order to maximize pt's functional independence and safety w/ mobility  Pt continues to be functioning below baseline level, and remains limited 2* factors listed above and including decreased functional mobility, decreased functional activity tolerance, TTWB on RLE   PT will continue to see pt while here in order to address the deficits listed above and provide interventions consistent w/ POC in effort to achieve STGs   Barriers to Discharge: Inaccessible home environment     Recommendation: Short-term skilled PT     PT - OK to Discharge: No    See flowsheet documentation for full assessment

## 2020-02-27 NOTE — SOCIAL WORK
Pt has been evaluated by Dr Luis Kolb and is stable to be discharged  [facility] and [facility] and they are unable to transport  TC to [facility] ([phone]) who will transport the pt at [address] [place] [address] to wife Kathy Rivas who will bring clothing in for the pt  Notified [facility] of pr going home today  Pt and VEE Arauz aware of transport time

## 2020-02-27 NOTE — DISCHARGE SUMMARY
Discharge Summary -   Ramy Gorman 80 y o  male MRN: [id number]  Unit/Bed#: -02 Encounter: [id number]    Admission Date: 2/14/2020     Discharge Date:  02/25/2020    Admitting Diagnosis: Acute on chronic respiratory failure (HCC) [J96 20]  Fracture right hip status post ORIF  Gait abnormality  Generalized weakness    Discharge Diagnosis:   Hypoxemia  Acute respiratory failure    Attending:  Paulina Vivas MD    [facility] Course:  Mr Soumya Ramírez is [de-identified][age] year old  male who was initially admitted to [place] [address] after receiving open reduction internal fixation for the right hip fracture for short-term rehabilitation at [place] [address]  Patient was sent to the emergency department on 02/25/2020 due to acute hypoxemia with O2 sats dropping down into 80s  Patient was sent to the emergency department for evaluation  Later it was found that patient was found to be in acute respiratory failure requiring BiPAP and ICU admission  During the course of his stay at [place] [address] patient required more than 2 admissions at hospital due to acute respiratory failure under similar circumstances  Patient received extensive physical therapy occupational therapy during his stay at [facility] [place] [address]  Condition at Discharge:  FAIR     Discharge instructions/Information to patient and family:   See after visit summary for information provided to patient and family  Provisions for Follow-Up Care:  See after visit summary for information related to follow-up care and any pertinent home health orders  Disposition: Extended care facility [facility] ED    Discharge Statement   I spent 50 minutes discharging the patient  This time was spent on the day of discharge  I had direct contact with the patient on the day of discharge  Discharge Medications:  See after visit summary for reconciled discharge medications provided to patient and family

## 2020-02-27 NOTE — DISCHARGE SUMMARY
Discharge Summary -   Analia Search 80 y o  male MRN: [id number]  Unit/Bed#: -01 Encounter: [id number]    Admission Date: 2/25/2020     Discharge Date:  02/27/2020    Admitting Diagnosis: Shortness of breath [R06 02]  Lactic acidosis [E87 2]  Leukocytosis [D72 829]  Pneumonia [J18 9]  Anemia [D64 9]  Hypoxia [R09 02]    Discharge Diagnosis:   Resolved acute respiratory failure  Resolved hypoxemia  No clinical pneumonia  Gait abnormality status post right hip ORIF  Generalized weakness    Attending:  Gustavo Paz MD    Consulting Physician(s):   Dr Rebecca Cortez, DO Ortho    Hospital Course:  Mr Chepe Chinchilla is 70-year-old  male who was admitted from [address] [place] [address] due to hypoxemia with O2 sats in low 80s  In the emergency department workup patient was found to have acute respiratory failure requiring BiPAP  There was suspicion for sepsis and bibasilar pneumonia initially as patient presented with fever, leukocytosis, and tachycardia  Sepsis was ruled out as patient became afebrile, resolution of elevated WBCs and procalcitonin levels less than 0 05 x2  Initially patient received cefepime and Flagyl which were discontinued  Patient was evaluated by Dr Rebecca Cortez orthopedic surgeon who did the ORIF on right hip fracture  He thinks that patient can come out of the bed with the toe-touch weight-bearing right lower extremity  He will follow-up the patient in 2 weeks with a new x-ray and continued physical therapy  Patient's wife wanted to take the patient home from the hospital   I discussed this plan with Dr Capo Meyer and he is okay with this plan but recommended home health care involvement with physical therapy at home  Patient examined at bedside  Patient's vitals were reviewed which were found to be within normal limits  Patient's physical examination was also found to be within normal limits  Patient will be discharged home today    All scripts have been sent as a prescription to [address]  Condition at Discharge:  FAIR     Discharge instructions/Information to patient and family:   See after visit summary for information provided to patient and family  Provisions for Follow-Up Care:  See after visit summary for information related to follow-up care and any pertinent home health orders  Disposition: Home    Discharge Statement   I spent 50 minutes discharging the patient  This time was spent on the day of discharge  I had direct contact with the patient on the day of discharge  Discharge Medications:  See after visit summary for reconciled discharge medications provided to patient and family

## 2020-02-27 NOTE — PLAN OF CARE
Problem: Potential for Falls  Goal: Patient will remain free of falls  Description  INTERVENTIONS:  - Assess patient frequently for physical needs  -  Identify cognitive and physical deficits and behaviors that affect risk of falls    -  [place] fall precautions as indicated by assessment   - Educate patient/family on patient safety including physical limitations  - Instruct patient to call for assistance with activity based on assessment  - Modify environment to reduce risk of injury  - Consider OT/PT consult to assist with strengthening/mobility  Outcome: Adequate for Discharge     Problem: Prexisting or High Potential for Compromised Skin Integrity  Goal: Skin integrity is maintained or improved  Description  INTERVENTIONS:  - Identify patients at risk for skin breakdown  - Assess and monitor skin integrity  - Assess and monitor nutrition and hydration status  - Monitor labs   - Assess for incontinence   - Turn and reposition patient  - Assist with mobility/ambulation  - Relieve pressure over bony prominences  - Avoid friction and shearing  - Provide appropriate hygiene as needed including keeping skin clean and dry  - Evaluate need for skin moisturizer/barrier cream  - Collaborate with interdisciplinary team   - Patient/family teaching  - Consider wound care consult   Outcome: Adequate for Discharge     Problem: PAIN - ADULT  Goal: Verbalizes/displays adequate comfort level or baseline comfort level  Description  Interventions:  - Encourage patient to monitor pain and request assistance  - Assess pain using appropriate pain scale  - Administer analgesics based on type and severity of pain and evaluate response  - Implement non-pharmacological measures as appropriate and evaluate response  - Consider cultural and social influences on pain and pain management  - Notify physician/advanced practitioner if interventions unsuccessful or patient reports new pain  Outcome: Adequate for Discharge     Problem: INFECTION - ADULT  Goal: Absence or prevention of progression during hospitalization  Description  INTERVENTIONS:  - Assess and monitor for signs and symptoms of infection  - Monitor lab/diagnostic results  - Monitor all insertion sites, i e  indwelling lines, tubes, and drains  - Monitor endotracheal if appropriate and nasal secretions for changes in amount and color  - [place] appropriate cooling/warming therapies per order  - Administer medications as ordered  - Instruct and encourage patient and family to use good hand hygiene technique  - Identify and instruct in appropriate isolation precautions for identified infection/condition  Outcome: Adequate for Discharge  Goal: Absence of fever/infection during neutropenic period  Description  INTERVENTIONS:  - Monitor WBC    Outcome: Adequate for Discharge     Problem: SAFETY ADULT  Goal: Patient will remain free of falls  Description  INTERVENTIONS:  - Assess patient frequently for physical needs  -  Identify cognitive and physical deficits and behaviors that affect risk of falls    -  [place] fall precautions as indicated by assessment   - Educate patient/family on patient safety including physical limitations  - Instruct patient to call for assistance with activity based on assessment  - Modify environment to reduce risk of injury  - Consider OT/PT consult to assist with strengthening/mobility  Outcome: Adequate for Discharge  Goal: Maintain or return to baseline ADL function  Description  INTERVENTIONS:  -  Assess patient's ability to carry out ADLs; assess patient's baseline for ADL function and identify physical deficits which impact ability to perform ADLs (bathing, care of mouth/teeth, toileting, grooming, dressing, etc )  - Assess/evaluate cause of self-care deficits   - Assess range of motion  - Assess patient's mobility; develop plan if impaired  - Assess patient's need for assistive devices and provide as appropriate  - Encourage maximum independence but intervene and supervise when necessary  - Involve family in performance of ADLs  - Assess for home care needs following discharge   - Consider OT consult to assist with ADL evaluation and planning for discharge  - Provide patient education as appropriate  Outcome: Adequate for Discharge  Goal: Maintain or return mobility status to optimal level  Description  INTERVENTIONS:  - Assess patient's baseline mobility status (ambulation, transfers, stairs, etc )    - Identify cognitive and physical deficits and behaviors that affect mobility  - Identify mobility aids required to assist with transfers and/or ambulation (gait belt, sit-to-stand, lift, walker, cane, etc )  - [place] fall precautions as indicated by assessment  - Record patient progress and toleration of activity level on Mobility SBAR; progress patient to next Phase/Stage  - Instruct patient to call for assistance with activity based on assessment  - Consider rehabilitation consult to assist with strengthening/weightbearing, etc   Outcome: Adequate for Discharge     Problem: CARDIOVASCULAR - ADULT  Goal: Maintains optimal cardiac output and hemodynamic stability  Description  INTERVENTIONS:  - Monitor I/O, vital signs and rhythm  - Monitor for S/S and trends of decreased cardiac output  - Administer and titrate ordered vasoactive medications to optimize hemodynamic stability  - Assess quality of pulses, skin color and temperature  - Assess for signs of decreased coronary artery perfusion  - Instruct patient to report change in severity of symptoms  Outcome: Adequate for Discharge  Goal: Absence of cardiac dysrhythmias or at baseline rhythm  Description  INTERVENTIONS:  - Continuous cardiac monitoring, vital signs, obtain 12 lead EKG if ordered  - Administer antiarrhythmic and heart rate control medications as ordered  - Monitor electrolytes and administer replacement therapy as ordered  Outcome: Adequate for Discharge     Problem: RESPIRATORY - ADULT  Goal: Achieves optimal ventilation and oxygenation  Description  INTERVENTIONS:  - Assess for changes in respiratory status  - Assess for changes in mentation and behavior  - Position to facilitate oxygenation and minimize respiratory effort  - Oxygen administered by appropriate delivery if ordered  - Initiate smoking cessation education as indicated  - Encourage broncho-pulmonary hygiene including cough, deep breathe, Incentive Spirometry  - Assess the need for suctioning and aspirate as needed  - Assess and instruct to report SOB or any respiratory difficulty  - Respiratory Therapy support as indicated  Outcome: Adequate for Discharge     Problem: GENITOURINARY - ADULT  Goal: Maintains or returns to baseline urinary function  Description  INTERVENTIONS:  - Assess urinary function  - Encourage oral fluids to ensure adequate hydration if ordered  - Administer IV fluids as ordered to ensure adequate hydration  - Administer ordered medications as needed  - Offer frequent toileting  - Follow urinary retention protocol if ordered  Outcome: Adequate for Discharge  Goal: Urinary catheter remains patent  Description  INTERVENTIONS:  - Assess patency of urinary catheter  - If patient has a chronic ocampo, consider changing catheter if non-functioning  - Follow guidelines for intermittent irrigation of non-functioning urinary catheter  Outcome: Adequate for Discharge     Problem: METABOLIC, FLUID AND ELECTROLYTES - ADULT  Goal: Electrolytes maintained within normal limits  Description  INTERVENTIONS:  - Monitor labs and assess patient for signs and symptoms of electrolyte imbalances  - Administer electrolyte replacement as ordered  - Monitor response to electrolyte replacements, including repeat lab results as appropriate  - Instruct patient on fluid and nutrition as appropriate  Outcome: Adequate for Discharge  Goal: Fluid balance maintained  Description  INTERVENTIONS:  - Monitor labs   - Monitor I/O and WT  - Instruct patient on fluid and nutrition as appropriate  - Assess for signs & symptoms of volume excess or deficit  Outcome: Adequate for Discharge  Goal: Glucose maintained within target range  Description  INTERVENTIONS:  - Monitor Blood Glucose as ordered  - Assess for signs and symptoms of hyperglycemia and hypoglycemia  - Administer ordered medications to maintain glucose within target range  - Assess nutritional intake and initiate nutrition service referral as needed  Outcome: Adequate for Discharge     Problem: SKIN/TISSUE INTEGRITY - ADULT  Goal: Skin integrity remains intact  Description  INTERVENTIONS  - Identify patients at risk for skin breakdown  - Assess and monitor skin integrity  - Assess and monitor nutrition and hydration status  - Monitor labs (i e  albumin)  - Assess for incontinence   - Turn and reposition patient  - Assist with mobility/ambulation  - Relieve pressure over bony prominences  - Avoid friction and shearing  - Provide appropriate hygiene as needed including keeping skin clean and dry  - Evaluate need for skin moisturizer/barrier cream  - Collaborate with interdisciplinary team (i e  Nutrition, Rehabilitation, etc )   - Patient/family teaching  Outcome: Adequate for Discharge  Goal: Incision(s), wounds(s) or drain site(s) healing without S/S of infection  Description  INTERVENTIONS  - Assess and document risk factors for skin impairment   - Assess and document dressing, incision, wound bed, drain sites and surrounding tissue  - Consider nutrition services referral as needed  - Oral mucous membranes remain intact  - Provide patient/ family education  Outcome: Adequate for Discharge  Goal: Oral mucous membranes remain intact  Description  INTERVENTIONS  - Assess oral mucosa and hygiene practices  - Implement preventative oral hygiene regimen  - Implement oral medicated treatments as ordered  - Initiate Nutrition services referral as needed  Outcome: Adequate for Discharge     Problem: HEMATOLOGIC - ADULT  Goal: Maintains hematologic stability  Description  INTERVENTIONS  - Assess for signs and symptoms of bleeding or hemorrhage  - Monitor labs  - Administer supportive blood products/factors as ordered and appropriate  Outcome: Adequate for Discharge     Problem: MUSCULOSKELETAL - ADULT  Goal: Maintain or return mobility to safest level of function  Description  INTERVENTIONS:  - Assess patient's ability to carry out ADLs; assess patient's baseline for ADL function and identify physical deficits which impact ability to perform ADLs (bathing, care of mouth/teeth, toileting, grooming, dressing, etc )  - Assess/evaluate cause of self-care deficits   - Assess range of motion  - Assess patient's mobility  - Assess patient's need for assistive devices and provide as appropriate  - Encourage maximum independence but intervene and supervise when necessary  - Involve family in performance of ADLs  - Assess for home care needs following discharge   - Consider OT consult to assist with ADL evaluation and planning for discharge  - Provide patient education as appropriate  Outcome: Adequate for Discharge  Goal: Maintain proper alignment of affected body part  Description  INTERVENTIONS:  - Support, maintain and protect limb and body alignment  - Provide patient/ family with appropriate education  Outcome: Adequate for Discharge     Problem: DISCHARGE PLANNING - CARE MANAGEMENT  Goal: Discharge to post-acute care or home with appropriate resources  Description  INTERVENTIONS:  - Conduct assessment to determine patient/family and health care team treatment goals, and need for post-acute services based on payer coverage, community resources, and patient preferences, and barriers to discharge  - Address psychosocial, clinical, and financial barriers to discharge as identified in assessment in conjunction with the patient/family and health care team  - Arrange appropriate level of post-acute services according to patient's   needs and preference and payer coverage in collaboration with the physician and health care team  - Communicate with and update the patient/family, physician, and health care team regarding progress on the discharge plan  - Arrange appropriate transportation to post-acute venues  PT is recommending STR  Pt is only TTWB S/P hip repair  Wife wants the pt to go home with [facility] and assist with her son  Will need BLS transport home  Outcome: Adequate for Discharge     Problem: Nutrition/Hydration-ADULT  Goal: Nutrient/Hydration intake appropriate for improving, restoring or maintaining nutritional needs  Description  Monitor and assess patient's nutrition/hydration status for malnutrition  Collaborate with interdisciplinary team and initiate plan and interventions as ordered  Monitor patient's weight and dietary intake as ordered or per policy  Utilize nutrition screening tool and intervene as necessary  Determine patient's food preferences and provide high-protein, high-caloric foods as appropriate       INTERVENTIONS:  - Monitor oral intake, urinary output, labs, and treatment plans  - Assess nutrition and hydration status and recommend course of action  - Evaluate amount of meals eaten  - Assist patient with eating if necessary   - Allow adequate time for meals  - Recommend/ encourage appropriate diets, oral nutritional supplements, and vitamin/mineral supplements  - Order, calculate, and assess calorie counts as needed  - Recommend, monitor, and adjust tube feedings and TPN/PPN based on assessed needs  - Assess need for intravenous fluids  - Provide specific nutrition/hydration education as appropriate  - Include patient/family/caregiver in decisions related to nutrition  Outcome: Adequate for Discharge

## 2020-02-27 NOTE — PHYSICAL THERAPY NOTE
02/27/20 1155   Pain Assessment   Pain Assessment 0-10   Pain Score 3   Pain Location Hip   Pain Orientation Right   Restrictions/Precautions   Weight Bearing Precautions Per Order Yes   RLE Weight Bearing Per Order TTWB   General   Chart Reviewed Yes   Response to Previous Treatment Patient unable to report, no changes reported from family or staff   Family/Caregiver Present No   Cognition   Overall Cognitive Status Impaired   Arousal/Participation Alert; Cooperative   Attention Attends with cues to redirect   Orientation Level Oriented to person;Oriented to place;Oriented to time   Memory Decreased short term memory;Decreased recall of recent events;Decreased recall of precautions   Following Commands Follows one step commands with increased time or repetition   Comments pt agreeable to PT session   Subjective   Subjective "I don't know why I'm here"   Bed Mobility   Rolling R 3  Moderate assistance   Additional items Assist x 1;Bedrails; Increased time required;Verbal cues   Rolling L 3  Moderate assistance   Additional items Assist x 1;Bedrails;Verbal cues; Increased time required   Supine to Sit 3  Moderate assistance   Additional items Assist x 1;Bedrails; Increased time required;Verbal cues;LE management   Sit to Supine 3  Moderate assistance   Additional items Assist x 1;Bedrails; Increased time required;Verbal cues;LE management   Balance   Static Sitting Fair   Dynamic Sitting Fair -   Endurance Deficit   Endurance Deficit Yes   Endurance Deficit Description ART, SOB   Activity Tolerance   Activity Tolerance Patient limited by fatigue;Patient limited by pain;Treatment limited secondary to medical complications (Comment)   Exercises   Quad Sets Supine;20 reps;AROM; Bilateral   Heelslides Supine;20 reps;AROM; Bilateral   Hip Flexion Sitting;20 reps;AROM; Bilateral   Hip Abduction 20 reps;AROM; Bilateral;Supine   Hip Adduction Sitting;20 reps;AROM; Bilateral   Knee AROM Short Arc [employer] reps;AROM; Bilateral   Knee AROM Long Arc Quad Sitting;20 reps;AROM; Bilateral   Ankle Pumps Supine;20 reps;AROM; Bilateral   Marching Sitting;20 reps;AROM; Bilateral   Balance training  sitting   Assessment   Prognosis Fair   Problem List Decreased strength;Decreased endurance; Impaired balance;Decreased mobility; Decreased coordination;Decreased cognition; Impaired judgement;Decreased safety awareness;Orthopedic restrictions;Pain   Assessment Pt seen for PT treatment session this date with interventions consisting of Therapeutic exercise consisting of: AROM 20 reps B LE in sitting and supine position and therapeutic activity consisting of training: bed mobility, supine<>sit transfers, static sitting tolerance at EOB for 14  minutes w/ min UE support and vc and tactile cues for static sitting posture faciliation  Pt agreeable to PT treatment session upon arrival, pt found supine in bed w/ HOB elevated, in no apparent distress and responsive  In comparison to previous session, pt with improvements in functional activity tolerance  Post session: pt returned BTB, bed alarm engaged and all needs in reach Continue to recommend STR at time of d/c in order to maximize pt's functional independence and safety w/ mobility  Pt continues to be functioning below baseline level, and remains limited 2* factors listed above and including decreased functional mobility, decreased functional activity tolerance, TTWB on RLE  PT will continue to see pt while here in order to address the deficits listed above and provide interventions consistent w/ POC in effort to achieve STGs  Barriers to Discharge Inaccessible home environment   Goals   PT Treatment Day 2   Plan   Treatment/Interventions Functional transfer training;LE strengthening/ROM; Therapeutic exercise; Endurance training;Bed mobility;Gait training;Cognitive reorientation   Progress Slow progress, decreased activity tolerance   PT Frequency 5x/wk   Recommendation   Recommendation Short-term skilled PT   Equipment Recommended Walker   Additional Comments upon conclusion, pt returned to resting in bed with needs in reach and SCD's active

## 2020-02-27 NOTE — MALNUTRITION/BMI
This medical record reflects one or more clinical indicators suggestive of malnutrition and/or morbid obesity  Malnutrition Findings:   Malnutrition type: Chronic illness  Degree of Malnutrition: Malnutrition of moderate degree(related to inadequate protein energy intake <75% of of estimated energy need >1 month as evidenced by moderate muscle wasting clavicle region, moderate orbital fat pad loss treated with level 3 dysphagia diet and ensure enlive BID )  Malnutrition Characteristics: Inadequate energy, Muscle loss    BMI Findings:  BMI Classifications: (BMI 22 62)     Body mass index is 22 62 kg/m²  See Nutrition note dated 2/27/2020 for additional details  Completed nutrition assessment is viewable in the nutrition documentation

## 2020-02-27 NOTE — CONSULTS
Consultation - Orthopedics   Ramy Gorman 80 y o  male MRN: [id number]  Unit/Bed#: -01 Encounter: [id number]      Assessment/Plan     Assessment:  Postop day number 32, status post cannulated screw fixation right hip  Plan:  Out of bed  Toe-touch weight-bearing right lower extremity  PT/OT  Follow-up in office in 2 weeks with new x-rays    History of Present Illness   Physician Requesting Consult: Paulina Vivas MD  Reason for Consult / Principal Problem:  Right hip fracture  HPI: Ramy Gorman is a 80y o  year old male who presents with a right hip fracture from late January  He is status post cannulated screw fixation from January 31st   There was no intraoperative complications  He has been hospitalized several times since the surgery for non orthopedic medical issues  He has had 2 office visits scheduled and canceled  I was notified from the nursing home 2 days ago about the staples    Orders were made to remove them and add Steri-Strips    Consults    Review of Systems    Historical Information   Past Medical History:   Diagnosis Date    [facility]     BPH (benign prostatic hyperplasia)     COPD (chronic obstructive pulmonary disease) (HCC)     Diabetes mellitus (HCC)     DJD (degenerative joint disease)     Falls     Gait abnormality     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     Renal disorder     [facility]     may 2015    Tobacco abuse      Past Surgical History:   Procedure Laterality Date    ANGIOPLASTY  03/01/2016    Right femoral vein    BACK SURGERY  2015    ORIF HIP FRACTURE Right 1/31/2020    Procedure: OPEN REDUCTION W/ INTERNAL FIXATION (ORIF) HIP WITH CANNUALATED SCREWS;  Surgeon: Donya Cohen DO;  Location: [address] [place] [address] OR;  Service: Orthopedics    SUPRAPUBIC TUBE PLACEMENT       Social History   Social History     Substance and Sexual Activity   Alcohol Use Never    Frequency: Never     Social History     Substance and Sexual Activity Drug Use No     E-Cigarette/Vaping     E-Cigarette/Vaping Substances     Social History     Tobacco Use   Smoking Status Current Every Day Smoker    Packs/day: 1 00    Years: 70 00    Pack years: 70 00   Smokeless Tobacco Never Used   Tobacco Comment    Wife reports it's a losing matos     Family History: non-contributory    Meds/Allergies   current meds:   Current Facility-Administered Medications   Medication Dose Route Frequency    albuterol inhalation solution 2 5 mg  2 5 mg Nebulization Q4H PRN    ALPRAZolam (XANAX) tablet 0 5 mg  0 5 mg Oral HS PRN    aspirin (ECOTRIN LOW STRENGTH) EC tablet 81 mg  81 mg Oral Daily    atorvastatin (LIPITOR) tablet 20 mg  20 mg Oral Daily With Dinner    budesonide (PULMICORT) inhalation solution 0 5 mg  0 5 mg Nebulization Q12H    docusate sodium (COLACE) capsule 100 mg  100 mg Oral BID    escitalopram (LEXAPRO) tablet 10 mg  10 mg Oral Daily    finasteride (PROSCAR) tablet 5 mg  5 mg Oral Daily    fondaparinux (ARIXTRA) subcutaneous injection 2 5 mg  2 5 mg Subcutaneous Daily    furosemide (LASIX) tablet 40 mg  40 mg Oral Daily    gabapentin (NEURONTIN) capsule 100 mg  100 mg Oral TID    guaiFENesin (MUCINEX) 12 hr tablet 600 mg  600 mg Oral BID    HYDROcodone-acetaminophen (NORCO) 5-325 mg per tablet 1 tablet  1 tablet Oral Q6H PRN    insulin glargine (LANTUS) subcutaneous injection 20 Units 0 2 mL  20 Units Subcutaneous Daily    insulin lispro (HumaLOG) 100 units/mL subcutaneous injection 1-5 Units  1-5 Units Subcutaneous HS    insulin lispro (HumaLOG) 100 units/mL subcutaneous injection 1-6 Units  1-6 Units Subcutaneous TID AC    levalbuterol (XOPENEX) inhalation solution 1 25 mg  1 25 mg Nebulization TID    And    sodium chloride 0 9 % inhalation solution 3 mL  3 mL Nebulization TID    nicotine (NICODERM CQ) 21 mg/24 hr TD 24 hr patch 1 patch  1 patch Transdermal Daily    oxybutynin (DITROPAN) tablet 5 mg  5 mg Oral Daily    polyethylene glycol (MIRALAX) packet 17 g  17 g Oral Daily    senna (SENOKOT) tablet 17 2 mg  2 tablet Oral Daily PRN     No Known Allergies    Objective   Vitals: Blood pressure 112/58, pulse 74, temperature 98 6 °F (37 °C), temperature source Tympanic, resp  rate 18, height 5' 10" (1 778 m), weight 71 5 kg (157 lb 10 1 oz), SpO2 93 %  ,Body mass index is 22 62 kg/m²  Intake/Output Summary (Last 24 hours) at 2/27/2020 0801  Last data filed at 2/27/2020 0500  Gross per 24 hour   Intake 240 ml   Output 3700 ml   Net -3460 ml     I/O last 24 hours: In: 240 [P O :240]  Out: 3700 [Urine:3700]    Invasive Devices     Peripheral Intravenous Line            Peripheral IV 02/25/20 Right Antecubital 1 day          Drain            Suprapubic Catheter Latex 22 Fr  -- days                Physical Exam  Ortho Exam      Right lower extremity is neurovascular intact  Toes are pink and mobile  Compartments are soft  Incision is clean, dry, and intact  Steri-Strips are on  There is a fairly painless arc of motion to rotation of the right hip  No groin pain  Arc of motion is improved  Negative Homans  Lab Results:   CBC:   Lab Results   Component Value Date    WBC 6 30 02/27/2020    HGB 10 2 (L) 02/27/2020    HCT 30 6 (L) 02/27/2020    MCV 98 02/27/2020     02/27/2020    MCH 32 6 02/27/2020    MCHC 33 2 02/27/2020    RDW 13 9 02/27/2020    MPV 8 3 (L) 02/27/2020     PT/INR: No results found for: PT, INR  ESR: No results found for: ESR  Imaging Studies: I have personally reviewed pertinent reports  EKG, Pathology, and Other Studies: I have personally reviewed pertinent reports  VTE Prophylaxis: Sequential compression device Lavnicki Martinezdict)     Code Status: Level 3 - DNAR and DNI  Advance Directive and Living Will: Yes    Power of :    POLST:      Counseling / Coordination of Care  Total floor / unit time spent today 30 minutes   Greater than 50% of total time was spent with the patient and / or family counseling and / or coordination of care   A description of the counseling / coordination of care:

## 2020-02-27 NOTE — PLAN OF CARE
Problem: Potential for Falls  Goal: Patient will remain free of falls  Description  INTERVENTIONS:  - Assess patient frequently for physical needs  -  Identify cognitive and physical deficits and behaviors that affect risk of falls    -  [place] fall precautions as indicated by assessment   - Educate patient/family on patient safety including physical limitations  - Instruct patient to call for assistance with activity based on assessment  - Modify environment to reduce risk of injury  - Consider OT/PT consult to assist with strengthening/mobility  Outcome: Progressing     Problem: Prexisting or High Potential for Compromised Skin Integrity  Goal: Skin integrity is maintained or improved  Description  INTERVENTIONS:  - Identify patients at risk for skin breakdown  - Assess and monitor skin integrity  - Assess and monitor nutrition and hydration status  - Monitor labs   - Assess for incontinence   - Turn and reposition patient  - Assist with mobility/ambulation  - Relieve pressure over bony prominences  - Avoid friction and shearing  - Provide appropriate hygiene as needed including keeping skin clean and dry  - Evaluate need for skin moisturizer/barrier cream  - Collaborate with interdisciplinary team   - Patient/family teaching  - Consider wound care consult   Outcome: Progressing     Problem: PAIN - ADULT  Goal: Verbalizes/displays adequate comfort level or baseline comfort level  Description  Interventions:  - Encourage patient to monitor pain and request assistance  - Assess pain using appropriate pain scale  - Administer analgesics based on type and severity of pain and evaluate response  - Implement non-pharmacological measures as appropriate and evaluate response  - Consider cultural and social influences on pain and pain management  - Notify physician/advanced practitioner if interventions unsuccessful or patient reports new pain  Outcome: Progressing     Problem: INFECTION - ADULT  Goal: Absence or prevention of progression during hospitalization  Description  INTERVENTIONS:  - Assess and monitor for signs and symptoms of infection  - Monitor lab/diagnostic results  - Monitor all insertion sites, i e  indwelling lines, tubes, and drains  - Monitor endotracheal if appropriate and nasal secretions for changes in amount and color  - [place] appropriate cooling/warming therapies per order  - Administer medications as ordered  - Instruct and encourage patient and family to use good hand hygiene technique  - Identify and instruct in appropriate isolation precautions for identified infection/condition  Outcome: Progressing  Goal: Absence of fever/infection during neutropenic period  Description  INTERVENTIONS:  - Monitor WBC    Outcome: Progressing     Problem: SAFETY ADULT  Goal: Patient will remain free of falls  Description  INTERVENTIONS:  - Assess patient frequently for physical needs  -  Identify cognitive and physical deficits and behaviors that affect risk of falls  -  [place] fall precautions as indicated by assessment   - Educate patient/family on patient safety including physical limitations  - Instruct patient to call for assistance with activity based on assessment  - Modify environment to reduce risk of injury  - Consider OT/PT consult to assist with strengthening/mobility  Outcome: Progressing  Goal: Maintain or return to baseline ADL function  Description  INTERVENTIONS:  - Assess patient frequently for physical needs  -  Identify cognitive and physical deficits and behaviors that affect risk of falls    -  [place] fall precautions as indicated by assessment   - Educate patient/family on patient safety including physical limitations  - Instruct patient to call for assistance with activity based on assessment  - Modify environment to reduce risk of injury  - Consider OT/PT consult to assist with strengthening/mobility  Outcome: Progressing  Goal: Maintain or return mobility status to optimal level  Description  INTERVENTIONS:  -  Assess patient's ability to carry out ADLs; assess patient's baseline for ADL function and identify physical deficits which impact ability to perform ADLs (bathing, care of mouth/teeth, toileting, grooming, dressing, etc )  - Assess/evaluate cause of self-care deficits   - Assess range of motion  - Assess patient's mobility; develop plan if impaired  - Assess patient's need for assistive devices and provide as appropriate  - Encourage maximum independence but intervene and supervise when necessary  - Involve family in performance of ADLs  - Assess for home care needs following discharge   - Consider OT consult to assist with ADL evaluation and planning for discharge  - Provide patient education as appropriate  Outcome: Progressing     Problem: CARDIOVASCULAR - ADULT  Goal: Maintains optimal cardiac output and hemodynamic stability  Description  INTERVENTIONS:  - Monitor I/O, vital signs and rhythm  - Monitor for S/S and trends of decreased cardiac output  - Administer and titrate ordered vasoactive medications to optimize hemodynamic stability  - Assess quality of pulses, skin color and temperature  - Assess for signs of decreased coronary artery perfusion  - Instruct patient to report change in severity of symptoms  Outcome: Progressing  Goal: Absence of cardiac dysrhythmias or at baseline rhythm  Description  INTERVENTIONS:  - Continuous cardiac monitoring, vital signs, obtain 12 lead EKG if ordered  - Administer antiarrhythmic and heart rate control medications as ordered  - Monitor electrolytes and administer replacement therapy as ordered  Outcome: Progressing     Problem: RESPIRATORY - ADULT  Goal: Achieves optimal ventilation and oxygenation  Description  INTERVENTIONS:  - Assess for changes in respiratory status  - Assess for changes in mentation and behavior  - Position to facilitate oxygenation and minimize respiratory effort  - Oxygen administered by appropriate delivery if ordered  - Initiate smoking cessation education as indicated  - Encourage broncho-pulmonary hygiene including cough, deep breathe, Incentive Spirometry  - Assess the need for suctioning and aspirate as needed  - Assess and instruct to report SOB or any respiratory difficulty  - Respiratory Therapy support as indicated  Outcome: Progressing     Problem: GENITOURINARY - ADULT  Goal: Maintains or returns to baseline urinary function  Description  INTERVENTIONS:  - Assess urinary function  - Encourage oral fluids to ensure adequate hydration if ordered  - Administer IV fluids as ordered to ensure adequate hydration  - Administer ordered medications as needed  - Offer frequent toileting  - Follow urinary retention protocol if ordered  Outcome: Progressing  Goal: Urinary catheter remains patent  Description  INTERVENTIONS:  - Assess patency of urinary catheter  - If patient has a chronic ocampo, consider changing catheter if non-functioning  - Follow guidelines for intermittent irrigation of non-functioning urinary catheter  Outcome: Progressing     Problem: METABOLIC, FLUID AND ELECTROLYTES - ADULT  Goal: Electrolytes maintained within normal limits  Description  INTERVENTIONS:  - Monitor labs and assess patient for signs and symptoms of electrolyte imbalances  - Administer electrolyte replacement as ordered  - Monitor response to electrolyte replacements, including repeat lab results as appropriate  - Instruct patient on fluid and nutrition as appropriate  Outcome: Progressing  Goal: Fluid balance maintained  Description  INTERVENTIONS:  - Monitor labs   - Monitor I/O and WT  - Instruct patient on fluid and nutrition as appropriate  - Assess for signs & symptoms of volume excess or deficit  Outcome: Progressing  Goal: Glucose maintained within target range  Description  INTERVENTIONS:  - Monitor Blood Glucose as ordered  - Assess for signs and symptoms of hyperglycemia and hypoglycemia  - Administer ordered medications to maintain glucose within target range  - Assess nutritional intake and initiate nutrition service referral as needed  Outcome: Progressing     Problem: SKIN/TISSUE INTEGRITY - ADULT  Goal: Skin integrity remains intact  Description  INTERVENTIONS  - Identify patients at risk for skin breakdown  - Assess and monitor skin integrity  - Assess and monitor nutrition and hydration status  - Monitor labs (i e  albumin)  - Assess for incontinence   - Turn and reposition patient  - Assist with mobility/ambulation  - Relieve pressure over bony prominences  - Avoid friction and shearing  - Provide appropriate hygiene as needed including keeping skin clean and dry  - Evaluate need for skin moisturizer/barrier cream  - Collaborate with interdisciplinary team (i e  Nutrition, Rehabilitation, etc )   - Patient/family teaching  Outcome: Progressing  Goal: Incision(s), wounds(s) or drain site(s) healing without S/S of infection  Description  INTERVENTIONS  - Assess and document risk factors for skin impairment   - Assess and document dressing, incision, wound bed, drain sites and surrounding tissue  - Consider nutrition services referral as needed  - Oral mucous membranes remain intact  - Provide patient/ family education  Outcome: Progressing  Goal: Oral mucous membranes remain intact  Description  INTERVENTIONS  - Assess oral mucosa and hygiene practices  - Implement preventative oral hygiene regimen  - Implement oral medicated treatments as ordered  - Initiate Nutrition services referral as needed  Outcome: Progressing     Problem: HEMATOLOGIC - ADULT  Goal: Maintains hematologic stability  Description  INTERVENTIONS  - Assess for signs and symptoms of bleeding or hemorrhage  - Monitor labs  - Administer supportive blood products/factors as ordered and appropriate  Outcome: Progressing     Problem: MUSCULOSKELETAL - ADULT  Goal: Maintain or return mobility to safest level of function  Description  INTERVENTIONS:  - Assess patient's ability to carry out ADLs; assess patient's baseline for ADL function and identify physical deficits which impact ability to perform ADLs (bathing, care of mouth/teeth, toileting, grooming, dressing, etc )  - Assess/evaluate cause of self-care deficits   - Assess range of motion  - Assess patient's mobility  - Assess patient's need for assistive devices and provide as appropriate  - Encourage maximum independence but intervene and supervise when necessary  - Involve family in performance of ADLs  - Assess for home care needs following discharge   - Consider OT consult to assist with ADL evaluation and planning for discharge  - Provide patient education as appropriate  Outcome: Progressing  Goal: Maintain proper alignment of affected body part  Description  INTERVENTIONS:  - Support, maintain and protect limb and body alignment  - Provide patient/ family with appropriate education  Outcome: Progressing     Problem: DISCHARGE PLANNING - CARE MANAGEMENT  Goal: Discharge to post-acute care or home with appropriate resources  Description  INTERVENTIONS:  - Conduct assessment to determine patient/family and health care team treatment goals, and need for post-acute services based on payer coverage, community resources, and patient preferences, and barriers to discharge  - Address psychosocial, clinical, and financial barriers to discharge as identified in assessment in conjunction with the patient/family and health care team  - Arrange appropriate level of post-acute services according to patient's   needs and preference and payer coverage in collaboration with the physician and health care team  - Communicate with and update the patient/family, physician, and health care team regarding progress on the discharge plan  - Arrange appropriate transportation to post-acute venues  PT is recommending STR  Pt is only [facility] S/P hip repair  Wife wants the pt to go home with [facility] and assist with her son    Will need BLS transport home     Outcome: Progressing

## 2020-02-29 LAB
BACTERIA SPT RESP CULT: ABNORMAL
GRAM STN SPEC: ABNORMAL
GRAM STN SPEC: ABNORMAL

## 2020-03-01 LAB
BACTERIA BLD CULT: NORMAL
BACTERIA BLD CULT: NORMAL

## 2020-03-02 ENCOUNTER — PATIENT OUTREACH (OUTPATIENT)
Dept: CASE MANAGEMENT | Facility: OTHER | Age: 85
End: 2020-03-02

## 2020-03-23 ENCOUNTER — PATIENT OUTREACH (OUTPATIENT)
Dept: CASE MANAGEMENT | Facility: OTHER | Age: 85
End: 2020-03-23

## 2020-03-23 NOTE — PROGRESS NOTES
CM placed call to Tiffany Bergeron in [facility] to confirm hospice services  Staff member first offered [facility] covering [facility], and [employer] [phone]  Before transferring, staff member placed this CM on a lengthy hold to confirm correct office / phone contact and returned to the line reporting that patient is actually being serviced by the [place] office  Staff member offered [phone] and then transferred  Staff member Maria Fernanda Barrientos reports confirmed that patient is on hospice services  BPCI form and care coordination note updated  This care manager will continue to monitor via chart review throughout bundle episode

## 2020-04-10 ENCOUNTER — PATIENT OUTREACH (OUTPATIENT)
Dept: CASE MANAGEMENT | Facility: OTHER | Age: 85
End: 2020-04-10

## 2020-04-24 ENCOUNTER — PATIENT OUTREACH (OUTPATIENT)
Dept: CASE MANAGEMENT | Facility: OTHER | Age: 85
End: 2020-04-24

## 2020-08-17 NOTE — PROGRESS NOTES
Epic notification received for patient discharge from [place] [id number]  Patient hospitalized 2/25/2020 - 2/27/2020 at [facility]  Patient discharged to home with MEGAN MIRANDA  This care manager will continue to monitor via chart review throughout bundle episode  normal

## 2020-08-18 NOTE — SOCIAL WORK
Evaluated the pt at the bedside on 1/30/2020  Pt was admitted to the hospital with a hip fracture  Explained the role of CM and then options of discharge planning with the pt  Pt states he lives with his wife in a one story home with 10-12 KHANH  Pt states his wife assists him with bathing and dressing  Wife performs all the IALD's  Pt is oxygen dependent at home on 4L/min  Pt does not know the name of the oxygen company  Pt states he ambulates with a walker at home where he had fallen  [facility]  Pt will be having a hip repair on 1/31/2020  Spoke to the pt about STR  Pt would like a referral made to [facility]  Referral made  Patient/caregiver received discharge checklist   Content reviewed  Patient/caregiver encouraged to participate in discharge plan of care prior to discharge home  CM reviewed d/c planning process including the following: identifying help at home, patient preference for d/c planning needs, availability of treatment team to discuss questions or concerns patient and/or family may have regarding understanding medications and recognizing signs and symptoms once discharged  CM also encouraged patient to follow up with all recommended appointments after discharge  Patient advised of importance for patient and family to participate in managing patients medical well being 
Pt has been accepted to [facility]   Wife here and will go to [facility] to sign paperwork  Provided Florencia Asper with the number for nurse to nurse report  Pt is aware and agreeable to same 
Pt has retruned from the OR s/p hip repair  Pt will need STR and a referral was made to ARU  Will need to be evaluated by PT and OT 
Received a TC from Jignesh Herrera from [facility] the pt has been denied  A post acute care recommendation was made by your care team for STR  Discussed Freedom of Choice with both patient and caregiver  List of facilities given to both patient and caregiver via in person  both patient and caregiver aware the list is custom filtered for them by zip code location and that [facility] post acute providers are designated  Wife states she would be the one driving to see him and would prefer the referrals be sent locally to [facility]  Pt wife indicated the pt at been at [facility]  in the past   Referrals made  Wife states as long as the pt is back to his prior level of function and can walk with the walker she would take him home 
[facility] referral was sent as requested, I called the pt's wife to # [phone], she does not want snf rehab at this time, she wants the pt to go to aru, I explained he may not be accepted, she stated if he is not accepted then she will think about snf rehab, cm will continue to follow 
62

## 2022-01-11 NOTE — ASSESSMENT & PLAN NOTE
· Chronically on 4 L, the patient was require 6 L nasal cannula as his SP O2 dropped to mid 70s  · Currently is improving   · Able to titrate down to 4L; continue oxygen supplement to keep SpO2 greater than 88%    · Incentive spirometry, early ambulation cough and deep breathing no

## (undated) DEVICE — GAUZE SPONGES,16 PLY: Brand: CURITY

## (undated) DEVICE — GLOVE INDICATOR UNDERGLOVE SZ 7 GREEN

## (undated) DEVICE — SUT VICRYL 0 CT-1 27 IN J260H

## (undated) DEVICE — ABDOMINAL PAD: Brand: DERMACEA

## (undated) DEVICE — 5.0MM CANNULATED DRILL BIT LARGE QC/300MM

## (undated) DEVICE — INTENDED FOR TISSUE SEPARATION, AND OTHER PROCEDURES THAT REQUIRE A SHARP SURGICAL BLADE TO PUNCTURE OR CUT.: Brand: BARD-PARKER ® CARBON RIB-BACK BLADES

## (undated) DEVICE — 2.8MM THREADED GUIDE WIRE- TROCAR POINT 300MM

## (undated) DEVICE — BETHLEHEM MAJOR GENERAL PACK: Brand: CARDINAL HEALTH

## (undated) DEVICE — GARMENT,MEDLINE,DVT,INT,CALF,FOAM,MED: Brand: MEDLINE

## (undated) DEVICE — READY WET SKIN SCRUB TRAY-LF: Brand: MEDLINE INDUSTRIES, INC.

## (undated) DEVICE — STAPLER SKIN 35 WIDE ULC APPOSE

## (undated) DEVICE — SUT VICRYL 2-0 CP-1 27 IN J266H

## (undated) DEVICE — ACE WRAP 6 IN UNSTERILE

## (undated) DEVICE — TRANSPOSAL ULTRAFLEX DUO/QUAD ULTRA CART MANIFOLD

## (undated) DEVICE — SKIN MARKER DUAL TIP WITH RULER CAP, FLEXIBLE RULER AND LABELS: Brand: DEVON

## (undated) DEVICE — PREP SURGICAL PURPREP 26ML

## (undated) DEVICE — BULB SYRINGE,IRRIGATION WITH PROTECTIVE CAP: Brand: DOVER

## (undated) DEVICE — GLOVE INDICATOR UNDERGLOVE SZ 8 GREEN

## (undated) DEVICE — POOLE SUCTION HANDLE W/TUBING: Brand: CARDINAL HEALTH

## (undated) DEVICE — GLOVE SRG BIOGEL 8

## (undated) DEVICE — 3M™ STERI-DRAPE™  ISOLATION DRAPE WITH INCISE FILM AND POUCH 1017: Brand: STERI-DRAPE™

## (undated) DEVICE — BAG DECANTER

## (undated) DEVICE — 3M™ TEGADERM™ TRANSPARENT FILM DRESSING FRAME STYLE, 1626W, 4 IN X 4-3/4 IN (10 CM X 12 CM), 50/CT 4CT/CASE: Brand: 3M™ TEGADERM™

## (undated) DEVICE — OCCLUSIVE GAUZE STRIP,3% BISMUTH TRIBROMOPHENATE IN PETROLATUM BLEND: Brand: XEROFORM

## (undated) DEVICE — VIOLET BRAIDED (POLYGLACTIN 910), SYNTHETIC ABSORBABLE SUTURE: Brand: COATED VICRYL

## (undated) DEVICE — DRESSING XEROFORM 5 X 9

## (undated) DEVICE — GLOVE SRG BIOGEL 7